# Patient Record
Sex: MALE | Race: WHITE | NOT HISPANIC OR LATINO | Employment: OTHER | URBAN - NONMETROPOLITAN AREA
[De-identification: names, ages, dates, MRNs, and addresses within clinical notes are randomized per-mention and may not be internally consistent; named-entity substitution may affect disease eponyms.]

---

## 2017-10-16 ENCOUNTER — OFFICE VISIT (OUTPATIENT)
Dept: NEUROSURGERY | Facility: CLINIC | Age: 82
End: 2017-10-16

## 2017-10-16 VITALS
WEIGHT: 216 LBS | SYSTOLIC BLOOD PRESSURE: 138 MMHG | BODY MASS INDEX: 30.92 KG/M2 | DIASTOLIC BLOOD PRESSURE: 80 MMHG | HEIGHT: 70 IN

## 2017-10-16 DIAGNOSIS — Z87.891 FORMER SMOKER: ICD-10-CM

## 2017-10-16 DIAGNOSIS — M48.062 SPINAL STENOSIS, LUMBAR REGION, WITH NEUROGENIC CLAUDICATION: Primary | ICD-10-CM

## 2017-10-16 DIAGNOSIS — E66.3 OVERWEIGHT: ICD-10-CM

## 2017-10-16 PROCEDURE — 99203 OFFICE O/P NEW LOW 30 MIN: CPT | Performed by: NEUROLOGICAL SURGERY

## 2017-10-16 RX ORDER — AMLODIPINE BESYLATE 5 MG/1
TABLET ORAL
Status: ON HOLD | COMMUNITY
Start: 2017-09-21 | End: 2017-10-30

## 2017-10-16 RX ORDER — ALPRAZOLAM 0.5 MG/1
TABLET ORAL
Status: ON HOLD | COMMUNITY
Start: 2017-09-13 | End: 2017-10-30 | Stop reason: SDUPTHER

## 2017-10-16 RX ORDER — HYDROCODONE BITARTRATE AND ACETAMINOPHEN 7.5; 325 MG/1; MG/1
TABLET ORAL
COMMUNITY
Start: 2017-10-09

## 2017-10-16 RX ORDER — AMLODIPINE BESYLATE 10 MG/1
TABLET ORAL
Status: ON HOLD | COMMUNITY
Start: 2017-07-22 | End: 2017-10-30

## 2017-10-17 NOTE — PROGRESS NOTES
"    Chief complaint   Chief Complaint   Patient presents with   • Back Pain        Subjective     HPI:     This patient is a 82-year-old male who presents with a 8 month history of spontaneous onset of back pain and trouble walking.  The quality is constant in the severity is 5 out of 10.  The majority of his pain is located in his low back region and his hip region, the timing is all the time.  It is associated with difficulty walking long distances and pain that radiates down his legs.  Walking worsens his symptoms, pain pills improve his symptoms.  He currently is seeing Dr. Mejia, a pain management physician in [place], and is being treated with Lortabs.  He is being referred for findings on his MRI.  His wife is at bedside.    Review of Systems     A 12 point review of systems is obtained and is negative except for as described in HPI    Past Medical History:   Diagnosis Date   • Arthritis    • Diabetes    • Gout    • Hypertension      Past Surgical History:   Procedure Laterality Date   • KNEE SURGERY       No family history on file.  Social History   Substance Use Topics   • Smoking status: Former Smoker   • Smokeless tobacco: None   • Alcohol use No       (Not in a hospital admission)  Allergies:  Review of patient's allergies indicates no known allergies.    Objective      Vital Signs  /80  Ht 70\" (177.8 cm)  Wt 216 lb (98 kg)  BMI 30.99 kg/m2    Physical Exam    No acute distress  Awake alert oriented ×3  HEENT atraumatic normocephalic  Neck supple  Abdomen soft, nontender  Extremities no clubbing, edema, cyanosis  Neurologic exam  Cranial nerves II through XII grossly intact  No pronator drift  Patient moves all extremities 5 out 5 strength  Sensation is intact light touch in upper and lower extremities  2+ biceps reflex, 1+ patellar reflex bilaterally  Ataxic, shuffling gait    Results Review:     MRI of lumbar spine reveals severe lumbar stenosis at L2, 3 and L3, 4 and moderate lumbar " stenosis at L4, 5          Assessment/Plan:     82-year-old male with a three-month history of low back pain and neurogenic claudication with severe lumbar stenosis.  I directly reviewed imaging findings with the patient and I discuss risks, benefits, alternatives of a lumbar laminectomy for treatment of his neurogenic claudication.  Patient acknowledges and declines surgical intervention as it would not address his complaint of low back pain.  Patient states that his wife benefited significantly from pain pump placement.  Patient states that he would like to proceed with pain pump placement and forgo a lumbar laminectomy.  I have discussed patient's wishes with Dr. Mejia's office.  I will follow-up with the patient in the future if he wishes to proceed with a lumbar laminectomy.  Thank you for this consultation.    I discussed the patients findings and my recommendations with patient    Wade uGdino MD  10/17/17  12:03 PM

## 2017-10-30 ENCOUNTER — HOSPITAL ENCOUNTER (INPATIENT)
Facility: HOSPITAL | Age: 82
LOS: 15 days | Discharge: HOME | End: 2017-11-14
Attending: FAMILY MEDICINE | Admitting: FAMILY MEDICINE

## 2017-10-30 DIAGNOSIS — I10 ESSENTIAL HYPERTENSION: ICD-10-CM

## 2017-10-30 DIAGNOSIS — M54.9 CHRONIC BACK PAIN GREATER THAN 3 MONTHS DURATION: ICD-10-CM

## 2017-10-30 DIAGNOSIS — G89.29 CHRONIC BACK PAIN GREATER THAN 3 MONTHS DURATION: ICD-10-CM

## 2017-10-30 DIAGNOSIS — R53.1 LEFT-SIDED WEAKNESS: ICD-10-CM

## 2017-10-30 DIAGNOSIS — Z74.09 IMPAIRED MOBILITY AND ADLS: ICD-10-CM

## 2017-10-30 DIAGNOSIS — R48.9 SYMBOLIC DYSFUNCTION: ICD-10-CM

## 2017-10-30 DIAGNOSIS — M62.81 MUSCLE WEAKNESS (GENERALIZED): ICD-10-CM

## 2017-10-30 DIAGNOSIS — F41.9 CHRONIC ANXIETY: Primary | ICD-10-CM

## 2017-10-30 DIAGNOSIS — Z78.9 IMPAIRED MOBILITY AND ADLS: ICD-10-CM

## 2017-10-30 DIAGNOSIS — R26.9 ABNORMALITY OF GAIT AND MOBILITY: ICD-10-CM

## 2017-10-30 DIAGNOSIS — I63.81 RIGHT-SIDED LACUNAR STROKE (HCC): ICD-10-CM

## 2017-10-30 DIAGNOSIS — I63.81 RIGHT-SIDED LACUNAR STROKE (HCC): Primary | ICD-10-CM

## 2017-10-30 DIAGNOSIS — E11.9 TYPE 2 DIABETES MELLITUS WITHOUT COMPLICATION, WITHOUT LONG-TERM CURRENT USE OF INSULIN (HCC): ICD-10-CM

## 2017-10-30 PROBLEM — G47.33 OBSTRUCTIVE SLEEP APNEA SYNDROME: Status: ACTIVE | Noted: 2017-10-30

## 2017-10-30 PROBLEM — I48.20 ATRIAL FIBRILLATION, CHRONIC (HCC): Status: ACTIVE | Noted: 2017-10-30

## 2017-10-30 PROBLEM — Z51.89 ENCOUNTER FOR REHABILITATION: Status: ACTIVE | Noted: 2017-10-30

## 2017-10-30 LAB
GLUCOSE BLDC GLUCOMTR-MCNC: 100 MG/DL (ref 70–130)
GLUCOSE BLDC GLUCOMTR-MCNC: 130 MG/DL (ref 70–130)

## 2017-10-30 PROCEDURE — 93010 ELECTROCARDIOGRAM REPORT: CPT | Performed by: INTERNAL MEDICINE

## 2017-10-30 PROCEDURE — 82962 GLUCOSE BLOOD TEST: CPT

## 2017-10-30 PROCEDURE — 93005 ELECTROCARDIOGRAM TRACING: CPT | Performed by: FAMILY MEDICINE

## 2017-10-30 PROCEDURE — 99221 1ST HOSP IP/OBS SF/LOW 40: CPT | Performed by: FAMILY MEDICINE

## 2017-10-30 RX ORDER — NAPROXEN 500 MG/1
TABLET ORAL
COMMUNITY
Start: 2017-08-16 | End: 2017-11-14 | Stop reason: HOSPADM

## 2017-10-30 RX ORDER — METOPROLOL SUCCINATE 50 MG/1
50 TABLET, EXTENDED RELEASE ORAL DAILY
Qty: 1 TABLET | Refills: 1
Start: 2017-10-30 | End: 2017-11-14

## 2017-10-30 RX ORDER — GLIMEPIRIDE 2 MG/1
2 TABLET ORAL
Qty: 1 TABLET | Refills: 1
Start: 2017-10-30

## 2017-10-30 RX ORDER — LISINOPRIL 10 MG/1
10 TABLET ORAL
Status: DISCONTINUED | OUTPATIENT
Start: 2017-10-31 | End: 2017-11-14 | Stop reason: HOSPADM

## 2017-10-30 RX ORDER — ASPIRIN 81 MG/1
81 TABLET, CHEWABLE ORAL DAILY
Status: DISCONTINUED | OUTPATIENT
Start: 2017-10-31 | End: 2017-11-14 | Stop reason: HOSPADM

## 2017-10-30 RX ORDER — ASPIRIN 81 MG/1
81 TABLET, CHEWABLE ORAL DAILY
Qty: 1 TABLET | Refills: 1
Start: 2017-10-30

## 2017-10-30 RX ORDER — TERAZOSIN 5 MG/1
5 CAPSULE ORAL NIGHTLY
Status: DISCONTINUED | OUTPATIENT
Start: 2017-10-30 | End: 2017-11-14 | Stop reason: HOSPADM

## 2017-10-30 RX ORDER — ALPRAZOLAM 0.5 MG/1
0.5 TABLET ORAL NIGHTLY PRN
Qty: 1 TABLET | Refills: 0
Start: 2017-10-30 | End: 2017-11-14 | Stop reason: HOSPADM

## 2017-10-30 RX ORDER — CALCIUM CARBONATE 200(500)MG
2 TABLET,CHEWABLE ORAL 2 TIMES DAILY PRN
Status: DISCONTINUED | OUTPATIENT
Start: 2017-10-30 | End: 2017-11-14 | Stop reason: HOSPADM

## 2017-10-30 RX ORDER — PANTOPRAZOLE SODIUM 40 MG/1
40 TABLET, DELAYED RELEASE ORAL DAILY
Qty: 1 TABLET | Refills: 1 | OUTPATIENT
Start: 2017-10-30 | End: 2017-11-14 | Stop reason: HOSPADM

## 2017-10-30 RX ORDER — METOPROLOL SUCCINATE 50 MG/1
50 TABLET, EXTENDED RELEASE ORAL NIGHTLY
Status: DISCONTINUED | OUTPATIENT
Start: 2017-10-30 | End: 2017-11-14 | Stop reason: HOSPADM

## 2017-10-30 RX ORDER — HYDROCODONE BITARTRATE AND ACETAMINOPHEN 7.5; 325 MG/1; MG/1
1 TABLET ORAL EVERY 8 HOURS PRN
Status: DISCONTINUED | OUTPATIENT
Start: 2017-10-30 | End: 2017-10-31

## 2017-10-30 RX ORDER — LISINOPRIL 10 MG/1
10 TABLET ORAL DAILY
Qty: 1 TABLET | Refills: 1
Start: 2017-10-30 | End: 2017-11-14

## 2017-10-30 RX ORDER — DOXAZOSIN 8 MG/1
8 TABLET ORAL NIGHTLY
Qty: 1 TABLET | Refills: 1
Start: 2017-10-30

## 2017-10-30 RX ORDER — CLOPIDOGREL BISULFATE 75 MG/1
75 TABLET ORAL DAILY
Qty: 1 TABLET | Refills: 1
Start: 2017-10-30 | End: 2017-11-14

## 2017-10-30 RX ORDER — PANTOPRAZOLE SODIUM 40 MG/1
40 TABLET, DELAYED RELEASE ORAL
Status: DISCONTINUED | OUTPATIENT
Start: 2017-10-31 | End: 2017-11-14 | Stop reason: HOSPADM

## 2017-10-30 RX ORDER — OMEPRAZOLE 20 MG/1
CAPSULE, DELAYED RELEASE ORAL
COMMUNITY
Start: 2017-09-13

## 2017-10-30 RX ORDER — ACETAMINOPHEN 325 MG/1
650 TABLET ORAL EVERY 4 HOURS PRN
Status: DISCONTINUED | OUTPATIENT
Start: 2017-10-30 | End: 2017-11-14 | Stop reason: HOSPADM

## 2017-10-30 RX ORDER — AMLODIPINE BESYLATE 10 MG/1
10 TABLET ORAL NIGHTLY
Status: DISCONTINUED | OUTPATIENT
Start: 2017-10-30 | End: 2017-11-14 | Stop reason: HOSPADM

## 2017-10-30 RX ORDER — NICOTINE POLACRILEX 4 MG
15 LOZENGE BUCCAL
Status: DISCONTINUED | OUTPATIENT
Start: 2017-10-30 | End: 2017-11-14 | Stop reason: HOSPADM

## 2017-10-30 RX ORDER — LANOLIN ALCOHOL/MO/W.PET/CERES
1 CREAM (GRAM) TOPICAL DAILY
Qty: 1 TABLET | Refills: 1
Start: 2017-10-30 | End: 2017-11-14

## 2017-10-30 RX ORDER — ATORVASTATIN CALCIUM 10 MG/1
10 TABLET, FILM COATED ORAL NIGHTLY
Qty: 1 TABLET | Refills: 1
Start: 2017-10-30 | End: 2017-11-14

## 2017-10-30 RX ORDER — GLIPIZIDE 10 MG/1
10 TABLET ORAL
Status: DISCONTINUED | OUTPATIENT
Start: 2017-10-31 | End: 2017-11-14 | Stop reason: HOSPADM

## 2017-10-30 RX ORDER — AMLODIPINE BESYLATE 5 MG/1
10 TABLET ORAL DAILY
Qty: 1 TABLET | Refills: 1
Start: 2017-10-30 | End: 2017-11-14 | Stop reason: HOSPADM

## 2017-10-30 RX ORDER — ALPRAZOLAM 0.25 MG/1
0.5 TABLET ORAL NIGHTLY PRN
Status: DISCONTINUED | OUTPATIENT
Start: 2017-10-30 | End: 2017-10-31

## 2017-10-30 RX ORDER — ATORVASTATIN CALCIUM 10 MG/1
10 TABLET, FILM COATED ORAL NIGHTLY
Status: DISCONTINUED | OUTPATIENT
Start: 2017-10-30 | End: 2017-11-14 | Stop reason: HOSPADM

## 2017-10-30 RX ORDER — CLOPIDOGREL BISULFATE 75 MG/1
75 TABLET ORAL DAILY
Status: DISCONTINUED | OUTPATIENT
Start: 2017-10-31 | End: 2017-11-14 | Stop reason: HOSPADM

## 2017-10-30 RX ADMIN — AMLODIPINE BESYLATE 10 MG: 10 TABLET ORAL at 20:34

## 2017-10-30 RX ADMIN — METFORMIN HYDROCHLORIDE 1000 MG: 500 TABLET ORAL at 17:02

## 2017-10-30 RX ADMIN — ALPRAZOLAM 0.5 MG: 0.25 TABLET ORAL at 23:53

## 2017-10-30 RX ADMIN — METOPROLOL SUCCINATE 50 MG: 50 TABLET, EXTENDED RELEASE ORAL at 20:33

## 2017-10-30 RX ADMIN — TERAZOSIN HYDROCHLORIDE ANHYDROUS 5 MG: 5 CAPSULE ORAL at 20:33

## 2017-10-30 RX ADMIN — HYDROCORTISONE: 1 CREAM TOPICAL at 17:02

## 2017-10-30 RX ADMIN — ATORVASTATIN CALCIUM 10 MG: 10 TABLET, FILM COATED ORAL at 20:33

## 2017-10-31 LAB
ALBUMIN SERPL-MCNC: 3.5 G/DL (ref 3.4–4.8)
ALBUMIN/GLOB SERPL: 1.3 G/DL (ref 1.1–1.8)
ALP SERPL-CCNC: 45 U/L (ref 38–126)
ALT SERPL W P-5'-P-CCNC: 32 U/L (ref 21–72)
ANION GAP SERPL CALCULATED.3IONS-SCNC: 13 MMOL/L (ref 5–15)
AST SERPL-CCNC: 26 U/L (ref 17–59)
BASOPHILS # BLD AUTO: 0.03 10*3/MM3 (ref 0–0.2)
BASOPHILS NFR BLD AUTO: 0.4 % (ref 0–2)
BILIRUB SERPL-MCNC: 1.5 MG/DL (ref 0.2–1.3)
BUN BLD-MCNC: 16 MG/DL (ref 7–21)
BUN/CREAT SERPL: 20.3 (ref 7–25)
CALCIUM SPEC-SCNC: 9.3 MG/DL (ref 8.4–10.2)
CHLORIDE SERPL-SCNC: 104 MMOL/L (ref 95–110)
CO2 SERPL-SCNC: 25 MMOL/L (ref 22–31)
CREAT BLD-MCNC: 0.79 MG/DL (ref 0.7–1.3)
DEPRECATED RDW RBC AUTO: 43 FL (ref 35.1–43.9)
EOSINOPHIL # BLD AUTO: 0.4 10*3/MM3 (ref 0–0.7)
EOSINOPHIL NFR BLD AUTO: 5 % (ref 0–7)
ERYTHROCYTE [DISTWIDTH] IN BLOOD BY AUTOMATED COUNT: 13.4 % (ref 11.5–14.5)
GFR SERPL CREATININE-BSD FRML MDRD: 94 ML/MIN/1.73 (ref 42–98)
GLOBULIN UR ELPH-MCNC: 2.8 GM/DL (ref 2.3–3.5)
GLUCOSE BLD-MCNC: 116 MG/DL (ref 60–100)
GLUCOSE BLDC GLUCOMTR-MCNC: 108 MG/DL (ref 70–130)
GLUCOSE BLDC GLUCOMTR-MCNC: 113 MG/DL (ref 70–130)
GLUCOSE BLDC GLUCOMTR-MCNC: 127 MG/DL (ref 70–130)
GLUCOSE BLDC GLUCOMTR-MCNC: 158 MG/DL (ref 70–130)
HBA1C MFR BLD: 5.6 % (ref 4–5.6)
HCT VFR BLD AUTO: 39.6 % (ref 39–49)
HGB BLD-MCNC: 13.4 G/DL (ref 13.7–17.3)
IMM GRANULOCYTES # BLD: 0.01 10*3/MM3 (ref 0–0.02)
IMM GRANULOCYTES NFR BLD: 0.1 % (ref 0–0.5)
IRON 24H UR-MRATE: 105 MCG/DL (ref 49–181)
IRON SATN MFR SERPL: 34 % (ref 20–55)
LYMPHOCYTES # BLD AUTO: 1.74 10*3/MM3 (ref 0.6–4.2)
LYMPHOCYTES NFR BLD AUTO: 21.8 % (ref 10–50)
MAGNESIUM SERPL-MCNC: 1.4 MG/DL (ref 1.6–2.3)
MCH RBC QN AUTO: 29.8 PG (ref 26.5–34)
MCHC RBC AUTO-ENTMCNC: 33.8 G/DL (ref 31.5–36.3)
MCV RBC AUTO: 88 FL (ref 80–98)
MONOCYTES # BLD AUTO: 1.01 10*3/MM3 (ref 0–0.9)
MONOCYTES NFR BLD AUTO: 12.6 % (ref 0–12)
NEUTROPHILS # BLD AUTO: 4.8 10*3/MM3 (ref 2–8.6)
NEUTROPHILS NFR BLD AUTO: 60.1 % (ref 37–80)
PLATELET # BLD AUTO: 235 10*3/MM3 (ref 150–450)
PMV BLD AUTO: 9.4 FL (ref 8–12)
POTASSIUM BLD-SCNC: 3.6 MMOL/L (ref 3.5–5.1)
PROT SERPL-MCNC: 6.3 G/DL (ref 6.3–8.6)
RBC # BLD AUTO: 4.5 10*6/MM3 (ref 4.37–5.74)
SODIUM BLD-SCNC: 142 MMOL/L (ref 137–145)
TIBC SERPL-MCNC: 305 MCG/DL (ref 261–462)
TSH SERPL DL<=0.05 MIU/L-ACNC: 3.85 MIU/ML (ref 0.46–4.68)
WBC NRBC COR # BLD: 7.99 10*3/MM3 (ref 3.2–9.8)

## 2017-10-31 PROCEDURE — G8988 SELF CARE GOAL STATUS: HCPCS

## 2017-10-31 PROCEDURE — 99232 SBSQ HOSP IP/OBS MODERATE 35: CPT | Performed by: FAMILY MEDICINE

## 2017-10-31 PROCEDURE — 97530 THERAPEUTIC ACTIVITIES: CPT

## 2017-10-31 PROCEDURE — 83550 IRON BINDING TEST: CPT | Performed by: FAMILY MEDICINE

## 2017-10-31 PROCEDURE — 83036 HEMOGLOBIN GLYCOSYLATED A1C: CPT | Performed by: FAMILY MEDICINE

## 2017-10-31 PROCEDURE — 92523 SPEECH SOUND LANG COMPREHEN: CPT | Performed by: SPEECH-LANGUAGE PATHOLOGIST

## 2017-10-31 PROCEDURE — G8978 MOBILITY CURRENT STATUS: HCPCS | Performed by: PHYSICAL THERAPIST

## 2017-10-31 PROCEDURE — 83735 ASSAY OF MAGNESIUM: CPT | Performed by: FAMILY MEDICINE

## 2017-10-31 PROCEDURE — 82962 GLUCOSE BLOOD TEST: CPT

## 2017-10-31 PROCEDURE — 80053 COMPREHEN METABOLIC PANEL: CPT | Performed by: FAMILY MEDICINE

## 2017-10-31 PROCEDURE — 84443 ASSAY THYROID STIM HORMONE: CPT | Performed by: FAMILY MEDICINE

## 2017-10-31 PROCEDURE — 97116 GAIT TRAINING THERAPY: CPT | Performed by: PHYSICAL THERAPIST

## 2017-10-31 PROCEDURE — G8987 SELF CARE CURRENT STATUS: HCPCS

## 2017-10-31 PROCEDURE — 25010000002 ENOXAPARIN PER 10 MG: Performed by: FAMILY MEDICINE

## 2017-10-31 PROCEDURE — 63710000001 INSULIN ASPART PER 5 UNITS: Performed by: FAMILY MEDICINE

## 2017-10-31 PROCEDURE — 97542 WHEELCHAIR MNGMENT TRAINING: CPT | Performed by: PHYSICAL THERAPIST

## 2017-10-31 PROCEDURE — 97530 THERAPEUTIC ACTIVITIES: CPT | Performed by: PHYSICAL THERAPIST

## 2017-10-31 PROCEDURE — 83540 ASSAY OF IRON: CPT | Performed by: FAMILY MEDICINE

## 2017-10-31 PROCEDURE — 97166 OT EVAL MOD COMPLEX 45 MIN: CPT

## 2017-10-31 PROCEDURE — 97116 GAIT TRAINING THERAPY: CPT

## 2017-10-31 PROCEDURE — G8979 MOBILITY GOAL STATUS: HCPCS | Performed by: PHYSICAL THERAPIST

## 2017-10-31 PROCEDURE — 97535 SELF CARE MNGMENT TRAINING: CPT

## 2017-10-31 PROCEDURE — 85025 COMPLETE CBC W/AUTO DIFF WBC: CPT | Performed by: FAMILY MEDICINE

## 2017-10-31 PROCEDURE — 97162 PT EVAL MOD COMPLEX 30 MIN: CPT | Performed by: PHYSICAL THERAPIST

## 2017-10-31 RX ORDER — ALPRAZOLAM 0.25 MG/1
0.25 TABLET ORAL NIGHTLY PRN
Status: DISCONTINUED | OUTPATIENT
Start: 2017-10-31 | End: 2017-10-31

## 2017-10-31 RX ORDER — ALPRAZOLAM 0.25 MG/1
0.25 TABLET ORAL NIGHTLY PRN
Status: DISCONTINUED | OUTPATIENT
Start: 2017-10-31 | End: 2017-11-14 | Stop reason: HOSPADM

## 2017-10-31 RX ORDER — HYDROCODONE BITARTRATE AND ACETAMINOPHEN 5; 325 MG/1; MG/1
1 TABLET ORAL EVERY 6 HOURS PRN
Status: DISCONTINUED | OUTPATIENT
Start: 2017-10-31 | End: 2017-10-31

## 2017-10-31 RX ADMIN — HYDROCORTISONE: 1 CREAM TOPICAL at 08:47

## 2017-10-31 RX ADMIN — INSULIN ASPART 2 UNITS: 100 INJECTION, SOLUTION INTRAVENOUS; SUBCUTANEOUS at 11:40

## 2017-10-31 RX ADMIN — LISINOPRIL 10 MG: 10 TABLET ORAL at 08:42

## 2017-10-31 RX ADMIN — ASPIRIN 81 MG 81 MG: 81 TABLET ORAL at 08:42

## 2017-10-31 RX ADMIN — METFORMIN HYDROCHLORIDE 1000 MG: 500 TABLET ORAL at 08:42

## 2017-10-31 RX ADMIN — TERAZOSIN HYDROCHLORIDE ANHYDROUS 5 MG: 5 CAPSULE ORAL at 20:07

## 2017-10-31 RX ADMIN — METOPROLOL SUCCINATE 50 MG: 50 TABLET, EXTENDED RELEASE ORAL at 20:07

## 2017-10-31 RX ADMIN — ATORVASTATIN CALCIUM 10 MG: 10 TABLET, FILM COATED ORAL at 20:06

## 2017-10-31 RX ADMIN — THERA TABS 1 TABLET: TAB at 08:42

## 2017-10-31 RX ADMIN — CLOPIDOGREL BISULFATE 75 MG: 75 TABLET ORAL at 08:42

## 2017-10-31 RX ADMIN — ENOXAPARIN SODIUM 40 MG: 40 INJECTION SUBCUTANEOUS at 08:42

## 2017-10-31 RX ADMIN — GLIPIZIDE 10 MG: 10 TABLET ORAL at 08:45

## 2017-10-31 RX ADMIN — HYDROCORTISONE: 1 CREAM TOPICAL at 17:01

## 2017-10-31 RX ADMIN — ALPRAZOLAM 0.25 MG: 0.25 TABLET ORAL at 20:10

## 2017-10-31 RX ADMIN — PANTOPRAZOLE SODIUM 40 MG: 40 TABLET, DELAYED RELEASE ORAL at 05:48

## 2017-10-31 RX ADMIN — MAGNESIUM OXIDE TAB 400 MG (241.3 MG ELEMENTAL MG) 400 MG: 400 (241.3 MG) TAB at 08:42

## 2017-10-31 RX ADMIN — METFORMIN HYDROCHLORIDE 1000 MG: 500 TABLET ORAL at 17:00

## 2017-10-31 RX ADMIN — AMLODIPINE BESYLATE 10 MG: 10 TABLET ORAL at 20:07

## 2017-11-01 LAB
GLUCOSE BLDC GLUCOMTR-MCNC: 114 MG/DL (ref 70–130)
GLUCOSE BLDC GLUCOMTR-MCNC: 118 MG/DL (ref 70–130)
GLUCOSE BLDC GLUCOMTR-MCNC: 140 MG/DL (ref 70–130)
GLUCOSE BLDC GLUCOMTR-MCNC: 99 MG/DL (ref 70–130)

## 2017-11-01 PROCEDURE — 82962 GLUCOSE BLOOD TEST: CPT

## 2017-11-01 PROCEDURE — 92507 TX SP LANG VOICE COMM INDIV: CPT | Performed by: SPEECH-LANGUAGE PATHOLOGIST

## 2017-11-01 PROCEDURE — 97530 THERAPEUTIC ACTIVITIES: CPT

## 2017-11-01 PROCEDURE — 97112 NEUROMUSCULAR REEDUCATION: CPT

## 2017-11-01 PROCEDURE — 97116 GAIT TRAINING THERAPY: CPT

## 2017-11-01 PROCEDURE — 25010000002 ENOXAPARIN PER 10 MG: Performed by: FAMILY MEDICINE

## 2017-11-01 PROCEDURE — 99232 SBSQ HOSP IP/OBS MODERATE 35: CPT | Performed by: FAMILY MEDICINE

## 2017-11-01 PROCEDURE — 97535 SELF CARE MNGMENT TRAINING: CPT

## 2017-11-01 RX ORDER — FINASTERIDE 5 MG/1
5 TABLET, FILM COATED ORAL DAILY
Status: DISCONTINUED | OUTPATIENT
Start: 2017-11-01 | End: 2017-11-14 | Stop reason: HOSPADM

## 2017-11-01 RX ORDER — FLUTICASONE PROPIONATE 50 MCG
2 SPRAY, SUSPENSION (ML) NASAL DAILY
Status: DISCONTINUED | OUTPATIENT
Start: 2017-11-01 | End: 2017-11-14 | Stop reason: HOSPADM

## 2017-11-01 RX ADMIN — FLUTICASONE PROPIONATE 2 SPRAY: 50 SPRAY, METERED NASAL at 14:57

## 2017-11-01 RX ADMIN — HYDROCORTISONE: 1 CREAM TOPICAL at 17:04

## 2017-11-01 RX ADMIN — METFORMIN HYDROCHLORIDE 1000 MG: 500 TABLET ORAL at 17:05

## 2017-11-01 RX ADMIN — FINASTERIDE 5 MG: 5 TABLET, FILM COATED ORAL at 14:58

## 2017-11-01 RX ADMIN — THERA TABS 1 TABLET: TAB at 08:16

## 2017-11-01 RX ADMIN — GLIPIZIDE 10 MG: 10 TABLET ORAL at 08:16

## 2017-11-01 RX ADMIN — ACETAMINOPHEN 650 MG: 325 TABLET ORAL at 14:59

## 2017-11-01 RX ADMIN — LISINOPRIL 10 MG: 10 TABLET ORAL at 08:17

## 2017-11-01 RX ADMIN — ENOXAPARIN SODIUM 40 MG: 40 INJECTION SUBCUTANEOUS at 08:17

## 2017-11-01 RX ADMIN — AMLODIPINE BESYLATE 10 MG: 10 TABLET ORAL at 21:19

## 2017-11-01 RX ADMIN — METOPROLOL SUCCINATE 50 MG: 50 TABLET, EXTENDED RELEASE ORAL at 21:20

## 2017-11-01 RX ADMIN — PANTOPRAZOLE SODIUM 40 MG: 40 TABLET, DELAYED RELEASE ORAL at 05:31

## 2017-11-01 RX ADMIN — CLOPIDOGREL BISULFATE 75 MG: 75 TABLET ORAL at 08:16

## 2017-11-01 RX ADMIN — MAGNESIUM OXIDE TAB 400 MG (241.3 MG ELEMENTAL MG) 400 MG: 400 (241.3 MG) TAB at 08:16

## 2017-11-01 RX ADMIN — ATORVASTATIN CALCIUM 10 MG: 10 TABLET, FILM COATED ORAL at 21:20

## 2017-11-01 RX ADMIN — METFORMIN HYDROCHLORIDE 1000 MG: 500 TABLET ORAL at 08:16

## 2017-11-01 RX ADMIN — TERAZOSIN HYDROCHLORIDE ANHYDROUS 5 MG: 5 CAPSULE ORAL at 21:21

## 2017-11-01 RX ADMIN — ASPIRIN 81 MG 81 MG: 81 TABLET ORAL at 08:17

## 2017-11-01 RX ADMIN — HYDROCORTISONE: 1 CREAM TOPICAL at 08:17

## 2017-11-01 NOTE — PROGRESS NOTES
LOS: 2 days   Patient Care Team:  Evan Marrero MD as PCP - General (Family Medicine)    Chief Complaint:   Right-sided lacunar stroke          Interval History:     SUBJECTIVE:  No shortness of breath no chest pain.  He says his weakness is improving.  His only real complaint is that he's having nocturia and urgency.  This is been a problem for long time he is  taking Hytrin at night but is not helping since his      story taken from: patient chart family RN And therapy staff.  I called his daughter yesterday at length    Objective     Vital Signs  Temp:  [97.8 °F (36.6 °C)] 97.8 °F (36.6 °C)  Heart Rate:  [77-80] 77  Resp:  [20] 20  BP: (129-140)/(63-73) 129/63  Last 3 weights    10/30/17  1500 10/30/17  2300 11/01/17  0100   Weight: 208 lb 4 oz (94.5 kg) 208 lb (94.3 kg) 211 lb (95.7 kg)         Physical Exam:     General Appearance:    Alert, cooperative, in no acute distress   Head:    Normocephalic, without obvious abnormality, atraumatic   Eyes:            Lids and lashes normal, conjunctivae and sclerae normal, no   icterus, no pallor, corneas clear, PERRLA   Throat:   No oral lesions, no thrush, oral mucosa moist   Neck:   No adenopathy, supple, trachea midline, no thyromegaly, no   carotid bruit, no JVD       Lungs:     Clear to auscultation,respirations regular, even and                  UnlGood bilateral air movement    Heart:    Regular rhythm and normal rate, normal S1 and S2, no            murmur, no gallop, no rub, no Ectopy.  rhythm is very regular although noted to be A. fib on EKG   Chest Wall:    No abnormalities observed   Abdomen:     Normal bowel sounds, no masses, no organomegaly, soft        non-tender, non-distended, no guarding, no rebound                Tenderness      Extremities:   Moves all extremities well, no edema, no cyanosis, no             Redness  Still has left upper and lower extremity weakness and spasticity and ataxia         Skin:   No bleeding, bruising or rash    Lymph nodes:   No palpable adenopathy   Neurologic:   Cranial nerves 2 - 12 grossly intact, sensation intact, DTR       present and equal bilCranial nerves December the intact       RESULTS REVIEW:     Lab Results (last 24 hours)     Procedure Component Value Units Date/Time    Hemoglobin A1c [[id number]]  (Normal) Collected:  10/31/17 0530    Specimen:  Blood Updated:  10/31/17 1005     Hemoglobin A1C 5.6 %     POC Glucose Fingerstick [[id number]]  (Abnormal) Collected:  10/31/17 1137    Specimen:  Blood Updated:  10/31/17 1235     Glucose 158 (H) mg/dL       Sliding Scale AdminMeter: [id number]: [id number] CROOK CE       POC Glucose Fingerstick [[id number]]  (Normal) Collected:  10/31/17 1612    Specimen:  Blood Updated:  10/31/17 1637     Glucose 127 mg/dL       RN NotifiedMeter: [id number]: [id number] RIGOBERTO KLARISSA       POC Glucose Fingerstick [[id number]]  (Normal) Collected:  10/31/17 1958    Specimen:  Blood Updated:  10/31/17 2030     Glucose 108 mg/dL       Meter: [id number]: [id number] KEYLA DE SANTIAGOCY ROBERT       POC Glucose Fingerstick [[id number]]  (Normal) Collected:  11/01/17 0519    Specimen:  Blood Updated:  11/01/17 0552     Glucose 118 mg/dL       RN NotifiedMeter: [id number]: [id number] CHALO TUTTLE           Imaging Results (last 72 hours)     ** No results found for the last 72 hours. **          Assessment/Plan     Principal Problem:    Right-sided lacunar stroke  Active Problems:    Left-sided weakness    Atrial fibrillation, chronic    Essential hypertension    Diabetes mellitus    Chronic anxiety    Chronic back pain greater than 3 months duration    Degenerative joint disease involving multiple joints    Encounter for rehabilitation    Obstructive sleep apnea syndrome    Former smoker        Participating well with therapy  diabetes is well-controlled hypertension well controlled.  We'll add Proscar to see if we can help his urinary symptoms  He had a  better night last night although he says he was a little anxious pearly on he slept well he's not complaining of any pain    Daughter was concerned about taking pain medicine here she said that he is not been complaining of pain and I did stop hydrocodone at her request.  Patient has not had complaints of pain      Vishnu Mckinnon MD  11/01/17  9:49 AM       12:45 PM  patient tells me now that he feels like he has water or wax in his right ear and doesn't hear as well.  Daughter says that he has been sneezing since he was hospitalized and that he has not had his hearing aids in 2 weeks.  I examined both external auditory canals and tympanic membranes.  There is no wax there is no foreign body or swelling in the external auditory canal.  His tympanic membranes are dull and white consistent with an effusion in the middle ear.  I reassured him there was no foreign body or wax occluding and recommended Flonase 2 puffs each nostril daily and he is agreeable to this daughter also agreeable

## 2017-11-01 NOTE — PLAN OF CARE
Problem: Patient Care Overview (Adult)  Goal: Plan of Care Review  Outcome: Ongoing (interventions implemented as appropriate)    11/01/17 1159   Coping/Psychosocial Response Interventions   Plan Of Care Reviewed With patient;daughter   Patient Care Overview   Progress progress toward functional goals as expected   Outcome Evaluation   Outcome Summary/Follow up Plan pt continues to have decreased orientation, memory and confusion that is not usual to him.          Problem: Inpatient SLP  Goal: Cognitive-linguistic-Patient will improve Cognitive-linguistic skills to improve safety and safety awareness in environment  Outcome: Ongoing (interventions implemented as appropriate)    10/31/17 1244 11/01/17 1159   Cognitive Linguistic- Improve Safety and Awareness   Cognitive Linguistic Improve Safety and Awareness- SLP, Date Established 10/31/17 --    Cognitive Linguistic Improve Safety and Awareness- SLP, Time to Achieve by discharge --    Cognitive Linguistic Improve Safety and Awareness- SLP, Activity Level Patient will improve orientation for increased safety in environment;Patient will improve memory skills for increased safety in environment;Patient will improve functional problem solving skills for increased safety in environment --    Cognitive Linguistic Improve Safety and Awareness- SLP, Date Goal Reviewed --  11/01/17   Cognitive Linguistic Improve Safety and Awareness- SLP, Outcome --  goal not met

## 2017-11-01 NOTE — PLAN OF CARE
Problem: Patient Care Overview (Adult)  Goal: Plan of Care Review  Outcome: Ongoing (interventions implemented as appropriate)    11/01/17 0129   Coping/Psychosocial Response Interventions   Plan Of Care Reviewed With patient   Patient Care Overview   Progress no change   Outcome Evaluation   Outcome Summary/Follow up Plan pt restless at start of shift no complaints of pain vss 1 episode of urinary incontinence oriented x3         Problem: Fall Risk (Adult)  Goal: Absence of Falls  Outcome: Ongoing (interventions implemented as appropriate)    Problem: Stroke (Ischemic) (Adult)  Goal: Signs and Symptoms of Listed Potential Problems Will be Absent or Manageable (Stroke)  Outcome: Ongoing (interventions implemented as appropriate)

## 2017-11-01 NOTE — CONSULTS
"Adult Nutrition  Assessment    Patient Name:  Kenneth Harper Jr.  YOB: 1934  MRN: [id number]  Admit Date:  10/30/2017    Assessment Date:  11/1/2017    Comments:  Pt said his appetite is not good. Denied GI issues. Pt sat with his daughter and she was encouraging him to eat and drink glucerna. Pt agreed to glucerna twice per day with meals(breakfast and lunch).will continue to monitor and note acceptance of meals. Made aware of menu and the always available list of food.          Reason for Assessment       11/01/17 1553    Reason for Assessment    Reason For Assessment/Visit admission assessment    Identified At Risk By Screening Criteria reduced oral intake over the last month                Nutrition/Diet History       11/01/17 1553    Nutrition/Diet History    Patient Reported Diet/Restrictions/Preferences carbohydrate counting    Typical Food/Fluid Intake pt didn't like the food at lunch and only ate bites. his daughter is there and encouraged him to drink glucerna with breakfast and lunch. pt tried it and did have good acceptance.    Food Preferences likes honey bun with coffee for breakfast-will allow.              Labs/Tests/Procedures/Meds       11/01/17 1557    Labs/Tests/Procedures/Meds    Labs/Tests Review Reviewed;Glucose    Medication Review Reviewed, pertinent    Significant Vitals reviewed              Estimated/Assessed Needs       11/01/17 1558    Calculation Measurements    Weight Used For Calculations 81 kg (178 lb 9.2 oz)    Height Used for Calculations 1.778 m (5' 10\")    Estimated/Assessed Energy Needs    Energy Need Method [facility]    Age 82    RMR ([facility] Equation) 1516.25    Total estimated needs ([facility] 1970    Estimated/Assessed Protein Needs    Weight Used for Protein Calculation 81 kg (178 lb 9.2 oz)    Protein (gm/kg) 1.0    1.0 Gm Protein (gm) 81    Estimated/Assessed Fluid Needs    Fluid Need Method RDA method    RDA Method (mL)  " 2000            Nutrition Prescription Ordered       11/01/17 1600    Nutrition Prescription PO    Current PO Diet Regular    Supplement Glucerna Shake    Supplement Frequency 2 times a day    Common Modifiers Cardiac;Consistent Carbohydrate            Evaluation of Received Nutrient/Fluid Intake       11/01/17 1600    PO Evaluation    Number of Meals 3    % PO Intake 75            Electronically signed by:  Landy Bourgeois RD  11/01/17 4:01 PM

## 2017-11-01 NOTE — PLAN OF CARE
Problem: Patient Care Overview (Adult)  Goal: Plan of Care Review  Outcome: Ongoing (interventions implemented as appropriate)    11/01/17 9592   Coping/Psychosocial Response Interventions   Plan Of Care Reviewed With patient;daughter   Patient Care Overview   Progress no change   Outcome Evaluation   Outcome Summary/Follow up Plan agreed to try glucerna with meals bid to supplement diet.

## 2017-11-01 NOTE — THERAPY TREATMENT NOTE
Inpatient Rehabilitation - Physical Therapy Treatment Note  [facility]     Patient Name: Kenneth Harper Jr.  : 1934  MRN: [id number]  Today's Date: 2017  Onset of Illness/Injury or Date of Surgery Date: 10/30/17  Date of Referral to PT: 10/30/17  Referring Physician: TERRENCE Mckinnon MD    Admit Date: 10/30/2017    Visit Dx:    ICD-10-CM ICD-9-CM   1. Symbolic dysfunction R48.9 784.60   2. Impaired mobility and ADLs Z74.09 799.89   3. Abnormality of gait and mobility R26.9 781.2   4. Muscle weakness (generalized) M62.81 728.87     Patient Active Problem List   Diagnosis   • Spinal stenosis, lumbar region, with neurogenic claudication   • Former smoker   • Overweight   • Left-sided weakness   • Right-sided lacunar stroke   • Essential hypertension   • Diabetes mellitus   • Chronic anxiety   • Chronic back pain greater than 3 months duration   • Prostatic hypertrophy   • Degenerative joint disease involving multiple joints   • Atrial fibrillation, chronic   • Encounter for rehabilitation   • Obstructive sleep apnea syndrome               Adult Rehabilitation Note       17 1524 17 1346 17 1122    Rehab Assessment/Intervention    Discipline (P)  occupational therapist  -RW physical therapy assistant  -RWA occupational therapist  -RW    Document Type  therapy note (daily note)  -RWA therapy note (daily note)  -RW    Subjective Information  agree to therapy  -RWA agree to therapy;no complaints  -RW    Patient Effort, Rehab Treatment  good  -RWA good  -RW    Symptoms Noted During/After Treatment   none  -RW    Precautions/Limitations  fall precautions  -RWA fall precautions  -RW    Recorded by [RW] Jacinta Pitts, OTR/L [RWA] Pipe Gruber PTA [RW] Jacinta Pitts, OTR/L    Vital Signs    Pretreatment Heart Rate (beats/min)  81  -RWA 86  -RW    Posttreatment Heart Rate (beats/min)   87  -RW    Pre SpO2 (%)  98  -RWA 93  -RW    O2 Delivery Pre Treatment  room air  -RWA room  air  -RW    Post SpO2 (%)   95  -RW    O2 Delivery Post Treatment   room air  -RW    Pre Patient Position   Sitting  -RW    Post Patient Position   Sitting  -RW    Recorded by  [RWA] Pipe Gruber PTA [RW] Jacinta Pitts, OTR/L    Pain Assessment    Pain Assessment (P)  0-10  -RW --   gives no rating but c/o left back/flank pain  -RWA No/denies pain  -RW    Recorded by [RW] Jacinta Pitts, OTR/L [RWA] Pipe Gruber PTA [RW] Jacinta Pitts, OTR/L    Vision Assessment/Intervention    Visual Impairment  WFL with corrective lenses  -RWA     Recorded by  [RWA] Pipe Gruber PTA     Cognitive Assessment/Intervention    Current Cognitive/Communication Assessment  impaired  -RWA impaired  -RW    Orientation Status  oriented to;person;place  -RWA oriented to;person;place;situation  -RW    Follows Commands/Answers Questions   able to follow single-step instructions;100% of the time  -RW    Personal Safety  mild impairment  -RWA mild impairment  -RW    Personal Safety Interventions  gait belt;nonskid shoes/slippers when out of bed  -RWA gait belt;fall prevention program maintained;nonskid shoes/slippers when out of bed;supervised activity;muscle strengthening facilitated  -RW    Recorded by  [RWA] Pipe Gruber PTA [RW] Jacinta Pitts, OTR/L    Cognitive Assessment Intervention    Behavior/Mood Observations  behavior appropriate to situation, WNL/WFL  -RWA     Recorded by  [RWA] Pipe Gruber PTA     Bed Mobility, Assessment/Treatment    Bed Mob, Supine to Sit, [place]  minimum assist (75% patient effort)  -RWA     Bed Mob, Sit to Supine, [place]  contact guard assist  -RWA     Recorded by  [RWA] Pipe Gruber PTA     Transfer Assessment/Treatment    Transfers, Bed-Chair [place]  minimum assist (75% patient effort)  -RWA     Transfers, Chair-Bed [place]  minimum assist (75% patient effort)  -RWA     Transfers, Bed-Chair-Bed, Assist Device  rolling walker  -RWA     Transfers,  Sit-Stand [place]  minimum assist (75% patient effort);contact guard assist  -RWA minimum assist (75% patient effort)  -RW    Transfers, Stand-Sit [place]  minimum assist (75% patient effort);verbal cues required;contact guard assist  -RWA minimum assist (75% patient effort)  -RW    Transfers, Sit-Stand-Sit, Assist Device  rolling walker  -RWA rolling walker  -RW    Toilet Transfer, [place]   minimum assist (75% patient effort)  -RW    Toilet Transfer, Assistive Device   rolling walker   grab bar  -RW    Transfer, Safety Issues   step length decreased  -RW    Transfer, Comment   sit-stand from toilet x3 for clothing management, lis care, & application of cream  -RW    Recorded by  [RWA] Pipe Gruber PTA [RW] Jacinta Pitts, OTR/L    Gait Assessment/Treatment    Gait, [place] Level  minimum assist (75% patient effort)  -RWA     Gait, Assistive Device  rolling walker  -RWA     Gait, Distance (Feet)  40   x 3  -RWA     Gait, Gait Deviations  left:;step length decreased   improved  -RWA     Recorded by  [RWA] Pipe Gruber PTA     Functional Mobility    Functional Mobility- Ind. Level   contact guard assist  -RW    Functional Mobility- Device   rolling walker  -RW    Functional Mobility-Distance (Feet)   12   x2  -RW    Functional Mobility- Safety Issues   step length decreased;weight-shifting ability decreased   too far away from AD  -RW    Recorded by   [RW] Jacinta Pitts, OTR/L    Wheelchair Training/Management    Wheelchair, Distance Propelled   25 ft with SBA  -RW    Recorded by   [RW] Jacinta Pitts, OTR/L    Lower Body Bathing Assessment/Training    LB Bathing Assess/Train, Position   standing  -RW    LB Bathing Assess/Train, [place] Level   minimum assist (75% patient effort)   for balance  -RW    LB Bathing Assess/Train, Impairments   impaired balance;strength decreased  -RW    LB Bathing Assess/Train, Comment   Pt washed lis area only & applied magic butt.  -RW     Recorded by   [RW] Jacinta Pitts OTR/L    Grooming Assessment/Training    Grooming Assess/Train, Position   sitting;sink side   w/c  -RW    Grooming Assess/Train, Indepen Level   supervision required;verbal cues required  -RW    Grooming Assess/Train, Impairments   strength decreased;coordination impaired  -RW    Grooming Assess/Train, Comment   Pt brushing teeth upon arrival, washed face & combed hair.  -RW    Recorded by   [RW] Jacinta Pitts OTR/L    Balance Skills Training    Static Standing Balance Support   assistive device  -RW    Standing-Balance Activities   Weight Shift R-L   ADL task  -RW    Standing Balance # of Minutes   5  -RW    Recorded by   [RW] Jacinta Pitts OTR/L    Therapy Exercises    Bilateral Lower Extremities  AROM:;10 reps;supine;ankle pumps/circles;heel slides;hip abduction/adduction;SAQ;standing;heel raises   2 sets of hs . hooklying hip aa x 10 radha  -RWA     Trunk Exercises  10 reps;trunk rotation;supine   hooklying  -RWA     Recorded by  [RWA] Pipe Gruber PTA     Positioning and Restraints    Pre-Treatment Position  other (comment)   eom  -RWA sitting in chair/recliner   w/c  -RW    Post Treatment Position  wheelchair  -RWA wheelchair   at table for lunch  -RW    In Wheelchair  with family/caregiver  -RWA sitting;with family/caregiver  -RW    Recorded by  [RWA] Pipe Gruber PTA [RW] Jacinta Pitts OTR/L      11/01/17 1020 11/01/17 0930 10/31/17 1310    Rehab Assessment/Intervention    Discipline speech language pathologist  -EC physical therapy assistant  -RWA physical therapy assistant  -RWA    Document Type therapy note (daily note)  -EC therapy note (daily note)  -RWA therapy note (daily note)  -RWA    Subjective Information agree to therapy  -EC agree to therapy  -RWA agree to therapy  -RWA    Patient Effort, Rehab Treatment good  -EC good  -RWA good  -RWA    Precautions/Limitations  fall precautions  -RWA fall precautions  -RWA    Recorded by [EC]  Clementina Murrell CCC-SLP [RWA] Pipe Gruber PTA [RWA] Pipe Gruber PTA    Vital Signs    Pre Systolic BP Rehab   117  -RWA    Pre Treatment Diastolic BP   62  -RWA    Pretreatment Heart Rate (beats/min)  70  -RWA 81  -RWA    Pre SpO2 (%)  94  -RWA     O2 Delivery Pre Treatment  room air  -RWA     Recorded by  [RWA] Pipe Gruber PTA [RWA] Pipe Gruber PTA    Pain Assessment    Pain Assessment No/denies pain  -EC No/denies pain  -RWA No/denies pain  -RWA    Recorded by [EC] Clementina Murrell CCC-SLP [RWA] Pipe Gruber PTA [RWA] Pipe Gruber PTA    Vision Assessment/Intervention    Visual Impairment  WFL with corrective lenses  -RWA WFL with corrective lenses  -RWA    Recorded by  [RWA] Pipe Gruber PTA [RWA] Pipe Gruber PTA    Cognitive Assessment/Intervention    Current Cognitive/Communication Assessment  impaired  -RWA impaired  -RWA    Orientation Status  oriented to;person;place  -RWA oriented to;person;place  -RWA    Personal Safety Interventions  gait belt;nonskid shoes/slippers when out of bed  -RWA gait belt;nonskid shoes/slippers when out of bed  -RWA    Recorded by  [RWA] Pipe Gruber PTA [RWA] Pipe Gruber PTA    Cognitive Assessment Intervention    Behavior/Mood Observations  behavior appropriate to situation, WNL/WFL  -RWA     Recorded by  [RWA] Pipe Gruber PTA     Communication Treatment Objective and Progress    Cognitive Linguistic Treatment Objectives Improve orientation;Improve memory skills;Improve functional problem solving  -EC      Recorded by [EC] Clementina Murrell CCC-SLP      Improve orientation    Improve orientation through: demonstrating orientation to day;demonstrating orientation to month;demonstrating orientation to year;demonstrating orientation to place;demonstrating orientation to disease/impairment;90%;with inconsistent cues  -EC      Status: Improve orientation through Progress slower than expected  -EC      Orientation Progress 0%  -EC       Comments: Improve orientation through pt was not oriented to a single question this date.  only recalled that yesterday was halloween, but not date/month  -EC      Recorded by [EC] MERI Schultz      Improve memory skills    Improve memory skills through: recalling related word lists with an imposed delay;90%;with inconsistent cues  -EC      Status: Improve memory skills Progress slower than expected  -EC      Memory Skills Progress 60%  -EC      Comments: Improve memory skills recall of 3 related words  -EC      Recorded by [EC] MERI Schultz      Improve functional problem solving    Improve functional problem solving through: complete organization/home management task;tell similarity between items  -EC      Status: Improve functional problem solving Progressing as expected  -EC      Functional Problem Solving Progress 70%  -EC      Comments: Improve functional problem solving pt identified early/late with 90% asccuracy and identified information on a bill and performed check writing with 60%   -EC      Recorded by [EC] MERI Schultz      Bed Mobility, Assessment/Treatment    Bed Mobility, Assistive Device   --  -RWA    Bed Mobility, Scoot/Bridge, Kay   --  -RWA    Bed Mob, Supine to Sit, [place]  minimum assist (75% patient effort)  -RWA --  -RWA    Bed Mob, Sit to Supine, [place]  contact guard assist  -RWA --  -RWA    Recorded by  [RWA] Pipe Gruber PTA [RWA] Pipe Gruber PTA    Transfer Assessment/Treatment    Transfers, Bed-Chair [place]  minimum assist (75% patient effort)  -RWA     Transfers, Chair-Bed [place]  minimum assist (75% patient effort)  -RWA     Transfers, Bed-Chair-Bed, Assist Device  rolling walker  -RWA     Transfers, Sit-Stand [place]  minimum assist (75% patient effort);contact guard assist  -RWA minimum assist (75% patient effort)  -RWA    Transfers, Stand-Sit [place]  minimum assist (75% patient  effort);verbal cues required;contact guard assist  -RWA minimum assist (75% patient effort);verbal cues required  -RWA    Transfers, Sit-Stand-Sit, Assist Device  rolling walker  -RWA rolling walker  -RWA    Transfer, Comment  sit to stand x 5, one lob  -RWA sit to stand x 5  -RWA    Recorded by  [RWA] Pipe Gruber PTA [RWA] Pipe Gruber PTA    Gait Assessment/Treatment    Gait, [place] Level  minimum assist (75% patient effort)  -RWA minimum assist (75% patient effort)  -RWA    Gait, Assistive Device  rolling walker  -RWA rolling walker  -RWA    Gait, Distance (Feet)  70  -RWA 30  -RWA    Gait, Gait Deviations  leans to the left;step length decreased;toe-to-floor clearance decreased;left:  -RWA leans to the left;narrow base  -RWA    Recorded by  [RWA] Pipe Gruber PTA [RWA] Pipe Gruber PTA    Wheelchair Training/Management    Wheelchair, Distance Propelled  150 sba and lots of vcues  -RWA  x 2  -RWA    Wheelchair Training Comment   decreased hand strength on left and limited ability to use le at this time  -RWA    Recorded by  [RWA] Pipe Gruber PTA [RWA] Pipe Gruber PTA    Therapy Exercises    Bilateral Lower Extremities  AROM:;10 reps;supine;ankle pumps/circles;heel slides;hip abduction/adduction;SAQ;standing;heel raises  -RWA AROM:;10 reps;standing;heel raises  -RWA    Recorded by  [RWA] Pipe Gruber PTA [RWA] Pipe Gruber PTA    Positioning and Restraints    Pre-Treatment Position  sitting in chair/recliner  -RWA sitting in chair/recliner  -RWA    Post Treatment Position  wheelchair  -RWA wheelchair  -RWA    In Wheelchair  with SLP  -RWA with family/caregiver  -RWA    Recorded by  [[email or web address]] Pipe Gruber PTA [RWA] Pipe Gruber PTA      10/31/17 1101          Rehab Assessment/Intervention    Discipline speech language pathologist  -HR      Patient Effort, Rehab Treatment good  -HR      Precautions/Limitations fall precautions  -HR      Recorded by [HR] Shante Otero MS CCC-SLP         User Key  (r) = Recorded By, (t) = Taken By, (c) = Cosigned By    Initials Name Effective Dates    EC Clementina Murrell, CCC-SLP 12/30/16 -     RW Jacinta Pitts, OTR/L 10/17/16 -     HR Shante Otero, MS CCC-SLP 12/15/16 -     RWA Pipe Gruber, PTA 10/17/16 -                 IP PT Goals       11/01/17 1543 10/31/17 0825       Bed Mobility PT LTG    Bed Mobility PT LTG, Date Established  10/31/17  -LM     Bed Mobility PT LTG, Time to Achieve  by discharge  -LM     Bed Mobility PT LTG, Activity Type  supine to sit/sit to supine  -LM     Bed Mobility PT LTG, [place] Level  supervision required  -LM     Bed Mobility PT LTG, Additional Goal  HOB flat; No BR's  -LM     Bed Mobility PT LTG, Date Goal Reviewed 11/01/17  -RW      Bed Mobility PT LTG, Outcome goal ongoing  -RW goal ongoing  -LM     Transfer Training PT LTG    Transfer Training PT LTG, Date Established  10/31/17  -LM     Transfer Training PT LTG, Time to Achieve  by discharge  -LM     Transfer Training PT LTG, Activity Type  bed to chair /chair to bed  -LM     Transfer Training PT LTG, [place] Level  supervision required  -LM     Transfer Training PT LTG, Assist Device  --   AAD  -LM     Transfer Training PT  LTG, Date Goal Reviewed 11/01/17  -RW      Transfer Training PT LTG, Outcome goal ongoing  -RW goal ongoing  -LM     Gait Training PT LTG    Gait Training Goal PT LTG, Date Established  10/31/17  -LM     Gait Training Goal PT LTG, Time to Achieve  by discharge  -LM     Gait Training Goal PT LTG, [place] Level  supervision required  -LM     Gait Training Goal PT LTG, Assist Device  --   AAD  -LM     Gait Training Goal PT LTG, Distance to Achieve  150 feet  -LM     Gait Training Goal PT LTG, Date Goal Reviewed 11/01/17  -RW      Gait Training Goal PT LTG, Outcome goal ongoing  -RW goal ongoing  -LM     Stair Training PT STG    Stair Training Goal PT STG, Date Established  10/31/17  -LM     Stair Training Goal PT STG, Time to  Achieve  4 days  -LM     Stair Training Goal PT STG, Number of Steps  4 steps  -LM     Stair Training Goal PT STG, [place] Level  contact guard assist  -LM     Stair Training Goal PT STG, Assist Device  1 handrail  -LM     Stair Training Goal PT STG, Date Goal Reviewed 11/01/17  -RW      Stair Training Goal PT STG, Outcome goal ongoing  -RW goal ongoing  -LM     Stair Training PT LTG    Stair Training Goal PT LTG, Date Established  10/31/17  -LM     Stair Training Goal PT LTG, Time to Achieve  by discharge  -LM     Stair Training Goal PT LTG, Number of Steps  1 flight  -LM     Stair Training Goal PT LTG, [place] Level  supervision required  -LM     Stair Training Goal PT LTG, Assist Device  2 handrails  -LM     Stair Training Goal PT LTG, Date Goal Reviewed 11/01/17  -RW      Stair Training Goal PT LTG, Outcome goal ongoing  -RW goal ongoing  -LM       User Key  (r) = Recorded By, (t) = Taken By, (c) = Cosigned By    Initials Name Provider Type    LM Landy Mckeon, LEOLA Physical Therapist    KATHIE Gruber, PTA Physical Therapy Assistant          Physical Therapy Education     Title: PT OT SLP Therapies (Active)     Topic: Physical Therapy (Active)     Point: Mobility training (Done)    Learning Progress Summary    Learner Readiness Method Response Comment Documented by Status   Patient Acceptance E VU reviewed safe transfers and risks of falls  11/01/17 1052 Done    Acceptance E NR Reviewed safety with transfers and ambulation.  10/31/17 1506 Active               Point: Precautions (Done)    Learning Progress Summary    Learner Readiness Method Response Comment Documented by Status   Patient Acceptance E VU reviewed safe transfers and risks of falls  11/01/17 1052 Done    Acceptance E NR Reviewed safety with transfers and ambulation.  10/31/17 1506 Active                      User Key     Initials Effective Dates Name Provider Type Discipline     06/15/16 -  Landy Mckeon, LEOLA Physical Therapist  PT    RW 10/17/16 -  Pipe Gruber PTA Physical Therapy Assistant PT                    PT Recommendation and Plan  Anticipated Equipment Needs At Discharge: front wheeled walker  Anticipated Discharge Disposition: home with 24/7 care, home with home health  Planned Therapy Interventions: balance training, bed mobility training, gait training, home exercise program, motor coordination training, neuromuscular re-education, patient/family education, postural re-education, ROM (Range of Motion), stair training, strengthening, stretching, transfer training, wheelchair management/propulsion training  PT Frequency: other (see comments) (5-14 times/wk)  Plan of Care Review  Plan Of Care Reviewed With: patient  Outcome Summary/Follow up Plan: some improvement is gt quality noted and with cues has improved sit to stand function. cont wiht PT          Outcome Measures       11/01/17 1122 11/01/17 1000 10/31/17 0925    How much help from another person do you currently need...    Turning from your back to your side while in flat bed without using bedrails?  3  -RW     Moving from lying on back to sitting on the side of a flat bed without bedrails?  3  -RW     Moving to and from a bed to a chair (including a wheelchair)?  3  -RW     Standing up from a chair using your arms (e.g., wheelchair, bedside chair)?  3  -RW     Climbing 3-5 steps with a railing?  2  -RW     To walk in hospital room?  3  -RW     AM-PAC 6 Clicks Score  17  -RW     How much help from another is currently needed...    Putting on and taking off regular lower body clothing? 2  -RWA  2  -BH (r) SP (t) BH (c)    Bathing (including washing, rinsing, and drying) 2  -RWA  2  -BH (r) SP (t) BH (c)    Toileting (which includes using toilet bed pan or urinal) 2  -RWA  2  -BH (r) SP (t) BH (c)    Putting on and taking off regular upper body clothing 3  -RWA  3  -BH (r) SP (t) BH (c)    Taking care of personal grooming (such as brushing teeth) 3  -RWA  3  -BH (r) SP  (t) BH (c)    Eating meals 3  -RWA  3  -BH (r) SP (t) BH (c)    Score 15  -RWA  15  -BH (r) SP (t)    Functional Assessment    Outcome Measure Options AM-PAC 6 Clicks Daily Activity (OT)  -RWA  AM-PAC 6 Clicks Daily Activity (OT)  -BH (r) SP (t) BH (c)      10/31/17 0825          How much help from another person do you currently need...    Turning from your back to your side while in flat bed without using bedrails? 3  -LM      Moving from lying on back to sitting on the side of a flat bed without bedrails? 3  -LM      Moving to and from a bed to a chair (including a wheelchair)? 2  -LM      Standing up from a chair using your arms (e.g., wheelchair, bedside chair)? 2  -LM      Climbing 3-5 steps with a railing? 2  -LM      To walk in hospital room? 3  -LM      AM-PAC 6 Clicks Score 15  -LM      Functional Assessment    Outcome Measure Options AM-PAC 6 Clicks Basic Mobility (PT)  -LM        User Key  (r) = Recorded By, (t) = Taken By, (c) = Cosigned By    Initials Name Provider Type     Landy Mckeon, PT Physical Therapist     Karoline Huang, OTR/L Occupational Therapist    THERESA Pitts, OTR/L Occupational Therapist    KATHIE Gruber PTA Physical Therapy Assistant    DIONTE Bruce, OT Student OT Student           Time Calculation:         PT Charges       11/01/17 1545 11/01/17 1053       Time Calculation    Start Time 1346  -RW 0930  -RW     Stop Time 1432  -RW 1015  -RW     Time Calculation (min) 46 min  -RW 45 min  -RW     Time Calculation- PT    Total Timed Code Minutes- PT 46 minute(s)  -RW 45 minute(s)  -RW       User Key  (r) = Recorded By, (t) = Taken By, (c) = Cosigned By    Initials Name Provider Type    KATHIE Gruber PTA Physical Therapy Assistant          Therapy Charges for Today     Code Description Service Date Service Provider Modifiers Qty    50868049847 HC GAIT TRAINING EA 15 MIN 10/31/2017 Pipe Gruber PTA GP 1    98589209904 HC PT THERAPEUTIC ACT EA 15 MIN  10/31/2017 Pipe HOWARD Yasmani, PTA GP 1    50351094889 HC PT THERAPEUTIC ACT EA 15 MIN 10/31/2017 Pipe HOWARD Yasmani, PTA GP 1    62460177479 HC GAIT TRAINING EA 15 MIN 11/1/2017 Pipe HOWARD Yasmani, PTA GP 1    95661569594 HC PT NEUROMUSC RE EDUCATION EA 15 MIN 11/1/2017 Pipe HOWARD Yasmani, PTA GP 1    33119605741 HC PT THERAPEUTIC ACT EA 15 MIN 11/1/2017 Pipe HOWARD Yasmani, PTA GP 1    93403071509 HC GAIT TRAINING EA 15 MIN 11/1/2017 Pipe Gruber, PTA GP 1    23713869660 HC PT NEUROMUSC RE EDUCATION EA 15 MIN 11/1/2017 Pipe HOWARD Yasmani, PTA GP 1    41955408192 HC PT THERAPEUTIC ACT EA 15 MIN 11/1/2017 Pipe HOWARD Yasmani, PTA GP 1          PT G-Codes  PT Professional Judgement Used?: Yes  Outcome Measure Options: AM-PAC 6 Clicks Daily Activity (OT)  Score: 15  Functional Limitation: Mobility: Walking and moving around  Mobility: Walking and Moving Around Current Status (): At least 40 percent but less than 60 percent impaired, limited or restricted  Mobility: Walking and Moving Around Goal Status (): At least 1 percent but less than 20 percent impaired, limited or restricted    Pipe LEE Gruber PTA  11/1/2017

## 2017-11-01 NOTE — PROGRESS NOTES
"Adult Nutrition  Assessment    Patient Name:  Kenneth Harper Jr.  YOB: 1934  MRN: [id number]  Admit Date:  10/30/2017    Assessment Date:  11/1/2017    Comments:  Noted a stage 1 pressure ulcer on coccyx. glucerna with meals bid will add protein and calories to present intake and can help promote healing.          Reason for Assessment       11/01/17 1553    Reason for Assessment    Reason For Assessment/Visit admission assessment    Identified At Risk By Screening Criteria reduced oral intake over the last month                Nutrition/Diet History       11/01/17 1553    Nutrition/Diet History    Patient Reported Diet/Restrictions/Preferences carbohydrate counting    Typical Food/Fluid Intake pt didn't like the food at lunch and only ate bites. his daughter is there and encouraged him to drink glucerna with breakfast and lunch. pt tried it and did have good acceptance.    Food Preferences likes honey bun with coffee for breakfast-will allow.              Labs/Tests/Procedures/Meds       11/01/17 1557    Labs/Tests/Procedures/Meds    Labs/Tests Review Reviewed;Glucose    Medication Review Reviewed, pertinent    Significant Vitals reviewed              Estimated/Assessed Needs       11/01/17 1558    Calculation Measurements    Weight Used For Calculations 81 kg (178 lb 9.2 oz)    Height Used for Calculations 1.778 m (5' 10\")    Estimated/Assessed Energy Needs    Energy Need Method Jackson-St or    Age 82    RMR (Jackson-St. Jeor Equation) 1516.25    Total estimated needs (Jackson St. Jeor) 1970    Estimated/Assessed Protein Needs    Weight Used for Protein Calculation 81 kg (178 lb 9.2 oz)    Protein (gm/kg) 1.0    1.0 Gm Protein (gm) 81    Estimated/Assessed Fluid Needs    Fluid Need Method RDA method    RDA Method (mL)  2000            Nutrition Prescription Ordered       11/01/17 1600    Nutrition Prescription PO    Current PO Diet Regular    Supplement Glucerna Shake    Supplement " Frequency 2 times a day    Common Modifiers Cardiac;Consistent Carbohydrate            Evaluation of Received Nutrient/Fluid Intake       11/01/17 1600    PO Evaluation    Number of Meals 3    % PO Intake 75            Electronically signed by:  Landy Bourgeois RD  11/01/17 4:19 PM

## 2017-11-01 NOTE — THERAPY TREATMENT NOTE
Inpatient Rehabilitation - Occupational Therapy Treatment Note  [facility]     Patient Name: Kenneth Harper Jr.  : 1934  MRN: [id number]  Today's Date: 2017  Onset of Illness/Injury or Date of Surgery Date: 10/30/17  Date of Referral to OT: 10/30/17  Referring Physician: TERRENCE Mckinnon MD      Admit Date: 10/30/2017    Visit Dx:     ICD-10-CM ICD-9-CM   1. Symbolic dysfunction R48.9 784.60   2. Impaired mobility and ADLs Z74.09 799.89   3. Abnormality of gait and mobility R26.9 781.2   4. Muscle weakness (generalized) M62.81 728.87     Patient Active Problem List   Diagnosis   • Spinal stenosis, lumbar region, with neurogenic claudication   • Former smoker   • Overweight   • Left-sided weakness   • Right-sided lacunar stroke   • Essential hypertension   • Diabetes mellitus   • Chronic anxiety   • Chronic back pain greater than 3 months duration   • Prostatic hypertrophy   • Degenerative joint disease involving multiple joints   • Atrial fibrillation, chronic   • Encounter for rehabilitation   • Obstructive sleep apnea syndrome             Adult Rehabilitation Note       17 1122 17 1020 17 0930    Rehab Assessment/Intervention    Discipline occupational therapist  -RW speech language pathologist  -EC physical therapy assistant  -RWA    Document Type therapy note (daily note)  -RW therapy note (daily note)  -EC therapy note (daily note)  -RWA    Subjective Information agree to therapy;no complaints  -RW agree to therapy  -EC agree to therapy  -RWA    Patient Effort, Rehab Treatment good  -RW good  -EC good  -RWA    Symptoms Noted During/After Treatment none  -RW      Precautions/Limitations fall precautions  -RW  fall precautions  -RWA    Recorded by [RW] Jacinta Pitts OTR/L [EC] Clementina Murrell CCC-SLP [RWA] Pipe Gruber PTA    Vital Signs    Pretreatment Heart Rate (beats/min) 86  -RW  70  -RWA    Posttreatment Heart Rate (beats/min) 87  -RW      Pre SpO2 (%) 93   -RW  94  -RWA    O2 Delivery Pre Treatment room air  -RW  room air  -RWA    Post SpO2 (%) 95  -RW      O2 Delivery Post Treatment room air  -RW      Pre Patient Position Sitting  -RW      Post Patient Position Sitting  -RW      Recorded by [RW] Jacinta Pitts, OTR/L  [RWA] Pipe Gruber PTA    Pain Assessment    Pain Assessment No/denies pain  -RW No/denies pain  -EC No/denies pain  -RWA    Recorded by [RW] Jacinta Pitts, OTR/L [EC] Clementina Murrell CCC-SLP [RWA] Ppie Gruber PTA    Vision Assessment/Intervention    Visual Impairment   WFL with corrective lenses  -RWA    Recorded by   [RWA] Pipe Gruber PTA    Cognitive Assessment/Intervention    Current Cognitive/Communication Assessment impaired  -RW  impaired  -RWA    Orientation Status oriented to;person;place;situation  -RW  oriented to;person;place  -RWA    Follows Commands/Answers Questions able to follow single-step instructions;100% of the time  -RW      Personal Safety mild impairment  -RW      Personal Safety Interventions gait belt;fall prevention program maintained;nonskid shoes/slippers when out of bed;supervised activity;muscle strengthening facilitated  -RW  gait belt;nonskid shoes/slippers when out of bed  -RWA    Recorded by [RW] Jacinta Pitts, OTR/L  [RWA] Pipe Gruber PTA    Cognitive Assessment Intervention    Behavior/Mood Observations   behavior appropriate to situation, WNL/WFL  -RWA    Recorded by   [RWA] Pipe Gruber PTA    Communication Treatment Objective and Progress    Cognitive Linguistic Treatment Objectives  Improve orientation;Improve memory skills;Improve functional problem solving  -EC     Recorded by  [EC] Clementina Murrell CCC-SLP     Improve orientation    Improve orientation through:  demonstrating orientation to day;demonstrating orientation to month;demonstrating orientation to year;demonstrating orientation to place;demonstrating orientation to disease/impairment;90%;with inconsistent cues   -EC     Status: Improve orientation through  Progress slower than expected  -EC     Orientation Progress  0%  -EC     Comments: Improve orientation through  pt was not oriented to a single question this date.  only recalled that yesterday was halloween, but not date/month  -EC     Recorded by  [EC] Clementina Murrell CCC-SLP     Improve memory skills    Improve memory skills through:  recalling related word lists with an imposed delay;90%;with inconsistent cues  -EC     Status: Improve memory skills  Progress slower than expected  -EC     Memory Skills Progress  60%  -EC     Comments: Improve memory skills  recall of 3 related words  -EC     Recorded by  [EC] MERI Schultz     Improve functional problem solving    Improve functional problem solving through:  complete organization/home management task;tell similarity between items  -EC     Status: Improve functional problem solving  Progressing as expected  -EC     Functional Problem Solving Progress  70%  -EC     Comments: Improve functional problem solving  pt identified early/late with 90% asccuracy and identified information on a bill and performed check writing with 60%   -EC     Recorded by  [EC] Clementina Murrell CCC-SLP     Bed Mobility, Assessment/Treatment    Bed Mob, Supine to Sit, [place]   minimum assist (75% patient effort)  -RWA    Bed Mob, Sit to Supine, [place]   contact guard assist  -RWA    Recorded by   [RWA] Pipe Gruber, PTA    Transfer Assessment/Treatment    Transfers, Bed-Chair [place]   minimum assist (75% patient effort)  -RWA    Transfers, Chair-Bed [place]   minimum assist (75% patient effort)  -RWA    Transfers, Bed-Chair-Bed, Assist Device   rolling walker  -RWA    Transfers, Sit-Stand [place] minimum assist (75% patient effort)  -RW  minimum assist (75% patient effort);contact guard assist  -RWA    Transfers, Stand-Sit [place] minimum assist (75% patient effort)  -RW  minimum assist (75%  patient effort);verbal cues required;contact guard assist  -RWA    Transfers, Sit-Stand-Sit, Assist Device rolling walker  -RW  rolling walker  -RWA    Toilet Transfer, [place] minimum assist (75% patient effort)  -RW      Toilet Transfer, Assistive Device rolling walker   grab bar  -RW      Transfer, Safety Issues step length decreased  -RW      Transfer, Comment sit-stand from toilet x3 for clothing management, lis care, & application of cream  -RW  sit to stand x 5, one lob  -RWA    Recorded by [RW] Jacinta Pitts, OTR/L  [RWA] Pipe Gruber, PTA    Gait Assessment/Treatment    Gait, [place] Level   minimum assist (75% patient effort)  -RWA    Gait, Assistive Device   rolling walker  -RWA    Gait, Distance (Feet)   70  -RWA    Gait, Gait Deviations   leans to the left;step length decreased;toe-to-floor clearance decreased;left:  -RWA    Recorded by   [RWA] Pipe Gruber, PTA    Functional Mobility    Functional Mobility- Ind. Level contact guard assist  -RW      Functional Mobility- Device rolling walker  -RW      Functional Mobility-Distance (Feet) 12   x2  -RW      Functional Mobility- Safety Issues step length decreased;weight-shifting ability decreased   too far away from AD  -RW      Recorded by [RW] Jacinta Pitts, OTR/L      Wheelchair Training/Management    Wheelchair, Distance Propelled 25 ft with SBA  -RW  150 sba and lots of vcues  -RWA    Recorded by [RW] Jacinta Pitts, OTR/L  [RWA] Pipe Gruber, PTA    Lower Body Bathing Assessment/Training    LB Bathing Assess/Train, Position standing  -RW      LB Bathing Assess/Train, [place] Level minimum assist (75% patient effort)   for balance  -RW      LB Bathing Assess/Train, Impairments impaired balance;strength decreased  -RW      LB Bathing Assess/Train, Comment Pt washed lis area only & applied magic butt.  -RW      Recorded by [RW] Jacinta Pitts OTR/L      Grooming Assessment/Training    Grooming Assess/Train,  Position sitting;sink side   w/c  -RW      Grooming Assess/Train, [place]ep[place] Level supervision required;verbal cues required  -RW      Grooming Assess/Train, Impairments strength decreased;coordination impaired  -RW      Grooming Assess/Train, Comment Pt brushing teeth upon arrival, washed face & combed hair.  -RW      Recorded by [RW] Jacinta Pitts OTR/L      Balance Skills Training    Static Standing Balance Support assistive device  -RW      Standing-Balance Activities Weight Shift R-L   ADL task  -RW      Standing Balance # of Minutes 5  -RW      Recorded by [RW] Jacinta Pitts, OTR/L      Therapy Exercises    Bilateral Lower Extremities   AROM:;10 reps;supine;ankle pumps/circles;heel slides;hip abduction/adduction;SAQ;standing;heel raises  -RWA    Recorded by   [RWA] Pipe Gruber PTA    Positioning and Restraints    Pre-Treatment Position sitting in chair/recliner   w/c  -RW  sitting in chair/recliner  -RWA    Post Treatment Position wheelchair   at table for lunch  -RW  wheelchair  -RWA    In Wheelchair sitting;with family/caregiver  -RW  with SLP  -RWA    Recorded by [RW] Jacinta Pitts OTR/L  [RWA] Pipe Gruber PTA      10/31/17 1310 10/31/17 1101       Rehab Assessment/Intervention    Discipline physical therapy assistant  -RWA speech language pathologist  -HR     Document Type therapy note (daily note)  -RWA      Subjective Information agree to therapy  -RWA      Patient Effort, Rehab Treatment good  -RWA good  -HR     Precautions/Limitations fall precautions  -RWA fall precautions  -HR     Recorded by [RWA] Pipe Gruber PTA [HR] Shante Otero MS CCC-SLP     Vital Signs    Pre Systolic BP Rehab 117  -RWA      Pre Treatment Diastolic BP 62  -RWA      Pretreatment Heart Rate (beats/min) 81  -RWA      Recorded by [RWA] Pipe Gruber PTA      Pain Assessment    Pain Assessment No/denies pain  -RWA      Recorded by [RWA] Pipe Gruber PTA      Vision Assessment/Intervention    Visual  Impairment WFL with corrective lenses  -RWA      Recorded by [RWJADIEL] Pipe Gruber PTA      Cognitive Assessment/Intervention    Current Cognitive/Communication Assessment impaired  -RWA      Orientation Status oriented to;person;place  -RWA      Personal Safety Interventions gait belt;nonskid shoes/slippers when out of bed  -RWA      Recorded by [RWA] Pipe Gruber PTA      Bed Mobility, Assessment/Treatment    Bed Mobility, Assistive Device --  -RWA      Bed Mobility, Scoot/Bridge, [place] --  -RWA      Bed Mob, Supine to Sit, [place] --  -RWA      Bed Mob, Sit to Supine, [place] --  -RWA      Recorded by [RWJADIEL] Pipe Gruber PTA      Transfer Assessment/Treatment    Transfers, Sit-Stand [place] minimum assist (75% patient effort)  -RWA      Transfers, Stand-Sit [place] minimum assist (75% patient effort);verbal cues required  -RWA      Transfers, Sit-Stand-Sit, Assist Device rolling walker  -RWA      Transfer, Comment sit to stand x 5  -RWA      Recorded by [THERESA] Pipe Gruber PTA      Gait Assessment/Treatment    Gait, [place] Level minimum assist (75% patient effort)  -RWA      Gait, Assistive Device rolling walker  -RWA      Gait, Distance (Feet) 30  -RWA      Gait, Gait Deviations leans to the left;narrow base  -RWA      Recorded by [THERESA] Pipe rGuber PTA      Wheelchair Training/Management    Wheelchair, Distance Propelled  x 2  -RWA      Wheelchair Training Comment decreased hand strength on left and limited ability to use le at this time  -RWA      Recorded by [THERESA] Pipe Gruber PTA      Therapy Exercises    Bilateral Lower Extremities AROM:;10 reps;standing;heel raises  -RWA      Recorded by [THERESA] Pipe Gruber PTA      Positioning and Restraints    Pre-Treatment Position sitting in chair/recliner  -RWA      Post Treatment Position wheelchair  -RWA      In Wheelchair with family/caregiver  -RWA      Recorded by [THERESA] Pipe Gruber PTA        User Key  (r) =  Recorded By, (t) = Taken By, (c) = Cosigned By    Initials Name Effective Dates    EC Clementina Murrell, CCC-SLP 12/30/16 -     RW Jacinta Pitts, OTR/L 10/17/16 -     HR Shante Otero, MS CCC-SLP 12/15/16 -     THERESA Gruber, PTA 10/17/16 -                 OT Goals       10/31/17 0925          Transfer Training OT LTG    Transfer Training OT LTG, Date Established 10/31/17  -BH (r) SP (t) BH (c)      Transfer Training OT LTG, Time to Achieve by discharge  -BH (r) SP (t) BH (c)      Transfer Training OT LTG, Activity Type all transfers  -BH (r) SP (t) BH (c)      Transfer Training OT LTG, [place] Level contact guard assist  -BH (r) SP (t) BH (c)      Patient Education OT LTG    Patient Education OT LTG, Date Established 10/31/17  -BH (r) SP (t) BH (c)      Patient Education OT LTG, Time to Achieve by discharge  -BH (r) SP (t) BH (c)      Patient Education OT LTG, Education Type HEP;posture/body mechanics;1 hand/anni technique;home safety;adaptive equipment mgmt;energy conservation  -BH (r) SP (t) BH (c)      Patient Education OT LTG, Education Understanding independent;demonstrates adequately;verbalizes understanding   with caregiver assist as necessary  -BH (r) SP (t) BH (c)      Safety Awareness OT LTG    Safety Awareness OT LTG, Date Established 10/31/17  -BH (r) SP (t) BH (c)      Safety Awareness OT LTG, Time to Achieve by discharge  -BH (r) SP (t) BH (c)      Safety Awareness OT LTG, Activity Type good safety awareness;with kitchen mobility;with ADL's  -BH (r) SP (t) BH (c)      Safety Awareness OT LTG, [place] Level min verbal cues  -BH (r) SP (t) BH (c)      ADL OT LTG    ADL OT LTG, Date Established 10/31/17  -BH (r) SP (t) BH (c)      ADL OT LTG, Time to Achieve by discharge  -BH (r) SP (t) BH (c)      ADL OT LTG, Activity Type ADL skills  -BH (r) SP (t) BH (c)      ADL OT LTG, Additional Goal Set-up A with self-feeding and grooming; VC for UB bathing and UB dressing; CGA with LB  bathing and LB dressing  -BH (r) SP (t) BH (c)      Endurance OT LTG    Endurance Goal OT LTG, Date Established 10/31/17  -BH (r) SP (t) BH (c)      Endurance Goal OT LTG, Time to Achieve by discharge  -BH (r) SP (t) BH (c)      Endurance Goal OT LTG, Activity Level endurance 2 good-  -BH (r) SP (t) BH (c)      Endurance Goal OT LTG, Additional Goal During 30 minutes of functional tasks, ADL, and therapeutic exercise  -BH (r) SP (t) BH (c)        User Key  (r) = Recorded By, (t) = Taken By, (c) = Cosigned By    Initials Name Provider Type    DONAL Huang, OTR/L Occupational Therapist    DIONTE Bruce, OT Student OT Student          Occupational Therapy Education     Title: PT OT SLP Therapies (Active)     Topic: Occupational Therapy (Active)     Point: ADL training (Active)    Description: Instruct learner(s) on proper safety adaptation and remediation techniques during self care or transfers.   Instruct in proper use of assistive devices.    Learning Progress Summary    Learner Readiness Method Response Comment Documented by Status   Patient Acceptance E,D NR   11/01/17 1518 Active    Acceptance E VU Educated about OT and POC. Educated about anni dressing technique. Educated about safety with ADL and t/f. Educated to call for assist and not to stand independently.  10/31/17 1354 Done   Family Acceptance E,D NR   11/01/17 1518 Active    Acceptance E VU Educated about OT and POC. Educated about anni dressing technique. Educated about safety with ADL and t/f. Educated to call for assist and not to stand independently.  10/31/17 1354 Done               Point: Precautions (Active)    Description: Instruct learner(s) on prescribed precautions during self-care and functional transfers.    Learning Progress Summary    Learner Readiness Method Response Comment Documented by Status   Patient Acceptance E,D NR   11/01/17 1518 Active    Acceptance E VU Educated about OT and POC. Educated about anni dressing  technique. Educated about safety with ADL and t/f. Educated to call for assist and not to stand independently. SP 10/31/17 1354 Done   Family Acceptance E,D NR   11/01/17 1518 Active    Acceptance E VU Educated about OT and POC. Educated about anni dressing technique. Educated about safety with ADL and t/f. Educated to call for assist and not to stand independently. SP 10/31/17 1354 Done               Point: Body mechanics (Done)    Description: Instruct learner(s) on proper positioning and spine alignment during self-care, functional mobility activities and/or exercises.    Learning Progress Summary    Learner Readiness Method Response Comment Documented by Status   Patient Acceptance E VU Educated about OT and POC. Educated about anni dressing technique. Educated about safety with ADL and t/f. Educated to call for assist and not to stand independently. SP 10/31/17 1354 Done   Family Acceptance E VU Educated about OT and POC. Educated about anni dressing technique. Educated about safety with ADL and t/f. Educated to call for assist and not to stand independently.  10/31/17 1354 Done                      User Key     Initials Effective Dates Name Provider Type Discipline     10/17/16 -  Jacinta Pitts, OTR/L Occupational Therapist OT     01/31/17 -  Alem Bruce OT Student OT Student OT                  OT Recommendation and Plan  Anticipated Equipment Needs At Discharge: bathing equipment, dressing equipment, front wheeled walker  Anticipated Discharge Disposition: home with 24/7 care, home with home health  Planned Therapy Interventions: activity intolerance, adaptive equipment training, ADL retraining, IADL retraining, balance training, bed mobility training, energy conservation, fine motor coordination training, home exercise program, motor coordination training, neuromuscular re-education, ROM (Range of Motion), strengthening, transfer training  Therapy Frequency: other (see comments)  (3-14x/wk)           Outcome Measures       11/01/17 1122 11/01/17 1000 10/31/17 0925    How much help from another person do you currently need...    Turning from your back to your side while in flat bed without using bedrails?  3  -RW     Moving from lying on back to sitting on the side of a flat bed without bedrails?  3  -RW     Moving to and from a bed to a chair (including a wheelchair)?  3  -RW     Standing up from a chair using your arms (e.g., wheelchair, bedside chair)?  3  -RW     Climbing 3-5 steps with a railing?  2  -RW     To walk in hospital room?  3  -RW     AM-PAC 6 Clicks Score  17  -RW     How much help from another is currently needed...    Putting on and taking off regular lower body clothing? 2  -RWA  2  -BH (r) SP (t) BH (c)    Bathing (including washing, rinsing, and drying) 2  -RWA  2  -BH (r) SP (t) BH (c)    Toileting (which includes using toilet bed pan or urinal) 2  -RWA  2  -BH (r) SP (t) BH (c)    Putting on and taking off regular upper body clothing 3  -RWA  3  -BH (r) SP (t) BH (c)    Taking care of personal grooming (such as brushing teeth) 3  -RWA  3  -BH (r) SP (t) BH (c)    Eating meals 3  -RWA  3  -BH (r) SP (t) BH (c)    Score 15  -RWA  15  -BH (r) SP (t)    Functional Assessment    Outcome Measure Options AM-PAC 6 Clicks Daily Activity (OT)  -RWA  AM-PAC 6 Clicks Daily Activity (OT)  -BH (r) SP (t) BH (c)      10/31/17 0825          How much help from another person do you currently need...    Turning from your back to your side while in flat bed without using bedrails? 3  -LM      Moving from lying on back to sitting on the side of a flat bed without bedrails? 3  -LM      Moving to and from a bed to a chair (including a wheelchair)? 2  -LM      Standing up from a chair using your arms (e.g., wheelchair, bedside chair)? 2  -LM      Climbing 3-5 steps with a railing? 2  -LM      To walk in hospital room? 3  -LM      AM-PAC 6 Clicks Score 15  -LM      Functional Assessment     Outcome Measure Options AM-PAC 6 Clicks Basic Mobility (PT)  -LM        User Key  (r) = Recorded By, (t) = Taken By, (c) = Cosigned By    Initials Name Provider Type     Landy Mckeon, PT Physical Therapist     Karoline Huang, OTR/L Occupational Therapist    RWA Jacinta Pitts, OTR/L Occupational Therapist    RW Pipe Gruber, PTA Physical Therapy Assistant    SP Alem Bruce, OT Student OT Student           Time Calculation:         Time Calculation- OT       11/01/17 1518 11/01/17 1305       Time Calculation- OT    OT Start Time 1122  -RW 1122  -KD     OT Stop Time 1203  -RW 1203  -KD     OT Time Calculation (min) 41 min  -RW 41 min  -KD     Total Timed Code Minutes- OT 41 minute(s)  -RW      OT Received On 11/01/17  -RW        User Key  (r) = Recorded By, (t) = Taken By, (c) = Cosigned By    Initials Name Provider Type    RW Jacinta Pitts, OTR/L Occupational Therapist    JOSS King, LARA/L Occupational Therapy Assistant           Therapy Charges for Today     Code Description Service Date Service Provider Modifiers Qty    14048047599 HC OT SELF CARE/MGMT/TRAIN EA 15 MIN 11/1/2017 Jacinta Pitts, OTR/L GO 3          OT G-codes  OT Professional Judgement Used?: Yes  OT Functional Scales Options: AM-PAC 6 Clicks Daily Activity (OT)  Score: 15  Functional Limitation: Self care  Self Care Current Status (): At least 40 percent but less than 60 percent impaired, limited or restricted  Self Care Goal Status (): At least 1 percent but less than 20 percent impaired, limited or restricted    Jacinta Pitts, OTR/L  11/1/2017

## 2017-11-01 NOTE — PLAN OF CARE
Problem: Patient Care Overview (Adult)  Goal: Plan of Care Review  Outcome: Ongoing (interventions implemented as appropriate)    11/01/17 1635   Coping/Psychosocial Response Interventions   Plan Of Care Reviewed With patient;daughter   Patient Care Overview   Progress progress toward functional goals as expected   Outcome Evaluation   Outcome Summary/Follow up Plan Pt participated well in OT tx this date. This AM, pt initiating grooming upon arrival. He completed grooming tasks seated in w/c at sink with set up & vc's. He required min(A) for transfers & min-mod(A) for toileting tasks. Pt propelled w/c to dining area for lunch with SBA & vc's. This afternoon pt c/o back pain & fatigue due to being up all day but agreeable to fine motor & strengthening ax in bed. Theraputty given & education completed with pt & daughter. Pt very motivated & would continue to benefit from skilled OT services to increase independence & safety with ADLs.         Problem: Inpatient Occupational Therapy  Goal: Transfer Training Goal 1 LTG- OT  Outcome: Ongoing (interventions implemented as appropriate)    10/31/17 0925 11/01/17 1635   Transfer Training OT LTG   Transfer Training OT LTG, Date Established 10/31/17 --    Transfer Training OT LTG, Time to Achieve by discharge --    Transfer Training OT LTG, Activity Type all transfers --    Transfer Training OT LTG, [place] Level contact guard assist --    Transfer Training OT LTG, Date Goal Reviewed --  11/01/17   Transfer Training OT LTG, Outcome --  goal ongoing       Goal: Patient Education Goal LTG- OT  Outcome: Ongoing (interventions implemented as appropriate)    10/31/17 0925 11/01/17 1635   Patient Education OT LTG   Patient Education OT LTG, Date Established 10/31/17 --    Patient Education OT LTG, Time to Achieve by discharge --    Patient Education OT LTG, Education Type HEP;posture/body mechanics;1 hand/anni technique;home safety;adaptive equipment mgmt;energy conservation  --    Patient Education OT LTG, Education Understanding independent;demonstrates adequately;verbalizes understanding  (with caregiver assist as necessary) --    Patient Education OT LTG, Date Goal Reviewed --  11/01/17   Patient Education OT LTG Outcome --  goal ongoing       Goal: Safety Awareness Goal LTG- OT  Outcome: Ongoing (interventions implemented as appropriate)    10/31/17 0925 11/01/17 1635   Safety Awareness OT LTG   Safety Awareness OT LTG, Date Established 10/31/17 --    Safety Awareness OT LTG, Time to Achieve by discharge --    Safety Awareness OT LTG, Activity Type good safety awareness;with kitchen mobility;with ADL's --    Safety Awareness OT LTG, [place] Level min verbal cues --    Safety Awareness OT LTG, Date Goal Reviewed --  11/01/17   Safety Awareness OT LTG, Outcome --  goal ongoing       Goal: ADL Goal LTG- OT  Outcome: Ongoing (interventions implemented as appropriate)    10/31/17 0925 11/01/17 1635   ADL OT LTG   ADL OT LTG, Date Established 10/31/17 --    ADL OT LTG, Time to Achieve by discharge --    ADL OT LTG, Activity Type ADL skills --    ADL OT LTG, Additional Goal Set-up A with self-feeding and grooming; VC for UB bathing and UB dressing; CGA with LB bathing and LB dressing --    ADL OT LTG, Date Goal Reviewed --  11/01/17   ADL OT LTG, Outcome --  goal ongoing       Goal: Endurance Goal LTG- OT  Outcome: Ongoing (interventions implemented as appropriate)    10/31/17 0925 11/01/17 1635   Endurance OT LTG   Endurance Goal OT LTG, Date Established 10/31/17 --    Endurance Goal OT LTG, Time to Achieve by discharge --    Endurance Goal OT LTG, Activity Level endurance 2 good- --    Endurance Goal OT LTG, Additional Goal During 30 minutes of functional tasks, ADL, and therapeutic exercise --    Endurance Goal OT LTG, Date Goal Reviewed --  11/01/17   Endurance Goal OT LTG, Outcome --  goal ongoing

## 2017-11-01 NOTE — THERAPY TREATMENT NOTE
Inpatient Rehabilitation - Occupational Therapy Treatment Note  [facility]     Patient Name: Kenneth Harper Jr.  : 1934  MRN: [id number]  Today's Date: 2017  Onset of Illness/Injury or Date of Surgery Date: 10/30/17  Date of Referral to OT: 10/30/17  Referring Physician: TERRENCE Mckinnon MD      Admit Date: 10/30/2017    Visit Dx:     ICD-10-CM ICD-9-CM   1. Symbolic dysfunction R48.9 784.60   2. Impaired mobility and ADLs Z74.09 799.89   3. Abnormality of gait and mobility R26.9 781.2   4. Muscle weakness (generalized) M62.81 728.87     Patient Active Problem List   Diagnosis   • Spinal stenosis, lumbar region, with neurogenic claudication   • Former smoker   • Overweight   • Left-sided weakness   • Right-sided lacunar stroke   • Essential hypertension   • Diabetes mellitus   • Chronic anxiety   • Chronic back pain greater than 3 months duration   • Prostatic hypertrophy   • Degenerative joint disease involving multiple joints   • Atrial fibrillation, chronic   • Encounter for rehabilitation   • Obstructive sleep apnea syndrome             Adult Rehabilitation Note       17 1524 17 1346 17 1122    Rehab Assessment/Intervention    Discipline occupational therapist  -RW physical therapy assistant  -RWA occupational therapist  -RW    Document Type therapy note (daily note)  -RW therapy note (daily note)  -RWA therapy note (daily note)  -RW    Subjective Information agree to therapy;complains of;pain  -RW agree to therapy  -RWA agree to therapy;no complaints  -RW    Patient Effort, Rehab Treatment good  -RW good  -RWA good  -RW    Symptoms Noted During/After Treatment none  -RW  none  -RW    Precautions/Limitations fall precautions  -RW fall precautions  -RWA fall precautions  -RW    Recorded by [RW] Jacinta Pitts, OTR/L [RWA] Pipe Gruber PTA [RW] Jacinta Pitts, OTR/L    Vital Signs    Pretreatment Heart Rate (beats/min) 81  -RW 81  -RWA 86  -RW    Posttreatment  Heart Rate (beats/min) 72  -RW  87  -RW    Pre SpO2 (%) 96  -RW 98  -RWA 93  -RW    O2 Delivery Pre Treatment room air  -RW room air  -RWA room air  -RW    Post SpO2 (%) 95  -RW  95  -RW    O2 Delivery Post Treatment room air  -RW  room air  -RW    Pre Patient Position Supine  -RW  Sitting  -RW    Post Patient Position Supine  -RW  Sitting  -RW    Recorded by [RW] Jacinta Pitts, OTR/L [RWA] Pipe Gruber PTA [RW] Jacinta Pitts, OTR/L    Pain Assessment    Pain Assessment 0-10  -RW --   gives no rating but c/o left back/flank pain  -RWA No/denies pain  -RW    Pain Score 4  -RW      Post Pain Score 3  -RW      Pain Location Back  -RW      Pain Orientation Lower;Upper  -RW      Recorded by [RW] Jacinta Pitts, OTR/L [RWA] Pipe Gruber PTA [RW] Jacinta Pitts, OTR/L    Vision Assessment/Intervention    Visual Impairment  WFL with corrective lenses  -RWA     Recorded by  [RWA] Pipe Gruber PTA     Cognitive Assessment/Intervention    Current Cognitive/Communication Assessment impaired  -RW impaired  -RWA impaired  -RW    Orientation Status oriented to;person;place;situation  -RW oriented to;person;place  -RWA oriented to;person;place;situation  -RW    Follows Commands/Answers Questions able to follow single-step instructions;100% of the time  -RW  able to follow single-step instructions;100% of the time  -RW    Personal Safety mild impairment  -RW mild impairment  -RWA mild impairment  -RW    Personal Safety Interventions  gait belt;nonskid shoes/slippers when out of bed  -RWA gait belt;fall prevention program maintained;nonskid shoes/slippers when out of bed;supervised activity;muscle strengthening facilitated  -RW    Recorded by [RW] Jacinta Pitts, OTR/L [RWA] Pipe Gruber PTA [RW] Jacinta Pitts, OTR/L    Cognitive Assessment Intervention    Behavior/Mood Observations  behavior appropriate to situation, WNL/WFL  -RWA     Recorded by  [RWA] Pipe Gruber PTA     Bed Mobility,  Assessment/Treatment    Bed Mob, Supine to Sit, [place]  minimum assist (75% patient effort)  -RWA     Bed Mob, Sit to Supine, [place]  contact guard assist  -RWA     Recorded by  [RWA] Pipe Gruber PTA     Transfer Assessment/Treatment    Transfers, Bed-Chair [place]  minimum assist (75% patient effort)  -RWA     Transfers, Chair-Bed [place]  minimum assist (75% patient effort)  -RWA     Transfers, Bed-Chair-Bed, Assist Device  rolling walker  -RWA     Transfers, Sit-Stand [place]  minimum assist (75% patient effort);contact guard assist  -RWA minimum assist (75% patient effort)  -RW    Transfers, Stand-Sit [place]  minimum assist (75% patient effort);verbal cues required;contact guard assist  -RWA minimum assist (75% patient effort)  -RW    Transfers, Sit-Stand-Sit, Assist Device  rolling walker  -RWA rolling walker  -RW    Toilet Transfer, [place]   minimum assist (75% patient effort)  -RW    Toilet Transfer, Assistive Device   rolling walker   grab bar  -RW    Transfer, Safety Issues   step length decreased  -RW    Transfer, Comment   sit-stand from toilet x3 for clothing management, lis care, & application of cream  -RW    Recorded by  [RWA] Pipe Gruber PTA [RW] Jacinta Pitts, OTR/L    Gait Assessment/Treatment    Gait, [place] Level  minimum assist (75% patient effort)  -RWA     Gait, Assistive Device  rolling walker  -RWA     Gait, Distance (Feet)  40   x 3  -RWA     Gait, Gait Deviations  left:;step length decreased   improved  -RWA     Recorded by  [RWA] Pipe Gruber PTA     Functional Mobility    Functional Mobility- Ind. Level   contact guard assist  -RW    Functional Mobility- Device   rolling walker  -RW    Functional Mobility-Distance (Feet)   12   x2  -RW    Functional Mobility- Safety Issues   step length decreased;weight-shifting ability decreased   too far away from AD  -RW    Recorded by   [RW] Jacinta Pitts, OTR/L    Wheelchair  Training/Management    Wheelchair, Distance Propelled   25 ft with SBA  -RW    Recorded by   [RW] Jacinta Pitts OTR/L    Lower Body Bathing Assessment/Training    LB Bathing Assess/Train, Position   standing  -RW    LB Bathing Assess/Train, [place] Level   minimum assist (75% patient effort)   for balance  -RW    LB Bathing Assess/Train, Impairments   impaired balance;strength decreased  -RW    LB Bathing Assess/Train, Comment   Pt washed lis area only & applied magic butt.  -RW    Recorded by   [RW] Jacinta Pitts OTR/L    Grooming Assessment/Training    Grooming Assess/Train, Position   sitting;sink side   w/c  -RW    Grooming Assess/Train, [place] Level   supervision required;verbal cues required  -RW    Grooming Assess/Train, Impairments   strength decreased;coordination impaired  -RW    Grooming Assess/Train, Comment   Pt brushing teeth upon arrival, washed face & combed hair.  -RW    Recorded by   [RW] Jacinta Pitts OTR/L    Balance Skills Training    Static Standing Balance Support   assistive device  -RW    Standing-Balance Activities   Weight Shift R-L   ADL task  -RW    Standing Balance # of Minutes   5  -RW    Recorded by   [RW] Jacinta Pitts, OTR/L    Therapy Exercises    Bilateral Lower Extremities  AROM:;10 reps;supine;ankle pumps/circles;heel slides;hip abduction/adduction;SAQ;standing;heel raises   2 sets of hs . hooklying hip aa x 10 radha  -RWA     Left Upper Extremity 5 reps  -RW      LUE Resistance theraputty   red  -RW      BUE Resistance theraputty   removed beads from theraputty x3, requiring approx 15 min  -RW      Trunk Exercises  10 reps;trunk rotation;supine   hooklying  -RWA     Recorded by [RW] Jacinta Pitts OTR/L [RWA] Pipe Gruber, PTA     Fine Motor Coordination Training    Detail (Fine Motor Coordination Training) pegboard ax & Connect 4 game to increase LUE fine motor coordination & strength  -RW      Recorded by [RW] Jacinta Pitts OTR/L       Positioning and Restraints    Pre-Treatment Position in bed  -RW other (comment)   eom  -RWA sitting in chair/recliner   w/c  -RW    Post Treatment Position bed  -RW wheelchair  -RWA wheelchair   at table for lunch  -RW    In Bed supine;call light within reach;encouraged to call for assist;with family/caregiver  -RW      In Wheelchair  with family/caregiver  -RWA sitting;with family/caregiver  -RW    Recorded by [RW] Jacinta Pitts, OTR/L [RWA] Pipe Gruber PTA [RW] Jacinta Pitts, OTR/L      11/01/17 1020 11/01/17 0930 10/31/17 1310    Rehab Assessment/Intervention    Discipline speech language pathologist  -EC physical therapy assistant  -RWA physical therapy assistant  -RWA    Document Type therapy note (daily note)  -EC therapy note (daily note)  -RWA therapy note (daily note)  -RWA    Subjective Information agree to therapy  -EC agree to therapy  -RWA agree to therapy  -RWA    Patient Effort, Rehab Treatment good  -EC good  -RWA good  -RWA    Precautions/Limitations  fall precautions  -RWA fall precautions  -RWA    Recorded by [EC] Clementina Murrell CCC-SLP [RWA] Pipe Gruber PTA [RWA] Pipe Gruber PTA    Vital Signs    Pre Systolic BP Rehab   117  -RWA    Pre Treatment Diastolic BP   62  -RWA    Pretreatment Heart Rate (beats/min)  70  -RWA 81  -RWA    Pre SpO2 (%)  94  -RWA     O2 Delivery Pre Treatment  room air  -RWA     Recorded by  [RWA] Pipe Gruber PTA [RWA] Pipe Gruber PTA    Pain Assessment    Pain Assessment No/denies pain  -EC No/denies pain  -RWA No/denies pain  -RWA    Recorded by [EC] Clementina Murrell CCC-SLP [RWA] Pipe Gruber PTA [RWA] Pipe Gruber PTA    Vision Assessment/Intervention    Visual Impairment  WFL with corrective lenses  -RWA WFL with corrective lenses  -RWA    Recorded by  [RWA] Pipe Gruber PTA [RWA] Pipe Gruber PTA    Cognitive Assessment/Intervention    Current Cognitive/Communication Assessment  impaired  -RWA impaired  -RWA     Orientation Status  oriented to;person;place  -RWA oriented to;person;place  -RWA    Personal Safety Interventions  gait belt;nonskid shoes/slippers when out of bed  -RWA gait belt;nonskid shoes/slippers when out of bed  -RWA    Recorded by  [RWA] Pipe Gruber PTA [RWA] Pipe Gruber PTA    Cognitive Assessment Intervention    Behavior/Mood Observations  behavior appropriate to situation, WNL/WFL  -RWA     Recorded by  [RWA] Pipe Gruber PTA     Communication Treatment Objective and Progress    Cognitive Linguistic Treatment Objectives Improve orientation;Improve memory skills;Improve functional problem solving  -EC      Recorded by [EC] Clementina Murrell CCC-SLP      Improve orientation    Improve orientation through: demonstrating orientation to day;demonstrating orientation to month;demonstrating orientation to year;demonstrating orientation to place;demonstrating orientation to disease/impairment;90%;with inconsistent cues  -EC      Status: Improve orientation through Progress slower than expected  -EC      Orientation Progress 0%  -EC      Comments: Improve orientation through pt was not oriented to a single question this date.  only recalled that yesterday was halloween, but not date/month  -EC      Recorded by [EC] Clementina Murrell CCC-SLP      Improve memory skills    Improve memory skills through: recalling related word lists with an imposed delay;90%;with inconsistent cues  -EC      Status: Improve memory skills Progress slower than expected  -EC      Memory Skills Progress 60%  -EC      Comments: Improve memory skills recall of 3 related words  -EC      Recorded by [EC] Clementina Murrell CCC-SLP      Improve functional problem solving    Improve functional problem solving through: complete organization/home management task;tell similarity between items  -EC      Status: Improve functional problem solving Progressing as expected  -EC      Functional Problem Solving Progress 70%  -EC       Comments: Improve functional problem solving pt identified early/late with 90% asccuracy and identified information on a bill and performed check writing with 60%   -EC      Recorded by [EC] Clementina Murrell CCC-SLP      Bed Mobility, Assessment/Treatment    Bed Mobility, Assistive Device   --  -RWA    Bed Mobility, Scoot/Bridge, [place]   --  -RWA    Bed Mob, Supine to Sit, [place]  minimum assist (75% patient effort)  -RWA --  -RWA    Bed Mob, Sit to Supine, [place]  contact guard assist  -RWA --  -RWA    Recorded by  [RWA] Pipe Gruber PTA [RWA] Pipe Gruber PTA    Transfer Assessment/Treatment    Transfers, Bed-Chair [place]  minimum assist (75% patient effort)  -RWA     Transfers, Chair-Bed [place]  minimum assist (75% patient effort)  -RWA     Transfers, Bed-Chair-Bed, Assist Device  rolling walker  -RWA     Transfers, Sit-Stand [place]  minimum assist (75% patient effort);contact guard assist  -RWA minimum assist (75% patient effort)  -RWA    Transfers, Stand-Sit [place]  minimum assist (75% patient effort);verbal cues required;contact guard assist  -RWA minimum assist (75% patient effort);verbal cues required  -RWA    Transfers, Sit-Stand-Sit, Assist Device  rolling walker  -RWA rolling walker  -RWA    Transfer, Comment  sit to stand x 5, one lob  -RWA sit to stand x 5  -RWA    Recorded by  [RWA] Pipe Gruber PTA [RWA] Pipe Gruber PTA    Gait Assessment/Treatment    Gait, [place] Level  minimum assist (75% patient effort)  -RWA minimum assist (75% patient effort)  -RWA    Gait, Assistive Device  rolling walker  -RWA rolling walker  -RWA    Gait, Distance (Feet)  70  -RWA 30  -RWA    Gait, Gait Deviations  leans to the left;step length decreased;toe-to-floor clearance decreased;left:  -RWA leans to the left;narrow base  -RWA    Recorded by  [RWA] Pipe Gruber PTA [RWA] Pipe Gruber PTA    Wheelchair Training/Management    Wheelchair, Distance  Propelled  150 sba and lots of vcues  -RWA  x 2  -RWA    Wheelchair Training Comment   decreased hand strength on left and limited ability to use le at this time  -RWA    Recorded by  [RWA] Pipe Gruber PTA [RWA] Pipe Gruber, MARTIN    Therapy Exercises    Bilateral Lower Extremities  AROM:;10 reps;supine;ankle pumps/circles;heel slides;hip abduction/adduction;SAQ;standing;heel raises  -RWA AROM:;10 reps;standing;heel raises  -RWA    Recorded by  [RWA] Pipe Gruber PTA [RWA] Pipe Gruber PTA    Positioning and Restraints    Pre-Treatment Position  sitting in chair/recliner  -RWA sitting in chair/recliner  -RWA    Post Treatment Position  wheelchair  -RWA wheelchair  -RWA    In Wheelchair  with SLP  -RWA with family/caregiver  -RWA    Recorded by  [RWA] Pipe Gruber PTA [RWA] Pipe Gruber, PTA      10/31/17 1101          Rehab Assessment/Intervention    Discipline speech language pathologist  -HR      Patient Effort, Rehab Treatment good  -HR      Precautions/Limitations fall precautions  -HR      Recorded by [HR] Shante Otero, MS CCC-SLP        User Key  (r) = Recorded By, (t) = Taken By, (c) = Cosigned By    Initials Name Effective Dates    EC Clementina Murrell, CCC-SLP 12/30/16 -     RW Jacinta Pitts, OTR/L 10/17/16 -     HR Shante Otero, MS CCC-SLP 12/15/16 -     RWA Pipe Gruber, PTA 10/17/16 -                 OT Goals       11/01/17 1635 10/31/17 0925       Transfer Training OT LTG    Transfer Training OT LTG, Date Established  10/31/17  -BH (r) SP (t) BH (c)     Transfer Training OT LTG, Time to Achieve  by discharge  -BH (r) SP (t) BH (c)     Transfer Training OT LTG, Activity Type  all transfers  -BH (r) SP (t) BH (c)     Transfer Training OT LTG, [place] Level  contact guard assist  -BH (r) SP (t) BH (c)     Transfer Training OT LTG, Date Goal Reviewed 11/01/17  -RW      Transfer Training OT LTG, Outcome goal ongoing  -RW      Patient Education OT LTG    Patient Education OT  LTG, Date Established  10/31/17  -BH (r) SP (t) BH (c)     Patient Education OT LTG, Time to Achieve  by discharge  -BH (r) SP (t) BH (c)     Patient Education OT LTG, Education Type  HEP;posture/body mechanics;1 hand/anni technique;home safety;adaptive equipment mgmt;energy conservation  -BH (r) SP (t) BH (c)     Patient Education OT LTG, Education Understanding  independent;demonstrates adequately;verbalizes understanding   with caregiver assist as necessary  -BH (r) SP (t) BH (c)     Patient Education OT LTG, Date Goal Reviewed 11/01/17  -RW      Patient Education OT LTG Outcome goal ongoing  -RW      Safety Awareness OT LTG    Safety Awareness OT LTG, Date Established  10/31/17  -BH (r) SP (t) BH (c)     Safety Awareness OT LTG, Time to Achieve  by discharge  -BH (r) SP (t) BH (c)     Safety Awareness OT LTG, Activity Type  good safety awareness;with kitchen mobility;with ADL's  -BH (r) SP (t) BH (c)     Safety Awareness OT LTG, [place] Level  min verbal cues  -BH (r) SP (t) BH (c)     Safety Awareness OT LTG, Date Goal Reviewed 11/01/17  -RW      Safety Awareness OT LTG, Outcome goal ongoing  -RW      ADL OT LTG    ADL OT LTG, Date Established  10/31/17  -BH (r) SP (t) BH (c)     ADL OT LTG, Time to Achieve  by discharge  -BH (r) SP (t) BH (c)     ADL OT LTG, Activity Type  ADL skills  -BH (r) SP (t) BH (c)     ADL OT LTG, Additional Goal  Set-up A with self-feeding and grooming; VC for UB bathing and UB dressing; CGA with LB bathing and LB dressing  -BH (r) SP (t) BH (c)     ADL OT LTG, Date Goal Reviewed 11/01/17  -RW      ADL OT LTG, Outcome goal ongoing  -RW      Endurance OT LTG    Endurance Goal OT LTG, Date Established  10/31/17  -BH (r) SP (t) BH (c)     Endurance Goal OT LTG, Time to Achieve  by discharge  -BH (r) SP (t) BH (c)     Endurance Goal OT LTG, Activity Level  endurance 2 good-  -BH (r) SP (t) BH (c)     Endurance Goal OT LTG, Additional Goal  During 30 minutes of functional tasks, ADL,  and therapeutic exercise  -BH (r) SP (t) BH (c)     Endurance Goal OT LTG, Date Goal Reviewed 11/01/17  -      Endurance Goal OT LTG, Outcome goal ongoing  -        User Key  (r) = Recorded By, (t) = Taken By, (c) = Cosigned By    Initials Name Provider Type     Karoline Huang, OTR/L Occupational Therapist    RW Jacinta Pitts, OTR/L Occupational Therapist    DIONTE Bruce, OT Student OT Student          Occupational Therapy Education     Title: PT OT SLP Therapies (Active)     Topic: Occupational Therapy (Active)     Point: ADL training (Active)    Description: Instruct learner(s) on proper safety adaptation and remediation techniques during self care or transfers.   Instruct in proper use of assistive devices.    Learning Progress Summary    Learner Readiness Method Response Comment Documented by Status   Patient Acceptance E,D NR   11/01/17 1518 Active    Acceptance E VU Educated about OT and POC. Educated about anni dressing technique. Educated about safety with ADL and t/f. Educated to call for assist and not to stand independently. SP 10/31/17 1354 Done   Family Acceptance E,D NR   11/01/17 1518 Active    Acceptance E VU Educated about OT and POC. Educated about anni dressing technique. Educated about safety with ADL and t/f. Educated to call for assist and not to stand independently.  10/31/17 1354 Done               Point: Home exercise program (Active)    Description: Instruct learner(s) on appropriate technique for monitoring, assisting and/or progressing therapeutic exercises/activities.    Learning Progress Summary    Learner Readiness Method Response Comment Documented by Status   Patient Acceptance E,D NR theraputty  11/01/17 1634 Active   Family Acceptance E,D NR theraputty  11/01/17 1634 Active               Point: Precautions (Active)    Description: Instruct learner(s) on prescribed precautions during self-care and functional transfers.    Learning Progress Summary     Learner Readiness Method Response Comment Documented by Status   Patient Acceptance E,D NR  RW 11/01/17 1518 Active    Acceptance E VU Educated about OT and POC. Educated about anni dressing technique. Educated about safety with ADL and t/f. Educated to call for assist and not to stand independently. SP 10/31/17 1354 Done   Family Acceptance E,D NR  RW 11/01/17 1518 Active    Acceptance E VU Educated about OT and POC. Educated about anni dressing technique. Educated about safety with ADL and t/f. Educated to call for assist and not to stand independently. SP 10/31/17 1354 Done               Point: Body mechanics (Done)    Description: Instruct learner(s) on proper positioning and spine alignment during self-care, functional mobility activities and/or exercises.    Learning Progress Summary    Learner Readiness Method Response Comment Documented by Status   Patient Acceptance E VU Educated about OT and POC. Educated about anni dressing technique. Educated about safety with ADL and t/f. Educated to call for assist and not to stand independently. SP 10/31/17 1354 Done   Family Acceptance E VU Educated about OT and POC. Educated about anni dressing technique. Educated about safety with ADL and t/f. Educated to call for assist and not to stand independently. SP 10/31/17 1354 Done                      User Key     Initials Effective Dates Name Provider Type Discipline    RW 10/17/16 -  Jacinta Pitts OTR/L Occupational Therapist OT    SP 01/31/17 -  Alem Bruce OT Student OT Student OT                  OT Recommendation and Plan  Anticipated Equipment Needs At Discharge: bathing equipment, dressing equipment, front wheeled walker  Anticipated Discharge Disposition: home with 24/7 care, home with home health  Planned Therapy Interventions: activity intolerance, adaptive equipment training, ADL retraining, IADL retraining, balance training, bed mobility training, energy conservation, fine motor coordination  training, home exercise program, motor coordination training, neuromuscular re-education, ROM (Range of Motion), strengthening, transfer training  Therapy Frequency: other (see comments) (3-14x/wk)  Plan of Care Review  Plan Of Care Reviewed With: patient, daughter  Progress: progress toward functional goals as expected  Outcome Summary/Follow up Plan: Pt participated well in OT tx this date. This AM, pt initiating grooming upon arrival. He completed grooming tasks seated in w/c at sink with set up & vc's. He required min(A) for transfers & min-mod(A) for toileting tasks. Pt propelled w/c to dining area for lunch with SBA & vc's. This afternoon pt c/o back pain & fatigue due to being up all day but agreeable to fine motor & strengthening ax in bed. Theraputty given & education completed with pt & daughter. Pt very motivated & would continue to benefit from skilled OT services to increase independence & safety with ADLs.        Outcome Measures       11/01/17 1122 11/01/17 1000 10/31/17 0925    How much help from another person do you currently need...    Turning from your back to your side while in flat bed without using bedrails?  3  -RW     Moving from lying on back to sitting on the side of a flat bed without bedrails?  3  -RW     Moving to and from a bed to a chair (including a wheelchair)?  3  -RW     Standing up from a chair using your arms (e.g., wheelchair, bedside chair)?  3  -RW     Climbing 3-5 steps with a railing?  2  -RW     To walk in hospital room?  3  -RW     AM-PAC 6 Clicks Score  17  -RW     How much help from another is currently needed...    Putting on and taking off regular lower body clothing? 2  -RWA  2  -BH (r) SP (t) BH (c)    Bathing (including washing, rinsing, and drying) 2  -RWA  2  -BH (r) SP (t) BH (c)    Toileting (which includes using toilet bed pan or urinal) 2  -RWA  2  -BH (r) SP (t) BH (c)    Putting on and taking off regular upper body clothing 3  -RWA  3  -BH (r) SP (t) BH (c)     Taking care of personal grooming (such as brushing teeth) 3  -RWA  3  -BH (r) SP (t) BH (c)    Eating meals 3  -RWA  3  -BH (r) SP (t) BH (c)    Score 15  -RWA  15  -BH (r) SP (t)    Functional Assessment    Outcome Measure Options AM-PAC 6 Clicks Daily Activity (OT)  -RWA  AM-PAC 6 Clicks Daily Activity (OT)  -BH (r) SP (t) BH (c)      10/31/17 0825          How much help from another person do you currently need...    Turning from your back to your side while in flat bed without using bedrails? 3  -LM      Moving from lying on back to sitting on the side of a flat bed without bedrails? 3  -LM      Moving to and from a bed to a chair (including a wheelchair)? 2  -LM      Standing up from a chair using your arms (e.g., wheelchair, bedside chair)? 2  -LM      Climbing 3-5 steps with a railing? 2  -LM      To walk in hospital room? 3  -LM      AM-PAC 6 Clicks Score 15  -LM      Functional Assessment    Outcome Measure Options AM-PAC 6 Clicks Basic Mobility (PT)  -LM        User Key  (r) = Recorded By, (t) = Taken By, (c) = Cosigned By    Initials Name Provider Type     Landy Mckeon, PT Physical Therapist     Karoline Huang, OTR/L Occupational Therapist    THERESA Pitts, OTR/L Occupational Therapist    KATHIE Gruber, PTA Physical Therapy Assistant    DIONTE Bruce, OT Student OT Student           Time Calculation:         Time Calculation- OT       11/01/17 1634 11/01/17 1518 11/01/17 1305    Time Calculation- OT    OT Start Time 1524  -RW 1122  -RW 1122  -KD    OT Stop Time 1607  -RW 1203  -RW 1203  -KD    OT Time Calculation (min) 43 min  -RW 41 min  -RW 41 min  -KD    Total Timed Code Minutes- OT 43 minute(s)  -RW 41 minute(s)  -RW     OT Received On 11/01/17  -RW 11/01/17  -RW       User Key  (r) = Recorded By, (t) = Taken By, (c) = Cosigned By    Initials Name Provider Type    KATHIE Pitts, OTR/L Occupational Therapist    MARCO Sánchez/L Occupational Therapy Assistant            Therapy Charges for Today     Code Description Service Date Service Provider Modifiers Qty    15164624026  OT SELF CARE/MGMT/TRAIN EA 15 MIN 11/1/2017 Jacinta Pitts, OTR/L GO 3    [id number]  OT THERAPEUTIC ACT EA 15 MIN 11/1/2017 Jacinta Pitts OTR/L GO 3          OT G-codes  OT Professional Judgement Used?: Yes  OT Functional Scales Options: AM-PAC 6 Clicks Daily Activity (OT)  Score: 15  Functional Limitation: Self care  Self Care Current Status (): At least 40 percent but less than 60 percent impaired, limited or restricted  Self Care Goal Status (): At least 1 percent but less than 20 percent impaired, limited or restricted    Jacinta Pitts OTR/L  11/1/2017

## 2017-11-01 NOTE — THERAPY TREATMENT NOTE
Inpatient Rehabilitation - Speech Language Pathology Treatment Note  [facility]     Patient Name: Kenneth Harper Jr.  : 1934  MRN: [id number]  Today's Date: 2017  Referring Physician: Pratibha      Admit Date: 10/30/2017  Goals  1. Patient will demonstrate orientation to day, month, year, place, situation with 90% acc with min cues: 0% accuracy.  Pt is oriented to place and situation, but could not recall day of week, month, date, year, stated that yesterday was halloween and that meant it had been the  .  Attempted to use newspaper as visual aid, but pt not successful.  Will make calendars to use tomorrow.    2. Patient will complete delayed word recall with 80% acc with min cues: delayed recall of 3 related words with 60% accuracy at 10 , 20, and 30 min intervals.  Pt was unsuccessful with memory strategies.  Will attempt related words again tomorrow.  (fall, leaves, rake)  3. Patient will complete organization/home management task with 90% acc with min cues: pt identified parts of a bill with 75% accuracy and filled out information on a check with 70% accuracy.  Pt identified early/late for appointment time with no cues and 90% accuracy.  Recommend use of pill organizer task tomorrow.    4. Patient will complete compare/contrast activity with 90% acc with min cues: pt was very sucessful with compare/contrast of visual objects.  He was articulate and well organized.  GOAL MET  Clementina Murrell CCC-SLP 2017 12:05 PM    Visit Dx:      ICD-10-CM ICD-9-CM   1. Symbolic dysfunction R48.9 784.60   2. Impaired mobility and ADLs Z74.09 799.89   3. Abnormality of gait and mobility R26.9 781.2   4. Muscle weakness (generalized) M62.81 728.87     Patient Active Problem List   Diagnosis   • Spinal stenosis, lumbar region, with neurogenic claudication   • Former smoker   • Overweight   • Left-sided weakness   • Right-sided lacunar stroke   • Essential hypertension   • Diabetes mellitus   •  Chronic anxiety   • Chronic back pain greater than 3 months duration   • Prostatic hypertrophy   • Degenerative joint disease involving multiple joints   • Atrial fibrillation, chronic   • Encounter for rehabilitation   • Obstructive sleep apnea syndrome              Adult Rehabilitation Note       11/01/17 1020 11/01/17 0930 10/31/17 1310    Rehab Assessment/Intervention    Discipline speech language pathologist  -EC physical therapy assistant  -RW physical therapy assistant  -RW    Document Type therapy note (daily note)  -EC therapy note (daily note)  -RW therapy note (daily note)  -RW    Subjective Information agree to therapy  -EC agree to therapy  -RW agree to therapy  -RW    Patient Effort, Rehab Treatment good  -EC good  -RW good  -RW    Precautions/Limitations  fall precautions  -RW fall precautions  -RW    Recorded by [EC] Clementina Murrell CCC-SLP [RW] Pipe Gruber PTA [RW] Pipe Gruber PTA    Vital Signs    Pre Systolic BP Rehab   117  -RW    Pre Treatment Diastolic BP   62  -RW    Pretreatment Heart Rate (beats/min)  70  -RW 81  -RW    Pre SpO2 (%)  94  -RW     O2 Delivery Pre Treatment  room air  -RW     Recorded by  [RW] Pipe Gruber PTA [RW] Pipe Gruber PTA    Pain Assessment    Pain Assessment No/denies pain  -EC No/denies pain  -RW No/denies pain  -RW    Recorded by [EC] Clementina Murrell CCC-SLP [RW] Pipe Gruber PTA [RW] Pipe Gruber PTA    Vision Assessment/Intervention    Visual Impairment  WFL with corrective lenses  -RW WFL with corrective lenses  -RW    Recorded by  [RW] Pipe Gruber PTA [RW] Pipe Gruber PTA    Cognitive Assessment/Intervention    Current Cognitive/Communication Assessment  impaired  -RW impaired  -RW    Orientation Status  oriented to;person;place  -RW oriented to;person;place  -RW    Personal Safety Interventions  gait belt;nonskid shoes/slippers when out of bed  -RW gait belt;nonskid shoes/slippers when out of bed  -RW    Recorded by  [RW]  Pipe Gruber PTA [RW] Pipe Gruber PTA    Cognitive Assessment Intervention    Behavior/Mood Observations  behavior appropriate to situation, WNL/WFL  -RW     Recorded by  [RW] Pipe Gruber PTA     Communication Treatment Objective and Progress    Cognitive Linguistic Treatment Objectives Improve orientation;Improve memory skills;Improve functional problem solving  -EC      Recorded by [EC] MERI Schultz      Improve orientation    Improve orientation through: demonstrating orientation to day;demonstrating orientation to month;demonstrating orientation to year;demonstrating orientation to place;demonstrating orientation to disease/impairment;90%;with inconsistent cues  -EC      Status: Improve orientation through Progress slower than expected  -EC      Orientation Progress 0%  -EC      Comments: Improve orientation through pt was not oriented to a single question this date.  only recalled that yesterday was halloween, but not date/month  -EC      Recorded by [EC] MERI Schultz      Improve memory skills    Improve memory skills through: recalling related word lists with an imposed delay;90%;with inconsistent cues  -EC      Status: Improve memory skills Progress slower than expected  -EC      Memory Skills Progress 60%  -EC      Comments: Improve memory skills recall of 3 related words  -EC      Recorded by [EC] MERI Schultz      Improve functional problem solving    Improve functional problem solving through: complete organization/home management task;tell similarity between items  -EC      Status: Improve functional problem solving Progressing as expected  -EC      Functional Problem Solving Progress 70%  -EC      Comments: Improve functional problem solving pt identified early/late with 90% asccuracy and identified information on a bill and performed check writing with 60%   -EC      Recorded by [EC] MERI Schultz      Bed Mobility,  Assessment/Treatment    Bed Mobility, Assistive Device   --  -RW    Bed Mobility, Scoot/Bridge, [place]   --  -RW    Bed Mob, Supine to Sit, [place]  minimum assist (75% patient effort)  -RW --  -RW    Bed Mob, Sit to Supine, [place]  contact guard assist  -RW --  -RW    Recorded by  [RW] Pipe Gruber PTA [RW] Pipe Gruber PTA    Transfer Assessment/Treatment    Transfers, Bed-Chair [place]  minimum assist (75% patient effort)  -RW     Transfers, Chair-Bed [place]  minimum assist (75% patient effort)  -RW     Transfers, Bed-Chair-Bed, Assist Device  rolling walker  -RW     Transfers, Sit-Stand [place]  minimum assist (75% patient effort);contact guard assist  -RW minimum assist (75% patient effort)  -RW    Transfers, Stand-Sit [place]  minimum assist (75% patient effort);verbal cues required;contact guard assist  -RW minimum assist (75% patient effort);verbal cues required  -RW    Transfers, Sit-Stand-Sit, Assist Device  rolling walker  -RW rolling walker  -RW    Transfer, Comment  sit to stand x 5, one lob  -RW sit to stand x 5  -RW    Recorded by  [RW] Pipe Gruber PTA [RW] Pipe Gruber PTA    Gait Assessment/Treatment    Gait, [place] Level  minimum assist (75% patient effort)  -RW minimum assist (75% patient effort)  -RW    Gait, Assistive Device  rolling walker  -RW rolling walker  -RW    Gait, Distance (Feet)  70  -RW 30  -RW    Gait, Gait Deviations  leans to the left;step length decreased;toe-to-floor clearance decreased;left:  -RW leans to the left;narrow base  -RW    Recorded by  [RW] Pipe Gruber PTA [RW] Pipe Gruber PTA    Wheelchair Training/Management    Wheelchair, Distance Propelled  150 sba and lots of vcues  -RW  x 2  -RW    Wheelchair Training Comment   decreased hand strength on left and limited ability to use le at this time  -RW    Recorded by  [RW] Pipe Gruber PTA [RW] Pipe Gruber PTA    Therapy Exercises    Bilateral Lower  Extremities  AROM:;10 reps;supine;ankle pumps/circles;heel slides;hip abduction/adduction;SAQ;standing;heel raises  -RW AROM:;10 reps;standing;heel raises  -RW    Recorded by  [RW] Pipe Gruber PTA [RW] Pipe Gruber PTA    Positioning and Restraints    Pre-Treatment Position  sitting in chair/recliner  -RW sitting in chair/recliner  -RW    Post Treatment Position  wheelchair  -RW wheelchair  -RW    In Wheelchair  with SLP  -RW with family/caregiver  -RW    Recorded by  [RW] Pipe Gruber PTA [RW] Pipe Gruber PTA      10/31/17 1101          Rehab Assessment/Intervention    Discipline speech language pathologist  -HR      Patient Effort, Rehab Treatment good  -HR      Precautions/Limitations fall precautions  -HR      Recorded by [HR] Shante Otero MS CCC-SLP        User Key  (r) = Recorded By, (t) = Taken By, (c) = Cosigned By    Initials Name Effective Dates    EC Clementina Murrell CCC-SLP 12/30/16 -     HR Shante Otero MS CCC-SLP 12/15/16 -     RW Pipe Gruber PTA 10/17/16 -               IP SLP Goals       11/01/17 1159 10/31/17 1244       Cognitive Linguistic- Improve Safety and Awareness    Cognitive Linguistic Improve Safety and Awareness- SLP, Date Established  10/31/17  -HR     Cognitive Linguistic Improve Safety and Awareness- SLP, Time to Achieve  by discharge  -HR     Cognitive Linguistic Improve Safety and Awareness- SLP, Activity Level  Patient will improve orientation for increased safety in environment;Patient will improve memory skills for increased safety in environment;Patient will improve functional problem solving skills for increased safety in environment  -HR     Cognitive Linguistic Improve Safety and Awareness- SLP, Date Goal Reviewed 11/01/17  -EC 10/31/17  -HR     Cognitive Linguistic Improve Safety and Awareness- SLP, Outcome goal not met  -EC goal ongoing  -HR       User Key  (r) = Recorded By, (t) = Taken By, (c) = Cosigned By    Initials Name Provider Type    EC  Clementina Murrell CCC-SLP Speech and Language Pathologist    HR Shante Otero, MS CCC-SLP Speech and Language Pathologist          EDUCATION  The patient has been educated in the following areas:   Cognitive Impairment.    SLP Recommendation and Plan                               Plan of Care Review  Plan Of Care Reviewed With: patient, daughter  Progress: progress toward functional goals as expected  Outcome Summary/Follow up Plan: pt continues to have decreased orientation, memory and confusion that is not usual to him.            Time Calculation:         Time Calculation- SLP       11/01/17 1200          Time Calculation- SLP    SLP Start Time 1020  -EC      SLP Stop Time 1115  -EC      SLP Time Calculation (min) 55 min  -EC      Total Timed Code Minutes- SLP 55 minute(s)  -EC      SLP Received On 11/01/17  -EC        User Key  (r) = Recorded By, (t) = Taken By, (c) = Cosigned By    Initials Name Provider Type    EC Clementina Murrell CCC-SLP Speech and Language Pathologist          Therapy Charges for Today     Code Description Service Date Service Provider Modifiers Qty    [id number]  ST TREATMENT SPEECH 4 11/1/2017 MERI Schultz GN 1               MERI Casey  11/1/2017

## 2017-11-01 NOTE — PLAN OF CARE
Problem: Patient Care Overview (Adult)  Goal: Plan of Care Review  Outcome: Ongoing (interventions implemented as appropriate)    11/01/17 1509   Coping/Psychosocial Response Interventions   Plan Of Care Reviewed With patient   Patient Care Overview   Progress improving   Outcome Evaluation   Outcome Summary/Follow up Plan pt has some fluid in the ears,new orders received,pt hasnt been confused today,wife stayed with him for most of the day as did his daughter.pt has good day         Problem: Fall Risk (Adult)  Goal: Absence of Falls  Outcome: Ongoing (interventions implemented as appropriate)    Problem: Stroke (Ischemic) (Adult)  Goal: Signs and Symptoms of Listed Potential Problems Will be Absent or Manageable (Stroke)  Outcome: Ongoing (interventions implemented as appropriate)

## 2017-11-01 NOTE — PLAN OF CARE
Problem: Diabetes, Type 2 (Adult)  Intervention: Support/Optimize Psychosocial Response to Condition    11/01/17 1620   Coping Strategies   Family/Support System Care self-care encouraged   Supportive Measures counseling provided         11/01/17 1620   Coping Strategies   Family/Support System Care self-care encouraged   Supportive Measures counseling provided

## 2017-11-01 NOTE — PLAN OF CARE
Problem: Patient Care Overview (Adult)  Goal: Plan of Care Review  Outcome: Ongoing (interventions implemented as appropriate)    11/01/17 1543   Coping/Psychosocial Response Interventions   Plan Of Care Reviewed With patient   Outcome Evaluation   Outcome Summary/Follow up Plan some improvement is gt quality noted and with cues has improved sit to stand function. cont wiht PT         Problem: Inpatient Physical Therapy  Goal: Bed Mobility Goal LTG- PT  Outcome: Ongoing (interventions implemented as appropriate)    10/31/17 0825 11/01/17 1543   Bed Mobility PT LTG   Bed Mobility PT LTG, Date Established 10/31/17 --    Bed Mobility PT LTG, Time to Achieve by discharge --    Bed Mobility PT LTG, Activity Type supine to sit/sit to supine --    Bed Mobility PT LTG, [place] Level supervision required --    Bed Mobility PT LTG, Additional Goal HOB flat; No BR's --    Bed Mobility PT LTG, Date Goal Reviewed --  11/01/17   Bed Mobility PT LTG, Outcome --  goal ongoing       Goal: Transfer Training Goal 1 LTG- PT  Outcome: Ongoing (interventions implemented as appropriate)    10/31/17 0825 11/01/17 1543   Transfer Training PT LTG   Transfer Training PT LTG, Date Established 10/31/17 --    Transfer Training PT LTG, Time to Achieve by discharge --    Transfer Training PT LTG, Activity Type bed to chair /chair to bed --    Transfer Training PT LTG, [place] Level supervision required --    Transfer Training PT LTG, Assist Device (AAD) --    Transfer Training PT LTG, Date Goal Reviewed --  11/01/17   Transfer Training PT LTG, Outcome --  goal ongoing       Goal: Gait Training Goal LTG- PT  Outcome: Ongoing (interventions implemented as appropriate)    10/31/17 0825 11/01/17 1543   Gait Training PT LTG   Gait Training Goal PT LTG, Date Established 10/31/17 --    Gait Training Goal PT LTG, Time to Achieve by discharge --    Gait Training Goal PT LTG, [place] Level supervision required --    Gait Training Goal PT LTG,  Assist Device (AAD) --    Gait Training Goal PT LTG, Distance to Achieve 150 feet --    Gait Training Goal PT LTG, Date Goal Reviewed --  11/01/17   Gait Training Goal PT LTG, Outcome --  goal ongoing       Goal: Stair Training Goal STG- PT  Outcome: Ongoing (interventions implemented as appropriate)    10/31/17 0825 11/01/17 1543   Stair Training PT STG   Stair Training Goal PT STG, Date Established 10/31/17 --    Stair Training Goal PT STG, Time to Achieve 4 days --    Stair Training Goal PT STG, Number of Steps 4 steps --    Stair Training Goal PT STG, [place] Level contact guard assist --    Stair Training Goal PT STG, Assist Device 1 handrail --    Stair Training Goal PT STG, Date Goal Reviewed --  11/01/17   Stair Training Goal PT STG, Outcome --  goal ongoing       Goal: Stair Training Goal LTG- PT  Outcome: Ongoing (interventions implemented as appropriate)    10/31/17 0825 11/01/17 1543   Stair Training PT LTG   Stair Training Goal PT LTG, Date Established 10/31/17 --    Stair Training Goal PT LTG, Time to Achieve by discharge --    Stair Training Goal PT LTG, Number of Steps 1 flight --    Stair Training Goal PT LTG, [place] Level supervision required --    Stair Training Goal PT LTG, Assist Device 2 handrails --    Stair Training Goal PT LTG, Date Goal Reviewed --  11/01/17   Stair Training Goal PT LTG, Outcome --  goal ongoing

## 2017-11-02 LAB
GLUCOSE BLDC GLUCOMTR-MCNC: 137 MG/DL (ref 70–130)
GLUCOSE BLDC GLUCOMTR-MCNC: 141 MG/DL (ref 70–130)
GLUCOSE BLDC GLUCOMTR-MCNC: 84 MG/DL (ref 70–130)
GLUCOSE BLDC GLUCOMTR-MCNC: 98 MG/DL (ref 70–130)

## 2017-11-02 PROCEDURE — 97110 THERAPEUTIC EXERCISES: CPT

## 2017-11-02 PROCEDURE — 97116 GAIT TRAINING THERAPY: CPT

## 2017-11-02 PROCEDURE — 25010000002 ENOXAPARIN PER 10 MG: Performed by: FAMILY MEDICINE

## 2017-11-02 PROCEDURE — 97530 THERAPEUTIC ACTIVITIES: CPT

## 2017-11-02 PROCEDURE — 97535 SELF CARE MNGMENT TRAINING: CPT

## 2017-11-02 PROCEDURE — 92507 TX SP LANG VOICE COMM INDIV: CPT | Performed by: SPEECH-LANGUAGE PATHOLOGIST

## 2017-11-02 PROCEDURE — 97542 WHEELCHAIR MNGMENT TRAINING: CPT

## 2017-11-02 PROCEDURE — 99232 SBSQ HOSP IP/OBS MODERATE 35: CPT | Performed by: FAMILY MEDICINE

## 2017-11-02 PROCEDURE — 82962 GLUCOSE BLOOD TEST: CPT

## 2017-11-02 RX ADMIN — FLUTICASONE PROPIONATE 2 SPRAY: 50 SPRAY, METERED NASAL at 08:38

## 2017-11-02 RX ADMIN — HYDROCORTISONE: 1 CREAM TOPICAL at 09:00

## 2017-11-02 RX ADMIN — ASPIRIN 81 MG 81 MG: 81 TABLET ORAL at 08:37

## 2017-11-02 RX ADMIN — GLIPIZIDE 10 MG: 10 TABLET ORAL at 07:30

## 2017-11-02 RX ADMIN — LISINOPRIL 10 MG: 10 TABLET ORAL at 08:36

## 2017-11-02 RX ADMIN — FINASTERIDE 5 MG: 5 TABLET, FILM COATED ORAL at 08:38

## 2017-11-02 RX ADMIN — ACETAMINOPHEN 650 MG: 325 TABLET ORAL at 13:48

## 2017-11-02 RX ADMIN — METFORMIN HYDROCHLORIDE 1000 MG: 500 TABLET ORAL at 17:00

## 2017-11-02 RX ADMIN — ENOXAPARIN SODIUM 40 MG: 40 INJECTION SUBCUTANEOUS at 08:36

## 2017-11-02 RX ADMIN — THERA TABS 1 TABLET: TAB at 08:36

## 2017-11-02 RX ADMIN — PANTOPRAZOLE SODIUM 40 MG: 40 TABLET, DELAYED RELEASE ORAL at 06:11

## 2017-11-02 RX ADMIN — METFORMIN HYDROCHLORIDE 1000 MG: 500 TABLET ORAL at 08:36

## 2017-11-02 RX ADMIN — CLOPIDOGREL BISULFATE 75 MG: 75 TABLET ORAL at 08:36

## 2017-11-02 RX ADMIN — ATORVASTATIN CALCIUM 10 MG: 10 TABLET, FILM COATED ORAL at 21:00

## 2017-11-02 RX ADMIN — METOPROLOL SUCCINATE 50 MG: 50 TABLET, EXTENDED RELEASE ORAL at 21:00

## 2017-11-02 RX ADMIN — TERAZOSIN HYDROCHLORIDE ANHYDROUS 5 MG: 5 CAPSULE ORAL at 21:00

## 2017-11-02 RX ADMIN — AMLODIPINE BESYLATE 10 MG: 10 TABLET ORAL at 20:59

## 2017-11-02 RX ADMIN — HYDROCORTISONE: 1 CREAM TOPICAL at 17:00

## 2017-11-02 RX ADMIN — MAGNESIUM OXIDE TAB 400 MG (241.3 MG ELEMENTAL MG) 400 MG: 400 (241.3 MG) TAB at 08:36

## 2017-11-02 NOTE — PLAN OF CARE
Problem: Patient Care Overview (Adult)  Goal: Plan of Care Review  Outcome: Ongoing (interventions implemented as appropriate)    11/02/17 1614   Coping/Psychosocial Response Interventions   Plan Of Care Reviewed With patient;daughter   Patient Care Overview   Progress progress toward functional goals as expected   Outcome Evaluation   Outcome Summary/Follow up Plan St therapy is progressing slowly. pt has diffiiulty with orientation and organization.         Problem: Inpatient SLP  Goal: Cognitive-linguistic-Patient will improve Cognitive-linguistic skills to improve safety and safety awareness in environment  Outcome: Ongoing (interventions implemented as appropriate)    10/31/17 1244 11/02/17 1614   Cognitive Linguistic- Improve Safety and Awareness   Cognitive Linguistic Improve Safety and Awareness- SLP, Date Established 10/31/17 --    Cognitive Linguistic Improve Safety and Awareness- SLP, Time to Achieve by discharge --    Cognitive Linguistic Improve Safety and Awareness- SLP, Activity Level Patient will improve orientation for increased safety in environment;Patient will improve memory skills for increased safety in environment;Patient will improve functional problem solving skills for increased safety in environment --    Cognitive Linguistic Improve Safety and Awareness- SLP, Date Goal Reviewed --  11/02/17   Cognitive Linguistic Improve Safety and Awareness- SLP, Outcome --  goal not met

## 2017-11-02 NOTE — THERAPY TREATMENT NOTE
Inpatient Rehabilitation - Occupational Therapy Treatment Note  [facility]     Patient Name: Kenneth Harper Jr.  : 1934  MRN: [id number]  Today's Date: 2017  Onset of Illness/Injury or Date of Surgery Date: 10/30/17  Date of Referral to OT: 10/30/17  Referring Physician: TERRENCE Mckinnon MD      Admit Date: 10/30/2017    Visit Dx:     ICD-10-CM ICD-9-CM   1. Symbolic dysfunction R48.9 784.60   2. Impaired mobility and ADLs Z74.09 799.89   3. Abnormality of gait and mobility R26.9 781.2   4. Muscle weakness (generalized) M62.81 728.87     Patient Active Problem List   Diagnosis   • Spinal stenosis, lumbar region, with neurogenic claudication   • Former smoker   • Overweight   • Left-sided weakness   • Right-sided lacunar stroke   • Essential hypertension   • Diabetes mellitus   • Chronic anxiety   • Chronic back pain greater than 3 months duration   • Prostatic hypertrophy   • Degenerative joint disease involving multiple joints   • Atrial fibrillation, chronic   • Encounter for rehabilitation   • Obstructive sleep apnea syndrome             Adult Rehabilitation Note       17 0902 17 0715 17 1524    Rehab Assessment/Intervention    Discipline physical therapy assistant  -RW occupational therapy assistant  -KD occupational therapist  -RWA    Document Type therapy note (daily note)  -RW therapy note (daily note)  -KD therapy note (daily note)  -RWA    Subjective Information agree to therapy  -RW agree to therapy  -KD agree to therapy;complains of;pain  -RWA    Patient Effort, Rehab Treatment good  -RW good  -KD good  -RWA    Symptoms Noted During/After Treatment   none  -RWA    Precautions/Limitations fall precautions  -RW fall precautions  -KD fall precautions  -RWA    Recorded by [RW] Pipe Gruber, MARTIN [KD] Therese King, LARA/DAWN [RWA] Jacinta Pitts, OTR/L    Vital Signs    Pre Systolic BP Rehab  105  -KD     Pre Treatment Diastolic BP  57  -KD     Pretreatment Heart Rate  (beats/min) 80  -RW 83  -KD 81  -RWA    Posttreatment Heart Rate (beats/min)  74  -KD 72  -RWA    Pre SpO2 (%) 93  -RW 94  -KD 96  -RWA    O2 Delivery Pre Treatment room air  -RW room air  -KD room air  -RWA    Post SpO2 (%)  96  -KD 95  -RWA    O2 Delivery Post Treatment  room air  -KD room air  -RWA    Pre Patient Position  Sitting  -KD Supine  -RWA    Intra Patient Position  Standing  -KD     Post Patient Position  Sitting  -KD Supine  -RWA    Recorded by [RW] Pipe Gruber PTA [KD] Therese King, LARA/L [RWA] Jacinta Pitts, OTR/L    Pain Assessment    Pain Assessment No/denies pain  -RW No/denies pain  -KD 0-10  -RWA    Pain Score   4  -RWA    Post Pain Score   3  -RWA    Pain Location   Back  -RWA    Pain Orientation   Lower;Upper  -RWA    Recorded by [RW] Pipe Gruber PTA [KD] Therese King, LARA/L [RWA] Jacinta Pitts, OTR/L    Vision Assessment/Intervention    Visual Impairment WFL with corrective lenses  -RW      Recorded by [RW] Pipe Gruber PTA      Cognitive Assessment/Intervention    Current Cognitive/Communication Assessment impaired  -RW impaired  -KD impaired  -RWA    Orientation Status oriented to;person;place  -RW oriented to;person;place  -KD oriented to;person;place;situation  -RWA    Follows Commands/Answers Questions  100% of the time;able to follow single-step instructions  -KD able to follow single-step instructions;100% of the time  -RWA    Personal Safety   mild impairment  -RWA    Personal Safety Interventions gait belt;nonskid shoes/slippers when out of bed  -RW gait belt;nonskid shoes/slippers when out of bed  -KD     Recorded by [RW] Pipe Gruber PTA [KD] Therese King, LARA/L [RWA] Jacinta Pitts, OTR/L    Cognitive Assessment Intervention    Behavior/Mood Observations behavior appropriate to situation, WNL/WFL  -RW      Recorded by [RW] Pipe Gruber PTA      Bed Mobility, Assessment/Treatment    Bed Mob, Supine to Sit, [place] supervision required   -RW      Bed Mob, Sit to Supine, [place] supervision required  -RW      Recorded by [RW] Pipe Gruber PTA      Transfer Assessment/Treatment    Transfers, Bed-Chair [place] minimum assist (75% patient effort)  -RW contact guard assist  -KD     Transfers, Chair-Bed [place] minimum assist (75% patient effort)  -RW      Transfers, Bed-Chair-Bed, Assist Device rolling walker  -RW rolling walker  -KD     Transfers, Sit-Stand [place] contact guard assist;verbal cues required  -RW contact guard assist  -KD     Transfers, Stand-Sit [place] verbal cues required;contact guard assist  -RW contact guard assist  -KD     Transfers, Sit-Stand-Sit, Assist Device rolling walker  -RW rolling walker  -KD     Walk-In Shower Transfer, [place]  contact guard assist  -KD     Walk-In Shower Transfer, Assist Device  rolling walker  -KD     Transfer, Comment  Pt completed 10 sit-stands w/ 1 RB  -KD     Recorded by [RW] Pipe Gruber PTA [KD] LEIGH VidalesA/L     Gait Assessment/Treatment    Gait, [place] Level minimum assist (75% patient effort)  -RW      Gait, Assistive Device rolling walker  -RW      Gait, Distance (Feet) 60    x 4  -RW      Gait, Gait Deviations left:   occ foot drag  -RW      Recorded by [RW] Pipe Grbuer PTA      Stairs Assessment/Treatment    Number of Stairs 4  -RW      Stairs, Handrail Location both sides  -RW      Stairs, [place] Level contact guard assist;verbal cues required  -RW      Recorded by [RW] Pipe Gruber PTA      Functional Mobility    Functional Mobility- Ind. Level  contact guard assist  -KD     Functional Mobility- Device  rolling walker  -KD     Functional Mobility-Distance (Feet)  --   10 x 2  -KD     Recorded by  [KD] LEIGH VidalesA/L     Wheelchair Training/Management    Wheelchair, Distance Propelled 150 cga  - cga w/ vc's  -KD     Recorded by [RW] Pipe Gruber PTA [KD] LEIGH VidalesA/L     Upper Body Bathing  Assessment/Training    UB Bathing Assess/Train Assistive Device  bath mitt;grab bars;hand-held shower head;long-handled sponge;shower chair without back  -KD     UB Bathing Assess/Train, Position  edge of bed;sitting  -KD     UB Bathing Assess/Train, [place] Level  set up required  -KD     Recorded by  [KD] MARCO Vidales/L     Lower Body Bathing Assessment/Training    LB Bathing Assess/Train Assistive Device  bath mitt;grab bars;hand-held shower head;long-handled sponge;shower chair without back  -KD     LB Bathing Assess/Train, Position  sitting  -KD     LB Bathing Assess/Train, Campus Level  set up required  -KD     Recorded by  [KD] DAYDAY Vidales     Upper Body Dressing Assessment/Training    UB Dressing Assess/Train, Clothing Type  doffing:;donning:;pull over  -KD     UB Dressing Assess/Train, Position  edge of bed;sitting  -KD     UB Dressing Assess/Train, Campus  contact guard assist  -KD     Recorded by  [KD] MARCO Vidales/L     Lower Body Dressing Assessment/Training    LB Dressing Assess/Train, Clothing Type  doffing:;donning:;pants;shorts;socks  -KD     LB Dressing Assess/Train, Position  edge of bed;sitting  -KD     LB Dressing Assess/Train, Campus  minimum assist (75% patient effort)  -KD     LB Dressing Assess/Train, Impairments  impaired balance  -KD     LB Dressing Assess/Train, Comment  Pt demonstrated impaired balance with LB dressing , pt leans to L and need vc's to self correct    -KD     Recorded by  [KD] DAYDAY Vidales     Grooming Assessment/Training    Grooming Assess/Train, Assistive Device  --   comb hair/ brush teeth  -KD     Grooming Assess/Train, Position  sitting  -KD     Grooming Assess/Train, Indepen Level  set up required  -KD     Recorded by  [KD] DAYDAY Vidales     Self-Feeding Assessment/Training    Self-Feeding Assess/Train, Position  sitting  -KD     Self-Feeding Assess/Train, Campus  conditional independence   -KD     Self-Feeding Assess/Train, Spillage Amount  minimal  -KD     Recorded by  [KD] LEIGH VidalesA/L     Balance Skills Training    Standing-Level of Assistance  Contact guard  -KD     Static Standing Balance Support Right upper extremity supported;Left upper extremity supported  -RW assistive device  -KD     Standing-Balance Activities Weight Shift A-P  -RW Weight Shift A-P   Baloon volleyball  -KD     Standing Balance # of Minutes  --   3  -KD     Recorded by [RW] Pipe Gruber PTA [KD] LEIGH VidalesA/L     Therapy Exercises    Bilateral Lower Extremities AROM:;10 reps;supine;hip abduction/adduction;sitting;knee flexion;LAQ;ankle pumps/circles  -RW      Left Upper Extremity   5 reps  -RWA    LUE Resistance   theraputty   red  -RWA    Bilateral Upper Extremity  AROM:;20 reps;sitting;elbow flexion/extension;hand pumps;pronation/supination;shoulder circles;shoulder ER/IR;shoulder extension/flexion  -KD     BUE Resistance  manual resistance- minimal  -KD theraputty   removed beads from theraputty x3, requiring approx 15 min  -RWA    Trunk Exercises 10 reps;trunk rotation;supine   hooklying  -RW      Recorded by [RW] Pipe Gruber PTA [KD] LEIGH VidalesA/L [RWA] Jacinta Pitts OTR/L    Fine Motor Coordination Training    Detail (Fine Motor Coordination Training)   pegboard ax & Connect 4 game to increase LUE fine motor coordination & strength  -RWA    Recorded by   [RWA] Jacinta Pitts OTR/L    Positioning and Restraints    Pre-Treatment Position sitting in chair/recliner  -RW sitting in chair/recliner  -KD in bed  -RWA    Post Treatment Position wheelchair  -RW wheelchair  -KD bed  -RWA    In Bed with family/caregiver  -RW  supine;call light within reach;encouraged to call for assist;with family/caregiver  -RWA    In Wheelchair  sitting;call light within reach;encouraged to call for assist;exit alarm on  -KD     Recorded by [RW] Pipe Gruber PTA [KD] MARCO Vidales/DAWN [RWA]  Jacinta Pitts, OTR/L      11/01/17 1346 11/01/17 1122 11/01/17 1020    Rehab Assessment/Intervention    Discipline physical therapy assistant  -RW occupational therapist  -RWA speech language pathologist  -EC    Document Type therapy note (daily note)  -RW therapy note (daily note)  -RWA therapy note (daily note)  -EC    Subjective Information agree to therapy  -RW agree to therapy;no complaints  -RWA agree to therapy  -EC    Patient Effort, Rehab Treatment good  -RW good  -RWA good  -EC    Symptoms Noted During/After Treatment  none  -RWA     Precautions/Limitations fall precautions  -RW fall precautions  -RWA     Recorded by [RW] Pipe Gruber PTA [RWA] Jacinta Pitts, OTR/L [EC] Clementina Murrell CCC-SLP    Vital Signs    Pretreatment Heart Rate (beats/min) 81  -RW 86  -RWA     Posttreatment Heart Rate (beats/min)  87  -RWA     Pre SpO2 (%) 98  -RW 93  -RWA     O2 Delivery Pre Treatment room air  -RW room air  -RWA     Post SpO2 (%)  95  -RWA     O2 Delivery Post Treatment  room air  -RWA     Pre Patient Position  Sitting  -RWA     Post Patient Position  Sitting  -RWA     Recorded by [RW] Pipe Gruber PTA [RWA] Jacinta Pitts, OTR/L     Pain Assessment    Pain Assessment --   gives no rating but c/o left back/flank pain  -RW No/denies pain  -RWA No/denies pain  -EC    Recorded by [RW] Pipe Gruber PTA [RWA] Jacinta Pitts, OTR/L [EC] Clementina Murrell CCC-SLP    Vision Assessment/Intervention    Visual Impairment WFL with corrective lenses  -RW      Recorded by [RW] Pipe Gruber PTA      Cognitive Assessment/Intervention    Current Cognitive/Communication Assessment impaired  -RW impaired  -RWA     Orientation Status oriented to;person;place  -RW oriented to;person;place;situation  -RWA     Follows Commands/Answers Questions  able to follow single-step instructions;100% of the time  -RWA     Personal Safety mild impairment  -RW mild impairment  -RWA     Personal Safety  Interventions gait belt;nonskid shoes/slippers when out of bed  -RW gait belt;fall prevention program maintained;nonskid shoes/slippers when out of bed;supervised activity;muscle strengthening facilitated  -RWA     Recorded by [RW] Pipe Gruber PTA [RWA] Jacinta Pitts, OTR/L     Cognitive Assessment Intervention    Behavior/Mood Observations behavior appropriate to situation, WNL/WFL  -RW      Recorded by [RW] Pipe Gruber PTA      Communication Treatment Objective and Progress    Cognitive Linguistic Treatment Objectives   Improve orientation;Improve memory skills;Improve functional problem solving  -EC    Recorded by   [EC] Clementina Murrell CCC-SLP    Improve orientation    Improve orientation through:   demonstrating orientation to day;demonstrating orientation to month;demonstrating orientation to year;demonstrating orientation to place;demonstrating orientation to disease/impairment;90%;with inconsistent cues  -EC    Status: Improve orientation through   Progress slower than expected  -EC    Orientation Progress   0%  -EC    Comments: Improve orientation through   pt was not oriented to a single question this date.  only recalled that yesterday was halloween, but not date/month  -EC    Recorded by   [EC] Clementina Murrell CCC-SLP    Improve memory skills    Improve memory skills through:   recalling related word lists with an imposed delay;90%;with inconsistent cues  -EC    Status: Improve memory skills   Progress slower than expected  -EC    Memory Skills Progress   60%  -EC    Comments: Improve memory skills   recall of 3 related words  -EC    Recorded by   [EC] Clementina Murrell CCC-SLP    Improve functional problem solving    Improve functional problem solving through:   complete organization/home management task;tell similarity between items  -EC    Status: Improve functional problem solving   Progressing as expected  -EC    Functional Problem Solving Progress   70%  -EC    Comments:  Improve functional problem solving   pt identified early/late with 90% asccuracy and identified information on a bill and performed check writing with 60%   -EC    Recorded by   [EC] Clementina Murrell, CCC-SLP    Bed Mobility, Assessment/Treatment    Bed Mob, Supine to Sit, [place] minimum assist (75% patient effort)  -RW      Bed Mob, Sit to Supine, [place] contact guard assist  -RW      Recorded by [RW] Pipe Gruber PTA      Transfer Assessment/Treatment    Transfers, Bed-Chair [place] minimum assist (75% patient effort)  -RW      Transfers, Chair-Bed [place] minimum assist (75% patient effort)  -RW      Transfers, Bed-Chair-Bed, Assist Device rolling walker  -RW      Transfers, Sit-Stand [place] minimum assist (75% patient effort);contact guard assist  -RW minimum assist (75% patient effort)  -RWA     Transfers, Stand-Sit [place] minimum assist (75% patient effort);verbal cues required;contact guard assist  -RW minimum assist (75% patient effort)  -RWA     Transfers, Sit-Stand-Sit, Assist Device rolling walker  -RW rolling walker  -RWA     Toilet Transfer, [place]  minimum assist (75% patient effort)  -RWA     Toilet Transfer, Assistive Device  rolling walker   grab bar  -RWA     Transfer, Safety Issues  step length decreased  -RWA     Transfer, Comment  sit-stand from toilet x3 for clothing management, lis care, & application of cream  -RWA     Recorded by [RW] Pipe Gruber PTA [RWA] Jacinta Pitts, OTR/L     Gait Assessment/Treatment    Gait, [place] Level minimum assist (75% patient effort)  -RW      Gait, Assistive Device rolling walker  -RW      Gait, Distance (Feet) 40   x 3  -RW      Gait, Gait Deviations left:;step length decreased   improved  -RW      Recorded by [RW] Pipe Gruber PTA      Functional Mobility    Functional Mobility- Ind. Level  contact guard assist  -RWA     Functional Mobility- Device  rolling walker  -RWA     Functional  Mobility-Distance (Feet)  12   x2  -RWA     Functional Mobility- Safety Issues  step length decreased;weight-shifting ability decreased   too far away from AD  -RWA     Recorded by  [RWA] Jacinta Pitts OTR/L     Wheelchair Training/Management    Wheelchair, Distance Propelled  25 ft with SBA  -RWA     Recorded by  [RWA] Jacinta Pitts OTR/L     Lower Body Bathing Assessment/Training    LB Bathing Assess/Train, Position  standing  -RWA     LB Bathing Assess/Train, [place] Level  minimum assist (75% patient effort)   for balance  -RWA     LB Bathing Assess/Train, Impairments  impaired balance;strength decreased  -RWA     LB Bathing Assess/Train, Comment  Pt washed lis area only & applied magic butt.  -RWA     Recorded by  [RWA] ESTEBAN Silver/L     Grooming Assessment/Training    Grooming Assess/Train, Position  sitting;sink side   w/c  -RWA     Grooming Assess/Train, Indepen Level  supervision required;verbal cues required  -RWA     Grooming Assess/Train, Impairments  strength decreased;coordination impaired  -RWA     Grooming Assess/Train, Comment  Pt brushing teeth upon arrival, washed face & combed hair.  -RWA     Recorded by  [RWA] Jacinta Pitts OTR/L     Balance Skills Training    Static Standing Balance Support  assistive device  -RWA     Standing-Balance Activities  Weight Shift R-L   ADL task  -RWA     Standing Balance # of Minutes  5  -RWA     Recorded by  [RWA] Jacinta Pitts OTR/L     Therapy Exercises    Bilateral Lower Extremities AROM:;10 reps;supine;ankle pumps/circles;heel slides;hip abduction/adduction;SAQ;standing;heel raises   2 sets of hs . hooklying hip aa x 10 radha  -RW      Trunk Exercises 10 reps;trunk rotation;supine   hooklying  -RW      Recorded by [RW] Pipe Gruber, PTA      Positioning and Restraints    Pre-Treatment Position other (comment)   eom  -RW sitting in chair/recliner   w/c  -RWA     Post Treatment Position wheelchair  -RW wheelchair   at  table for lunch  -RWA     In Wheelchair with family/caregiver  -RW sitting;with family/caregiver  -RWA     Recorded by [RW] Pipe Gruber PTA [RWA] Jacinta Pitts, OTR/L       11/01/17 0930 10/31/17 1310 10/31/17 1101    Rehab Assessment/Intervention    Discipline physical therapy assistant  -RW physical therapy assistant  -RW speech language pathologist  -HR    Document Type therapy note (daily note)  -RW therapy note (daily note)  -RW     Subjective Information agree to therapy  -RW agree to therapy  -RW     Patient Effort, Rehab Treatment good  -RW good  -RW good  -HR    Precautions/Limitations fall precautions  -RW fall precautions  -RW fall precautions  -HR    Recorded by [RW] Pipe Gruber PTA [RW] Pipe Gruber PTA [HR] Shante Otero MS CCC-SLP    Vital Signs    Pre Systolic BP Rehab  117  -RW     Pre Treatment Diastolic BP  62  -RW     Pretreatment Heart Rate (beats/min) 70  -RW 81  -RW     Pre SpO2 (%) 94  -RW      O2 Delivery Pre Treatment room air  -RW      Recorded by [RW] Pipe Gruber PTA [RW] Pipe Gruber PTA     Pain Assessment    Pain Assessment No/denies pain  -RW No/denies pain  -RW     Recorded by [RW] Pipe Gruber PTA [RW] Pipe Gruber PTA     Vision Assessment/Intervention    Visual Impairment WFL with corrective lenses  -RW WFL with corrective lenses  -RW     Recorded by [RW] Pipe Gruber PTA [RW] Pipe Gruber PTA     Cognitive Assessment/Intervention    Current Cognitive/Communication Assessment impaired  -RW impaired  -RW     Orientation Status oriented to;person;place  -RW oriented to;person;place  -RW     Personal Safety Interventions gait belt;nonskid shoes/slippers when out of bed  -RW gait belt;nonskid shoes/slippers when out of bed  -RW     Recorded by [RW] Pipe Gruber PTA [RW] Pipe Gruber PTA     Cognitive Assessment Intervention    Behavior/Mood Observations behavior appropriate to situation, WNL/WFL  -RW      Recorded by [RW] Pipe Gruber PTA       Bed Mobility, Assessment/Treatment    Bed Mobility, Assistive Device  --  -RW     Bed Mobility, Scoot/Bridge, [place]  --  -RW     Bed Mob, Supine to Sit, [place] minimum assist (75% patient effort)  -RW --  -RW     Bed Mob, Sit to Supine, [place] contact guard assist  -RW --  -RW     Recorded by [RW] Pipe Gruber PTA [RW] Pipe Gruber PTA     Transfer Assessment/Treatment    Transfers, Bed-Chair [place] minimum assist (75% patient effort)  -RW      Transfers, Chair-Bed [place] minimum assist (75% patient effort)  -RW      Transfers, Bed-Chair-Bed, Assist Device rolling walker  -RW      Transfers, Sit-Stand [place] minimum assist (75% patient effort);contact guard assist  -RW minimum assist (75% patient effort)  -RW     Transfers, Stand-Sit [place] minimum assist (75% patient effort);verbal cues required;contact guard assist  -RW minimum assist (75% patient effort);verbal cues required  -RW     Transfers, Sit-Stand-Sit, Assist Device rolling walker  -RW rolling walker  -RW     Transfer, Comment sit to stand x 5, one lob  -RW sit to stand x 5  -RW     Recorded by [RW] Pipe Gruber PTA [RW] Pipe Gruber PTA     Gait Assessment/Treatment    Gait, [place] Level minimum assist (75% patient effort)  -RW minimum assist (75% patient effort)  -RW     Gait, Assistive Device rolling walker  -RW rolling walker  -RW     Gait, Distance (Feet) 70  -RW 30  -RW     Gait, Gait Deviations leans to the left;step length decreased;toe-to-floor clearance decreased;left:  -RW leans to the left;narrow base  -RW     Recorded by [RW] Pipe Gruebr PTA [RW] Pipe Gruber PTA     Wheelchair Training/Management    Wheelchair, Distance Propelled 150 sba and lots of vcues  -RW  x 2  -RW     Wheelchair Training Comment  decreased hand strength on left and limited ability to use le at this time  -RW     Recorded by [RW] Pipe Gruber PTA [RW] Pipe Gruber PTA     Therapy Exercises     Bilateral Lower Extremities AROM:;10 reps;supine;ankle pumps/circles;heel slides;hip abduction/adduction;SAQ;standing;heel raises  -RW AROM:;10 reps;standing;heel raises  -RW     Recorded by [RW] Pipe Gruber PTA [RW] Pipe Gruber PTA     Positioning and Restraints    Pre-Treatment Position sitting in chair/recliner  -RW sitting in chair/recliner  -RW     Post Treatment Position wheelchair  -RW wheelchair  -RW     In Wheelchair with SLP  -RW with family/caregiver  -RW     Recorded by [RW] Pipe Gruber PTA [RW] Pipe Gruber PTA       User Key  (r) = Recorded By, (t) = Taken By, (c) = Cosigned By    Initials Name Effective Dates    EC Clementina Murrell, CCC-SLP 12/30/16 -     RWA Jacinta Pitts, OTR/L 10/17/16 -     HR Shante Otero, MS [facility]-SLP 12/15/16 -     RW Pipe Gruber PTA 10/17/16 -     KD Therese King, LARA/L 10/17/16 -                 OT Goals       11/02/17 0715 11/01/17 1635 10/31/17 0925    Transfer Training OT LTG    Transfer Training OT LTG, Date Established   10/31/17  -BH (r) SP (t) BH (c)    Transfer Training OT LTG, Time to Achieve   by discharge  -BH (r) SP (t) BH (c)    Transfer Training OT LTG, Activity Type   all transfers  -BH (r) SP (t) BH (c)    Transfer Training OT LTG, [place] Level   contact guard assist  -BH (r) SP (t) BH (c)    Transfer Training OT LTG, Date Goal Reviewed 11/02/17  -KD 11/01/17  -RW     Transfer Training OT LTG, Outcome goal not met  -KD goal ongoing  -RW     Patient Education OT LTG    Patient Education OT LTG, Date Established   10/31/17  -BH (r) SP (t) BH (c)    Patient Education OT LTG, Time to Achieve   by discharge  -BH (r) SP (t) BH (c)    Patient Education OT LTG, Education Type   HEP;posture/body mechanics;1 hand/anni technique;home safety;adaptive equipment mgmt;energy conservation  -BH (r) SP (t) BH (c)    Patient Education OT LTG, Education Understanding   independent;demonstrates adequately;verbalizes understanding   with caregiver  assist as necessary  -BH (r) SP (t) BH (c)    Patient Education OT LTG, Date Goal Reviewed 11/02/17  -KD 11/01/17  -RW     Patient Education OT LTG Outcome goal not met  -KD goal ongoing  -RW     Safety Awareness OT LTG    Safety Awareness OT LTG, Date Established   10/31/17  -BH (r) SP (t) BH (c)    Safety Awareness OT LTG, Time to Achieve   by discharge  -BH (r) SP (t) BH (c)    Safety Awareness OT LTG, Activity Type   good safety awareness;with kitchen mobility;with ADL's  -BH (r) SP (t) BH (c)    Safety Awareness OT LTG, [place] Level   min verbal cues  -BH (r) SP (t) BH (c)    Safety Awareness OT LTG, Date Goal Reviewed 11/02/17  -KD 11/01/17  -RW     Safety Awareness OT LTG, Outcome goal not met  -KD goal ongoing  -RW     ADL OT LTG    ADL OT LTG, Date Established   10/31/17  -BH (r) SP (t) BH (c)    ADL OT LTG, Time to Achieve   by discharge  -BH (r) SP (t) BH (c)    ADL OT LTG, Activity Type   ADL skills  -BH (r) SP (t) BH (c)    ADL OT LTG, Additional Goal   Set-up A with self-feeding and grooming; VC for UB bathing and UB dressing; CGA with LB bathing and LB dressing  -BH (r) SP (t) BH (c)    ADL OT LTG, Date Goal Reviewed  11/01/17  -RW     ADL OT LTG, Outcome goal not met  -KD goal ongoing  -RW     Endurance OT LTG    Endurance Goal OT LTG, Date Established   10/31/17  -BH (r) SP (t) BH (c)    Endurance Goal OT LTG, Time to Achieve   by discharge  -BH (r) SP (t) BH (c)    Endurance Goal OT LTG, Activity Level   endurance 2 good-  -BH (r) SP (t) BH (c)    Endurance Goal OT LTG, Additional Goal   During 30 minutes of functional tasks, ADL, and therapeutic exercise  -BH (r) SP (t) BH (c)    Endurance Goal OT LTG, Date Goal Reviewed 11/02/17  -KD 11/01/17  -RW     Endurance Goal OT LTG, Outcome goal not met  -KD goal ongoing  -RW       User Key  (r) = Recorded By, (t) = Taken By, (c) = Cosigned By    Initials Name Provider Type    DONAL Haung, OTR/L Occupational Therapist    KATHIE Pitts,  OTR/L Occupational Therapist    JOSS King LARA/L Occupational Therapy Assistant    DIONTE Bruce, OT Student OT Student          Occupational Therapy Education     Title: PT OT SLP Therapies (Active)     Topic: Occupational Therapy (Active)     Point: ADL training (Active)    Description: Instruct learner(s) on proper safety adaptation and remediation techniques during self care or transfers.   Instruct in proper use of assistive devices.    Learning Progress Summary    Learner Readiness Method Response Comment Documented by Status   Patient Acceptance TB NR  KD 11/02/17 1108 Active    Acceptance E,D NR  RW 11/01/17 1518 Active    Acceptance E VU Educated about OT and POC. Educated about anni dressing technique. Educated about safety with ADL and t/f. Educated to call for assist and not to stand independently. SP 10/31/17 1354 Done   Family Acceptance E,D NR  RW 11/01/17 1518 Active    Acceptance E VU Educated about OT and POC. Educated about anni dressing technique. Educated about safety with ADL and t/f. Educated to call for assist and not to stand independently. SP 10/31/17 1354 Done               Point: Home exercise program (Active)    Description: Instruct learner(s) on appropriate technique for monitoring, assisting and/or progressing therapeutic exercises/activities.    Learning Progress Summary    Learner Readiness Method Response Comment Documented by Status   Patient Acceptance E,D NR theraputty RW 11/01/17 1634 Active   Family Acceptance E,D NR theraputty RW 11/01/17 1634 Active               Point: Precautions (Active)    Description: Instruct learner(s) on prescribed precautions during self-care and functional transfers.    Learning Progress Summary    Learner Readiness Method Response Comment Documented by Status   Patient Acceptance E,D NR  RW 11/01/17 1518 Active    Acceptance E VU Educated about OT and POC. Educated about anni dressing technique. Educated about safety with ADL and  t/f. Educated to call for assist and not to stand independently. SP 10/31/17 1354 Done   Family Acceptance E,D NR  RW 11/01/17 1518 Active    Acceptance E VU Educated about OT and POC. Educated about anni dressing technique. Educated about safety with ADL and t/f. Educated to call for assist and not to stand independently. SP 10/31/17 1354 Done               Point: Body mechanics (Done)    Description: Instruct learner(s) on proper positioning and spine alignment during self-care, functional mobility activities and/or exercises.    Learning Progress Summary    Learner Readiness Method Response Comment Documented by Status   Patient Acceptance E VU Educated about OT and POC. Educated about anni dressing technique. Educated about safety with ADL and t/f. Educated to call for assist and not to stand independently. SP 10/31/17 1354 Done   Family Acceptance E VU Educated about OT and POC. Educated about anni dressing technique. Educated about safety with ADL and t/f. Educated to call for assist and not to stand independently. SP 10/31/17 1354 Done                      User Key     Initials Effective Dates Name Provider Type Discipline    RW 10/17/16 -  ESTEBAN Silver/L Occupational Therapist OT    KD 10/17/16 -  MARCO Vidales/L Occupational Therapy Assistant OT    SP 01/31/17 -  Alem Bruce OT Student OT Student OT                  OT Recommendation and Plan  Anticipated Equipment Needs At Discharge: bathing equipment, dressing equipment, front wheeled walker  Anticipated Discharge Disposition: home with 24/7 care, home with home health  Planned Therapy Interventions: activity intolerance, adaptive equipment training, ADL retraining, IADL retraining, balance training, bed mobility training, energy conservation, fine motor coordination training, home exercise program, motor coordination training, neuromuscular re-education, ROM (Range of Motion), strengthening, transfer training  Therapy Frequency:  other (see comments) (3-14x/wk)  Plan of Care Review  Outcome Summary/Follow up Plan: sit-stand cga, pt completed 10 sit-stands with good lizandro 1 RB using RW. Pt performed dynamic standing ~ 3 mins with good tolerance using RW. Pt performed 1 set x 30 reps of ther ex w/ 3lb HW w/ no complaints. Pt completed shower Independently using AE this AM Pt CGA with shower t/f. Pt lizandro tx well this AM.        Outcome Measures       11/02/17 1000 11/02/17 0715 11/01/17 1122    How much help from another person do you currently need...    Turning from your back to your side while in flat bed without using bedrails? 3  -RW      Moving from lying on back to sitting on the side of a flat bed without bedrails? 3  -RW      Moving to and from a bed to a chair (including a wheelchair)? 3  -RW      Standing up from a chair using your arms (e.g., wheelchair, bedside chair)? 3  -RW      Climbing 3-5 steps with a railing? 3  -RW      To walk in hospital room? 3  -RW      AM-PAC 6 Clicks Score 18  -RW      How much help from another is currently needed...    Putting on and taking off regular lower body clothing?  2  -KD 2  -RWA    Bathing (including washing, rinsing, and drying)  3  -KD 2  -RWA    Toileting (which includes using toilet bed pan or urinal)  2  -KD 2  -RWA    Putting on and taking off regular upper body clothing  3  -KD 3  -RWA    Taking care of personal grooming (such as brushing teeth)  3  -KD 3  -RWA    Eating meals  3  -KD 3  -RWA    Score  16  -KD 15  -RWA    Functional Assessment    Outcome Measure Options   AM-PAC 6 Clicks Daily Activity (OT)  -RWA      11/01/17 1000 10/31/17 0925 10/31/17 0825    How much help from another person do you currently need...    Turning from your back to your side while in flat bed without using bedrails? 3  -RW  3  -LM    Moving from lying on back to sitting on the side of a flat bed without bedrails? 3  -RW  3  -LM    Moving to and from a bed to a chair (including a wheelchair)? 3  -RW  2   -LM    Standing up from a chair using your arms (e.g., wheelchair, bedside chair)? 3  -RW  2  -LM    Climbing 3-5 steps with a railing? 2  -RW  2  -LM    To walk in hospital room? 3  -RW  3  -LM    AM-PAC 6 Clicks Score 17  -RW  15  -LM    How much help from another is currently needed...    Putting on and taking off regular lower body clothing?  2  -BH (r) SP (t) BH (c)     Bathing (including washing, rinsing, and drying)  2  -BH (r) SP (t) BH (c)     Toileting (which includes using toilet bed pan or urinal)  2  -BH (r) SP (t) BH (c)     Putting on and taking off regular upper body clothing  3  -BH (r) SP (t) BH (c)     Taking care of personal grooming (such as brushing teeth)  3  -BH (r) SP (t) BH (c)     Eating meals  3  -BH (r) SP (t) BH (c)     Score  15  -BH (r) SP (t)     Functional Assessment    Outcome Measure Options  AM-PAC 6 Clicks Daily Activity (OT)  -BH (r) SP (t) BH (c) AM-PAC 6 Clicks Basic Mobility (PT)  -LM      User Key  (r) = Recorded By, (t) = Taken By, (c) = Cosigned By    Initials Name Provider Type    LM Landy Mckeon, PT Physical Therapist     Karoline Huang, OTR/L Occupational Therapist    THERESA Pitts, OTR/L Occupational Therapist    RW Pipe Gruber, PTA Physical Therapy Assistant    MARCO Sánchez/L Occupational Therapy Assistant    DIONTE Bruce, OT Student OT Student           Time Calculation:         Time Calculation- OT       11/02/17 1118          Time Calculation- OT    OT Start Time 0715  -KD      OT Stop Time 0845  -KD      OT Time Calculation (min) 90 min  -KD      Total Timed Code Minutes- OT 90 minute(s)  -KD      OT Received On 11/02/17  -KD        User Key  (r) = Recorded By, (t) = Taken By, (c) = Cosigned By    Initials Name Provider Type    LEIGH SánchezA/L Occupational Therapy Assistant           Therapy Charges for Today     Code Description Service Date Service Provider Modifiers Qty    14589638709  OT THER PROC EA 15 MIN 11/2/2017  MARCO Vidales/L GO 2    50920257669 HC OT WHEELCHAIR MGMT EA 15 MIN 11/2/2017 MARCO Vidales/L GO 1    58994699284 HC OT THERAPEUTIC ACT EA 15 MIN 11/2/2017 MARCO Vidales/L GO 1    38154062311 HC OT SELF CARE/MGMT/TRAIN EA 15 MIN 11/2/2017 MARCO Vidales/L GO 2          OT G-codes  OT Professional Judgement Used?: Yes  OT Functional Scales Options: AM-PAC 6 Clicks Daily Activity (OT)  Score: 15  Functional Limitation: Self care  Self Care Current Status (): At least 40 percent but less than 60 percent impaired, limited or restricted  Self Care Goal Status (): At least 1 percent but less than 20 percent impaired, limited or restricted    MARCO Vidales/DAWN  11/2/2017

## 2017-11-02 NOTE — THERAPY TREATMENT NOTE
Inpatient Rehabilitation - Speech Language Pathology Treatment Note  [facility]     Patient Name: Kenneth Harper Jr.  : 1934  MRN: [id number]  Today's Date: 2017  Referring Physician: Pratibha      Admit Date: 10/30/2017  1. Patient will demonstrate orientation to day, month, year, place, situation with 90% acc with min cues: 10% accuracy.  Pt is oriented to place and situation but states he knows he is waking up disoriented in the middle of the night and he continues to have trouble stating month,day, year, date.  Calendars provided with information filled in and will be used daily for orientation activities.      2. Patient will complete delayed word recall with 80% acc with min cues: delayed recall of 4 related words (3 of them the same as yesterday.  Recall is with 75% accuracy at 10 , 20, and 30 min intervals with occasional cues.  Will attempt related words again tomorrow.  (fall, leaves, rake, red)  3. Patient will complete organization/home management task with 90% acc with min cues: pt used pill organizer this date with max cues and only 50% accuracy.  .    4. Patient will complete compare/contrast activity with 90% acc with min cues: GOAL MET previously.  Daughter present for half of session.  Concern for orientation and memory continues.  Discussed need for no driving till cleared.    Clementina Murrell, CCC-SLP 2017 4:25 PM    Visit Dx:      ICD-10-CM ICD-9-CM   1. Symbolic dysfunction R48.9 784.60   2. Impaired mobility and ADLs Z74.09 799.89   3. Abnormality of gait and mobility R26.9 781.2   4. Muscle weakness (generalized) M62.81 728.87     Patient Active Problem List   Diagnosis   • Spinal stenosis, lumbar region, with neurogenic claudication   • Former smoker   • Overweight   • Left-sided weakness   • Right-sided lacunar stroke   • Essential hypertension   • Diabetes mellitus   • Chronic anxiety   • Chronic back pain greater than 3 months duration   • Prostatic  hypertrophy   • Degenerative joint disease involving multiple joints   • Atrial fibrillation, chronic   • Encounter for rehabilitation   • Obstructive sleep apnea syndrome              Adult Rehabilitation Note       11/02/17 1455 11/02/17 0902 11/02/17 0715    Rehab Assessment/Intervention    Discipline speech language pathologist  -EC physical therapy assistant  -RW occupational therapy assistant  -KD    Document Type therapy note (daily note)  -EC therapy note (daily note)  -RW therapy note (daily note)  -KD    Subjective Information agree to therapy  -EC agree to therapy  -RW agree to therapy  -KD    Patient Effort, Rehab Treatment good  -EC good  -RW good  -KD    Precautions/Limitations  fall precautions  -RW fall precautions  -KD    Recorded by [EC] Clementina Murrell CCC-SLP [RW] Pipe Gruber PTA [KD] Therese King, LARA/L    Vital Signs    Pre Systolic BP Rehab   105  -KD    Pre Treatment Diastolic BP   57  -KD    Pretreatment Heart Rate (beats/min)  80  -RW 83  -KD    Posttreatment Heart Rate (beats/min)   74  -KD    Pre SpO2 (%)  93  -RW 94  -KD    O2 Delivery Pre Treatment  room air  -RW room air  -KD    Post SpO2 (%)   96  -KD    O2 Delivery Post Treatment   room air  -KD    Pre Patient Position   Sitting  -KD    Intra Patient Position   Standing  -KD    Post Patient Position   Sitting  -KD    Recorded by  [RW] Pipe Gruber PTA [KD] Therese King, LARA/L    Pain Assessment    Pain Assessment No/denies pain  -EC No/denies pain  -RW No/denies pain  -KD    Recorded by [EC] Clementina Murrell CCC-SLP [RW] Pipe Gruber PTA [KD] Therese King, LARA/L    Vision Assessment/Intervention    Visual Impairment  WFL with corrective lenses  -RW     Recorded by  [RW] Pipe Gruber PTA     Cognitive Assessment/Intervention    Current Cognitive/Communication Assessment  impaired  -RW impaired  -KD    Orientation Status  oriented to;person;place  -RW oriented to;person;place  -KD    Follows  Commands/Answers Questions   100% of the time;able to follow single-step instructions  -KD    Personal Safety Interventions  gait belt;nonskid shoes/slippers when out of bed  -RW gait belt;nonskid shoes/slippers when out of bed  -KD    Recorded by  [RW] Pipe Gruber PTA [KD] MARCO Vdiales/L    Cognitive Assessment Intervention    Behavior/Mood Observations  behavior appropriate to situation, WNL/WFL  -RW     Recorded by  [RW] Pipe Gruber PTA     Communication Treatment Objective and Progress    Cognitive Linguistic Treatment Objectives Improve orientation;Improve memory skills;Improve functional problem solving  -EC      Recorded by [EC] MERI Schultz      Improve orientation    Improve orientation through: demonstrating orientation to day;demonstrating orientation to month;demonstrating orientation to year;demonstrating orientation to place;demonstrating orientation to disease/impairment;90%;with inconsistent cues  -EC      Status: Improve orientation through Progress slower than expected  -EC      Orientation Progress 10%  -EC      Recorded by [EC] MERI Schultz      Improve memory skills    Improve memory skills through: recalling related word lists with an imposed delay;90%;with inconsistent cues  -EC      Status: Improve memory skills Progress slower than expected  -EC      Memory Skills Progress 70%  -EC      Recorded by [EC] MERI Schultz      Improve functional problem solving    Improve functional problem solving through: complete organization/home management task;tell similarity between items  -EC      Status: Improve functional problem solving Progress slower than expected  -EC      Functional Problem Solving Progress 30%  -EC      Recorded by [EC] MERI Schultz      Bed Mobility, Assessment/Treatment    Bed Mob, Supine to Sit, [place]  supervision required  -RW     Bed Mob, Sit to Supine, [place]  supervision required  -RW      Recorded by  [RW] Pipe Gruber PTA     Transfer Assessment/Treatment    Transfers, Bed-Chair [place]  minimum assist (75% patient effort)  -RW contact guard assist  -KD    Transfers, Chair-Bed [place]  minimum assist (75% patient effort)  -RW     Transfers, Bed-Chair-Bed, Assist Device  rolling walker  -RW rolling walker  -KD    Transfers, Sit-Stand [place]  contact guard assist;verbal cues required  -RW contact guard assist  -KD    Transfers, Stand-Sit [place]  verbal cues required;contact guard assist  -RW contact guard assist  -KD    Transfers, Sit-Stand-Sit, Assist Device  rolling walker  -RW rolling walker  -KD    Walk-In Shower Transfer, [place]   contact guard assist  -KD    Walk-In Shower Transfer, Assist Device   rolling walker  -KD    Transfer, Comment   Pt completed 10 sit-stands w/ 1 RB  -KD    Recorded by  [RW] Pipe Gruber PTA [KD] MARCO Vidales/L    Gait Assessment/Treatment    Gait, [place] Level  minimum assist (75% patient effort)  -RW     Gait, Assistive Device  rolling walker  -RW     Gait, Distance (Feet)  60    x 4  -RW     Gait, Gait Deviations  left:   occ foot drag  -RW     Recorded by  [RW] Pipe Gruber PTA     Stairs Assessment/Treatment    Number of Stairs  4  -RW     Stairs, Handrail Location  both sides  -RW     Stairs, [place] Level  contact guard assist;verbal cues required  -RW     Recorded by  [RW] Pipe Gruber PTA     Functional Mobility    Functional Mobility- Ind. Level   contact guard assist  -KD    Functional Mobility- Device   rolling walker  -KD    Functional Mobility-Distance (Feet)   --   10 x 2  -KD    Recorded by   [KD] MARCO Vidales/L    Wheelchair Training/Management    Wheelchair, Distance Propelled  150 cga  - cga w/ vc's  -KD    Recorded by  [RW] Pipe Gruber PTA [KD] LEIGH VidalesA/L    Upper Body Bathing Assessment/Training    UB Bathing Assess/Train Assistive Device   bath mitt;venkatesh  bars;hand-held shower head;long-handled sponge;shower chair without back  -KD    UB Bathing Assess/Train, Position   edge of bed;sitting  -KD    UB Bathing Assess/Train, [place] Level   set up required  -KD    Recorded by   [KD] LEIGH VidalesA/L    Lower Body Bathing Assessment/Training    LB Bathing Assess/Train Assistive Device   bath mitt;grab bars;hand-held shower head;long-handled sponge;shower chair without back  -KD    LB Bathing Assess/Train, Position   sitting  -KD    LB Bathing Assess/Train, [place] Level   set up required  -KD    Recorded by   [KD] MARCO Vidales/L    Upper Body Dressing Assessment/Training    UB Dressing Assess/Train, Clothing Type   doffing:;donning:;pull over  -KD    UB Dressing Assess/Train, Position   edge of bed;sitting  -KD    UB Dressing Assess/Train, [place]   contact guard assist  -KD    Recorded by   [KD] MARCO Vidales/L    Lower Body Dressing Assessment/Training    LB Dressing Assess/Train, Clothing Type   doffing:;donning:;pants;shorts;socks  -KD    LB Dressing Assess/Train, Position   edge of bed;sitting  -KD    LB Dressing Assess/Train, [place]   minimum assist (75% patient effort)  -KD    LB Dressing Assess/Train, Impairments   impaired balance  -KD    LB Dressing Assess/Train, Comment   Pt demonstrated impaired balance with LB dressing , pt leans to L and need vc's to self correct    -KD    Recorded by   [KD] MARCO Vidales/L    Grooming Assessment/Training    Grooming Assess/Train, Assistive Device   --   comb hair/ brush teeth  -KD    Grooming Assess/Train, Position   sitting  -KD    Grooming Assess/Train, [place]ep[place] Level   set up required  -KD    Recorded by   [KD] LEIGH VidalesA/L    Self-Feeding Assessment/Training    Self-Feeding Assess/Train, Position   sitting  -KD    Self-Feeding Assess/Train, [place]   conditional independence  -KD    Self-Feeding Assess/Train, Spillage Amount   minimal  -KD    Recorded by    [KD] MARCO Vidales/L    Balance Skills Training    Standing-Level of Assistance   Contact guard  -KD    Static Standing Balance Support  Right upper extremity supported;Left upper extremity supported  -RW assistive device  -KD    Standing-Balance Activities  Weight Shift A-P  -RW Weight Shift A-P   Baloon volleyball  -KD    Standing Balance # of Minutes   --   3  -KD    Recorded by  [RW] Pipe Gruber PTA [KD] LEIGH VidalesA/L    Therapy Exercises    Bilateral Lower Extremities  AROM:;10 reps;supine;hip abduction/adduction;sitting;knee flexion;LAQ;ankle pumps/circles  -RW     Bilateral Upper Extremity   AROM:;20 reps;sitting;elbow flexion/extension;hand pumps;pronation/supination;shoulder circles;shoulder ER/IR;shoulder extension/flexion  -KD    BUE Resistance   manual resistance- minimal  -KD    Trunk Exercises  10 reps;trunk rotation;supine   hooklying  -RW     Recorded by  [RW] Pipe Gruber PTA [KD] MARCO Vidales/L    Positioning and Restraints    Pre-Treatment Position  sitting in chair/recliner  -RW sitting in chair/recliner  -KD    Post Treatment Position  wheelchair  -RW wheelchair  -KD    In Bed  with family/caregiver  -RW     In Wheelchair   sitting;call light within reach;encouraged to call for assist;exit alarm on  -KD    Recorded by  [RW] Pipe Gruber PTA [KD] MARCO Vidales/DAWN      11/01/17 1524 11/01/17 1346 11/01/17 1122    Rehab Assessment/Intervention    Discipline occupational therapist  -RWA physical therapy assistant  -RW occupational therapist  -RWA    Document Type therapy note (daily note)  -RWA therapy note (daily note)  -RW therapy note (daily note)  -RWA    Subjective Information agree to therapy;complains of;pain  -RWA agree to therapy  -RW agree to therapy;no complaints  -RWA    Patient Effort, Rehab Treatment good  -RWA good  -RW good  -RWA    Symptoms Noted During/After Treatment none  -RWA  none  -RWA    Precautions/Limitations fall precautions  -RWA  fall precautions  -RW fall precautions  -RWA    Recorded by [RWA] Jacinta Pitts, OTR/L [RW] Pipe Gruber PTA [RWA] Jacinta Pitts, OTR/L    Vital Signs    Pretreatment Heart Rate (beats/min) 81  -RWA 81  -RW 86  -RWA    Posttreatment Heart Rate (beats/min) 72  -RWA  87  -RWA    Pre SpO2 (%) 96  -RWA 98  -RW 93  -RWA    O2 Delivery Pre Treatment room air  -RWA room air  -RW room air  -RWA    Post SpO2 (%) 95  -RWA  95  -RWA    O2 Delivery Post Treatment room air  -RWA  room air  -RWA    Pre Patient Position Supine  -RWA  Sitting  -RWA    Post Patient Position Supine  -RWA  Sitting  -RWA    Recorded by [RWA] Jacinta Pitts, OTR/L [RW] Pipe Gruber PTA [RWA] Jacinta Pitts, OTR/L    Pain Assessment    Pain Assessment 0-10  -RWA --   gives no rating but c/o left back/flank pain  -RW No/denies pain  -RWA    Pain Score 4  -RWA      Post Pain Score 3  -RWA      Pain Location Back  -RWA      Pain Orientation Lower;Upper  -RWA      Recorded by [RWA] Jacinta Pitts, OTR/L [RW] Pipe Gruber PTA [RWA] Jacinta Pitts, OTR/L    Vision Assessment/Intervention    Visual Impairment  WFL with corrective lenses  -RW     Recorded by  [RW] Pipe Gruber PTA     Cognitive Assessment/Intervention    Current Cognitive/Communication Assessment impaired  -RWA impaired  -RW impaired  -RWA    Orientation Status oriented to;person;place;situation  -RWA oriented to;person;place  -RW oriented to;person;place;situation  -RWA    Follows Commands/Answers Questions able to follow single-step instructions;100% of the time  -RWA  able to follow single-step instructions;100% of the time  -RWA    Personal Safety mild impairment  -RWA mild impairment  -RW mild impairment  -RWA    Personal Safety Interventions  gait belt;nonskid shoes/slippers when out of bed  -RW gait belt;fall prevention program maintained;nonskid shoes/slippers when out of bed;supervised activity;muscle strengthening facilitated  -RWA    Recorded by  [RWA] Jacinta Pitts, OTR/L [RW] Pipe Gruber PTA [RWA] Jacinta Pitts, OTR/L    Cognitive Assessment Intervention    Behavior/Mood Observations  behavior appropriate to situation, WNL/WFL  -RW     Recorded by  [RW] Pipe Gruber PTA     Bed Mobility, Assessment/Treatment    Bed Mob, Supine to Sit, [place]  minimum assist (75% patient effort)  -RW     Bed Mob, Sit to Supine, [place]  contact guard assist  -RW     Recorded by  [RW] Pipe Gruber PTA     Transfer Assessment/Treatment    Transfers, Bed-Chair [place]  minimum assist (75% patient effort)  -RW     Transfers, Chair-Bed [place]  minimum assist (75% patient effort)  -RW     Transfers, Bed-Chair-Bed, Assist Device  rolling walker  -RW     Transfers, Sit-Stand [place]  minimum assist (75% patient effort);contact guard assist  -RW minimum assist (75% patient effort)  -RWA    Transfers, Stand-Sit [place]  minimum assist (75% patient effort);verbal cues required;contact guard assist  -RW minimum assist (75% patient effort)  -RWA    Transfers, Sit-Stand-Sit, Assist Device  rolling walker  -RW rolling walker  -RWA    Toilet Transfer, [place]   minimum assist (75% patient effort)  -RWA    Toilet Transfer, Assistive Device   rolling walker   grab bar  -RWA    Transfer, Safety Issues   step length decreased  -RWA    Transfer, Comment   sit-stand from toilet x3 for clothing management, lis care, & application of cream  -RWA    Recorded by  [RW] Pipe Gruber PTA [RWA] Jacinta Pitts, OTR/L    Gait Assessment/Treatment    Gait, [place] Level  minimum assist (75% patient effort)  -RW     Gait, Assistive Device  rolling walker  -RW     Gait, Distance (Feet)  40   x 3  -RW     Gait, Gait Deviations  left:;step length decreased   improved  -RW     Recorded by  [RW] Pipe Gruber PTA     Functional Mobility    Functional Mobility- Ind. Level   contact guard assist  -RWA    Functional Mobility- Device   rolling  walker  -RWA    Functional Mobility-Distance (Feet)   12   x2  -RWA    Functional Mobility- Safety Issues   step length decreased;weight-shifting ability decreased   too far away from AD  -RWA    Recorded by   [RWA] Jacinta Pitts OTR/L    Wheelchair Training/Management    Wheelchair, Distance Propelled   25 ft with SBA  -RWA    Recorded by   [RWA] Jacinta Pitts OTR/L    Lower Body Bathing Assessment/Training    LB Bathing Assess/Train, Position   standing  -RWA    LB Bathing Assess/Train, [place] Level   minimum assist (75% patient effort)   for balance  -RWA    LB Bathing Assess/Train, Impairments   impaired balance;strength decreased  -RWA    LB Bathing Assess/Train, Comment   Pt washed lis area only & applied magic butt.  -RWA    Recorded by   [RWA] Jacinta Pitts OTR/L    Grooming Assessment/Training    Grooming Assess/Train, Position   sitting;sink side   w/c  -RWA    Grooming Assess/Train, [place] Level   supervision required;verbal cues required  -RWA    Grooming Assess/Train, Impairments   strength decreased;coordination impaired  -RWA    Grooming Assess/Train, Comment   Pt brushing teeth upon arrival, washed face & combed hair.  -RWA    Recorded by   [RWA] Jacinta Pitts OTR/L    Balance Skills Training    Static Standing Balance Support   assistive device  -RWA    Standing-Balance Activities   Weight Shift R-L   ADL task  -RWA    Standing Balance # of Minutes   5  -RWA    Recorded by   [RWA] Jacinta Pitts OTR/L    Therapy Exercises    Bilateral Lower Extremities  AROM:;10 reps;supine;ankle pumps/circles;heel slides;hip abduction/adduction;SAQ;standing;heel raises   2 sets of hs . hooklying hip aa x 10 radha  -RW     Left Upper Extremity 5 reps  -RWA      LUE Resistance theraputty   red  -RWA      BUE Resistance theraputty   removed beads from theraputty x3, requiring approx 15 min  -RWA      Trunk Exercises  10 reps;trunk rotation;supine   hooklying  -RW     Recorded by  [RWA] Jacinta Pitts, OTR/L [RW] Pipe Gruber PTA     Fine Motor Coordination Training    Detail (Fine Motor Coordination Training) pegboard ax & Connect 4 game to increase LUE fine motor coordination & strength  -RWA      Recorded by [RWA] Jacinta Pitts, OTR/L      Positioning and Restraints    Pre-Treatment Position in bed  -RWA other (comment)   eom  -RW sitting in chair/recliner   w/c  -RWA    Post Treatment Position bed  -RWA wheelchair  -RW wheelchair   at table for lunch  -RWA    In Bed supine;call light within reach;encouraged to call for assist;with family/caregiver  -RWA      In Wheelchair  with family/caregiver  -RW sitting;with family/caregiver  -RWA    Recorded by [RWA] Jacinta Pitts, OTR/L [RW] Pipe Gruber PTA [RWA] Jacinta Pitts, OTR/L      11/01/17 1020 11/01/17 0930 10/31/17 1310    Rehab Assessment/Intervention    Discipline speech language pathologist  -EC physical therapy assistant  -RW physical therapy assistant  -RW    Document Type therapy note (daily note)  -EC therapy note (daily note)  -RW therapy note (daily note)  -RW    Subjective Information agree to therapy  -EC agree to therapy  -RW agree to therapy  -RW    Patient Effort, Rehab Treatment good  -EC good  -RW good  -RW    Precautions/Limitations  fall precautions  -RW fall precautions  -RW    Recorded by [EC] Clementina Murrell CCC-SLP [RW] Pipe Gruber PTA [RW] Pipe Gruber PTA    Vital Signs    Pre Systolic BP Rehab   117  -RW    Pre Treatment Diastolic BP   62  -RW    Pretreatment Heart Rate (beats/min)  70  -RW 81  -RW    Pre SpO2 (%)  94  -RW     O2 Delivery Pre Treatment  room air  -RW     Recorded by  [RW] Pipe Gruber PTA [RW] Pipe Gruber PTA    Pain Assessment    Pain Assessment No/denies pain  -EC No/denies pain  -RW No/denies pain  -RW    Recorded by [EC] Clementina Murrell CCC-SLP [RW] Pipe Gruber PTA [RW] Pipe Gruber PTA    Vision Assessment/Intervention    Visual Impairment   WFL with corrective lenses  -RW WFL with corrective lenses  -RW    Recorded by  [RW] Pipe Gruber PTA [RW] Pipe Gruber PTA    Cognitive Assessment/Intervention    Current Cognitive/Communication Assessment  impaired  -RW impaired  -RW    Orientation Status  oriented to;person;place  -RW oriented to;person;place  -RW    Personal Safety Interventions  gait belt;nonskid shoes/slippers when out of bed  -RW gait belt;nonskid shoes/slippers when out of bed  -RW    Recorded by  [RW] Pipe Gruber PTA [RW] Pipe Gruber PTA    Cognitive Assessment Intervention    Behavior/Mood Observations  behavior appropriate to situation, WNL/WFL  -RW     Recorded by  [RW] Pipe Gruber PTA     Communication Treatment Objective and Progress    Cognitive Linguistic Treatment Objectives Improve orientation;Improve memory skills;Improve functional problem solving  -EC      Recorded by [EC] Clementina Murrell CCC-SLP      Improve orientation    Improve orientation through: demonstrating orientation to day;demonstrating orientation to month;demonstrating orientation to year;demonstrating orientation to place;demonstrating orientation to disease/impairment;90%;with inconsistent cues  -EC      Status: Improve orientation through Progress slower than expected  -EC      Orientation Progress 0%  -EC      Comments: Improve orientation through pt was not oriented to a single question this date.  only recalled that yesterday was halloween, but not date/month  -EC      Recorded by [EC] Clementina Murrell CCC-SLP      Improve memory skills    Improve memory skills through: recalling related word lists with an imposed delay;90%;with inconsistent cues  -EC      Status: Improve memory skills Progress slower than expected  -EC      Memory Skills Progress 60%  -EC      Comments: Improve memory skills recall of 3 related words  -EC      Recorded by [EC] Clementina Murrell CCC-SLP      Improve functional problem solving    Improve functional  problem solving through: complete organization/home management task;tell similarity between items  -EC      Status: Improve functional problem solving Progressing as expected  -EC      Functional Problem Solving Progress 70%  -EC      Comments: Improve functional problem solving pt identified early/late with 90% asccuracy and identified information on a bill and performed check writing with 60%   -EC      Recorded by [EC] Clementina Murrell, [facility]SLP      Bed Mobility, Assessment/Treatment    Bed Mobility, Assistive Device   --  -RW    Bed Mobility, Scoot/Bridge, [place]   --  -RW    Bed Mob, Supine to Sit, [place]  minimum assist (75% patient effort)  -RW --  -RW    Bed Mob, Sit to Supine, [place]  contact guard assist  -RW --  -RW    Recorded by  [RW] Pipe Gruber PTA [RW] Pipe Gruber PTA    Transfer Assessment/Treatment    Transfers, Bed-Chair [place]  minimum assist (75% patient effort)  -RW     Transfers, Chair-Bed [place]  minimum assist (75% patient effort)  -RW     Transfers, Bed-Chair-Bed, Assist Device  rolling walker  -RW     Transfers, Sit-Stand [place]  minimum assist (75% patient effort);contact guard assist  -RW minimum assist (75% patient effort)  -RW    Transfers, Stand-Sit [place]  minimum assist (75% patient effort);verbal cues required;contact guard assist  -RW minimum assist (75% patient effort);verbal cues required  -RW    Transfers, Sit-Stand-Sit, Assist Device  rolling walker  -RW rolling walker  -RW    Transfer, Comment  sit to stand x 5, one lob  -RW sit to stand x 5  -RW    Recorded by  [RW] Pipe Gruber PTA [RW] Pipe Gruber PTA    Gait Assessment/Treatment    Gait, [place] Level  minimum assist (75% patient effort)  -RW minimum assist (75% patient effort)  -RW    Gait, Assistive Device  rolling walker  -RW rolling walker  -RW    Gait, Distance (Feet)  70  -RW 30  -RW    Gait, Gait Deviations  leans to the left;step length  decreased;toe-to-floor clearance decreased;left:  -RW leans to the left;narrow base  -RW    Recorded by  [RW] Pipe Gruber PTA [RW] Pipe Gruber PTA    Wheelchair Training/Management    Wheelchair, Distance Propelled  150 sba and lots of vcues  -RW  x 2  -RW    Wheelchair Training Comment   decreased hand strength on left and limited ability to use le at this time  -RW    Recorded by  [RW] Pipe Gruber PTA [RW] Pipe Gruber PTA    Therapy Exercises    Bilateral Lower Extremities  AROM:;10 reps;supine;ankle pumps/circles;heel slides;hip abduction/adduction;SAQ;standing;heel raises  -RW AROM:;10 reps;standing;heel raises  -RW    Recorded by  [RW] Pipe Gruber PTA [RW] Pipe Gruber PTA    Positioning and Restraints    Pre-Treatment Position  sitting in chair/recliner  -RW sitting in chair/recliner  -RW    Post Treatment Position  wheelchair  -RW wheelchair  -RW    In Wheelchair  with SLP  -RW with family/caregiver  -RW    Recorded by  [RW] Pipe Gruber PTA [RW] Pipe Gruber PTA      10/31/17 1101          Rehab Assessment/Intervention    Discipline speech language pathologist  -HR      Patient Effort, Rehab Treatment good  -HR      Precautions/Limitations fall precautions  -HR      Recorded by [HR] Shante Otero, MS CCC-SLP        User Key  (r) = Recorded By, (t) = Taken By, (c) = Cosigned By    Initials Name Effective Dates    EC Clementina Murrell CCC-SLP 12/30/16 -     RWA Jacinta Ptits, OTR/L 10/17/16 -     HR Shante Otero, MS [facility]-SLP 12/15/16 -     RW Pipe Gruber PTA 10/17/16 -     KD Therese King, LARA/L 10/17/16 -               IP SLP Goals       11/02/17 1614 11/01/17 1159 10/31/17 1244    Cognitive Linguistic- Improve Safety and Awareness    Cognitive Linguistic Improve Safety and Awareness- SLP, Date Established   10/31/17  -HR    Cognitive Linguistic Improve Safety and Awareness- SLP, Time to Achieve   by discharge  -HR    Cognitive Linguistic Improve Safety and  Awareness- SLP, Activity Level   Patient will improve orientation for increased safety in environment;Patient will improve memory skills for increased safety in environment;Patient will improve functional problem solving skills for increased safety in environment  -HR    Cognitive Linguistic Improve Safety and Awareness- SLP, Date Goal Reviewed 11/02/17  -EC 11/01/17  -EC 10/31/17  -HR    Cognitive Linguistic Improve Safety and Awareness- SLP, Outcome goal not met  -EC goal not met  -EC goal ongoing  -HR      User Key  (r) = Recorded By, (t) = Taken By, (c) = Cosigned By    Initials Name Provider Type    DANELLE Murrell CCC-SLP Speech and Language Pathologist    HR Shante Otero, MS CCC-SLP Speech and Language Pathologist          EDUCATION  The patient has been educated in the following areas:   Cognitive Impairment.    SLP Recommendation and Plan                               Plan of Care Review  Plan Of Care Reviewed With: patient, daughter  Progress: progress toward functional goals as expected  Outcome Summary/Follow up Plan: St therapy is progressing slowly.  pt has diffiiulty with orientation and organization.          Time Calculation:         Time Calculation- SLP       11/02/17 1619          Time Calculation- SLP    SLP Start Time 1455  -EC      SLP Stop Time 1550  -EC      SLP Time Calculation (min) 55 min  -EC      Total Timed Code Minutes- SLP 55 minute(s)  -EC      SLP Received On 11/02/17  -EC        User Key  (r) = Recorded By, (t) = Taken By, (c) = Cosigned By    Initials Name Provider Type    EC MERI Schultz Speech and Language Pathologist          Therapy Charges for Today     Code Description Service Date Service Provider Modifiers Qty    97301349411  ST TREATMENT SPEECH 4 11/1/2017 MERI Schultz GN 1    [id number]  ST TREATMENT SPEECH 4 11/2/2017 MERI Schultz GN 1               MERI Casey  11/2/2017

## 2017-11-02 NOTE — PROGRESS NOTES
LOS: 3 days   Patient Care Team:  Evan Marrero MD as PCP - General (Family Medicine)    Chief Complaint:   Right-sided lacunar stroke          Interval History:     SUBJECTIVE:  He feels good today he was a little confused this morning on first getting out of bed as he did yesterday.  He reorients quickly.  He says is getting stronger.  Still complains of weakness and loss of control and left side  History taken from: patient chart family RN    Objective     Vital Signs  Temp:  [95.3 °F (35.2 °C)-97.9 °F (36.6 °C)] 97.9 °F (36.6 °C)  Heart Rate:  [68-73] 73  Resp:  [18] 18  BP: (113-135)/(62-70) 135/70  Last 3 weights    10/30/17  1500 10/30/17  2300 11/01/17  0100   Weight: 208 lb 4 oz (94.5 kg) 208 lb (94.3 kg) 211 lb (95.7 kg)         Physical Exam:     General Appearance:    Alert, cooperative, in no acute distress   Head:    Normocephalic, without obvious abnormality, atraumatic   Eyes:            Lids and lashes normal, conjunctivae and sclerae normal, no   icterus, no pallor, corneas clear, PERRLA   Throat:   No oral lesions, no thrush, oral mucosa moist   Neck:   No adenopathy, supple, trachea midline, no thyromegaly, no   carotid bruit, no JVD       Lungs:     Clear to auscultation,respirations regular, even and                  Unlabored     Heart:    Regular rhythm and normal rate, normal S1 and S2, no            murmur, no gallop, no rub, no click    Chest Wall:    No abnormalities observed   Abdomen:     Normal bowel sounds, no masses, no organomegaly, soft        non-tender, non-distended, no guarding, no rebound                Tenderness    Extremities:   Moves all extremities well, no edema, no cyanosis, no             Redness        Skin:   No bleeding, bruising or rash   Lymph nodes:   No palpable adenopathy   Neurologic:   Cranial nerves 2 - 12 grossly intact, sensation intact, DTR       present and equal bilaterally He has some left-sided weakness still and ataxia in the upper and  lower extremity.  This is demonstrated more on his ambulation and wheelchair mobility       RESULTS REVIEW:     Lab Results (last 24 hours)     Procedure Component Value Units Date/Time    POC Glucose Fingerstick [[id number]]  (Abnormal) Collected:  11/01/17 1042    Specimen:  Blood Updated:  11/01/17 1057     Glucose 140 (H) mg/dL       RN NotifiedMeter: [id number]: [id number] HUSK NAOMI       POC Glucose Fingerstick [[id number]]  (Normal) Collected:  11/01/17 1605    Specimen:  Blood Updated:  11/01/17 1639     Glucose 114 mg/dL       RN NotifiedMeter: [id number]: [id number] LAVELLE GALDAMEZ       POC Glucose Fingerstick [[id number]]  (Normal) Collected:  11/01/17 1945    Specimen:  Blood Updated:  11/01/17 2008     Glucose 99 mg/dL       RN NotifiedMeter: [id number]: [id number] LAVELLE GALDAMEZ       POC Glucose Fingerstick [[id number]]  (Normal) Collected:  11/02/17 0608    Specimen:  Blood Updated:  11/02/17 0659     Glucose 98 mg/dL       Meter: [id number]: [id number] KEYLA JONES       POC Glucose Fingerstick [[id number]]  (Abnormal) Collected:  11/02/17 1031    Specimen:  Blood Updated:  11/02/17 1044     Glucose 141 (H) mg/dL       RN NotifiedMeter: [id number]: [id number] HUSK NAOMI           Imaging Results (last 72 hours)     ** No results found for the last 72 hours. **          Assessment/Plan     Principal Problem:    Right-sided lacunar stroke  Active Problems:    Left-sided weakness    Atrial fibrillation, chronic    Essential hypertension    Diabetes mellitus    Chronic anxiety    Chronic back pain greater than 3 months duration    Degenerative joint disease involving multiple joints    Encounter for rehabilitation    Obstructive sleep apnea syndrome    Former smoker        He is participating in therapy very well and he is progressing.  He will need continued acute rehabilitation.  He is participating in intensive therapy.  His daughter did ask  questions about some discharge planning.  They plan to take him home.  He has an old war rolling walker that belonged to his wife and he will need a new one.  Possibly will need a wheelchair but that's uncertain at this time.  He should continue to progress  Diabetes is very well controlled and does not seem to be a problem for him. Blood pressures controlled as well      Vishnu Mckinnon MD  11/02/17  10:44 AM

## 2017-11-02 NOTE — THERAPY TREATMENT NOTE
Inpatient Rehabilitation - Physical Therapy Treatment Note  [facility]     Patient Name: Kenneth Harper Jr.  : 1934  MRN: [id number]  Today's Date: 2017  Onset of Illness/Injury or Date of Surgery Date: 10/30/17  Date of Referral to PT: 10/30/17  Referring Physician: TERRENCE Mckinnon MD    Admit Date: 10/30/2017    Visit Dx:    ICD-10-CM ICD-9-CM   1. Symbolic dysfunction R48.9 784.60   2. Impaired mobility and ADLs Z74.09 799.89   3. Abnormality of gait and mobility R26.9 781.2   4. Muscle weakness (generalized) M62.81 728.87     Patient Active Problem List   Diagnosis   • Spinal stenosis, lumbar region, with neurogenic claudication   • Former smoker   • Overweight   • Left-sided weakness   • Right-sided lacunar stroke   • Essential hypertension   • Diabetes mellitus   • Chronic anxiety   • Chronic back pain greater than 3 months duration   • Prostatic hypertrophy   • Degenerative joint disease involving multiple joints   • Atrial fibrillation, chronic   • Encounter for rehabilitation   • Obstructive sleep apnea syndrome               Adult Rehabilitation Note       17 1455 17 1405 17 [id number]    Rehab Assessment/Intervention    Discipline speech language pathologist  -EC physical therapy assistant  -RW physical therapy assistant  -RW    Document Type therapy note (daily note)  -EC therapy note (daily note)  -RW therapy note (daily note)  -RW    Subjective Information agree to therapy  -EC agree to therapy  -RW agree to therapy  -RW    Patient Effort, Rehab Treatment good  -EC good  -RW good  -RW    Precautions/Limitations  fall precautions  -RW fall precautions  -RW    Recorded by [EC] Clementina Murrell CCC-SLP [RW] Pipe Gruber PTA [RW] Pipe Gruber PTA    Vital Signs    Pretreatment Heart Rate (beats/min)   80  -RW    Pre SpO2 (%)   93  -RW    O2 Delivery Pre Treatment   room air  -RW    Recorded by   [RW] Pipe Grbuer PTA    Pain Assessment    Pain Assessment  No/denies pain  -EC No/denies pain  -RW No/denies pain  -RW    Recorded by [EC] Clementina Murrell CCC-SLP [RW] Pipe Gruber PTA [RW] Pipe Gruber PTA    Vision Assessment/Intervention    Visual Impairment  WFL with corrective lenses  -RW WFL with corrective lenses  -RW    Recorded by  [RW] Pipe Gruber PTA [RW] Pipe Gruber PTA    Cognitive Assessment/Intervention    Current Cognitive/Communication Assessment  impaired  -RW impaired  -RW    Orientation Status  oriented to;person;place  -RW oriented to;person;place  -RW    Personal Safety Interventions  gait belt;nonskid shoes/slippers when out of bed  -RW gait belt;nonskid shoes/slippers when out of bed  -RW    Recorded by  [RW] Pipe Gruber PTA [RW] Pipe Gruber PTA    Cognitive Assessment Intervention    Behavior/Mood Observations  behavior appropriate to situation, WNL/WFL  -RW behavior appropriate to situation, WNL/WFL  -RW    Recorded by  [RW] Pipe Gruber PTA [RW] Pipe Gruber PTA    Communication Treatment Objective and Progress    Cognitive Linguistic Treatment Objectives Improve orientation;Improve memory skills;Improve functional problem solving  -EC      Recorded by [EC] Clementina Murrell CCC-SLP      Improve orientation    Improve orientation through: demonstrating orientation to day;demonstrating orientation to month;demonstrating orientation to year;demonstrating orientation to place;demonstrating orientation to disease/impairment;90%;with inconsistent cues  -EC      Status: Improve orientation through Progress slower than expected  -EC      Orientation Progress 10%  -EC      Recorded by [EC] Clementina Murrell CCC-SLP      Improve memory skills    Improve memory skills through: recalling related word lists with an imposed delay;90%;with inconsistent cues  -EC      Status: Improve memory skills Progress slower than expected  -EC      Memory Skills Progress 70%  -EC      Recorded by [EC] Clementina Murrell CCC-SLP       Improve functional problem solving    Improve functional problem solving through: complete organization/home management task;tell similarity between items  -EC      Status: Improve functional problem solving Progress slower than expected  -EC      Functional Problem Solving Progress 30%  -EC      Recorded by [EC] Clementina Murrell CCC-SLP      Bed Mobility, Assessment/Treatment    Bed Mob, Supine to Sit, [place]   supervision required  -RW    Bed Mob, Sit to Supine, [place]   supervision required  -RW    Recorded by   [RW] Pipe Gruber PTA    Transfer Assessment/Treatment    Transfers, Bed-Chair [place]  minimum assist (75% patient effort)  -RW minimum assist (75% patient effort)  -RW    Transfers, Chair-Bed [place]  minimum assist (75% patient effort)  -RW minimum assist (75% patient effort)  -RW    Transfers, Bed-Chair-Bed, Assist Device  rolling walker  -RW rolling walker  -RW    Transfers, Sit-Stand [place]  contact guard assist;verbal cues required  -RW contact guard assist;verbal cues required  -RW    Transfers, Stand-Sit [place]  verbal cues required;contact guard assist  -RW verbal cues required;contact guard assist  -RW    Transfers, Sit-Stand-Sit, Assist Device  rolling walker  -RW rolling walker  -RW    Transfer, Comment  sit to stand x 5  -RW     Recorded by  [RW] Pipe Gruber PTA [RW] Pipe Gruber PTA    Gait Assessment/Treatment    Gait, [place] Level  minimum assist (75% patient effort)  -RW minimum assist (75% patient effort)  -RW    Gait, Assistive Device  rolling walker  -RW rolling walker  -RW    Gait, Distance (Feet)  150   2 standing / stop to regroup, 60 x 3  -RW 60    x 4  -RW    Gait, Gait Deviations  --   left foot drag at times  -RW left:   occ foot drag  -RW    Recorded by  [RW] Pipe Gruber PTA [RW] Pipe Gruber PTA    Stairs Assessment/Treatment    Number of Stairs   4  -RW    Stairs, Handrail Location   both sides  -RW    Stairs,  [place] Level   contact guard assist;verbal cues required  -RW    Recorded by   [RW] Pipe Gruber PTA    Wheelchair Training/Management    Wheelchair, Distance Propelled   150 cga  -RW    Recorded by   [RW] Pipe Gruber PTA    Balance Skills Training    Static Standing Balance Support   Right upper extremity supported;Left upper extremity supported  -RW    Standing-Balance Activities   Weight Shift A-P  -RW    Recorded by   [RW] Pipe Gruber PTA    Therapy Exercises    Bilateral Lower Extremities  AROM:;sitting;knee flexion;LAQ;ankle pumps/circles;20 reps;hip flexion;standing;heel slides  -RW AROM:;10 reps;supine;hip abduction/adduction;sitting;knee flexion;LAQ;ankle pumps/circles  -RW    Trunk Exercises  --   hooklying  -RW 10 reps;trunk rotation;supine   hooklying  -RW    Recorded by  [RW] Pipe Gruber PTA [RW] Pipe Gruber PTA    Positioning and Restraints    Pre-Treatment Position  sitting in chair/recliner  -RW sitting in chair/recliner  -RW    Post Treatment Position  wheelchair  -RW wheelchair  -RW    In Bed   with family/caregiver  -RW    Recorded by  [RW] Pipe Gruber PTA [RW] Pipe Gruber PTA      11/02/17 0715 11/01/17 1524 11/01/17 1346    Rehab Assessment/Intervention    Discipline occupational therapy assistant  -KD occupational therapist  -RWA physical therapy assistant  -RW    Document Type therapy note (daily note)  -KD therapy note (daily note)  -RWA therapy note (daily note)  -RW    Subjective Information agree to therapy  -KD agree to therapy;complains of;pain  -RWA agree to therapy  -RW    Patient Effort, Rehab Treatment good  -KD good  -RWA good  -RW    Symptoms Noted During/After Treatment  none  -RWA     Precautions/Limitations fall precautions  -KD fall precautions  -RWA fall precautions  -RW    Recorded by [KD] Therese King, LARA/L [RWA] Jacinta Pitts, OTR/L [RW] Pipe Gruber PTA    Vital Signs    Pre Systolic BP Rehab 105  -KD      Pre Treatment  Diastolic BP 57  -KD      Pretreatment Heart Rate (beats/min) 83  -KD 81  -RWA 81  -RW    Posttreatment Heart Rate (beats/min) 74  -KD 72  -RWA     Pre SpO2 (%) 94  -KD 96  -RWA 98  -RW    O2 Delivery Pre Treatment room air  -KD room air  -RWA room air  -RW    Post SpO2 (%) 96  -KD 95  -RWA     O2 Delivery Post Treatment room air  -KD room air  -RWA     Pre Patient Position Sitting  -KD Supine  -RWA     Intra Patient Position Standing  -KD      Post Patient Position Sitting  -KD Supine  -RWA     Recorded by [KD] Therese King, LARA/L [RWA] Jacinta Pitts, OTR/L [RW] Pipe Gruber PTA    Pain Assessment    Pain Assessment No/denies pain  -KD 0-10  -RWA --   gives no rating but c/o left back/flank pain  -RW    Pain Score  4  -RWA     Post Pain Score  3  -RWA     Pain Location  Back  -RWA     Pain Orientation  Lower;Upper  -RWA     Recorded by [KD] Therese King LARA/L [RWA] Jacinta Pitts, OTR/L [RW] Pipe Gruber PTA    Vision Assessment/Intervention    Visual Impairment   WFL with corrective lenses  -RW    Recorded by   [RW] Pipe Gruber PTA    Cognitive Assessment/Intervention    Current Cognitive/Communication Assessment impaired  -KD impaired  -RWA impaired  -RW    Orientation Status oriented to;person;place  -KD oriented to;person;place;situation  -RWA oriented to;person;place  -RW    Follows Commands/Answers Questions 100% of the time;able to follow single-step instructions  -KD able to follow single-step instructions;100% of the time  -RWA     Personal Safety  mild impairment  -RWA mild impairment  -RW    Personal Safety Interventions gait belt;nonskid shoes/slippers when out of bed  -KD  gait belt;nonskid shoes/slippers when out of bed  -RW    Recorded by [KD] Therese King LARA/L [RWA] Jacinta Pitts, OTR/L [RW] Pipe Gruber PTA    Cognitive Assessment Intervention    Behavior/Mood Observations   behavior appropriate to situation, WNL/WFL  -RW    Recorded by   [RW] Pipe Gruber,  PTA    Bed Mobility, Assessment/Treatment    Bed Mob, Supine to Sit, [place]   minimum assist (75% patient effort)  -RW    Bed Mob, Sit to Supine, [place]   contact guard assist  -RW    Recorded by   [RW] Pipe Gruber PTA    Transfer Assessment/Treatment    Transfers, Bed-Chair [place] contact guard assist  -KD  minimum assist (75% patient effort)  -RW    Transfers, Chair-Bed [place]   minimum assist (75% patient effort)  -RW    Transfers, Bed-Chair-Bed, Assist Device rolling walker  -KD  rolling walker  -RW    Transfers, Sit-Stand [place] contact guard assist  -KD  minimum assist (75% patient effort);contact guard assist  -RW    Transfers, Stand-Sit [place] contact guard assist  -KD  minimum assist (75% patient effort);verbal cues required;contact guard assist  -RW    Transfers, Sit-Stand-Sit, Assist Device rolling walker  -KD  rolling walker  -RW    Walk-In Shower Transfer, [place] contact guard assist  -KD      Walk-In Shower Transfer, Assist Device rolling walker  -KD      Transfer, Comment Pt completed 10 sit-stands w/ 1 RB  -KD      Recorded by [KD] MARCO Vidales/DAWN  [RW] Pipe Gruber PTA    Gait Assessment/Treatment    Gait, [place] Level   minimum assist (75% patient effort)  -RW    Gait, Assistive Device   rolling walker  -RW    Gait, Distance (Feet)   40   x 3  -RW    Gait, Gait Deviations   left:;step length decreased   improved  -RW    Recorded by   [RW] Pipe Gruber PTA    Functional Mobility    Functional Mobility- Ind. Level contact guard assist  -KD      Functional Mobility- Device rolling walker  -KD      Functional Mobility-Distance (Feet) --   10 x 2  -KD      Recorded by [KD] MARCO Vidales/L      Wheelchair Training/Management    Wheelchair, Distance Propelled 150 cga w/ vc's  -KD      Recorded by [KD] MARCO Vidales/L      Upper Body Bathing Assessment/Training    UB Bathing Assess/Train Assistive Device bath mitt;venkatesh  bars;hand-held shower head;long-handled sponge;shower chair without back  -KD      UB Bathing Assess/Train, Position edge of bed;sitting  -KD      UB Bathing Assess/Train, [place] Level set up required  -KD      Recorded by [KD] LEIGH VidalesA/L      Lower Body Bathing Assessment/Training    LB Bathing Assess/Train Assistive Device bath mitt;grab bars;hand-held shower head;long-handled sponge;shower chair without back  -KD      LB Bathing Assess/Train, Position sitting  -KD      LB Bathing Assess/Train, [place] Level set up required  -KD      Recorded by [KD] MARCO Vidales/L      Upper Body Dressing Assessment/Training    UB Dressing Assess/Train, Clothing Type doffing:;donning:;pull over  -KD      UB Dressing Assess/Train, Position edge of bed;sitting  -KD      UB Dressing Assess/Train, [place] contact guard assist  -KD      Recorded by [KD] MARCO Vidales/L      Lower Body Dressing Assessment/Training    LB Dressing Assess/Train, Clothing Type doffing:;donning:;pants;shorts;socks  -KD      LB Dressing Assess/Train, Position edge of bed;sitting  -KD      LB Dressing Assess/Train, [place] minimum assist (75% patient effort)  -KD      LB Dressing Assess/Train, Impairments impaired balance  -KD      LB Dressing Assess/Train, Comment Pt demonstrated impaired balance with LB dressing , pt leans to L and need vc's to self correct    -KD      Recorded by [KD] MARCO Vidales/L      Grooming Assessment/Training    Grooming Assess/Train, Assistive Device --   comb hair/ brush teeth  -KD      Grooming Assess/Train, Position sitting  -KD      Grooming Assess/Train, [place] Level set up required  -KD      Recorded by [KD] LEIGH VidalesA/L      Self-Feeding Assessment/Training    Self-Feeding Assess/Train, Position sitting  -KD      Self-Feeding Assess/Train, [place] conditional independence  -KD      Self-Feeding Assess/Train, Spillage Amount minimal  -KD      Recorded by  [KD] MARCO Vidales/L      Balance Skills Training    Standing-Level of Assistance Contact guard  -KD      Static Standing Balance Support assistive device  -KD      Standing-Balance Activities Weight Shift A-P   Baloon volleyball  -KD      Standing Balance # of Minutes --   3  -KD      Recorded by [KD] MARCO Vidales/L      Therapy Exercises    Bilateral Lower Extremities   AROM:;10 reps;supine;ankle pumps/circles;heel slides;hip abduction/adduction;SAQ;standing;heel raises   2 sets of hs . hooklying hip aa x 10 radha  -RW    Left Upper Extremity  5 reps  -RWA     LUE Resistance  theraputty   red  -RWA     Bilateral Upper Extremity AROM:;20 reps;sitting;elbow flexion/extension;hand pumps;pronation/supination;shoulder circles;shoulder ER/IR;shoulder extension/flexion  -KD      BUE Resistance manual resistance- minimal  -KD theraputty   removed beads from theraputty x3, requiring approx 15 min  -RWA     Trunk Exercises   10 reps;trunk rotation;supine   hooklying  -RW    Recorded by [KD] MARCO Vidales/DAWN [RWA] Jacinta Pitts OTR/L [RW] Pipe Gruber, PTA    Fine Motor Coordination Training    Detail (Fine Motor Coordination Training)  pegboard ax & Connect 4 game to increase LUE fine motor coordination & strength  -RWA     Recorded by  [RWA] ESTEBAN Silver/L     Positioning and Restraints    Pre-Treatment Position sitting in chair/recliner  -KD in bed  -RWA other (comment)   eom  -RW    Post Treatment Position wheelchair  -KD bed  -RWA wheelchair  -RW    In Bed  supine;call light within reach;encouraged to call for assist;with family/caregiver  -RWA     In Wheelchair sitting;call light within reach;encouraged to call for assist;exit alarm on  -KD  with family/caregiver  -RW    Recorded by [KD] MARCO Vidales/DAWN [RWA] ESTEBAN Silver/L [RW] Pipe Gruber PTA      11/01/17 1122 11/01/17 1020 11/01/17 0930    Rehab Assessment/Intervention    Discipline occupational therapist   -RWA speech language pathologist  -EC physical therapy assistant  -RW    Document Type therapy note (daily note)  -RWA therapy note (daily note)  -EC therapy note (daily note)  -RW    Subjective Information agree to therapy;no complaints  -RWA agree to therapy  -EC agree to therapy  -RW    Patient Effort, Rehab Treatment good  -RWA good  -EC good  -RW    Symptoms Noted During/After Treatment none  -RWA      Precautions/Limitations fall precautions  -RWA  fall precautions  -RW    Recorded by [RWA] Jacinta Pitts, OTR/L [EC] Clementina Murrell CCC-SLP [RW] Pipe Gruber, MARTIN    Vital Signs    Pretreatment Heart Rate (beats/min) 86  -RWA  70  -RW    Posttreatment Heart Rate (beats/min) 87  -RWA      Pre SpO2 (%) 93  -RWA  94  -RW    O2 Delivery Pre Treatment room air  -RWA  room air  -RW    Post SpO2 (%) 95  -RWA      O2 Delivery Post Treatment room air  -RWA      Pre Patient Position Sitting  -RWA      Post Patient Position Sitting  -RWA      Recorded by [RWA] Jacinta Pitts, OTR/L  [RW] Pipe Gruber PTA    Pain Assessment    Pain Assessment No/denies pain  -RWA No/denies pain  -EC No/denies pain  -RW    Recorded by [RWA] Jacinta Pitts, OTR/L [EC] Clementina Murrell CCC-SLP [RW] Pipe Gruber PTA    Vision Assessment/Intervention    Visual Impairment   WFL with corrective lenses  -RW    Recorded by   [RW] Pipe Gruber PTA    Cognitive Assessment/Intervention    Current Cognitive/Communication Assessment impaired  -RWA  impaired  -RW    Orientation Status oriented to;person;place;situation  -RWA  oriented to;person;place  -RW    Follows Commands/Answers Questions able to follow single-step instructions;100% of the time  -RWA      Personal Safety mild impairment  -RWA      Personal Safety Interventions gait belt;fall prevention program maintained;nonskid shoes/slippers when out of bed;supervised activity;muscle strengthening facilitated  -RWA  gait belt;nonskid shoes/slippers when out of bed  -RW     Recorded by [RWA] Jacinta Pitts, OTR/L  [RW] Pipe Gruber PTA    Cognitive Assessment Intervention    Behavior/Mood Observations   behavior appropriate to situation, WNL/WFL  -RW    Recorded by   [RW] Pipe Gruber PTA    Communication Treatment Objective and Progress    Cognitive Linguistic Treatment Objectives  Improve orientation;Improve memory skills;Improve functional problem solving  -EC     Recorded by  [EC] SHELTON SchultzSLP     Improve orientation    Improve orientation through:  demonstrating orientation to day;demonstrating orientation to month;demonstrating orientation to year;demonstrating orientation to place;demonstrating orientation to disease/impairment;90%;with inconsistent cues  -EC     Status: Improve orientation through  Progress slower than expected  -EC     Orientation Progress  0%  -EC     Comments: Improve orientation through  pt was not oriented to a single question this date.  only recalled that yesterday was halloween, but not date/month  -EC     Recorded by  [EC] MERI Schultz     Improve memory skills    Improve memory skills through:  recalling related word lists with an imposed delay;90%;with inconsistent cues  -EC     Status: Improve memory skills  Progress slower than expected  -EC     Memory Skills Progress  60%  -EC     Comments: Improve memory skills  recall of 3 related words  -EC     Recorded by  [EC] MERI Schultz     Improve functional problem solving    Improve functional problem solving through:  complete organization/home management task;tell similarity between items  -EC     Status: Improve functional problem solving  Progressing as expected  -EC     Functional Problem Solving Progress  70%  -EC     Comments: Improve functional problem solving  pt identified early/late with 90% asccuracy and identified information on a bill and performed check writing with 60%   -EC     Recorded by  [EC] SHELTON SchultzSLP      Bed Mobility, Assessment/Treatment    Bed Mob, Supine to Sit, [place]   minimum assist (75% patient effort)  -RW    Bed Mob, Sit to Supine, [place]   contact guard assist  -RW    Recorded by   [RW] Pipe Gruber PTA    Transfer Assessment/Treatment    Transfers, Bed-Chair [place]   minimum assist (75% patient effort)  -RW    Transfers, Chair-Bed [place]   minimum assist (75% patient effort)  -RW    Transfers, Bed-Chair-Bed, Assist Device   rolling walker  -RW    Transfers, Sit-Stand [place] minimum assist (75% patient effort)  -RWA  minimum assist (75% patient effort);contact guard assist  -RW    Transfers, Stand-Sit [place] minimum assist (75% patient effort)  -RWA  minimum assist (75% patient effort);verbal cues required;contact guard assist  -RW    Transfers, Sit-Stand-Sit, Assist Device rolling walker  -RWA  rolling walker  -RW    Toilet Transfer, [place] minimum assist (75% patient effort)  -RWA      Toilet Transfer, Assistive Device rolling walker   grab bar  -RWA      Transfer, Safety Issues step length decreased  -RWA      Transfer, Comment sit-stand from toilet x3 for clothing management, lis care, & application of cream  -RWA  sit to stand x 5, one lob  -RW    Recorded by [RWA] Jacinta Pitts, OTR/L  [RW] Pipe Gruber PTA    Gait Assessment/Treatment    Gait, [place] Level   minimum assist (75% patient effort)  -RW    Gait, Assistive Device   rolling walker  -RW    Gait, Distance (Feet)   70  -RW    Gait, Gait Deviations   leans to the left;step length decreased;toe-to-floor clearance decreased;left:  -RW    Recorded by   [RW] Pipe Gruber PTA    Functional Mobility    Functional Mobility- Ind. Level contact guard assist  -RWA      Functional Mobility- Device rolling walker  -RWA      Functional Mobility-Distance (Feet) 12   x2  -RWA      Functional Mobility- Safety Issues step length decreased;weight-shifting ability decreased   too far away from AD   -RWA      Recorded by [RWA] Jacinta Pitts OTR/L      Wheelchair Training/Management    Wheelchair, Distance Propelled 25 ft with SBA  -RWA  150 sba and lots of vcues  -RW    Recorded by [RWA] Jacinta Pitts OTR/L  [RW] Pipe Gruber PTA    Lower Body Bathing Assessment/Training    LB Bathing Assess/Train, Position standing  -RWA      LB Bathing Assess/Train, [place] Level minimum assist (75% patient effort)   for balance  -RWA      LB Bathing Assess/Train, Impairments impaired balance;strength decreased  -RWA      LB Bathing Assess/Train, Comment Pt washed lis area only & applied magic butt.  -RWA      Recorded by [RWA] Jacinta Pitts OTR/L      Grooming Assessment/Training    Grooming Assess/Train, Position sitting;sink side   w/c  -RWA      Grooming Assess/Train, [place] Level supervision required;verbal cues required  -RWA      Grooming Assess/Train, Impairments strength decreased;coordination impaired  -RWA      Grooming Assess/Train, Comment Pt brushing teeth upon arrival, washed face & combed hair.  -RWA      Recorded by [RWA] Jacinta Pitts OTR/L      Balance Skills Training    Static Standing Balance Support assistive device  -RWA      Standing-Balance Activities Weight Shift R-L   ADL task  -RWA      Standing Balance # of Minutes 5  -RWA      Recorded by [RWA] Jacinta Pitts OTR/L      Therapy Exercises    Bilateral Lower Extremities   AROM:;10 reps;supine;ankle pumps/circles;heel slides;hip abduction/adduction;SAQ;standing;heel raises  -RW    Recorded by   [RW] Pipe Gruber PTA    Positioning and Restraints    Pre-Treatment Position sitting in chair/recliner   w/c  -RWA  sitting in chair/recliner  -RW    Post Treatment Position wheelchair   at table for lunch  -RWA  wheelchair  -RW    In Wheelchair sitting;with family/caregiver  -RWA  with SLP  -RW    Recorded by [RWA] ESTEBAN Silver/L  [RW] Pipe Gruber PTA      10/31/17 1310 10/31/17 1101       Rehab  Assessment/Intervention    Discipline physical therapy assistant  -RW speech language pathologist  -HR     Document Type therapy note (daily note)  -RW      Subjective Information agree to therapy  -RW      Patient Effort, Rehab Treatment good  -RW good  -HR     Precautions/Limitations fall precautions  -RW fall precautions  -HR     Recorded by [RW] Pipe Gruber PTA [HR] Shante Otero MS CCC-SLP     Vital Signs    Pre Systolic BP Rehab 117  -RW      Pre Treatment Diastolic BP 62  -RW      Pretreatment Heart Rate (beats/min) 81  -RW      Recorded by [RW] Pipe Gruber PTA      Pain Assessment    Pain Assessment No/denies pain  -RW      Recorded by [RW] Pipe Gruber PTA      Vision Assessment/Intervention    Visual Impairment WFL with corrective lenses  -RW      Recorded by [RW] Pipe Gruber PTA      Cognitive Assessment/Intervention    Current Cognitive/Communication Assessment impaired  -RW      Orientation Status oriented to;person;place  -RW      Personal Safety Interventions gait belt;nonskid shoes/slippers when out of bed  -RW      Recorded by [RW] Pipe Gruber PTA      Bed Mobility, Assessment/Treatment    Bed Mobility, Assistive Device --  -RW      Bed Mobility, Scoot/Bridge, [place] --  -RW      Bed Mob, Supine to Sit, [place] --  -RW      Bed Mob, Sit to Supine, [place] --  -RW      Recorded by [RW] Pipe Gruber PTA      Transfer Assessment/Treatment    Transfers, Sit-Stand [place] minimum assist (75% patient effort)  -RW      Transfers, Stand-Sit [place] minimum assist (75% patient effort);verbal cues required  -RW      Transfers, Sit-Stand-Sit, Assist Device rolling walker  -RW      Transfer, Comment sit to stand x 5  -RW      Recorded by [RW] Pipe Grubre PTA      Gait Assessment/Treatment    Gait, [place] Level minimum assist (75% patient effort)  -RW      Gait, Assistive Device rolling walker  -RW      Gait, Distance (Feet) 30  -RW      Gait, Gait  Deviations leans to the left;narrow base  -RW      Recorded by [RW] Pipe Gruber PTA      Wheelchair Training/Management    Wheelchair, Distance Propelled  x 2  -RW      Wheelchair Training Comment decreased hand strength on left and limited ability to use le at this time  -RW      Recorded by [RW] Pipe Gruber PTA      Therapy Exercises    Bilateral Lower Extremities AROM:;10 reps;standing;heel raises  -RW      Recorded by [RW] Pipe Gruber PTA      Positioning and Restraints    Pre-Treatment Position sitting in chair/recliner  -RW      Post Treatment Position wheelchair  -RW      In Wheelchair with family/caregiver  -RW      Recorded by [RW] Pipe Gruber PTA        User Key  (r) = Recorded By, (t) = Taken By, (c) = Cosigned By    Initials Name Effective Dates    EC Clementina Murrell, CCC-SLP 12/30/16 -     RWA Jacinta Pitts, OTR/L 10/17/16 -     HR Shante Otero, MS CCC-SLP 12/15/16 -     RW Pipe Gruber, PTA 10/17/16 -     KD Therese King, LARA/L 10/17/16 -                 IP PT Goals       11/02/17 1631 11/01/17 1543 10/31/17 0825    Bed Mobility PT LTG    Bed Mobility PT LTG, Date Established   10/31/17  -LM    Bed Mobility PT LTG, Time to Achieve   by discharge  -LM    Bed Mobility PT LTG, Activity Type   supine to sit/sit to supine  -LM    Bed Mobility PT LTG, [place] Level   supervision required  -LM    Bed Mobility PT LTG, Additional Goal   HOB flat; No BR's  -LM    Bed Mobility PT LTG, Date Goal Reviewed 11/02/17  -RW 11/01/17  -RW     Bed Mobility PT LTG, Outcome goal met  -RW goal ongoing  -RW goal ongoing  -LM    Transfer Training PT LTG    Transfer Training PT LTG, Date Established   10/31/17  -LM    Transfer Training PT LTG, Time to Achieve   by discharge  -LM    Transfer Training PT LTG, Activity Type   bed to chair /chair to bed  -LM    Transfer Training PT LTG, [place] Level   supervision required  -LM    Transfer Training PT LTG, Assist Device   --   AAD  -LM     Transfer Training PT  LTG, Date Goal Reviewed 11/02/17  -RW 11/01/17  -RW     Transfer Training PT LTG, Outcome goal ongoing  -RW goal ongoing  -RW goal ongoing  -LM    Gait Training PT LTG    Gait Training Goal PT LTG, Date Established   10/31/17  -LM    Gait Training Goal PT LTG, Time to Achieve   by discharge  -LM    Gait Training Goal PT LTG, [place] Level   supervision required  -LM    Gait Training Goal PT LTG, Assist Device   --   AAD  -LM    Gait Training Goal PT LTG, Distance to Achieve   150 feet  -LM    Gait Training Goal PT LTG, Date Goal Reviewed 11/02/17  -RW 11/01/17  -RW     Gait Training Goal PT LTG, Outcome goal ongoing  -RW goal ongoing  -RW goal ongoing  -LM    Stair Training PT STG    Stair Training Goal PT STG, Date Established   10/31/17  -LM    Stair Training Goal PT STG, Time to Achieve   4 days  -LM    Stair Training Goal PT STG, Number of Steps   4 steps  -LM    Stair Training Goal PT STG, [place] Level   contact guard assist  -LM    Stair Training Goal PT STG, Assist Device   1 handrail  -LM    Stair Training Goal PT STG, Date Goal Reviewed 11/02/17  -RW 11/01/17  -RW     Stair Training Goal PT STG, Outcome goal ongoing  -RW goal ongoing  -RW goal ongoing  -LM    Stair Training PT LTG    Stair Training Goal PT LTG, Date Established   10/31/17  -LM    Stair Training Goal PT LTG, Time to Achieve   by discharge  -LM    Stair Training Goal PT LTG, Number of Steps   1 flight  -LM    Stair Training Goal PT LTG, [place] Level   supervision required  -LM    Stair Training Goal PT LTG, Assist Device   2 handrails  -LM    Stair Training Goal PT LTG, Date Goal Reviewed 11/02/17  -RW 11/01/17  -RW     Stair Training Goal PT LTG, Outcome goal ongoing  -RW goal ongoing  -RW goal ongoing  -LM      User Key  (r) = Recorded By, (t) = Taken By, (c) = Cosigned By    Initials Name Provider Type    MOSHE Mckeon, PT Physical Therapist    KATHIE Gruber, PTA Physical Therapy Assistant           Physical Therapy Education     Title: PT OT SLP Therapies (Active)     Topic: Physical Therapy (Active)     Point: Mobility training (Done)    Learning Progress Summary    Learner Readiness Method Response Comment Documented by Status   Patient Acceptance E VU reviewed safe transfers and risks of falls  11/01/17 1052 Done    Acceptance E NR Reviewed safety with transfers and ambulation.  10/31/17 1506 Active               Point: Precautions (Done)    Learning Progress Summary    Learner Readiness Method Response Comment Documented by Status   Patient Acceptance E VU reviewed safe transfers and risks of falls RW 11/01/17 1052 Done    Acceptance E NR Reviewed safety with transfers and ambulation.  10/31/17 1506 Active                      User Key     Initials Effective Dates Name Provider Type Discipline     06/15/16 -  Landy Mckeon, PT Physical Therapist PT     10/17/16 -  Pipe Gruber, PTA Physical Therapy Assistant PT                    PT Recommendation and Plan  Anticipated Equipment Needs At Discharge: front wheeled walker  Anticipated Discharge Disposition: home with 24/7 care, home with home health  Planned Therapy Interventions: balance training, bed mobility training, gait training, home exercise program, motor coordination training, neuromuscular re-education, patient/family education, postural re-education, ROM (Range of Motion), stair training, strengthening, stretching, transfer training, wheelchair management/propulsion training  PT Frequency: other (see comments) (5-14 times/wk)  Plan of Care Review  Plan Of Care Reviewed With: patient  Outcome Summary/Follow up Plan: pt with improved bed mobility and somewhat improved gt . needs cont PT          Outcome Measures       11/02/17 1000 11/02/17 0715 11/01/17 1122    How much help from another person do you currently need...    Turning from your back to your side while in flat bed without using bedrails? 3  -RW      Moving from lying on  back to sitting on the side of a flat bed without bedrails? 3  -RW      Moving to and from a bed to a chair (including a wheelchair)? 3  -RW      Standing up from a chair using your arms (e.g., wheelchair, bedside chair)? 3  -RW      Climbing 3-5 steps with a railing? 3  -RW      To walk in hospital room? 3  -RW      AM-PAC 6 Clicks Score 18  -RW      How much help from another is currently needed...    Putting on and taking off regular lower body clothing?  2  -KD 2  -RWA    Bathing (including washing, rinsing, and drying)  3  -KD 2  -RWA    Toileting (which includes using toilet bed pan or urinal)  2  -KD 2  -RWA    Putting on and taking off regular upper body clothing  3  -KD 3  -RWA    Taking care of personal grooming (such as brushing teeth)  3  -KD 3  -RWA    Eating meals  3  -KD 3  -RWA    Score  16  -KD 15  -RWA    Functional Assessment    Outcome Measure Options   AM-PAC 6 Clicks Daily Activity (OT)  -RWA      11/01/17 1000 10/31/17 0925 10/31/17 0825    How much help from another person do you currently need...    Turning from your back to your side while in flat bed without using bedrails? 3  -RW  3  -LM    Moving from lying on back to sitting on the side of a flat bed without bedrails? 3  -RW  3  -LM    Moving to and from a bed to a chair (including a wheelchair)? 3  -RW  2  -LM    Standing up from a chair using your arms (e.g., wheelchair, bedside chair)? 3  -RW  2  -LM    Climbing 3-5 steps with a railing? 2  -RW  2  -LM    To walk in hospital room? 3  -RW  3  -LM    AM-PAC 6 Clicks Score 17  -RW  15  -LM    How much help from another is currently needed...    Putting on and taking off regular lower body clothing?  2  -BH (r) SP (t) BH (c)     Bathing (including washing, rinsing, and drying)  2  -BH (r) SP (t) BH (c)     Toileting (which includes using toilet bed pan or urinal)  2  -BH (r) SP (t) BH (c)     Putting on and taking off regular upper body clothing  3  -BH (r) SP (t) BH (c)     Taking care  of personal grooming (such as brushing teeth)  3  -BH (r) SP (t) BH (c)     Eating meals  3  -BH (r) SP (t) BH (c)     Score  15  -BH (r) SP (t)     Functional Assessment    Outcome Measure Options  AM-PAC 6 Clicks Daily Activity (OT)  -BH (r) SP (t) BH (c) AM-PAC 6 Clicks Basic Mobility (PT)  -LM      User Key  (r) = Recorded By, (t) = Taken By, (c) = Cosigned By    Initials Name Provider Type     Landy Mckeon, PT Physical Therapist     Karoline Huang, OTR/L Occupational Therapist    RWA Jacinta Pitts, OTR/L Occupational Therapist    KATHIE Gruber, MARTIN Physical Therapy Assistant    JOSS King, LARA/L Occupational Therapy Assistant    DIONTE Bruce, OT Student OT Student           Time Calculation:         PT Charges       11/02/17 1638 11/02/17 1011       Time Calculation    Start Time 1405  -RW 0902  -RW     Stop Time 1445  -RW 0958  -RW     Time Calculation (min) 40 min  -RW 56 min  -RW     Time Calculation- PT    Total Timed Code Minutes- PT 40 minute(s)  -RW 56 minute(s)  -RW       User Key  (r) = Recorded By, (t) = Taken By, (c) = Cosigned By    Initials Name Provider Type    KATHIE Gruber PTA Physical Therapy Assistant          Therapy Charges for Today     Code Description Service Date Service Provider Modifiers Qty    42837462167 HC GAIT TRAINING EA 15 MIN 11/1/2017 Pipe Gruber, PTA GP 1    09203738417 HC PT NEUROMUSC RE EDUCATION EA 15 MIN 11/1/2017 Ppie Gruber PTA GP 1    61142280150 HC PT THERAPEUTIC ACT EA 15 MIN 11/1/2017 Pipe Gruber PTA GP 1    92126328937 HC GAIT TRAINING EA 15 MIN 11/1/2017 Pipe Gruber PTA GP 1    66210882528 HC PT NEUROMUSC RE EDUCATION EA 15 MIN 11/1/2017 Pipe Gruber PTA GP 1    39891794139 HC PT THERAPEUTIC ACT EA 15 MIN 11/1/2017 Pipe Gruber PTA GP 1    77032496326 HC GAIT TRAINING EA 15 MIN 11/2/2017 Pipe Gruber PTA GP 1    74645313677 HC PT THER PROC EA 15 MIN 11/2/2017 Pipe Gruber, [facility] GP 1    10144951738  PT  THERAPEUTIC ACT EA 15 MIN 11/2/2017 Pipe Gruber, PTA GP 2    28957177441 HC GAIT TRAINING EA 15 MIN 11/2/2017 Pipe Gruber, PTA GP 1    26151162752 HC PT THER PROC EA 15 MIN 11/2/2017 Pipe Gruber, PTA GP 1    76156310881 HC PT THERAPEUTIC ACT EA 15 MIN 11/2/2017 Pipe Gruber, PTA GP 1          PT G-Codes  PT Professional Judgement Used?: Yes  Outcome Measure Options: AM-PAC 6 Clicks Daily Activity (OT)  Score: 15  Functional Limitation: Mobility: Walking and moving around  Mobility: Walking and Moving Around Current Status (): At least 40 percent but less than 60 percent impaired, limited or restricted  Mobility: Walking and Moving Around Goal Status (): At least 1 percent but less than 20 percent impaired, limited or restricted    Pipe Gruber PTA  11/2/2017

## 2017-11-02 NOTE — PROGRESS NOTES
Discharge Planning Assessment  [facility]     Patient Name: Kenneth Harper Jr.  MRN: [id number]  Today's Date: 11/2/2017    Admit Date: 10/30/2017      Demographic Summary       11/02/17 1643    Referral Information    Admission Type --   Acute Rehab Unit    Arrived From admitted as an inpatient    Reason For Consult --   Psychosocial assessment    Record Reviewed medical record            Functional Status       11/02/17 1644    Functional Status Current    Ambulation 3-->assistive equipment and person    Transferring 3-->assistive equipment and person    Toileting 1-->assistive equipment    Bathing 3-->assistive equipment and person    Dressing 2-->assistive person    Eating 0-->independent    Communication 0-->understands/communicates without difficulty    Change in Functional Status Since Onset of Current Illness/Injury yes    Cognitive/Perceptual/Developmental    Current Mental Status/Cognitive Functioning no deficits noted    Recent Changes in Mental Status/Cognitive Functioning other (see comments)   daughter at bedside indicates impaired mental status secondary to stroke with patient resuming his cognitive abilities during admission.  Reportedly improving daily.    Developmental Stage (Eriksson's Stages of Development) Stage 8 (65 years-death/Late Adulthood) Integrity vs. Despair    Employment/Financial    Source Of Income pension/prison;social security    Financial Concerns none    Who Manages Finances If Patient Unable pt manages all finances and indicates no deficit in maintaining that role     Employment/Finance Comments Pt. 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            Psychosocial       11/02/17 4376    Values/Beliefs    (F) [place]: Importance of Culture, Spirituality, Buddhist in Life not discussed this visit    Emotional/Psychological    Affect no deficits noted;other (see comments)   smiling, calm and pleasant      Mood congruent to situation    Verbal Skills no deficits noted    Current Interpersonal Conduct/Behavior appropriate to situation    Mental Health Conditions/Symptoms anxiety disorder;other (see comments)   history of anxiety, prescribed xanax at bedtime, by pcp    Thought Process Alterations no deficits noted    Previous Mental Health Treatment medication    Coping/Stress    Major Change/Loss/Stressor caregiver responsibilities;family/significant other illness;medical condition/diagnosis;other (see comments)   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  Wife has 2 adult children,  no children together    Reaction To Health Status adjusting;anxious;shock    Understanding Of Condition And Treatment adequate understanding of medical condition;adequate understanding of treatment    Coping/Stress Comments Pt. with history of adaptive coping.  Daughter at bedside presents attentive and supportive and participating in patient care.  Pt. describes self as positive and goal directed with the goal to get back to his baseline functioning    Suicide Risk    Suicidal Ideation no    Homicide Risk    Homicidal Ideation no            Abuse/Neglect       11/02/17 1657    Home Safety    Feels Safe Living In Home yes           Discharge Plan       11/02/17 1657    Case Management/Social Work Plan    Plan Pt. states his goal is to return home, receptive to home health and adaptive equipment if needed.  Support from adult children voiced to aid in care of pt. wife.       KAL met with patient in his room on Acute Rehab Unit.  Pt. Is accompanied during the assessment by his daughter who presents attentive and supportive and is assisting patient with his care.  KAL  introduced self and explained role and scope of assessment.  Pt. Was admitted to ARU with impaired mobility subsequent to right sided lacunar stroke. Pt states he is motivated for therapy and describes willingness to participate as his goal is to return to his home with his wife. Pt. 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  Pt was  from his first wife and has been  to his current spouse for 30 years. The two have children from previous marriages. Pt has 3 adult children.  Pt. Endorses history of general anxiety disorder and often experiences negative and fearful thoughts around bedtime.  Pt. Is prescribed xanax by his pcp and takes one prior to sleep. Pt. Indicates his symptoms are well managed at present. No reported history of mood disorder or psychiatric treatment.  Pt. Engaged freely and responded receptive to SW encounter and feedback. SW offered education as to ARU services and supports. LCSW offered emotional support, validation of emotions, education related to symptoms of depression/ anxiety with current emotions normalized and consistent with her current situation.  Pt. Describes himself as positive and goal driven and his goal is to return home with his wife. He is receptive to support from family, adaptive equipment  and home health services as needed to maintain at home. Pt stated no immediate SW needs and voiced understanding of support and willingness to contact should need arise.       Bonita Tracy LCSW

## 2017-11-02 NOTE — PLAN OF CARE
Problem: Patient Care Overview (Adult)  Goal: Plan of Care Review  Outcome: Ongoing (interventions implemented as appropriate)    11/02/17 1449   Coping/Psychosocial Response Interventions   Plan Of Care Reviewed With patient   Patient Care Overview   Progress improving   Outcome Evaluation   Outcome Summary/Follow up Plan pt has periods of confusion but he is very pleasant and willing to cooperate         Problem: Fall Risk (Adult)  Goal: Absence of Falls  Outcome: Ongoing (interventions implemented as appropriate)    Problem: Stroke (Ischemic) (Adult)  Goal: Signs and Symptoms of Listed Potential Problems Will be Absent or Manageable (Stroke)  Outcome: Ongoing (interventions implemented as appropriate)    Problem: Diabetes, Type 2 (Adult)  Goal: Signs and Symptoms of Listed Potential Problems Will be Absent or Manageable (Diabetes, Type 2)  Outcome: Ongoing (interventions implemented as appropriate)

## 2017-11-02 NOTE — PLAN OF CARE
Problem: Patient Care Overview (Adult)  Goal: Plan of Care Review  Outcome: Ongoing (interventions implemented as appropriate)    11/02/17 0447 11/02/17 0715   Coping/Psychosocial Response Interventions   Plan Of Care Reviewed With patient --    Patient Care Overview   Progress progress toward functional goals as expected --    Outcome Evaluation   Outcome Summary/Follow up Plan --  sit-stand cga, pt completed 10 sit-stands with good lizandro 1 RB using RW. Pt performed dynamic standing ~ 3 mins withgood tolerance using RW. Pt completed shower Independently using AE this AM Pt CGA with shower t/f. Pt lizandro tx well this AM.         11/02/17 0447 11/02/17 0715   Coping/Psychosocial Response Interventions   Plan Of Care Reviewed With patient --    Patient Care Overview   Progress progress toward functional goals as expected --    Outcome Evaluation   Outcome Summary/Follow up Plan --  sit-stand cga, pt completed 10 sit-stands with good lizandro 1 RB using RW. Pt performed dynamic standing ~ 3 mins with good tolerance using RW. Pt performed 1 set x 30 reps of ther ex w/ 3lb HW w/ no complaints. Pt completed shower Independently using AE this AM Pt CGA with shower t/f. Pt lizandro tx well this AM.         11/02/17 0447 11/02/17 0715   Coping/Psychosocial Response Interventions   Plan Of Care Reviewed With patient --    Patient Care Overview   Progress progress toward functional goals as expected --    Outcome Evaluation   Outcome Summary/Follow up Plan --  sit-stand cga, pt completed 10 sit-stands with good lizandro 1 RB using RW. Pt performed dynamic standing ~ 3 mins with good tolerance using RW. Pt performed 1 set x 30 reps of ther ex w/ 3lb HW w/ no complaints. Pt completed shower Independently using AE this AM Pt CGA with shower t/f. Pt lizandro tx well this AM.       Goal: Discharge Needs Assessment  Outcome: Ongoing (interventions implemented as appropriate)    10/31/17 0825 10/31/17 0925 11/02/17 0447   Discharge Needs Assessment    Concerns To Be Addressed --  --  no discharge needs identified   Equipment Needed After Discharge walker, rolling --  --    Discharge Facility/Level Of Care Needs home with home health  (Home with 24/7) --  --    Living Environment   Transportation Available --  car;family or friend will provide --    Self-Care   Equipment Currently Used at Home --  cane, straight;shower chair;raised toilet --          Problem: Inpatient Occupational Therapy  Goal: Transfer Training Goal 1 LTG- OT  Outcome: Ongoing (interventions implemented as appropriate)    10/31/17 0925 11/02/17 0715   Transfer Training OT LTG   Transfer Training OT LTG, Date Established 10/31/17 --    Transfer Training OT LTG, Time to Achieve by discharge --    Transfer Training OT LTG, Activity Type all transfers --    Transfer Training OT LTG, [place] Level contact guard assist --    Transfer Training OT LTG, Date Goal Reviewed --  11/02/17   Transfer Training OT LTG, Outcome --  goal not met       Goal: Patient Education Goal LTG- OT  Outcome: Ongoing (interventions implemented as appropriate)    10/31/17 0925 11/02/17 0715   Patient Education OT LTG   Patient Education OT LTG, Date Established 10/31/17 --    Patient Education OT LTG, Time to Achieve by discharge --    Patient Education OT LTG, Education Type HEP;posture/body mechanics;1 hand/anni technique;home safety;adaptive equipment mgmt;energy conservation --    Patient Education OT LTG, Education Understanding independent;demonstrates adequately;verbalizes understanding  (with caregiver assist as necessary) --    Patient Education OT LTG, Date Goal Reviewed --  11/02/17   Patient Education OT LTG Outcome --  goal not met       Goal: Safety Awareness Goal LTG- OT  Outcome: Ongoing (interventions implemented as appropriate)    10/31/17 0925 11/02/17 0715   Safety Awareness OT LTG   Safety Awareness OT LTG, Date Established 10/31/17 --    Safety Awareness OT LTG, Time to Achieve by discharge --     Safety Awareness OT LTG, Activity Type good safety awareness;with kitchen mobility;with ADL's --    Safety Awareness OT LTG, [place] Level min verbal cues --    Safety Awareness OT LTG, Date Goal Reviewed --  11/02/17   Safety Awareness OT LTG, Outcome --  goal not met       Goal: ADL Goal LTG- OT  Outcome: Ongoing (interventions implemented as appropriate)    10/31/17 0925 11/01/17 1635 11/02/17 0715   ADL OT LTG   ADL OT LTG, Date Established 10/31/17 --  --    ADL OT LTG, Time to Achieve by discharge --  --    ADL OT LTG, Activity Type ADL skills --  --    ADL OT LTG, Additional Goal Set-up A with self-feeding and grooming; VC for UB bathing and UB dressing; CGA with LB bathing and LB dressing --  --    ADL OT LTG, Date Goal Reviewed --  11/01/17 --    ADL OT LTG, Outcome --  --  goal not met       Goal: Endurance Goal LTG- OT  Outcome: Ongoing (interventions implemented as appropriate)    10/31/17 0925 11/02/17 0715   Endurance OT LTG   Endurance Goal OT LTG, Date Established 10/31/17 --    Endurance Goal OT LTG, Time to Achieve by discharge --    Endurance Goal OT LTG, Activity Level endurance 2 good- --    Endurance Goal OT LTG, Additional Goal During 30 minutes of functional tasks, ADL, and therapeutic exercise --    Endurance Goal OT LTG, Date Goal Reviewed --  11/02/17   Endurance Goal OT LTG, Outcome --  goal not met

## 2017-11-02 NOTE — PLAN OF CARE
Problem: Patient Care Overview (Adult)  Goal: Plan of Care Review  Outcome: Ongoing (interventions implemented as appropriate)  Goal: Adult Individualization and Mutuality  Outcome: Ongoing (interventions implemented as appropriate)  Goal: Discharge Needs Assessment  Outcome: Ongoing (interventions implemented as appropriate)    Problem: Fall Risk (Adult)  Goal: Absence of Falls  Outcome: Ongoing (interventions implemented as appropriate)    Problem: Stroke (Ischemic) (Adult)  Goal: Signs and Symptoms of Listed Potential Problems Will be Absent or Manageable (Stroke)  Outcome: Ongoing (interventions implemented as appropriate)    Problem: Diabetes, Type 2 (Adult)  Goal: Signs and Symptoms of Listed Potential Problems Will be Absent or Manageable (Diabetes, Type 2)  Outcome: Ongoing (interventions implemented as appropriate)

## 2017-11-02 NOTE — PLAN OF CARE
Problem: Patient Care Overview (Adult)  Goal: Plan of Care Review  Outcome: Ongoing (interventions implemented as appropriate)    11/02/17 1631   Coping/Psychosocial Response Interventions   Plan Of Care Reviewed With patient   Outcome Evaluation   Outcome Summary/Follow up Plan pt with improved bed mobility and somewhat improved gt . needs cont PT         Problem: Inpatient Physical Therapy  Goal: Bed Mobility Goal LTG- PT  Outcome: Outcome(s) achieved Date Met:  11/02/17    10/31/17 0825 11/02/17 1631   Bed Mobility PT LTG   Bed Mobility PT LTG, Date Established 10/31/17 --    Bed Mobility PT LTG, Time to Achieve by discharge --    Bed Mobility PT LTG, Activity Type supine to sit/sit to supine --    Bed Mobility PT LTG, [place] Level supervision required --    Bed Mobility PT LTG, Additional Goal HOB flat; No BR's --    Bed Mobility PT LTG, Date Goal Reviewed --  11/02/17   Bed Mobility PT LTG, Outcome --  goal met       Goal: Transfer Training Goal 1 LTG- PT  Outcome: Ongoing (interventions implemented as appropriate)    10/31/17 0825 11/02/17 1631   Transfer Training PT LTG   Transfer Training PT LTG, Date Established 10/31/17 --    Transfer Training PT LTG, Time to Achieve by discharge --    Transfer Training PT LTG, Activity Type bed to chair /chair to bed --    Transfer Training PT LTG, [place] Level supervision required --    Transfer Training PT LTG, Assist Device (AAD) --    Transfer Training PT LTG, Date Goal Reviewed --  11/02/17   Transfer Training PT LTG, Outcome --  goal ongoing       Goal: Gait Training Goal LTG- PT  Outcome: Ongoing (interventions implemented as appropriate)    10/31/17 0825 11/02/17 1631   Gait Training PT LTG   Gait Training Goal PT LTG, Date Established 10/31/17 --    Gait Training Goal PT LTG, Time to Achieve by discharge --    Gait Training Goal PT LTG, [place] Level supervision required --    Gait Training Goal PT LTG, Assist Device (AAD) --    Gait Training  Goal PT LTG, Distance to Achieve 150 feet --    Gait Training Goal PT LTG, Date Goal Reviewed --  11/02/17   Gait Training Goal PT LTG, Outcome --  goal ongoing       Goal: Stair Training Goal STG- PT  Outcome: Ongoing (interventions implemented as appropriate)    10/31/17 0825 11/02/17 1631   Stair Training PT STG   Stair Training Goal PT STG, Date Established 10/31/17 --    Stair Training Goal PT STG, Time to Achieve 4 days --    Stair Training Goal PT STG, Number of Steps 4 steps --    Stair Training Goal PT STG, [place] Level contact guard assist --    Stair Training Goal PT STG, Assist Device 1 handrail --    Stair Training Goal PT STG, Date Goal Reviewed --  11/02/17   Stair Training Goal PT STG, Outcome --  goal ongoing       Goal: Stair Training Goal LTG- PT  Outcome: Ongoing (interventions implemented as appropriate)    10/31/17 0825 11/02/17 1631   Stair Training PT LTG   Stair Training Goal PT LTG, Date Established 10/31/17 --    Stair Training Goal PT LTG, Time to Achieve by discharge --    Stair Training Goal PT LTG, Number of Steps 1 flight --    Stair Training Goal PT LTG, [place] Level supervision required --    Stair Training Goal PT LTG, Assist Device 2 handrails --    Stair Training Goal PT LTG, Date Goal Reviewed --  11/02/17   Stair Training Goal PT LTG, Outcome --  goal ongoing

## 2017-11-03 LAB
ALBUMIN SERPL-MCNC: 3.6 G/DL (ref 3.4–4.8)
ALBUMIN/GLOB SERPL: 1.2 G/DL (ref 1.1–1.8)
ALP SERPL-CCNC: 44 U/L (ref 38–126)
ALT SERPL W P-5'-P-CCNC: 28 U/L (ref 21–72)
ANION GAP SERPL CALCULATED.3IONS-SCNC: 13 MMOL/L (ref 5–15)
AST SERPL-CCNC: 26 U/L (ref 17–59)
BILIRUB SERPL-MCNC: 1.4 MG/DL (ref 0.2–1.3)
BUN BLD-MCNC: 23 MG/DL (ref 7–21)
BUN/CREAT SERPL: 24.2 (ref 7–25)
CALCIUM SPEC-SCNC: 9.1 MG/DL (ref 8.4–10.2)
CHLORIDE SERPL-SCNC: 104 MMOL/L (ref 95–110)
CO2 SERPL-SCNC: 25 MMOL/L (ref 22–31)
CREAT BLD-MCNC: 0.95 MG/DL (ref 0.7–1.3)
DEPRECATED RDW RBC AUTO: 42.3 FL (ref 35.1–43.9)
ERYTHROCYTE [DISTWIDTH] IN BLOOD BY AUTOMATED COUNT: 13.1 % (ref 11.5–14.5)
GFR SERPL CREATININE-BSD FRML MDRD: 76 ML/MIN/1.73 (ref 42–98)
GLOBULIN UR ELPH-MCNC: 2.9 GM/DL (ref 2.3–3.5)
GLUCOSE BLD-MCNC: 101 MG/DL (ref 60–100)
GLUCOSE BLDC GLUCOMTR-MCNC: 104 MG/DL (ref 70–130)
GLUCOSE BLDC GLUCOMTR-MCNC: 110 MG/DL (ref 70–130)
GLUCOSE BLDC GLUCOMTR-MCNC: 142 MG/DL (ref 70–130)
GLUCOSE BLDC GLUCOMTR-MCNC: 82 MG/DL (ref 70–130)
HCT VFR BLD AUTO: 38.6 % (ref 39–49)
HGB BLD-MCNC: 13.1 G/DL (ref 13.7–17.3)
MCH RBC QN AUTO: 29.9 PG (ref 26.5–34)
MCHC RBC AUTO-ENTMCNC: 33.9 G/DL (ref 31.5–36.3)
MCV RBC AUTO: 88.1 FL (ref 80–98)
PLATELET # BLD AUTO: 262 10*3/MM3 (ref 150–450)
PMV BLD AUTO: 9.6 FL (ref 8–12)
POTASSIUM BLD-SCNC: 3.4 MMOL/L (ref 3.5–5.1)
PROT SERPL-MCNC: 6.5 G/DL (ref 6.3–8.6)
RBC # BLD AUTO: 4.38 10*6/MM3 (ref 4.37–5.74)
SODIUM BLD-SCNC: 142 MMOL/L (ref 137–145)
WBC NRBC COR # BLD: 6.91 10*3/MM3 (ref 3.2–9.8)

## 2017-11-03 PROCEDURE — 97530 THERAPEUTIC ACTIVITIES: CPT

## 2017-11-03 PROCEDURE — 97110 THERAPEUTIC EXERCISES: CPT

## 2017-11-03 PROCEDURE — 92507 TX SP LANG VOICE COMM INDIV: CPT | Performed by: SPEECH-LANGUAGE PATHOLOGIST

## 2017-11-03 PROCEDURE — 80053 COMPREHEN METABOLIC PANEL: CPT | Performed by: FAMILY MEDICINE

## 2017-11-03 PROCEDURE — 97116 GAIT TRAINING THERAPY: CPT

## 2017-11-03 PROCEDURE — 82962 GLUCOSE BLOOD TEST: CPT

## 2017-11-03 PROCEDURE — 99232 SBSQ HOSP IP/OBS MODERATE 35: CPT | Performed by: FAMILY MEDICINE

## 2017-11-03 PROCEDURE — 25010000002 ENOXAPARIN PER 10 MG: Performed by: FAMILY MEDICINE

## 2017-11-03 PROCEDURE — 85027 COMPLETE CBC AUTOMATED: CPT | Performed by: FAMILY MEDICINE

## 2017-11-03 PROCEDURE — 97535 SELF CARE MNGMENT TRAINING: CPT

## 2017-11-03 RX ORDER — LIDOCAINE 50 MG/G
1 PATCH TOPICAL
Status: DISCONTINUED | OUTPATIENT
Start: 2017-11-03 | End: 2017-11-11

## 2017-11-03 RX ORDER — POTASSIUM CHLORIDE 750 MG/1
10 CAPSULE, EXTENDED RELEASE ORAL DAILY
Status: DISCONTINUED | OUTPATIENT
Start: 2017-11-03 | End: 2017-11-14 | Stop reason: HOSPADM

## 2017-11-03 RX ADMIN — METFORMIN HYDROCHLORIDE 1000 MG: 500 TABLET ORAL at 08:30

## 2017-11-03 RX ADMIN — METOPROLOL SUCCINATE 50 MG: 50 TABLET, EXTENDED RELEASE ORAL at 20:05

## 2017-11-03 RX ADMIN — HYDROCORTISONE: 1 CREAM TOPICAL at 17:10

## 2017-11-03 RX ADMIN — MAGNESIUM OXIDE TAB 400 MG (241.3 MG ELEMENTAL MG) 400 MG: 400 (241.3 MG) TAB at 08:30

## 2017-11-03 RX ADMIN — ATORVASTATIN CALCIUM 10 MG: 10 TABLET, FILM COATED ORAL at 20:05

## 2017-11-03 RX ADMIN — FINASTERIDE 5 MG: 5 TABLET, FILM COATED ORAL at 08:30

## 2017-11-03 RX ADMIN — FLUTICASONE PROPIONATE 2 SPRAY: 50 SPRAY, METERED NASAL at 08:31

## 2017-11-03 RX ADMIN — ACETAMINOPHEN 650 MG: 325 TABLET ORAL at 15:12

## 2017-11-03 RX ADMIN — HYDROCORTISONE: 1 CREAM TOPICAL at 08:31

## 2017-11-03 RX ADMIN — CLOPIDOGREL BISULFATE 75 MG: 75 TABLET ORAL at 08:30

## 2017-11-03 RX ADMIN — ENOXAPARIN SODIUM 40 MG: 40 INJECTION SUBCUTANEOUS at 08:30

## 2017-11-03 RX ADMIN — GLIPIZIDE 10 MG: 10 TABLET ORAL at 08:31

## 2017-11-03 RX ADMIN — LISINOPRIL 10 MG: 10 TABLET ORAL at 08:30

## 2017-11-03 RX ADMIN — ASPIRIN 81 MG 81 MG: 81 TABLET ORAL at 08:30

## 2017-11-03 RX ADMIN — THERA TABS 1 TABLET: TAB at 08:31

## 2017-11-03 RX ADMIN — LIDOCAINE 1 PATCH: 50 PATCH TOPICAL at 17:09

## 2017-11-03 RX ADMIN — POTASSIUM CHLORIDE 10 MEQ: 750 CAPSULE, EXTENDED RELEASE ORAL at 12:15

## 2017-11-03 RX ADMIN — AMLODIPINE BESYLATE 10 MG: 10 TABLET ORAL at 20:05

## 2017-11-03 RX ADMIN — METFORMIN HYDROCHLORIDE 1000 MG: 500 TABLET ORAL at 17:09

## 2017-11-03 RX ADMIN — TERAZOSIN HYDROCHLORIDE ANHYDROUS 5 MG: 5 CAPSULE ORAL at 20:05

## 2017-11-03 RX ADMIN — PANTOPRAZOLE SODIUM 40 MG: 40 TABLET, DELAYED RELEASE ORAL at 06:00

## 2017-11-03 NOTE — PLAN OF CARE
Problem: Patient Care Overview (Adult)  Goal: Plan of Care Review  Outcome: Ongoing (interventions implemented as appropriate)    11/03/17 1540   Coping/Psychosocial Response Interventions   Plan Of Care Reviewed With patient   Outcome Evaluation   Outcome Summary/Follow up Plan pt is cga for transfers and cga/min for gt. making progress overall. cont wiht gt and therex         Problem: Inpatient Physical Therapy  Goal: Transfer Training Goal 1 LTG- PT  Outcome: Ongoing (interventions implemented as appropriate)    10/31/17 0825 11/03/17 1540   Transfer Training PT LTG   Transfer Training PT LTG, Date Established 10/31/17 --    Transfer Training PT LTG, Time to Achieve by discharge --    Transfer Training PT LTG, Activity Type bed to chair /chair to bed --    Transfer Training PT LTG, [place] Level supervision required --    Transfer Training PT LTG, Assist Device (AAD) --    Transfer Training PT LTG, Date Goal Reviewed --  11/03/17   Transfer Training PT LTG, Outcome --  goal ongoing       Goal: Gait Training Goal LTG- PT  Outcome: Ongoing (interventions implemented as appropriate)    10/31/17 0825 11/03/17 1540   Gait Training PT LTG   Gait Training Goal PT LTG, Date Established 10/31/17 --    Gait Training Goal PT LTG, Time to Achieve by discharge --    Gait Training Goal PT LTG, [place] Level supervision required --    Gait Training Goal PT LTG, Assist Device (AAD) --    Gait Training Goal PT LTG, Distance to Achieve 150 feet --    Gait Training Goal PT LTG, Date Goal Reviewed --  11/03/17   Gait Training Goal PT LTG, Outcome --  goal ongoing       Goal: Stair Training Goal STG- PT  Outcome: Ongoing (interventions implemented as appropriate)    10/31/17 0825 11/03/17 1540   Stair Training PT STG   Stair Training Goal PT STG, Date Established 10/31/17 --    Stair Training Goal PT STG, Time to Achieve 4 days --    Stair Training Goal PT STG, Number of Steps 4 steps --    Stair Training Goal PT STG,  [place] Level contact guard assist --    Stair Training Goal PT STG, Assist Device 1 handrail --    Stair Training Goal PT STG, Date Goal Reviewed --  11/03/17   Stair Training Goal PT STG, Outcome --  goal ongoing       Goal: Stair Training Goal LTG- PT  Outcome: Ongoing (interventions implemented as appropriate)    10/31/17 0825 11/03/17 1540   Stair Training PT LTG   Stair Training Goal PT LTG, Date Established 10/31/17 --    Stair Training Goal PT LTG, Time to Achieve by discharge --    Stair Training Goal PT LTG, Number of Steps 1 flight --    Stair Training Goal PT LTG, [place] Level supervision required --    Stair Training Goal PT LTG, Assist Device 2 handrails --    Stair Training Goal PT LTG, Date Goal Reviewed --  11/03/17   Stair Training Goal PT LTG, Outcome --  goal ongoing

## 2017-11-03 NOTE — PLAN OF CARE
Problem: Patient Care Overview (Adult)  Goal: Plan of Care Review  Outcome: Ongoing (interventions implemented as appropriate)    11/03/17 1346   Coping/Psychosocial Response Interventions   Plan Of Care Reviewed With patient   Patient Care Overview   Progress progress toward functional goals as expected   Outcome Evaluation   Outcome Summary/Follow up Plan ST therapy: pt orientatiion improved with use of calendar, but requires cues to use.          Problem: Inpatient SLP  Goal: Cognitive-linguistic-Patient will improve Cognitive-linguistic skills to improve safety and safety awareness in environment  Outcome: Ongoing (interventions implemented as appropriate)    10/31/17 1244 11/03/17 1346   Cognitive Linguistic- Improve Safety and Awareness   Cognitive Linguistic Improve Safety and Awareness- SLP, Date Established 10/31/17 --    Cognitive Linguistic Improve Safety and Awareness- SLP, Time to Achieve by discharge --    Cognitive Linguistic Improve Safety and Awareness- SLP, Activity Level Patient will improve orientation for increased safety in environment;Patient will improve memory skills for increased safety in environment;Patient will improve functional problem solving skills for increased safety in environment --    Cognitive Linguistic Improve Safety and Awareness- SLP, Date Goal Reviewed --  11/03/17   Cognitive Linguistic Improve Safety and Awareness- SLP, Outcome --  goal not met

## 2017-11-03 NOTE — PROGRESS NOTES
LOS: 4 days   Patient Care Team:  Evan Marrero MD as PCP - General (Family Medicine)    Chief Complaint:   Right-sided lacunar stroke          Interval History:     SUBJECTIVE:  Says he feels sleepy this morning but he has been up in therapy all morning working well with therapy making progress.  No shortness of breath no chest pain.  He says the more he works in therapy to more he realized that his processes are not as sharp and clear as they have been before his stroke.  He has to pay more attention to ambulate and transfers.  History taken from: patient chart family    Objective     Vital Signs  Temp:  [96.9 °F (36.1 °C)-97.2 °F (36.2 °C)] 96.9 °F (36.1 °C)  Heart Rate:  [65-73] 73  Resp:  [18] 18  BP: (108-134)/(56-68) 131/68  Last 3 weights    10/30/17  2300 11/01/17  0100 11/03/17  0500   Weight: 208 lb (94.3 kg) 211 lb (95.7 kg) 207 lb 14.4 oz (94.3 kg)         Physical Exam:     General Appearance:    Alert, cooperative, in no acute distress   Head:    Normocephalic, without obvious abnormality, atraumatic   Eyes:            Lids and lashes normal, conjunctivae and sclerae normal, no   icterus, no pallor, corneas clear, PERRLA   Throat:   No oral lesions, no thrush, oral mucosa moist   Neck:   No adenopathy, supple, trachea midline, no thyromegaly, no   carotid bruit, no JVD       Lungs:     Clear to auscultation,respirations regular, even and                  Unlabored Good bilateral air movement     Heart:    Regular rhythm and normal rate, normal S1 and S2, no            murmur, no gallop, no rub, no click Regular rhythm and no ectopy    Chest Wall:    No abnormalities observed   Abdomen:     Normal bowel sounds, no masses, no organomegaly, soft        non-tender, non-distended, no guarding, no rebound                Tenderness    Extremities:   Moves all extremities well, no edema, no cyanosis, no             Redness        Skin:   No bleeding, bruising or rash   Lymph nodes:   No palpable  adenopathy   Neurologic:   Cranial nerves 2 - 12 grossly intact, sensation intact, DTR       present and equal bilaterally he has left-sided weakness and ataxia but does seem to be improving       RESULTS REVIEW:     Lab Results (last 24 hours)     Procedure Component Value Units Date/Time    POC Glucose Fingerstick [[id number]]  (Abnormal) Collected:  11/02/17 1031    Specimen:  Blood Updated:  11/02/17 1044     Glucose 141 (H) mg/dL       RN NotifiedMeter: [id number]: [id number] HUSK NAOMI       POC Glucose Fingerstick [[id number]]  (Abnormal) Collected:  11/02/17 1605    Specimen:  Blood Updated:  11/02/17 1628     Glucose 137 (H) mg/dL       RN NotifiedMeter: [id number]: [id number] LAVELLE GALDAMEZ       POC Glucose Fingerstick [[id number]]  (Normal) Collected:  11/02/17 1932    Specimen:  Blood Updated:  11/02/17 2019     Glucose 84 mg/dL       Meter: [id number]: [id number] LAVELLE GALDAMEZ       CBC (No Diff) [[id number]]  (Abnormal) Collected:  11/03/17 0512    Specimen:  Blood Updated:  11/03/17 0610     WBC 6.91 10*3/mm3      RBC 4.38 10*6/mm3      Hemoglobin 13.1 (L) g/dL      Hematocrit 38.6 (L) %      MCV 88.1 fL      MCH 29.9 pg      MCHC 33.9 g/dL      RDW 13.1 %      RDW-SD 42.3 fl      MPV 9.6 fL      Platelets 262 10*3/mm3     Comprehensive Metabolic Panel [[id number]]  (Abnormal) Collected:  11/03/17 0512    Specimen:  Blood Updated:  11/03/17 0636     Glucose 101 (H) mg/dL      BUN 23 (H) mg/dL      Creatinine 0.95 mg/dL      Sodium 142 mmol/L      Potassium 3.4 (L) mmol/L      Chloride 104 mmol/L      CO2 25.0 mmol/L      Calcium 9.1 mg/dL      Total Protein 6.5 g/dL      Albumin 3.60 g/dL      ALT (SGPT) 28 U/L      AST (SGOT) 26 U/L      Alkaline Phosphatase 44 U/L      Total Bilirubin 1.4 (H) mg/dL      eGFR Non African Amer 76 mL/min/1.73      Globulin 2.9 gm/dL      A/G Ratio 1.2 g/dL      BUN/Creatinine Ratio 24.2     Anion Gap 13.0 mmol/L     Narrative:       The  MDRD GFR formula is only valid for adults with stable renal function between ages 18 and 70.        Imaging Results (last 72 hours)     ** No results found for the last 72 hours. **          Assessment/Plan     Principal Problem:    Right-sided lacunar stroke  Active Problems:    Left-sided weakness    Atrial fibrillation, chronic    Essential hypertension    Diabetes mellitus    Chronic anxiety    Chronic back pain greater than 3 months duration    Degenerative joint disease involving multiple joints    Encounter for rehabilitation    Obstructive sleep apnea syndrome    Former smoker        He is participating well with therapy making good progress  Diabetes is well-controlled  Noted that his potassium is decreasing and I'm going to start him on low-dose potassium replacement and recheck lab work in 48 hours.  He is not on a diuretic at this time  Otherwise seems to be doing well and make progress    He is noted to be a little bit confused in the morning or after he awakens from sleep during the day      Vishnu Mckinnno MD  11/03/17  10:36 AM

## 2017-11-03 NOTE — THERAPY TREATMENT NOTE
Inpatient Rehabilitation - Physical Therapy Treatment Note  [facility]     Patient Name: Kenneth Harper Jr.  : 1934  MRN: [id number]  Today's Date: 11/3/2017  Onset of Illness/Injury or Date of Surgery Date: 10/30/17  Date of Referral to PT: 10/30/17  Referring Physician: TERRENCE Mckinnon MD    Admit Date: 10/30/2017    Visit Dx:    ICD-10-CM ICD-9-CM   1. Symbolic dysfunction R48.9 784.60   2. Impaired mobility and ADLs Z74.09 799.89   3. Abnormality of gait and mobility R26.9 781.2   4. Muscle weakness (generalized) M62.81 728.87     Patient Active Problem List   Diagnosis   • Spinal stenosis, lumbar region, with neurogenic claudication   • Former smoker   • Overweight   • Left-sided weakness   • Right-sided lacunar stroke   • Essential hypertension   • Diabetes mellitus   • Chronic anxiety   • Chronic back pain greater than 3 months duration   • Prostatic hypertrophy   • Degenerative joint disease involving multiple joints   • Atrial fibrillation, chronic   • Encounter for rehabilitation   • Obstructive sleep apnea syndrome               Adult Rehabilitation Note       17 1421 17 1300 17 1025    Rehab Assessment/Intervention    Discipline physical therapy assistant  -RW speech language pathologist  -EC occupational therapy assistant  -LM    Document Type therapy note (daily note)  -RW therapy note (daily note)  -EC therapy note (daily note)  -LM    Subjective Information agree to therapy  -RW agree to therapy  -EC agree to therapy  -LM    Patient Effort, Rehab Treatment good  -RW  good  -LM    Symptoms Noted During/After Treatment   none  -LM    Precautions/Limitations fall precautions  -RW      Recorded by [RW] Pipe Gruber PTA [EC] Clementina Murrell CCC-SLP [LM] MARCO Piña/L    Pain Assessment    Pain Assessment  No/denies pain  -EC No/denies pain  -LM    Pain Score   4  -LM    Post Pain Score   3  -LM    Pain Type   Acute pain  -LM    Pain Location   Back   -LM    Recorded by  [EC] SHELTON SchultzSLP [LM] Carmen Boucher, LARA/L    Vision Assessment/Intervention    Visual Impairment WFL with corrective lenses  -RW  WFL  -LM    Recorded by [RW] Pipe Gruber PTA  [LM] Carmen Boucher, LARA/L    Cognitive Assessment/Intervention    Current Cognitive/Communication Assessment impaired  -RW  impaired  -LM    Orientation Status oriented to;person;place  -RW  oriented x 4  -LM    Follows Commands/Answers Questions   100% of the time  -LM    Personal Safety   mild impairment  -LM    Recorded by [RW] Pipe Gruber PTA  [LM] Carmen Boucher, LARA/L    Cognitive Assessment Intervention    Behavior/Mood Observations behavior appropriate to situation, WNL/WFL  -RW      Recorded by [RW] Pipe Gruber PTA      Communication Treatment Objective and Progress    Cognitive Linguistic Treatment Objectives  Improve orientation;Improve memory skills;Improve functional problem solving  -EC     Recorded by  [EC] Clementina Murrell CCC-SLP     Improve orientation    Improve orientation through:  demonstrating orientation to day;demonstrating orientation to month;demonstrating orientation to year;demonstrating orientation to place;demonstrating orientation to disease/impairment;90%;with inconsistent cues  -EC     Status: Improve orientation through  Progress slower than expected  -EC     Orientation Progress  50%  -EC     Comments: Improve orientation through  with mod cues and calendar to look at  -EC     Recorded by  [EC] SHELTON SchultzSLP     Improve memory skills    Improve memory skills through:  recalling related word lists with an imposed delay;90%;with inconsistent cues  -EC     Status: Improve memory skills  Progress slower than expected  -EC     Memory Skills Progress  60%  -EC     Comments: Improve memory skills  using the same 4 words from previous 2 days.  patient continues to only get 3/4 even with memory st rategies.   -EC     Recorded by  [EC]  Clementina Murrell, CCC-SLP     Improve functional problem solving    Improve functional problem solving through:  complete organization/home management task;tell similarity between items  -EC     Status: Improve functional problem solving  Progress slower than expected  -EC     Functional Problem Solving Progress  60%  -EC     Comments: Improve functional problem solving  moderate cues to read infomation on a bill and write a check  -EC     Recorded by  [EC] Clementina Murrell CCC-SLP     Bed Mobility, Assessment/Treatment    Bed Mobility, Assistive Device head of bed elevated;bed rails  -RW      Bed Mobility, Scoot/Bridge, [place] supervision required  -RW      Bed Mob, Sit to Supine, [place] supervision required  -RW      Recorded by [RW] Pipe Gruber PTA      Transfer Assessment/Treatment    Transfers, Bed-Chair [place] --  -RW      Transfers, Chair-Bed [place] contact guard assist  -RW      Transfers, Bed-Chair-Bed, Assist Device rolling walker  -RW      Transfers, Sit-Stand [place] verbal cues required;stand by assist  -RW      Transfers, Stand-Sit [place] verbal cues required;stand by assist  -RW      Transfers, Sit-Stand-Sit, Assist Device rolling walker  -RW      Recorded by [RW] Pipe Gruber PTA      Gait Assessment/Treatment    Gait, [place] Level minimum assist (75% patient effort);contact guard assist;verbal cues required  -RW      Gait, Assistive Device rolling walker  -RW      Gait, Distance (Feet) 10  -RW      Recorded by [RW] Pipe Gruber PTA      Stairs Assessment/Treatment    Stairs, Handrail Location --  -RW      Stairs, [place] Level --  -RW      Recorded by [RW] Pipe Gruber PTA      Wheelchair Training/Management    Wheelchair, Distance Propelled   --   150 ft   -LM    Recorded by   [LM] MARCO Piña/L    Lower Body Dressing Assessment/Training    LB Dressing Assess/Train, Clothing Type shoes;doffing:  -RW      Recorded by [RW]  Pipe Gruber PTA      Grooming Assessment/Training    Grooming Assess/Train, [place]ep[place] Level   independent  -LM    Recorded by   [LM] DAYDAY Piña    Motor Skills/Interventions    Motor Response Observations   --   ther act with multi act to incresae rom pulley over head  -LM    Additional Documentation   --   coordination act B UE to incresae L UE  -LM    Recorded by   [LM] DAYDAY Piña    Balance Skills Training    Standing-Level of Assistance --  -RW      Static Standing Balance Support --  -RW      Standing-Balance Activities --  -RW      Gait Balance Support --  -RW      Gait Balance Activities --  -RW      Recorded by [RW] Pipe Gruber PTA      Therapy Exercises    Bilateral Lower Extremities AROM:;20 reps;supine;ankle pumps/circles;glut sets;heel slides;hip abduction/adduction;SAQ  -RW      Bilateral Upper Extremity   AROM:;elbow flexion/extension;shoulder abduction/adduction;shoulder extension/flexion;shoulder horizontal abd/add;shoulder protraction/retraction;shoulder rolls/shrugs   also perf tband all planes x 20 x 2   -LM    BUE Resistance   manual resistance- minimal   also UEE bike x 15 min   -LM    Trunk Exercises trunk rotation;supine;15 reps   hooklying  -RW      Recorded by [RW] Pipe Gruber PTA  [LM] DAYDAY Piña    Positioning and Restraints    Pre-Treatment Position sitting in chair/recliner  -RW  sitting in chair/recliner  -LM    Post Treatment Position bed  -RW  wheelchair  -LM    In Bed call light within reach  -RW      Recorded by [RW] Pipe Gruber PTA  [LM] DAYDAY Piña      11/03/17 0807 11/02/17 1455 11/02/17 1405    Rehab Assessment/Intervention    Discipline physical therapy assistant  -RW speech language pathologist  -EC physical therapy assistant  -RW    Document Type therapy note (daily note)  -RW therapy note (daily note)  -EC therapy note (daily note)  -RW    Subjective Information agree to therapy  -RW agree to therapy   -EC agree to therapy  -RW    Patient Effort, Rehab Treatment good  -RW good  -EC good  -RW    Precautions/Limitations fall precautions  -RW  fall precautions  -RW    Recorded by [RW] Pipe Gruber PTA [EC] Clementina Murrell CCC-SLP [RW] Pipe Gruber PTA    Pain Assessment    Pain Assessment No/denies pain  -RW No/denies pain  -EC No/denies pain  -RW    Recorded by [RW] Pipe Gruber PTA [EC] Clementina Murrell CCC-SLP [RW] Pipe Gruber PTA    Vision Assessment/Intervention    Visual Impairment WFL with corrective lenses  -RW  WFL with corrective lenses  -RW    Recorded by [RW] Pipe Gruber PTA  [RW] Pipe Gruber PTA    Cognitive Assessment/Intervention    Current Cognitive/Communication Assessment impaired  -RW  impaired  -RW    Orientation Status oriented to;person;place  -RW  oriented to;person;place  -RW    Personal Safety Interventions   gait belt;nonskid shoes/slippers when out of bed  -RW    Recorded by [RW] Pipe Gruber, MARTIN  [RW] Pipe Gruber PTA    Cognitive Assessment Intervention    Behavior/Mood Observations behavior appropriate to situation, WNL/WFL  -RW  behavior appropriate to situation, WNL/WFL  -RW    Recorded by [RW] Pipe Gruber PTA  [RW] Pipe Gruber PTA    Communication Treatment Objective and Progress    Cognitive Linguistic Treatment Objectives  Improve orientation;Improve memory skills;Improve functional problem solving  -EC     Recorded by  [EC] Clementina Murrell CCC-SLP     Improve orientation    Improve orientation through:  demonstrating orientation to day;demonstrating orientation to month;demonstrating orientation to year;demonstrating orientation to place;demonstrating orientation to disease/impairment;90%;with inconsistent cues  -EC     Status: Improve orientation through  Progress slower than expected  -EC     Orientation Progress  10%  -EC     Recorded by  [EC] Clementina Murrell CCC-SLP     Improve memory skills    Improve memory skills through:   recalling related word lists with an imposed delay;90%;with inconsistent cues  -EC     Status: Improve memory skills  Progress slower than expected  -EC     Memory Skills Progress  70%  -EC     Recorded by  [EC] Clementina Murrell CCC-SLP     Improve functional problem solving    Improve functional problem solving through:  complete organization/home management task;tell similarity between items  -EC     Status: Improve functional problem solving  Progress slower than expected  -EC     Functional Problem Solving Progress  30%  -EC     Recorded by  [EC] Clementina Murrell CCC-SLP     Transfer Assessment/Treatment    Transfers, Bed-Chair [place] minimum assist (75% patient effort)  -RW  minimum assist (75% patient effort)  -RW    Transfers, Chair-Bed [place] minimum assist (75% patient effort)  -RW  minimum assist (75% patient effort)  -RW    Transfers, Bed-Chair-Bed, Assist Device rolling walker  -RW  rolling walker  -RW    Transfers, Sit-Stand [place] verbal cues required;stand by assist  -RW  contact guard assist;verbal cues required  -RW    Transfers, Stand-Sit [place] verbal cues required;stand by assist  -RW  verbal cues required;contact guard assist  -RW    Transfers, Sit-Stand-Sit, Assist Device rolling walker  -RW  rolling walker  -RW    Transfer, Comment   sit to stand x 5  -RW    Recorded by [RW] Pipe Gruber PTA  [RW] Pipe Gruber PTA    Gait Assessment/Treatment    Gait, [place] Level minimum assist (75% patient effort);contact guard assist;verbal cues required  -RW  minimum assist (75% patient effort)  -RW    Gait, Assistive Device rolling walker  -RW  rolling walker  -RW    Gait, Distance (Feet) 40    x 5  -RW  150   2 standing / stop to regroup, 60 x 3  -RW    Gait, Gait Deviations   --   left foot drag at times  -RW    Recorded by [RW] Pipe Gruber PTA  [RW] Pipe Gruber PTA    Stairs Assessment/Treatment    Number of Stairs 4  -RW      Stairs, Handrail Location  left side (ascending)  -RW      Stairs, [place] Level minimum assist (75% patient effort);verbal cues required  -RW      Stairs, Comment pt not getting weak leg onto step  fully which poses a risk for accident  -RW      Recorded by [RW] Pipe Gruber PTA      Lower Body Dressing Assessment/Training    LB Dressing Assess/Train, Clothing Type donning:;shoes  -RW      LB Dressing Assess/Train, [place] set up required  -RW      Recorded by [RW] Pipe Gruber PTA      Balance Skills Training    Standing-Level of Assistance Contact guard  -RW      Static Standing Balance Support assistive device  -RW      Standing-Balance Activities --   tucking in shirt-tail  -RW      Gait Balance Support parallel bars  -RW      Gait Balance Activities side-stepping  -RW      Recorded by [RW] Pipe Gruber PTA      Therapy Exercises    Bilateral Lower Extremities AROM:;20 reps;heel raises  -RW  AROM:;sitting;knee flexion;LAQ;ankle pumps/circles;20 reps;hip flexion;standing;heel slides  -RW    Trunk Exercises --   hooklying  -RW  --   hooklying  -RW    Recorded by [RW] Pipe Gruber PTA  [RW] Pipe Gruber PTA    Positioning and Restraints    Pre-Treatment Position in bed   eob  -RW  sitting in chair/recliner  -RW    Post Treatment Position wheelchair  -RW  wheelchair  -RW    Recorded by [RW] Pipe Gruber PTA  [RW] Pipe Gruber PTA      11/02/17 0902 11/02/17 0715 11/01/17 1524    Rehab Assessment/Intervention    Discipline physical therapy assistant  -RW occupational therapy assistant  -KD occupational therapist  -RWA    Document Type therapy note (daily note)  -RW therapy note (daily note)  -KD therapy note (daily note)  -RWA    Subjective Information agree to therapy  -RW agree to therapy  -KD agree to therapy;complains of;pain  -RWA    Patient Effort, Rehab Treatment good  -RW good  -KD good  -RWA    Symptoms Noted During/After Treatment   none  -RWA    Precautions/Limitations fall precautions  -RW fall  precautions  -KD fall precautions  -RWA    Recorded by [RW] Pipe Gruber PTA [KD] Therese King, LARA/L [RWA] Jacinta Pitts, OTR/L    Vital Signs    Pre Systolic BP Rehab  105  -KD     Pre Treatment Diastolic BP  57  -KD     Pretreatment Heart Rate (beats/min) 80  -RW 83  -KD 81  -RWA    Posttreatment Heart Rate (beats/min)  74  -KD 72  -RWA    Pre SpO2 (%) 93  -RW 94  -KD 96  -RWA    O2 Delivery Pre Treatment room air  -RW room air  -KD room air  -RWA    Post SpO2 (%)  96  -KD 95  -RWA    O2 Delivery Post Treatment  room air  -KD room air  -RWA    Pre Patient Position  Sitting  -KD Supine  -RWA    Intra Patient Position  Standing  -KD     Post Patient Position  Sitting  -KD Supine  -RWA    Recorded by [RW] Pipe Gruber PTA [KD] Therese King, LARA/L [RWA] Jacinta Pitts, OTR/L    Pain Assessment    Pain Assessment No/denies pain  -RW No/denies pain  -KD 0-10  -RWA    Pain Score   4  -RWA    Post Pain Score   3  -RWA    Pain Location   Back  -RWA    Pain Orientation   Lower;Upper  -RWA    Recorded by [RW] Pipe Gruber PTA [KD] Therese King, LARA/L [RWA] Jacinta Pitts, OTR/L    Vision Assessment/Intervention    Visual Impairment WFL with corrective lenses  -RW      Recorded by [RW] Pipe Gruber PTA      Cognitive Assessment/Intervention    Current Cognitive/Communication Assessment impaired  -RW impaired  -KD impaired  -RWA    Orientation Status oriented to;person;place  -RW oriented to;person;place  -KD oriented to;person;place;situation  -RWA    Follows Commands/Answers Questions  100% of the time;able to follow single-step instructions  -KD able to follow single-step instructions;100% of the time  -RWA    Personal Safety   mild impairment  -RWA    Personal Safety Interventions gait belt;nonskid shoes/slippers when out of bed  -RW gait belt;nonskid shoes/slippers when out of bed  -KD     Recorded by [RW] Pipe Gruber PTA [KD] Therese King, LARA/L [RWA] Jacinta Pitts, OTR/L     Cognitive Assessment Intervention    Behavior/Mood Observations behavior appropriate to situation, WNL/WFL  -RW      Recorded by [RW] Pipe Gruber PTA      Bed Mobility, Assessment/Treatment    Bed Mob, Supine to Sit, [place] supervision required  -RW      Bed Mob, Sit to Supine, [place] supervision required  -RW      Recorded by [RW] Pipe Gruber PTA      Transfer Assessment/Treatment    Transfers, Bed-Chair [place] minimum assist (75% patient effort)  -RW contact guard assist  -KD     Transfers, Chair-Bed [place] minimum assist (75% patient effort)  -RW      Transfers, Bed-Chair-Bed, Assist Device rolling walker  -RW rolling walker  -KD     Transfers, Sit-Stand [place] contact guard assist;verbal cues required  -RW contact guard assist  -KD     Transfers, Stand-Sit [place] verbal cues required;contact guard assist  -RW contact guard assist  -KD     Transfers, Sit-Stand-Sit, Assist Device rolling walker  -RW rolling walker  -KD     Walk-In Shower Transfer, [place]  contact guard assist  -KD     Walk-In Shower Transfer, Assist Device  rolling walker  -KD     Transfer, Comment  Pt completed 10 sit-stands w/ 1 RB  -KD     Recorded by [RW] Pipe Gruber PTA [KD] Therese King, LARA/L     Gait Assessment/Treatment    Gait, [place] Level minimum assist (75% patient effort)  -RW      Gait, Assistive Device rolling walker  -RW      Gait, Distance (Feet) 60    x 4  -RW      Gait, Gait Deviations left:   occ foot drag  -RW      Recorded by [RW] Pipe Gruber PTA      Stairs Assessment/Treatment    Number of Stairs 4  -RW      Stairs, Handrail Location both sides  -RW      Stairs, [place] Level contact guard assist;verbal cues required  -RW      Recorded by [RW] Pipe Gruber PTA      Functional Mobility    Functional Mobility- Ind. Level  contact guard assist  -KD     Functional Mobility- Device  rolling walker  -KD     Functional Mobility-Distance (Feet)  --   10 x 2   -KD     Recorded by  [KD] DAYDAY Vidales     Wheelchair Training/Management    Wheelchair, Distance Propelled 150 cga  - cga w/ vc's  -KD     Recorded by [RW] Pipe Gruber PTA [KD] MARCO Vidales/L     Upper Body Bathing Assessment/Training    UB Bathing Assess/Train Assistive Device  bath mitt;grab bars;hand-held shower head;long-handled sponge;shower chair without back  -KD     UB Bathing Assess/Train, Position  edge of bed;sitting  -KD     UB Bathing Assess/Train, [place] Level  set up required  -KD     Recorded by  [KD] MARCO Vidales/L     Lower Body Bathing Assessment/Training    LB Bathing Assess/Train Assistive Device  bath mitt;grab bars;hand-held shower head;long-handled sponge;shower chair without back  -KD     LB Bathing Assess/Train, Position  sitting  -KD     LB Bathing Assess/Train, [place] Level  set up required  -KD     Recorded by  [KD] MARCO Vidales/L     Upper Body Dressing Assessment/Training    UB Dressing Assess/Train, Clothing Type  doffing:;donning:;pull over  -KD     UB Dressing Assess/Train, Position  edge of bed;sitting  -KD     UB Dressing Assess/Train, [place]  contact guard assist  -KD     Recorded by  [KD] MARCO Vidales/L     Lower Body Dressing Assessment/Training    LB Dressing Assess/Train, Clothing Type  doffing:;donning:;pants;shorts;socks  -KD     LB Dressing Assess/Train, Position  edge of bed;sitting  -KD     LB Dressing Assess/Train, [place]  minimum assist (75% patient effort)  -KD     LB Dressing Assess/Train, Impairments  impaired balance  -KD     LB Dressing Assess/Train, Comment  Pt demonstrated impaired balance with LB dressing , pt leans to L and need vc's to self correct    -KD     Recorded by  [KD] DAYDAY Vidales     Grooming Assessment/Training    Grooming Assess/Train, Assistive Device  --   comb hair/ brush teeth  -KD     Grooming Assess/Train, Position  sitting  -KD     Grooming Assess/Train,  [place] Level  set up required  -KD     Recorded by  [KD] MARCO Vidales/L     Self-Feeding Assessment/Training    Self-Feeding Assess/Train, Position  sitting  -KD     Self-Feeding Assess/Train, [place]  conditional independence  -KD     Self-Feeding Assess/Train, Spillage Amount  minimal  -KD     Recorded by  [KD] MARCO Vidales/L     Balance Skills Training    Standing-Level of Assistance  Contact guard  -KD     Static Standing Balance Support Right upper extremity supported;Left upper extremity supported  -RW assistive device  -KD     Standing-Balance Activities Weight Shift A-P  -RW Weight Shift A-P   Baloon volleyball  -KD     Standing Balance # of Minutes  --   3  -KD     Recorded by [RW] Pipe Gruber PTA [KD] MARCO Vidales/L     Therapy Exercises    Bilateral Lower Extremities AROM:;10 reps;supine;hip abduction/adduction;sitting;knee flexion;LAQ;ankle pumps/circles  -RW      Left Upper Extremity   5 reps  -RWA    LUE Resistance   theraputty   red  -RWA    Bilateral Upper Extremity  AROM:;20 reps;sitting;elbow flexion/extension;hand pumps;pronation/supination;shoulder circles;shoulder ER/IR;shoulder extension/flexion  -KD     BUE Resistance  manual resistance- minimal  -KD theraputty   removed beads from theraputty x3, requiring approx 15 min  -RWA    Trunk Exercises 10 reps;trunk rotation;supine   hooklying  -RW      Recorded by [RW] Pipe Gruber PTA [KD] MARCO Vidales/DAWN [RWA] Jacinta Pitts OTR/L    Fine Motor Coordination Training    Detail (Fine Motor Coordination Training)   pegboard ax & Connect 4 game to increase LUE fine motor coordination & strength  -RWA    Recorded by   [RWA] Jacinta Pitts OTR/L    Positioning and Restraints    Pre-Treatment Position sitting in chair/recliner  -RW sitting in chair/recliner  -KD in bed  -RWA    Post Treatment Position wheelchair  -RW wheelchair  -KD bed  -RWA    In Bed with family/caregiver  -RW  supine;call light  within reach;encouraged to call for assist;with family/caregiver  -RWA    In Wheelchair  sitting;call light within reach;encouraged to call for assist;exit alarm on  -KD     Recorded by [RW] Pipe Gruber PTA [KD] Therese King LARA/L [RWA] Jacinta Pitts, OTR/L      11/01/17 1346 11/01/17 1122 11/01/17 1020    Rehab Assessment/Intervention    Discipline physical therapy assistant  -RW occupational therapist  -RWA speech language pathologist  -EC    Document Type therapy note (daily note)  -RW therapy note (daily note)  -RWA therapy note (daily note)  -EC    Subjective Information agree to therapy  -RW agree to therapy;no complaints  -RWA agree to therapy  -EC    Patient Effort, Rehab Treatment good  -RW good  -RWA good  -EC    Symptoms Noted During/After Treatment  none  -RWA     Precautions/Limitations fall precautions  -RW fall precautions  -RWA     Recorded by [RW] Pipe Gruber PTA [RWA] Jacinta Pitts, OTR/L [EC] Clementina Murrell, CCC-SLP    Vital Signs    Pretreatment Heart Rate (beats/min) 81  -RW 86  -RWA     Posttreatment Heart Rate (beats/min)  87  -RWA     Pre SpO2 (%) 98  -RW 93  -RWA     O2 Delivery Pre Treatment room air  -RW room air  -RWA     Post SpO2 (%)  95  -RWA     O2 Delivery Post Treatment  room air  -RWA     Pre Patient Position  Sitting  -RWA     Post Patient Position  Sitting  -RWA     Recorded by [RW] Pipe Gruber PTA [RWA] Jacinta Pitts, OTR/L     Pain Assessment    Pain Assessment --   gives no rating but c/o left back/flank pain  -RW No/denies pain  -RWA No/denies pain  -EC    Recorded by [RW] Pipe Gruber PTA [RWA] Jacinta Pitts, OTR/L [EC] Clementina Murrell, CCC-SLP    Vision Assessment/Intervention    Visual Impairment WFL with corrective lenses  -RW      Recorded by [RW] Pipe Gruber PTA      Cognitive Assessment/Intervention    Current Cognitive/Communication Assessment impaired  -RW impaired  -RWA     Orientation Status oriented to;person;place   -RW oriented to;person;place;situation  -RWA     Follows Commands/Answers Questions  able to follow single-step instructions;100% of the time  -RWA     Personal Safety mild impairment  -RW mild impairment  -RWA     Personal Safety Interventions gait belt;nonskid shoes/slippers when out of bed  -RW gait belt;fall prevention program maintained;nonskid shoes/slippers when out of bed;supervised activity;muscle strengthening facilitated  -RWA     Recorded by [RW] Pipe Gruber PTA [RWA] Jacinta Pitts, OTR/L     Cognitive Assessment Intervention    Behavior/Mood Observations behavior appropriate to situation, WNL/WFL  -RW      Recorded by [RW] Pipe Gruber PTA      Communication Treatment Objective and Progress    Cognitive Linguistic Treatment Objectives   Improve orientation;Improve memory skills;Improve functional problem solving  -EC    Recorded by   [EC] Clementina Murrell CCC-SLP    Improve orientation    Improve orientation through:   demonstrating orientation to day;demonstrating orientation to month;demonstrating orientation to year;demonstrating orientation to place;demonstrating orientation to disease/impairment;90%;with inconsistent cues  -EC    Status: Improve orientation through   Progress slower than expected  -EC    Orientation Progress   0%  -EC    Comments: Improve orientation through   pt was not oriented to a single question this date.  only recalled that yesterday was halloween, but not date/month  -EC    Recorded by   [EC] MERI Schultz    Improve memory skills    Improve memory skills through:   recalling related word lists with an imposed delay;90%;with inconsistent cues  -EC    Status: Improve memory skills   Progress slower than expected  -EC    Memory Skills Progress   60%  -EC    Comments: Improve memory skills   recall of 3 related words  -EC    Recorded by   [EC] MERI Schultz    Improve functional problem solving    Improve functional problem solving  through:   complete organization/home management task;tell similarity between items  -EC    Status: Improve functional problem solving   Progressing as expected  -EC    Functional Problem Solving Progress   70%  -EC    Comments: Improve functional problem solving   pt identified early/late with 90% asccuracy and identified information on a bill and performed check writing with 60%   -EC    Recorded by   [EC] Clementina Murrell, CCC-SLP    Bed Mobility, Assessment/Treatment    Bed Mob, Supine to Sit, [place] minimum assist (75% patient effort)  -RW      Bed Mob, Sit to Supine, [place] contact guard assist  -RW      Recorded by [RW] Pipe Gruber PTA      Transfer Assessment/Treatment    Transfers, Bed-Chair [place] minimum assist (75% patient effort)  -RW      Transfers, Chair-Bed [place] minimum assist (75% patient effort)  -RW      Transfers, Bed-Chair-Bed, Assist Device rolling walker  -RW      Transfers, Sit-Stand [place] minimum assist (75% patient effort);contact guard assist  -RW minimum assist (75% patient effort)  -RWA     Transfers, Stand-Sit [place] minimum assist (75% patient effort);verbal cues required;contact guard assist  -RW minimum assist (75% patient effort)  -RWA     Transfers, Sit-Stand-Sit, Assist Device rolling walker  -RW rolling walker  -RWA     Toilet Transfer, [place]  minimum assist (75% patient effort)  -RWA     Toilet Transfer, Assistive Device  rolling walker   grab bar  -RWA     Transfer, Safety Issues  step length decreased  -RWA     Transfer, Comment  sit-stand from toilet x3 for clothing management, lis care, & application of cream  -RWA     Recorded by [RW] Pipe Gruber PTA [RWA] Jacinta Pitts, OTR/L     Gait Assessment/Treatment    Gait, [place] Level minimum assist (75% patient effort)  -RW      Gait, Assistive Device rolling walker  -RW      Gait, Distance (Feet) 40   x 3  -RW      Gait, Gait Deviations left:;step length  decreased   improved  -RW      Recorded by [RW] Pipe Gruber, PTA      Functional Mobility    Functional Mobility- Ind. Level  contact guard assist  -RWA     Functional Mobility- Device  rolling walker  -RWA     Functional Mobility-Distance (Feet)  12   x2  -RWA     Functional Mobility- Safety Issues  step length decreased;weight-shifting ability decreased   too far away from AD  -RWA     Recorded by  [RWA] Jacinta Pitts OTR/L     Wheelchair Training/Management    Wheelchair, Distance Propelled  25 ft with SBA  -RWA     Recorded by  [RWA] Jacinat Pitts, OTR/L     Lower Body Bathing Assessment/Training    LB Bathing Assess/Train, Position  standing  -RWA     LB Bathing Assess/Train, [place] Level  minimum assist (75% patient effort)   for balance  -RWA     LB Bathing Assess/Train, Impairments  impaired balance;strength decreased  -RWA     LB Bathing Assess/Train, Comment  Pt washed lis area only & applied magic butt.  -RWA     Recorded by  [RWA] Jacinta Pitts OTR/L     Grooming Assessment/Training    Grooming Assess/Train, Position  sitting;sink side   w/c  -RWA     Grooming Assess/Train, [place] Level  supervision required;verbal cues required  -RWA     Grooming Assess/Train, Impairments  strength decreased;coordination impaired  -RWA     Grooming Assess/Train, Comment  Pt brushing teeth upon arrival, washed face & combed hair.  -RWA     Recorded by  [RWA] Jacinta Pitts OTR/L     Balance Skills Training    Static Standing Balance Support  assistive device  -RWA     Standing-Balance Activities  Weight Shift R-L   ADL task  -RWA     Standing Balance # of Minutes  5  -RWA     Recorded by  [RWA] Jacinta Pitts OTR/L     Therapy Exercises    Bilateral Lower Extremities AROM:;10 reps;supine;ankle pumps/circles;heel slides;hip abduction/adduction;SAQ;standing;heel raises   2 sets of hs . hooklying hip aa x 10 radha  -RW      Trunk Exercises 10 reps;trunk rotation;supine   hooklying  -RW       Recorded by [RW] Pipe Gruber PTA      Positioning and Restraints    Pre-Treatment Position other (comment)   eom  -RW sitting in chair/recliner   w/c  -RWA     Post Treatment Position wheelchair  -RW wheelchair   at table for lunch  -RWA     In Wheelchair with family/caregiver  -RW sitting;with family/caregiver  -RWA     Recorded by [RW] Pipe Gruber PTA [RWA] Jacinta Pitts, OTR/L       11/01/17 0930          Rehab Assessment/Intervention    Discipline physical therapy assistant  -RW      Document Type therapy note (daily note)  -RW      Subjective Information agree to therapy  -RW      Patient Effort, Rehab Treatment good  -RW      Precautions/Limitations fall precautions  -RW      Recorded by [RW] Pipe Gruber PTA      Vital Signs    Pretreatment Heart Rate (beats/min) 70  -RW      Pre SpO2 (%) 94  -RW      O2 Delivery Pre Treatment room air  -RW      Recorded by [RW] Pipe Gruber PTA      Pain Assessment    Pain Assessment No/denies pain  -RW      Recorded by [RW] Pipe Gruber PTA      Vision Assessment/Intervention    Visual Impairment WFL with corrective lenses  -RW      Recorded by [RW] Pipe Gruber PTA      Cognitive Assessment/Intervention    Current Cognitive/Communication Assessment impaired  -RW      Orientation Status oriented to;person;place  -RW      Personal Safety Interventions gait belt;nonskid shoes/slippers when out of bed  -RW      Recorded by [RW] Pipe Gruber PTA      Cognitive Assessment Intervention    Behavior/Mood Observations behavior appropriate to situation, WNL/WFL  -RW      Recorded by [RW] Pipe Gruber PTA      Bed Mobility, Assessment/Treatment    Bed Mob, Supine to Sit, [place] minimum assist (75% patient effort)  -RW      Bed Mob, Sit to Supine, [place] contact guard assist  -RW      Recorded by [RW] Pipe Gruber PTA      Transfer Assessment/Treatment    Transfers, Bed-Chair [place] minimum assist (75% patient effort)  -RW       Transfers, Chair-Bed [place] minimum assist (75% patient effort)  -RW      Transfers, Bed-Chair-Bed, Assist Device rolling walker  -RW      Transfers, Sit-Stand [place] minimum assist (75% patient effort);contact guard assist  -RW      Transfers, Stand-Sit [place] minimum assist (75% patient effort);verbal cues required;contact guard assist  -RW      Transfers, Sit-Stand-Sit, Assist Device rolling walker  -RW      Transfer, Comment sit to stand x 5, one lob  -RW      Recorded by [RW] Pipe Gruber PTA      Gait Assessment/Treatment    Gait, [place] Level minimum assist (75% patient effort)  -RW      Gait, Assistive Device rolling walker  -RW      Gait, Distance (Feet) 70  -RW      Gait, Gait Deviations leans to the left;step length decreased;toe-to-floor clearance decreased;left:  -RW      Recorded by [RW] Pipe Gruber PTA      Wheelchair Training/Management    Wheelchair, Distance Propelled 150 sba and lots of vcues  -RW      Recorded by [RW] Pipe Gruber PTA      Therapy Exercises    Bilateral Lower Extremities AROM:;10 reps;supine;ankle pumps/circles;heel slides;hip abduction/adduction;SAQ;standing;heel raises  -RW      Recorded by [RW] Pipe Gruber PTA      Positioning and Restraints    Pre-Treatment Position sitting in chair/recliner  -RW      Post Treatment Position wheelchair  -RW      In Wheelchair with SLP  -RW      Recorded by [RW] Pipe Gruber PTA        User Key  (r) = Recorded By, (t) = Taken By, (c) = Cosigned By    Initials Name Effective Dates    EC Clementina Murrell CCC-SLP 12/30/16 -     THERESA Pitts, OTR/L 10/17/16 -     KATHIE Gruber PTA 10/17/16 -     JOSS King, LARA/L 10/17/16 -     LM Carmen Boucher, LARA/L 10/17/16 -                 IP PT Goals       11/03/17 1540 11/02/17 1631 11/01/17 1543    Bed Mobility PT LTG    Bed Mobility PT LTG, Date Goal Reviewed  11/02/17  -RW 11/01/17  -RW    Bed Mobility PT LTG, Outcome  goal met  -RW  goal ongoing  -RW    Transfer Training PT LTG    Transfer Training PT  LTG, Date Goal Reviewed 11/03/17  -RW 11/02/17  -RW 11/01/17  -RW    Transfer Training PT LTG, Outcome goal ongoing  -RW goal ongoing  -RW goal ongoing  -RW    Gait Training PT LTG    Gait Training Goal PT LTG, Date Goal Reviewed 11/03/17  -RW 11/02/17  -RW 11/01/17  -RW    Gait Training Goal PT LTG, Outcome goal ongoing  -RW goal ongoing  -RW goal ongoing  -RW    Stair Training PT STG    Stair Training Goal PT STG, Date Goal Reviewed 11/03/17  -RW 11/02/17  -RW 11/01/17  -RW    Stair Training Goal PT STG, Outcome goal ongoing  -RW goal ongoing  -RW goal ongoing  -RW    Stair Training PT LTG    Stair Training Goal PT LTG, Date Goal Reviewed 11/03/17  -RW 11/02/17  -RW 11/01/17  -RW    Stair Training Goal PT LTG, Outcome goal ongoing  -RW goal ongoing  -RW goal ongoing  -RW      10/31/17 0825          Bed Mobility PT LTG    Bed Mobility PT LTG, Date Established 10/31/17  -LM      Bed Mobility PT LTG, Time to Achieve by discharge  -LM      Bed Mobility PT LTG, Activity Type supine to sit/sit to supine  -LM      Bed Mobility PT LTG, [place] Level supervision required  -LM      Bed Mobility PT LTG, Additional Goal HOB flat; No BR's  -LM      Bed Mobility PT LTG, Outcome goal ongoing  -LM      Transfer Training PT LTG    Transfer Training PT LTG, Date Established 10/31/17  -LM      Transfer Training PT LTG, Time to Achieve by discharge  -LM      Transfer Training PT LTG, Activity Type bed to chair /chair to bed  -LM      Transfer Training PT LTG, [place] Level supervision required  -LM      Transfer Training PT LTG, Assist Device --   AAD  -LM      Transfer Training PT LTG, Outcome goal ongoing  -LM      Gait Training PT LTG    Gait Training Goal PT LTG, Date Established 10/31/17  -LM      Gait Training Goal PT LTG, Time to Achieve by discharge  -LM      Gait Training Goal PT LTG, [place] Level supervision required  -LM      Gait  Training Goal PT LTG, Assist Device --   AAD  -LM      Gait Training Goal PT LTG, Distance to Achieve 150 feet  -LM      Gait Training Goal PT LTG, Outcome goal ongoing  -LM      Stair Training PT STG    Stair Training Goal PT STG, Date Established 10/31/17  -LM      Stair Training Goal PT STG, Time to Achieve 4 days  -LM      Stair Training Goal PT STG, Number of Steps 4 steps  -LM      Stair Training Goal PT STG, [place] Level contact guard assist  -LM      Stair Training Goal PT STG, Assist Device 1 handrail  -LM      Stair Training Goal PT STG, Outcome goal ongoing  -LM      Stair Training PT LTG    Stair Training Goal PT LTG, Date Established 10/31/17  -LM      Stair Training Goal PT LTG, Time to Achieve by discharge  -LM      Stair Training Goal PT LTG, Number of Steps 1 flight  -LM      Stair Training Goal PT LTG, [place] Level supervision required  -LM      Stair Training Goal PT LTG, Assist Device 2 handrails  -LM      Stair Training Goal PT LTG, Outcome goal ongoing  -LM        User Key  (r) = Recorded By, (t) = Taken By, (c) = Cosigned By    Initials Name Provider Type    LM Landy Mckeon, PT Physical Therapist    KATHIE Gruber, PTA Physical Therapy Assistant          Physical Therapy Education     Title: PT OT SLP Therapies (Active)     Topic: Physical Therapy (Active)     Point: Mobility training (Done)    Learning Progress Summary    Learner Readiness Method Response Comment Documented by Status   Patient Acceptance E VU again reviewed correct gt and transfer safey. RW 11/03/17 0910 Done    Acceptance E VU reviewed safe transfers and risks of falls RW 11/01/17 1052 Done    Acceptance E NR Reviewed safety with transfers and ambulation.  10/31/17 1506 Active               Point: Precautions (Done)    Learning Progress Summary    Learner Readiness Method Response Comment Documented by Status   Patient Acceptance E VU again reviewed correct gt and transfer safey. RW 11/03/17 0910 Done     Acceptance E VU reviewed safe transfers and risks of falls RW 11/01/17 1052 Done    Acceptance E NR Reviewed safety with transfers and ambulation.  10/31/17 1506 Active                      User Key     Initials Effective Dates Name Provider Type Discipline    LM 06/15/16 -  Landy Mckeon, PT Physical Therapist PT    RW 10/17/16 -  Pipe Gruber, PTA Physical Therapy Assistant PT                    PT Recommendation and Plan  Anticipated Equipment Needs At Discharge: front wheeled walker  Anticipated Discharge Disposition: home with /7 care, home with home health  Planned Therapy Interventions: balance training, bed mobility training, gait training, home exercise program, motor coordination training, neuromuscular re-education, patient/family education, postural re-education, ROM (Range of Motion), stair training, strengthening, stretching, transfer training, wheelchair management/propulsion training  PT Frequency: other (see comments) (5-14 times/wk)  Plan of Care Review  Plan Of Care Reviewed With: patient  Outcome Summary/Follow up Plan: pt is cga for transfers and cga/min  for gt. making progress overall. cont wiht gt and therex          Outcome Measures       11/03/17 0900 11/02/17 1000 11/02/17 0715    How much help from another person do you currently need...    Turning from your back to your side while in flat bed without using bedrails? 3  -RW 3  -RW     Moving from lying on back to sitting on the side of a flat bed without bedrails? 3  -RW 3  -RW     Moving to and from a bed to a chair (including a wheelchair)? 3  -RW 3  -RW     Standing up from a chair using your arms (e.g., wheelchair, bedside chair)? 3  -RW 3  -RW     Climbing 3-5 steps with a railing? 3  -RW 3  -RW     To walk in hospital room? 3  -RW 3  -RW     AM-PAC 6 Clicks Score 18  -RW 18  -RW     How much help from another is currently needed...    Putting on and taking off regular lower body clothing?   2  -KD    Bathing (including washing,  rinsing, and drying)   3  -KD    Toileting (which includes using toilet bed pan or urinal)   2  -KD    Putting on and taking off regular upper body clothing   3  -KD    Taking care of personal grooming (such as brushing teeth)   3  -KD    Eating meals   3  -KD    Score   16  -KD      11/01/17 1122 11/01/17 1000       How much help from another person do you currently need...    Turning from your back to your side while in flat bed without using bedrails?  3  -RW     Moving from lying on back to sitting on the side of a flat bed without bedrails?  3  -RW     Moving to and from a bed to a chair (including a wheelchair)?  3  -RW     Standing up from a chair using your arms (e.g., wheelchair, bedside chair)?  3  -RW     Climbing 3-5 steps with a railing?  2  -RW     To walk in hospital room?  3  -RW     AM-PAC 6 Clicks Score  17  -RW     How much help from another is currently needed...    Putting on and taking off regular lower body clothing? 2  -RWA      Bathing (including washing, rinsing, and drying) 2  -RWA      Toileting (which includes using toilet bed pan or urinal) 2  -RWA      Putting on and taking off regular upper body clothing 3  -RWA      Taking care of personal grooming (such as brushing teeth) 3  -RWA      Eating meals 3  -RWA      Score 15  -RWA      Functional Assessment    Outcome Measure Options AM-PAC 6 Clicks Daily Activity (OT)  -RWA        User Key  (r) = Recorded By, (t) = Taken By, (c) = Cosigned By    Initials Name Provider Type    RW Jacinta Pitts OTR/L Occupational Therapist    KATHIE Gruber PTA Physical Therapy Assistant    MARCO Sánchez/L Occupational Therapy Assistant           Time Calculation:         PT Charges       11/03/17 1543 11/03/17 0913       Time Calculation    Start Time 1421  -RW 0807  -RW     Stop Time 1500  -RW 0902  -RW     Time Calculation (min) 39 min  -RW 55 min  -RW     Time Calculation- PT    Total Timed Code Minutes- PT 39 minute(s)  -RW 55  minute(s)  -RW       User Key  (r) = Recorded By, (t) = Taken By, (c) = Cosigned By    Initials Name Provider Type    KATHIE Gruber PTA Physical Therapy Assistant          Therapy Charges for Today     Code Description Service Date Service Provider Modifiers Qty    19135959893 HC GAIT TRAINING EA 15 MIN 11/2/2017 Pipe HOWARD Yasmani, PTA GP 1    01664012125 HC PT THER PROC EA 15 MIN 11/2/2017 Pipe HOWARD Yasmani, PTA GP 1    90096105131 HC PT THERAPEUTIC ACT EA 15 MIN 11/2/2017 Pipe HOWARD Yasmani, PTA GP 2    78300293514 HC GAIT TRAINING EA 15 MIN 11/2/2017 Pipe HOWARD Yasmani, PTA GP 1    40470266826 HC PT THER PROC EA 15 MIN 11/2/2017 Pipe Gruber, PTA GP 1    19535830288 HC PT THERAPEUTIC ACT EA 15 MIN 11/2/2017 Pipe Gruber, PTA GP 1    45714684470 HC GAIT TRAINING EA 15 MIN 11/3/2017 Pipe HOWARD Yasmani, PTA GP 1    42676632423 HC PT THER PROC EA 15 MIN 11/3/2017 Pipe Gruber, PTA GP 1    99428928479 HC PT THERAPEUTIC ACT EA 15 MIN 11/3/2017 Pipe Gruber, PTA GP 2    13915285330 HC PT THER PROC EA 15 MIN 11/3/2017 Pipe Gruber, PTA GP 2    32218431963 HC PT THERAPEUTIC ACT EA 15 MIN 11/3/2017 Pipe Gruber, PTA GP 1          PT G-Codes  PT Professional Judgement Used?: Yes  Outcome Measure Options: AM-PAC 6 Clicks Daily Activity (OT)  Score: 15  Functional Limitation: Mobility: Walking and moving around  Mobility: Walking and Moving Around Current Status (): At least 40 percent but less than 60 percent impaired, limited or restricted  Mobility: Walking and Moving Around Goal Status (): At least 1 percent but less than 20 percent impaired, limited or restricted    Pipe Gruber PTA  11/3/2017

## 2017-11-03 NOTE — THERAPY TREATMENT NOTE
Inpatient Rehabilitation - Speech Language Pathology Treatment Note  [facility]     Patient Name: Kenneth Harper Jr.  : 1934  MRN: [id number]  Today's Date: 11/3/2017  Referring Physician: Pratibha      Admit Date: 10/30/2017  1. Patient will demonstrate orientation to day, month, year, place, situation with 90% acc with min cues.  60% with mod cues and a calendar to look at.  Requires frequent cues to stay on track.        2. Patient will complete delayed word recall with 80% acc with min cues: delayed recall of 4 related words (same as yesterday)  Recall is 66% accuracy at 10 , 20 min intervals with strategies and mod cues.    3. Patient will complete organization/home management task with 90% acc with min cues:   pt identified information on a bill and then filled out a check.  Required mod cues to find information and min cues to fill out check -- but extra time to fill in blanks.  60% overall accuracy.    4. Patient will complete compare/contrast activity with 90% acc with min cues: GOAL MET previously.  wife present for half of session.   Clementina Murrell, CCC-SLP 11/3/2017 2:01 PM    Visit Dx:      ICD-10-CM ICD-9-CM   1. Symbolic dysfunction R48.9 784.60   2. Impaired mobility and ADLs Z74.09 799.89   3. Abnormality of gait and mobility R26.9 781.2   4. Muscle weakness (generalized) M62.81 728.87     Patient Active Problem List   Diagnosis   • Spinal stenosis, lumbar region, with neurogenic claudication   • Former smoker   • Overweight   • Left-sided weakness   • Right-sided lacunar stroke   • Essential hypertension   • Diabetes mellitus   • Chronic anxiety   • Chronic back pain greater than 3 months duration   • Prostatic hypertrophy   • Degenerative joint disease involving multiple joints   • Atrial fibrillation, chronic   • Encounter for rehabilitation   • Obstructive sleep apnea syndrome              Adult Rehabilitation Note       17 1300 17 [id number] 17 [id number]    Rehab  Assessment/Intervention    Discipline speech language pathologist  -EC physical therapy assistant  -RW speech language pathologist  -EC    Document Type therapy note (daily note)  -EC therapy note (daily note)  -RW therapy note (daily note)  -EC    Subjective Information agree to therapy  -EC agree to therapy  -RW agree to therapy  -EC    Patient Effort, Rehab Treatment  good  -RW good  -EC    Precautions/Limitations  fall precautions  -RW     Recorded by [EC] MERI Schultz [RW] Pipe Gruber PTA [EC] MERI Schultz    Pain Assessment    Pain Assessment No/denies pain  -EC No/denies pain  -RW No/denies pain  -EC    Recorded by [EC] MERI Schultz [RW] Pipe Gruber PTA [EC] MERI Schultz    Vision Assessment/Intervention    Visual Impairment  WFL with corrective lenses  -RW     Recorded by  [RW] Pipe Gruber PTA     Cognitive Assessment/Intervention    Current Cognitive/Communication Assessment  impaired  -RW     Orientation Status  oriented to;person;place  -RW     Recorded by  [RW] Pipe Gruber PTA     Cognitive Assessment Intervention    Behavior/Mood Observations  behavior appropriate to situation, WNL/WFL  -RW     Recorded by  [RW] Pipe Gruber PTA     Communication Treatment Objective and Progress    Cognitive Linguistic Treatment Objectives Improve orientation;Improve memory skills;Improve functional problem solving  -EC  Improve orientation;Improve memory skills;Improve functional problem solving  -EC    Recorded by [EC] MERI Schultz  [EC] MERI Schultz    Improve orientation    Improve orientation through: demonstrating orientation to day;demonstrating orientation to month;demonstrating orientation to year;demonstrating orientation to place;demonstrating orientation to disease/impairment;90%;with inconsistent cues  -EC  demonstrating orientation to day;demonstrating orientation to month;demonstrating orientation  to year;demonstrating orientation to place;demonstrating orientation to disease/impairment;90%;with inconsistent cues  -EC    Status: Improve orientation through Progress slower than expected  -EC  Progress slower than expected  -EC    Orientation Progress 50%  -EC  10%  -EC    Comments: Improve orientation through with mod cues and calendar to look at  -EC      Recorded by [EC] MERI Schultz  [EC] MERI Schultz    Improve memory skills    Improve memory skills through: recalling related word lists with an imposed delay;90%;with inconsistent cues  -EC  recalling related word lists with an imposed delay;90%;with inconsistent cues  -EC    Status: Improve memory skills Progress slower than expected  -EC  Progress slower than expected  -EC    Memory Skills Progress 60%  -EC  70%  -EC    Comments: Improve memory skills using the same 4 words from previous 2 days.  patient continues to only get 3/4 even with memory st rategies.   -EC      Recorded by [EC] MERI Schultz  [EC] MERI Schultz    Improve functional problem solving    Improve functional problem solving through: complete organization/home management task;tell similarity between items  -EC  complete organization/home management task;tell similarity between items  -EC    Status: Improve functional problem solving Progress slower than expected  -EC  Progress slower than expected  -EC    Functional Problem Solving Progress 60%  -EC  30%  -EC    Comments: Improve functional problem solving moderate cues to read infomation on a bill and write a check  -EC      Recorded by [EC] MERI Schultz  [EC] MERI Schultz    Transfer Assessment/Treatment    Transfers, Bed-Chair [place]  minimum assist (75% patient effort)  -RW     Transfers, Chair-Bed [place]  minimum assist (75% patient effort)  -RW     Transfers, Bed-Chair-Bed, Assist Device  rolling walker  -RW     Transfers,  Sit-Stand [place]  verbal cues required;stand by assist  -RW     Transfers, Stand-Sit [place]  verbal cues required;stand by assist  -RW     Transfers, Sit-Stand-Sit, Assist Device  rolling walker  -RW     Recorded by  [RW] Pipe Gruber PTA     Gait Assessment/Treatment    Gait, [place] Level  minimum assist (75% patient effort);contact guard assist;verbal cues required  -RW     Gait, Assistive Device  rolling walker  -RW     Gait, Distance (Feet)  40    x 5  -RW     Recorded by  [RW] Pipe Gruber PTA     Stairs Assessment/Treatment    Number of Stairs  4  -RW     Stairs, Handrail Location  left side (ascending)  -RW     Stairs, [place] Level  minimum assist (75% patient effort);verbal cues required  -RW     Stairs, Comment  pt not getting weak leg onto step  fully which poses a risk for accident  -RW     Recorded by  [RW] Pipe Gruber PTA     Lower Body Dressing Assessment/Training    LB Dressing Assess/Train, Clothing Type  donning:;shoes  -RW     LB Dressing Assess/Train, [place]  set up required  -RW     Recorded by  [RW] Pipe Gruber PTA     Balance Skills Training    Standing-Level of Assistance  Contact guard  -RW     Static Standing Balance Support  assistive device  -RW     Standing-Balance Activities  --   tucking in shirt-tail  -RW     Gait Balance Support  parallel bars  -RW     Gait Balance Activities  side-stepping  -RW     Recorded by  [RW] Pipe Gruber PTA     Therapy Exercises    Bilateral Lower Extremities  AROM:;20 reps;heel raises  -RW     Trunk Exercises  --   hooklying  -RW     Recorded by  [RW] Pipe Gruber PTA     Positioning and Restraints    Pre-Treatment Position  in bed   eob  -RW     Post Treatment Position  wheelchair  -RW     Recorded by  [RW] iPpe Gruber PTA       11/02/17 1405 11/02/17 0902 11/02/17 0715    Rehab Assessment/Intervention    Discipline physical therapy assistant  -KATHIE physical therapy assistant  -RW occupational therapy  assistant  -KD    Document Type therapy note (daily note)  -RW therapy note (daily note)  -RW therapy note (daily note)  -KD    Subjective Information agree to therapy  -RW agree to therapy  -RW agree to therapy  -KD    Patient Effort, Rehab Treatment good  -RW good  -RW good  -KD    Precautions/Limitations fall precautions  -RW fall precautions  -RW fall precautions  -KD    Recorded by [RW] Pipe Gruber PTA [RW] Pipe Gruber PTA [KD] Therese King, LARA/L    Vital Signs    Pre Systolic BP Rehab   105  -KD    Pre Treatment Diastolic BP   57  -KD    Pretreatment Heart Rate (beats/min)  80  -RW 83  -KD    Posttreatment Heart Rate (beats/min)   74  -KD    Pre SpO2 (%)  93  -RW 94  -KD    O2 Delivery Pre Treatment  room air  -RW room air  -KD    Post SpO2 (%)   96  -KD    O2 Delivery Post Treatment   room air  -KD    Pre Patient Position   Sitting  -KD    Intra Patient Position   Standing  -KD    Post Patient Position   Sitting  -KD    Recorded by  [RW] Pipe Gruber PTA [KD] Therese King, LARA/L    Pain Assessment    Pain Assessment No/denies pain  -RW No/denies pain  -RW No/denies pain  -KD    Recorded by [RW] Pipe Gruber PTA [RW] Pipe Gruber PTA [KD] Therese King, LARA/L    Vision Assessment/Intervention    Visual Impairment WFL with corrective lenses  -RW WFL with corrective lenses  -RW     Recorded by [RW] Pipe Gruber PTA [RW] Pipe Gruber PTA     Cognitive Assessment/Intervention    Current Cognitive/Communication Assessment impaired  -RW impaired  -RW impaired  -KD    Orientation Status oriented to;person;place  -RW oriented to;person;place  -RW oriented to;person;place  -KD    Follows Commands/Answers Questions   100% of the time;able to follow single-step instructions  -KD    Personal Safety Interventions gait belt;nonskid shoes/slippers when out of bed  -RW gait belt;nonskid shoes/slippers when out of bed  -RW gait belt;nonskid shoes/slippers when out of bed  -KD    Recorded by [RW]  Pipe Gruber PTA [RW] Pipe Gruber PTA [KD] Therese King, LARA/L    Cognitive Assessment Intervention    Behavior/Mood Observations behavior appropriate to situation, WNL/WFL  -RW behavior appropriate to situation, WNL/WFL  -RW     Recorded by [RW] Pipe Gruber PTA [RW] Pipe Gruber PTA     Bed Mobility, Assessment/Treatment    Bed Mob, Supine to Sit, [place]  supervision required  -RW     Bed Mob, Sit to Supine, [place]  supervision required  -RW     Recorded by  [RW] Pipe Gruber PTA     Transfer Assessment/Treatment    Transfers, Bed-Chair [place] minimum assist (75% patient effort)  -RW minimum assist (75% patient effort)  -RW contact guard assist  -KD    Transfers, Chair-Bed [place] minimum assist (75% patient effort)  -RW minimum assist (75% patient effort)  -RW     Transfers, Bed-Chair-Bed, Assist Device rolling walker  -RW rolling walker  -RW rolling walker  -KD    Transfers, Sit-Stand [place] contact guard assist;verbal cues required  -RW contact guard assist;verbal cues required  -RW contact guard assist  -KD    Transfers, Stand-Sit [place] verbal cues required;contact guard assist  -RW verbal cues required;contact guard assist  -RW contact guard assist  -KD    Transfers, Sit-Stand-Sit, Assist Device rolling walker  -RW rolling walker  -RW rolling walker  -KD    Walk-In Shower Transfer, [place]   contact guard assist  -KD    Walk-In Shower Transfer, Assist Device   rolling walker  -KD    Transfer, Comment sit to stand x 5  -RW  Pt completed 10 sit-stands w/ 1 RB  -KD    Recorded by [RW] Pipe Gruber PTA [RW] Pipe Gruber, MARTIN [KD] Therese King, LARA/L    Gait Assessment/Treatment    Gait, [place] Level minimum assist (75% patient effort)  -RW minimum assist (75% patient effort)  -RW     Gait, Assistive Device rolling walker  -RW rolling walker  -RW     Gait, Distance (Feet) 150   2 standing / stop to regroup, 60 x 3  -RW 60    x 4  -RW      Gait, Gait Deviations --   left foot drag at times  -RW left:   occ foot drag  -RW     Recorded by [RW] Pipe Gruber PTA [RW] Pipe Gruber PTA     Stairs Assessment/Treatment    Number of Stairs  4  -RW     Stairs, Handrail Location  both sides  -RW     Stairs, [place] Level  contact guard assist;verbal cues required  -RW     Recorded by  [RW] Pipe Gruber PTA     Functional Mobility    Functional Mobility- Ind. Level   contact guard assist  -KD    Functional Mobility- Device   rolling walker  -KD    Functional Mobility-Distance (Feet)   --   10 x 2  -KD    Recorded by   [KD] MARCO Vidales/L    Wheelchair Training/Management    Wheelchair, Distance Propelled  150 cga  - cga w/ vc's  -KD    Recorded by  [RW] Pipe Gruber PTA [KD] MARCO Vidales/L    Upper Body Bathing Assessment/Training    UB Bathing Assess/Train Assistive Device   bath mitt;grab bars;hand-held shower head;long-handled sponge;shower chair without back  -KD    UB Bathing Assess/Train, Position   edge of bed;sitting  -KD    UB Bathing Assess/Train, [place] Level   set up required  -KD    Recorded by   [KD] MARCO Vidales/L    Lower Body Bathing Assessment/Training    LB Bathing Assess/Train Assistive Device   bath mitt;grab bars;hand-held shower head;long-handled sponge;shower chair without back  -KD    LB Bathing Assess/Train, Position   sitting  -KD    LB Bathing Assess/Train, [place] Level   set up required  -KD    Recorded by   [KD] MARCO Vidales/L    Upper Body Dressing Assessment/Training    UB Dressing Assess/Train, Clothing Type   doffing:;donning:;pull over  -KD    UB Dressing Assess/Train, Position   edge of bed;sitting  -KD    UB Dressing Assess/Train, [place]   contact guard assist  -KD    Recorded by   [KD] MARCO Vidales/L    Lower Body Dressing Assessment/Training    LB Dressing Assess/Train, Clothing Type   doffing:;donning:;pants;shorts;socks  -KD    LB Dressing  Assess/Train, Position   edge of bed;sitting  -KD    LB Dressing Assess/Train, [place]   minimum assist (75% patient effort)  -KD    LB Dressing Assess/Train, Impairments   impaired balance  -KD    LB Dressing Assess/Train, Comment   Pt demonstrated impaired balance with LB dressing , pt leans to L and need vc's to self correct    -KD    Recorded by   [KD] LEIGH VidalesA/L    Grooming Assessment/Training    Grooming Assess/Train, Assistive Device   --   comb hair/ brush teeth  -KD    Grooming Assess/Train, Position   sitting  -KD    Grooming Assess/Train, [place] Level   set up required  -KD    Recorded by   [KD] LEIGH VidalesA/L    Self-Feeding Assessment/Training    Self-Feeding Assess/Train, Position   sitting  -KD    Self-Feeding Assess/Train, [place]   conditional independence  -KD    Self-Feeding Assess/Train, Spill[place] Amount   minimal  -KD    Recorded by   [KD] LEIGH VidalesA/L    Balance Skills Training    Standing-Level of Assistance   Contact guard  -KD    Static Standing Balance Support  Right upper extremity supported;Left upper extremity supported  -RW assistive device  -KD    Standing-Balance Activities  Weight Shift A-P  -RW Weight Shift A-P   [place] volleyball  -KD    Standing Balance # of Minutes   --   3  -KD    Recorded by  [RW] Pipe Gruber, PTA [KD] MARCO Vidales/L    Therapy Exercises    Bilateral Lower Extremities AROM:;sitting;knee flexion;LAQ;ankle pumps/circles;20 reps;hip flexion;standing;heel slides  -RW AROM:;10 reps;supine;hip abduction/adduction;sitting;knee flexion;LAQ;ankle pumps/circles  -RW     Bilateral Upper Extremity   AROM:;20 reps;sitting;elbow flexion/extension;hand pumps;pronation/supination;shoulder circles;shoulder ER/IR;shoulder extension/flexion  -KD    BUE Resistance   manual resistance- minimal  -KD    Trunk Exercises --   hooklying  -RW 10 reps;trunk rotation;supine   hooklying  -RW     Recorded by [RW] Pipe Gruber, PTA [RW]  Pipe Gruber PTA [KD] Therese King, LARA/L    Positioning and Restraints    Pre-Treatment Position sitting in chair/recliner  -RW sitting in chair/recliner  -RW sitting in chair/recliner  -KD    Post Treatment Position wheelchair  -RW wheelchair  -RW wheelchair  -KD    In Bed  with family/caregiver  -RW     In Wheelchair   sitting;call light within reach;encouraged to call for assist;exit alarm on  -KD    Recorded by [RW] Pipe Gruber PTA [RW] Pipe Gruber PTA [KD] Therese King, LARA/L      11/01/17 1524 11/01/17 1346 11/01/17 1122    Rehab Assessment/Intervention    Discipline occupational therapist  -RWA physical therapy assistant  -RW occupational therapist  -[id number]    Document Type therapy note (daily note)  -RWA therapy note (daily note)  -RW therapy note (daily note)  -RWA    Subjective Information agree to therapy;complains of;pain  -RWA agree to therapy  -RW agree to therapy;no complaints  -RWA    Patient Effort, Rehab Treatment good  -RWA good  -RW good  -RWA    Symptoms Noted During/After Treatment none  -RWA  none  -RWA    Precautions/Limitations fall precautions  -RWA fall precautions  -RW fall precautions  -RWA    Recorded by [RWA] Jacinta Pitts, OTR/L [RW] Pipe Gruber PTA [RWA] Jacinta Pitts, OTR/L    Vital Signs    Pretreatment Heart Rate (beats/min) 81  -RWA 81  -RW 86  -RWA    Posttreatment Heart Rate (beats/min) 72  -RWA  87  -RWA    Pre SpO2 (%) 96  -RWA 98  -RW 93  -RWA    O2 Delivery Pre Treatment room air  -RWA room air  -RW room air  -RWA    Post SpO2 (%) 95  -RWA  95  -RWA    O2 Delivery Post Treatment room air  -RWA  room air  -RWA    Pre Patient Position Supine  -RWA  Sitting  -RWA    Post Patient Position Supine  -RWA  Sitting  -RWA    Recorded by [RWA] Jacinta Pitts, OTR/L [RW] Pipe Gruber PTA [RWA] Jacinta Pitts, OTR/L    Pain Assessment    Pain Assessment 0-10  -RWA --   gives no rating but c/o left back/flank pain  -RW No/denies pain  -RWA    Pain  Score 4  -RWA      Post Pain Score 3  -RWA      Pain Location Back  -RWA      Pain Orientation Lower;Upper  -RWA      Recorded by [RWA] Jacinta Pitts, OTR/L [RW] Pipe Gruber PTA [RWA] Jacinta Pitts, OTR/L    Vision Assessment/Intervention    Visual Impairment  WFL with corrective lenses  -RW     Recorded by  [RW] Pipe Gruber PTA     Cognitive Assessment/Intervention    Current Cognitive/Communication Assessment impaired  -RWA impaired  -RW impaired  -RWA    Orientation Status oriented to;person;place;situation  -RWA oriented to;person;place  -RW oriented to;person;place;situation  -RWA    Follows Commands/Answers Questions able to follow single-step instructions;100% of the time  -RWA  able to follow single-step instructions;100% of the time  -RWA    Personal Safety mild impairment  -RWA mild impairment  -RW mild impairment  -RWA    Personal Safety Interventions  gait belt;nonskid shoes/slippers when out of bed  -RW gait belt;fall prevention program maintained;nonskid shoes/slippers when out of bed;supervised activity;muscle strengthening facilitated  -RWA    Recorded by [RWA] Jacinta Pitts, OTR/L [RW] Pipe Gruber PTA [RWA] Jacinta Pitts, OTR/L    Cognitive Assessment Intervention    Behavior/Mood Observations  behavior appropriate to situation, WNL/WFL  -RW     Recorded by  [RW] Pipe Gruber PTA     Bed Mobility, Assessment/Treatment    Bed Mob, Supine to Sit, [place]  minimum assist (75% patient effort)  -RW     Bed Mob, Sit to Supine, [place]  contact guard assist  -RW     Recorded by  [RW] Pipe Gruber PTA     Transfer Assessment/Treatment    Transfers, Bed-Chair [place]  minimum assist (75% patient effort)  -RW     Transfers, Chair-Bed [place]  minimum assist (75% patient effort)  -RW     Transfers, Bed-Chair-Bed, Assist Device  rolling walker  -RW     Transfers, Sit-Stand [place]  minimum assist (75% patient effort);contact guard assist  -RW  minimum assist (75% patient effort)  -RWA    Transfers, Stand-Sit [place]  minimum assist (75% patient effort);verbal cues required;contact guard assist  -RW minimum assist (75% patient effort)  -RWA    Transfers, Sit-Stand-Sit, Assist Device  rolling walker  -RW rolling walker  -RWA    Toilet Transfer, [place]   minimum assist (75% patient effort)  -RWA    Toilet Transfer, Assistive Device   rolling walker   grab bar  -RWA    Transfer, Safety Issues   step length decreased  -RWA    Transfer, Comment   sit-stand from toilet x3 for clothing management, lis care, & application of cream  -RWA    Recorded by  [RW] Pipe Gruber PTA [RWA] Jacinta Pitts, OTR/L    Gait Assessment/Treatment    Gait, [place] Level  minimum assist (75% patient effort)  -RW     Gait, Assistive Device  rolling walker  -RW     Gait, Distance (Feet)  40   x 3  -RW     Gait, Gait Deviations  left:;step length decreased   improved  -RW     Recorded by  [RW] Pipe Gruber PTA     Functional Mobility    Functional Mobility- Ind. Level   contact guard assist  -RWA    Functional Mobility- Device   rolling walker  -RWA    Functional Mobility-Distance (Feet)   12   x2  -RWA    Functional Mobility- Safety Issues   step length decreased;weight-shifting ability decreased   too far away from AD  -RWA    Recorded by   [RWA] Jacinta Pitts, OTR/L    Wheelchair Training/Management    Wheelchair, Distance Propelled   25 ft with SBA  -RWA    Recorded by   [RWA] Jacinta Pitts, OTR/L    Lower Body Bathing Assessment/Training    LB Bathing Assess/Train, Position   standing  -RWA    LB Bathing Assess/Train, [place] Level   minimum assist (75% patient effort)   for balance  -RWA    LB Bathing Assess/Train, Impairments   impaired balance;strength decreased  -RWA    LB Bathing Assess/Train, Comment   Pt washed lis area only & applied magic butt.  -RWA    Recorded by   [RWA] Jacinta Pitts OTR/L    Grooming Assessment/Training     Grooming Assess/Train, Position   sitting;sink side   w/c  -RWA    Grooming Assess/Train, [place] Level   supervision required;verbal cues required  -RWA    Grooming Assess/Train, Impairments   strength decreased;coordination impaired  -RWA    Grooming Assess/Train, Comment   Pt brushing teeth upon arrival, washed face & combed hair.  -RWA    Recorded by   [RWA] Jacinta Pitts OTR/L    Balance Skills Training    Static Standing Balance Support   assistive device  -RWA    Standing-Balance Activities   Weight Shift R-L   ADL task  -RWA    Standing Balance # of Minutes   5  -RWA    Recorded by   [RWA] Jacinta Pitts OTR/L    Therapy Exercises    Bilateral Lower Extremities  AROM:;10 reps;supine;ankle pumps/circles;heel slides;hip abduction/adduction;SAQ;standing;heel raises   2 sets of hs . hooklying hip aa x 10 radha  -RW     Left Upper Extremity 5 reps  -RWA      LUE Resistance theraputty   red  -RWA      BUE Resistance theraputty   removed beads from theraputty x3, requiring approx 15 min  -RWA      Trunk Exercises  10 reps;trunk rotation;supine   hooklying  -RW     Recorded by [RWA] Jacinta Pitts OTR/L [RW] Pipe Gruber PTA     Fine Motor Coordination Training    Detail (Fine Motor Coordination Training) pegboard ax & Connect 4 game to increase LUE fine motor coordination & strength  -RWA      Recorded by [RWA] ESTEBAN Silver/L      Positioning and Restraints    Pre-Treatment Position in bed  -RWA other (comment)   eom  -RW sitting in chair/recliner   w/c  -RWA    Post Treatment Position bed  -RWA wheelchair  -RW wheelchair   at table for lunch  -RWA    In Bed supine;call light within reach;encouraged to call for assist;with family/caregiver  -RWA      In Wheelchair  with family/caregiver  -RW sitting;with family/caregiver  -RWA    Recorded by [RWA] ESTEBAN Silver/L [RW] Pipe Gruber PTA [RWA] ESTEBAN Silver/L      11/01/17 1020 11/01/17 0930       Rehab  Assessment/Intervention    Discipline speech language pathologist  -EC physical therapy assistant  -RW     Document Type therapy note (daily note)  -EC therapy note (daily note)  -RW     Subjective Information agree to therapy  -EC agree to therapy  -RW     Patient Effort, Rehab Treatment good  -EC good  -RW     Precautions/Limitations  fall precautions  -RW     Recorded by [EC] Clementina Murrell CCC-SLP [RW] Pipe Gruber PTA     Vital Signs    Pretreatment Heart Rate (beats/min)  70  -RW     Pre SpO2 (%)  94  -RW     O2 Delivery Pre Treatment  room air  -RW     Recorded by  [RW] Pipe Gruber PTA     Pain Assessment    Pain Assessment No/denies pain  -EC No/denies pain  -RW     Recorded by [EC] Clementina Murrell CCC-SLP [RW] Pipe Gruber PTA     Vision Assessment/Intervention    Visual Impairment  WFL with corrective lenses  -RW     Recorded by  [RW] Pipe Gruber PTA     Cognitive Assessment/Intervention    Current Cognitive/Communication Assessment  impaired  -RW     Orientation Status  oriented to;person;place  -RW     Personal Safety Interventions  gait belt;nonskid shoes/slippers when out of bed  -RW     Recorded by  [RW] Pipe Gruber PTA     Cognitive Assessment Intervention    Behavior/Mood Observations  behavior appropriate to situation, WNL/WFL  -RW     Recorded by  [RW] Pipe Gruber PTA     Communication Treatment Objective and Progress    Cognitive Linguistic Treatment Objectives Improve orientation;Improve memory skills;Improve functional problem solving  -EC      Recorded by [EC] Clementina Murrell CCC-SLP      Improve orientation    Improve orientation through: demonstrating orientation to day;demonstrating orientation to month;demonstrating orientation to year;demonstrating orientation to place;demonstrating orientation to disease/impairment;90%;with inconsistent cues  -EC      Status: Improve orientation through Progress slower than expected  -EC      Orientation Progress 0%   -EC      Comments: Improve orientation through pt was not oriented to a single question this date.  only recalled that yesterday was halloween, but not date/month  -EC      Recorded by [EC] SHELTON SchultzSLP      Improve memory skills    Improve memory skills through: recalling related word lists with an imposed delay;90%;with inconsistent cues  -EC      Status: Improve memory skills Progress slower than expected  -EC      Memory Skills Progress 60%  -EC      Comments: Improve memory skills recall of 3 related words  -EC      Recorded by [EC] SHELTON SchultzSLP      Improve functional problem solving    Improve functional problem solving through: complete organization/home management task;tell similarity between items  -EC      Status: Improve functional problem solving Progressing as expected  -EC      Functional Problem Solving Progress 70%  -EC      Comments: Improve functional problem solving pt identified early/late with 90% asccuracy and identified information on a bill and performed check writing with 60%   -EC      Recorded by [EC] SHELTON SchultzSLP      Bed Mobility, Assessment/Treatment    Bed Mob, Supine to Sit, [place]  minimum assist (75% patient effort)  -RW     Bed Mob, Sit to Supine, [place]  contact guard assist  -RW     Recorded by  [RW] Pipe Gruber, PTA     Transfer Assessment/Treatment    Transfers, Bed-Chair [place]  minimum assist (75% patient effort)  -RW     Transfers, Chair-Bed [place]  minimum assist (75% patient effort)  -RW     Transfers, Bed-Chair-Bed, Assist Device  rolling walker  -RW     Transfers, Sit-Stand [place]  minimum assist (75% patient effort);contact guard assist  -RW     Transfers, Stand-Sit [place]  minimum assist (75% patient effort);verbal cues required;contact guard assist  -RW     Transfers, Sit-Stand-Sit, Assist Device  rolling walker  -RW     Transfer, Comment  sit to stand x 5, one lob  -RW     Recorded  by  [RW] Pipe Gruber PTA     Gait Assessment/Treatment    Gait, [place] Level  minimum assist (75% patient effort)  -RW     Gait, Assistive Device  rolling walker  -RW     Gait, Distance (Feet)  70  -RW     Gait, Gait Deviations  leans to the left;step length decreased;toe-to-floor clearance decreased;left:  -RW     Recorded by  [RW] Pipe Gruber PTA     Wheelchair Training/Management    Wheelchair, Distance Propelled  150 sba and lots of vcues  -RW     Recorded by  [RW] Pipe Gruber PTA     Therapy Exercises    Bilateral Lower Extremities  AROM:;10 reps;supine;ankle pumps/circles;heel slides;hip abduction/adduction;SAQ;standing;heel raises  -RW     Recorded by  [RW] Pipe Gruber PTA     Positioning and Restraints    Pre-Treatment Position  sitting in chair/recliner  -RW     Post Treatment Position  wheelchair  -RW     In Wheelchair  with SLP  -RW     Recorded by  [RW] Pipe Gruber PTA       User Key  (r) = Recorded By, (t) = Taken By, (c) = Cosigned By    Initials Name Effective Dates    EC Clementina Murrell, CCC-SLP 12/30/16 -     RWA Jacinta Pitts, OTR/L 10/17/16 -     RW Pipe Gruber PTA 10/17/16 -     JOSS King LARA/DAWN 10/17/16 -               IP SLP Goals       11/03/17 1346 11/02/17 1614 11/01/17 1159    Cognitive Linguistic- Improve Safety and Awareness    Cognitive Linguistic Improve Safety and Awareness- SLP, Date Goal Reviewed 11/03/17  -EC 11/02/17  -EC 11/01/17  -EC    Cognitive Linguistic Improve Safety and Awareness- SLP, Outcome goal not met  -EC goal not met  -EC goal not met  -EC      10/31/17 1244          Cognitive Linguistic- Improve Safety and Awareness    Cognitive Linguistic Improve Safety and Awareness- SLP, Date Established 10/31/17  -HR      Cognitive Linguistic Improve Safety and Awareness- SLP, Time to Achieve by discharge  -HR      Cognitive Linguistic Improve Safety and Awareness- SLP, Activity Level Patient will improve orientation for increased  safety in environment;Patient will improve memory skills for increased safety in environment;Patient will improve functional problem solving skills for increased safety in environment  -HR      Cognitive Linguistic Improve Safety and Awareness- SLP, Date Goal Reviewed 10/31/17  -HR      Cognitive Linguistic Improve Safety and Awareness- SLP, Outcome goal ongoing  -HR        User Key  (r) = Recorded By, (t) = Taken By, (c) = Cosigned By    Initials Name Provider Type    EC Clementina Murrell CCC-SLP Speech and Language Pathologist    HR Shante Otero, MS CCC-SLP Speech and Language Pathologist          EDUCATION  The patient has been educated in the following areas:   Cognitive Impairment.    SLP Recommendation and Plan                               Plan of Care Review  Plan Of Care Reviewed With: patient  Progress: progress toward functional goals as expected  Outcome Summary/Follow up Plan: ST therapy: pt orientatiion improved with use of calendar, but requires cues to use.            Time Calculation:         Time Calculation- SLP       11/03/17 1347          Time Calculation- SLP    SLP Start Time 1300  -EC      SLP Stop Time 1338  -EC      SLP Time Calculation (min) 38 min  -EC      Total Timed Code Minutes- SLP 38 minute(s)  -EC      SLP Received On 11/03/17  -EC        User Key  (r) = Recorded By, (t) = Taken By, (c) = Cosigned By    Initials Name Provider Type    EC Clementina Murrell CCC-SLP Speech and Language Pathologist          Therapy Charges for Today     Code Description Service Date Service Provider Modifiers Qty    [id number]  ST TREATMENT SPEECH 4 11/2/2017 SHELTON SchultzSLP GN 1    [id number] HC ST TREATMENT SPEECH 3 11/3/2017 MERI Schultz GN 1               MERI Casey  11/3/2017

## 2017-11-03 NOTE — PLAN OF CARE
Problem: Patient Care Overview (Adult)  Goal: Plan of Care Review  Outcome: Ongoing (interventions implemented as appropriate)  Goal: Adult Individualization and Mutuality  Outcome: Ongoing (interventions implemented as appropriate)  Goal: Discharge Needs Assessment  Outcome: Ongoing (interventions implemented as appropriate)    Problem: Fall Risk (Adult)  Goal: Absence of Falls  Outcome: Ongoing (interventions implemented as appropriate)    Problem: Stroke (Ischemic) (Adult)  Goal: Signs and Symptoms of Listed Potential Problems Will be Absent or Manageable (Stroke)  Outcome: Ongoing (interventions implemented as appropriate)    Problem: Diabetes, Type 2 (Adult)  Goal: Signs and Symptoms of Listed Potential Problems Will be Absent or Manageable (Diabetes, Type 2)  Outcome: Ongoing (interventions implemented as appropriate)    11/03/17 0313   Diabetes, Type 2   Problems Assessed (Type 2 Diabetes) all   Problems Present (Type 2 Diabetes) none

## 2017-11-03 NOTE — THERAPY TREATMENT NOTE
Inpatient Rehabilitation - Occupational Therapy Treatment Note  [facility]     Patient Name: Kenneth Harper Jr.  : 1934  MRN: [id number]  Today's Date: 11/3/2017  Onset of Illness/Injury or Date of Surgery Date: 10/30/17  Date of Referral to OT: 10/30/17  Referring Physician: TERRENCE Mckinnon MD      Admit Date: 10/30/2017    Visit Dx:     ICD-10-CM ICD-9-CM   1. Symbolic dysfunction R48.9 784.60   2. Impaired mobility and ADLs Z74.09 799.89   3. Abnormality of gait and mobility R26.9 781.2   4. Muscle weakness (generalized) M62.81 728.87     Patient Active Problem List   Diagnosis   • Spinal stenosis, lumbar region, with neurogenic claudication   • Former smoker   • Overweight   • Left-sided weakness   • Right-sided lacunar stroke   • Essential hypertension   • Diabetes mellitus   • Chronic anxiety   • Chronic back pain greater than 3 months duration   • Prostatic hypertrophy   • Degenerative joint disease involving multiple joints   • Atrial fibrillation, chronic   • Encounter for rehabilitation   • Obstructive sleep apnea syndrome             Adult Rehabilitation Note       17 1300 17 1025 17 [id number]    Rehab Assessment/Intervention    Discipline speech language pathologist  -EC occupational therapy assistant  -LM physical therapy assistant  -RW    Document Type therapy note (daily note)  -EC therapy note (daily note)  -LM therapy note (daily note)  -RW    Subjective Information agree to therapy  -EC agree to therapy  -LM agree to therapy  -RW    Patient Effort, Rehab Treatment  good  -LM good  -RW    Symptoms Noted During/After Treatment  none  -LM     Precautions/Limitations   fall precautions  -RW    Recorded by [EC] Clementina Murrell CCC-SLP [LM] MARCO Piña/L [RW] Pipe Gruber, PTA    Pain Assessment    Pain Assessment No/denies pain  -EC No/denies pain  -LM No/denies pain  -RW    Pain Score  4  -LM     Post Pain Score  3  -LM     Pain Type  Acute pain  -LM      Pain Location  Back  -LM     Recorded by [EC] Clementina Murrell CCC-SLP [LM] Carmen Boucher, LARA/L [RW] Pipe Gruber PTA    Vision Assessment/Intervention    Visual Impairment  WFL  -LM WFL with corrective lenses  -RW    Recorded by  [LM] Carmen Boucher LARA/L [RW] Pipe Gruber PTA    Cognitive Assessment/Intervention    Current Cognitive/Communication Assessment  impaired  -LM impaired  -RW    Orientation Status  oriented x 4  -LM oriented to;person;place  -RW    Follows Commands/Answers Questions  100% of the time  -LM     Personal Safety  mild impairment  -LM     Recorded by  [LM] Carmen Boucher LARA/L [RW] Pipe Gruber PTA    Cognitive Assessment Intervention    Behavior/Mood Observations   behavior appropriate to situation, WNL/WFL  -RW    Recorded by   [RW] Pipe Gruber PTA    Communication Treatment Objective and Progress    Cognitive Linguistic Treatment Objectives Improve orientation;Improve memory skills;Improve functional problem solving  -EC      Recorded by [EC] Clementina Murrell CCC-SLP      Improve orientation    Improve orientation through: demonstrating orientation to day;demonstrating orientation to month;demonstrating orientation to year;demonstrating orientation to place;demonstrating orientation to disease/impairment;90%;with inconsistent cues  -EC      Status: Improve orientation through Progress slower than expected  -EC      Orientation Progress 50%  -EC      Comments: Improve orientation through with mod cues and calendar to look at  -EC      Recorded by [EC] Clementina Murrell CCC-SLP      Improve memory skills    Improve memory skills through: recalling related word lists with an imposed delay;90%;with inconsistent cues  -EC      Status: Improve memory skills Progress slower than expected  -EC      Memory Skills Progress 60%  -EC      Comments: Improve memory skills using the same 4 words from previous 2 days.  patient continues to only get 3/4 even with memory  st bal.   -EC      Recorded by [EC] Clementina Murrell CCC-SLP      Improve functional problem solving    Improve functional problem solving through: complete organization/home management task;tell similarity between items  -EC      Status: Improve functional problem solving Progress slower than expected  -EC      Functional Problem Solving Progress 60%  -EC      Comments: Improve functional problem solving moderate cues to read infomation on a bill and write a check  -EC      Recorded by [EC] Clementina Murrell CCC-SLP      Transfer Assessment/Treatment    Transfers, Bed-Chair [place]   minimum assist (75% patient effort)  -RW    Transfers, Chair-Bed [place]   minimum assist (75% patient effort)  -RW    Transfers, Bed-Chair-Bed, Assist Device   rolling walker  -RW    Transfers, Sit-Stand [place]   verbal cues required;stand by assist  -RW    Transfers, Stand-Sit [place]   verbal cues required;stand by assist  -RW    Transfers, Sit-Stand-Sit, Assist Device   rolling walker  -RW    Recorded by   [RW] Pipe Gruber PTA    Gait Assessment/Treatment    Gait, [place] Level   minimum assist (75% patient effort);contact guard assist;verbal cues required  -RW    Gait, Assistive Device   rolling walker  -RW    Gait, Distance (Feet)   40    x 5  -RW    Recorded by   [RW] Pipe Gruber PTA    Stairs Assessment/Treatment    Number of Stairs   4  -RW    Stairs, Handrail Location   left side (ascending)  -RW    Stairs, [place] Level   minimum assist (75% patient effort);verbal cues required  -RW    Stairs, Comment   pt not getting weak leg onto step  fully which poses a risk for accident  -RW    Recorded by   [RW] Pipe Gruber PTA    Wheelchair Training/Management    Wheelchair, Distance Propelled  --   150 ft   -LM     Recorded by  [LM] Carmen Boucher, LARA/L     Lower Body Dressing Assessment/Training    LB Dressing Assess/Train, Clothing Type   donning:;shoes  -RW    LB Dressing  Assess/Train, [place]   set up required  -RW    Recorded by   [RW] Pipe Gruber PTA    Grooming Assessment/Training    Grooming Assess/Train, [place] Level  independent  -LM     Recorded by  [LM] MARCO Piña/L     Motor Skills/Interventions    Motor Response Observations  --   ther act with multi act to incresae rom pulley over head  -LM     Additional Documentation  --   coordination act B UE to incresae L UE  -LM     Recorded by  [LM] MARCO Piña/L     Balance Skills Training    Standing-Level of Assistance   Contact guard  -RW    Static Standing Balance Support   assistive device  -RW    Standing-Balance Activities   --   tucking in shirt-tail  -RW    Gait Balance Support   parallel bars  -RW    Gait Balance Activities   side-stepping  -RW    Recorded by   [RW] Pipe Gruber PTA    Therapy Exercises    Bilateral Lower Extremities   AROM:;20 reps;heel raises  -RW    Bilateral Upper Extremity  AROM:;elbow flexion/extension;shoulder abduction/adduction;shoulder extension/flexion;shoulder horizontal abd/add;shoulder protraction/retraction;shoulder rolls/shrugs   also perf tband all planes x 20 x 2   -LM     BUE Resistance  manual resistance- minimal   also UEE bike x 15 min   -LM     Trunk Exercises   --   hooklying  -RW    Recorded by  [LM] MARCO Piña/L [RW] Pipe Gruber PTA    Positioning and Restraints    Pre-Treatment Position  sitting in chair/recliner  -LM in bed   eob  -RW    Post Treatment Position  wheelchair  -LM wheelchair  -RW    Recorded by  [LM] MARCO Piña/L [RW] Pipe Gruber PTA      11/02/17 1455 11/02/17 1405 11/02/17 0902    Rehab Assessment/Intervention    Discipline speech language pathologist  -EC physical therapy assistant  -RW physical therapy assistant  -RW    Document Type therapy note (daily note)  -EC therapy note (daily note)  -RW therapy note (daily note)  -RW    Subjective Information agree to therapy  -EC agree to therapy   -RW agree to therapy  -RW    Patient Effort, Rehab Treatment good  -EC good  -RW good  -RW    Precautions/Limitations  fall precautions  -RW fall precautions  -RW    Recorded by [EC] Clementina Murrell CCC-SLP [RW] Pipe Gruber PTA [RW] Pipe Gruber PTA    Vital Signs    Pretreatment Heart Rate (beats/min)   80  -RW    Pre SpO2 (%)   93  -RW    O2 Delivery Pre Treatment   room air  -RW    Recorded by   [RW] Pipe Gruber PTA    Pain Assessment    Pain Assessment No/denies pain  -EC No/denies pain  -RW No/denies pain  -RW    Recorded by [EC] Clementina Murrell CCC-SLP [RW] Pipe Gruber PTA [RW] Pipe Gruber PTA    Vision Assessment/Intervention    Visual Impairment  WFL with corrective lenses  -RW WFL with corrective lenses  -RW    Recorded by  [RW] Pipe Gruber PTA [RW] Pipe Gruber PTA    Cognitive Assessment/Intervention    Current Cognitive/Communication Assessment  impaired  -RW impaired  -RW    Orientation Status  oriented to;person;place  -RW oriented to;person;place  -RW    Personal Safety Interventions  gait belt;nonskid shoes/slippers when out of bed  -RW gait belt;nonskid shoes/slippers when out of bed  -RW    Recorded by  [RW] Pipe Gruber PTA [RW] Pipe Gruber PTA    Cognitive Assessment Intervention    Behavior/Mood Observations  behavior appropriate to situation, WNL/WFL  -RW behavior appropriate to situation, WNL/WFL  -RW    Recorded by  [RW] Pipe Gruber PTA [RW] Pipe Gruber PTA    Communication Treatment Objective and Progress    Cognitive Linguistic Treatment Objectives Improve orientation;Improve memory skills;Improve functional problem solving  -EC      Recorded by [EC] Clementina Murrell CCC-SLP      Improve orientation    Improve orientation through: demonstrating orientation to day;demonstrating orientation to month;demonstrating orientation to year;demonstrating orientation to place;demonstrating orientation to disease/impairment;90%;with inconsistent cues   -EC      Status: Improve orientation through Progress slower than expected  -EC      Orientation Progress 10%  -EC      Recorded by [EC] Clementina Murrell CCC-SLP      Improve memory skills    Improve memory skills through: recalling related word lists with an imposed delay;90%;with inconsistent cues  -EC      Status: Improve memory skills Progress slower than expected  -EC      Memory Skills Progress 70%  -EC      Recorded by [EC] Clementina Murrell CCC-SLP      Improve functional problem solving    Improve functional problem solving through: complete organization/home management task;tell similarity between items  -EC      Status: Improve functional problem solving Progress slower than expected  -EC      Functional Problem Solving Progress 30%  -EC      Recorded by [EC] Clementina Murrell CCC-SLP      Bed Mobility, Assessment/Treatment    Bed Mob, Supine to Sit, [place]   supervision required  -RW    Bed Mob, Sit to Supine, [place]   supervision required  -RW    Recorded by   [RW] Pipe Gruber PTA    Transfer Assessment/Treatment    Transfers, Bed-Chair [place]  minimum assist (75% patient effort)  -RW minimum assist (75% patient effort)  -RW    Transfers, Chair-Bed [place]  minimum assist (75% patient effort)  -RW minimum assist (75% patient effort)  -RW    Transfers, Bed-Chair-Bed, Assist Device  rolling walker  -RW rolling walker  -RW    Transfers, Sit-Stand [place]  contact guard assist;verbal cues required  -RW contact guard assist;verbal cues required  -RW    Transfers, Stand-Sit [place]  verbal cues required;contact guard assist  -RW verbal cues required;contact guard assist  -RW    Transfers, Sit-Stand-Sit, Assist Device  rolling walker  -RW rolling walker  -RW    Transfer, Comment  sit to stand x 5  -RW     Recorded by  [RW] Pipe Gruber PTA [RW] Pipe Gruber PTA    Gait Assessment/Treatment    Gait, [place] Level  minimum assist (75% patient effort)  -RW  minimum assist (75% patient effort)  -RW    Gait, Assistive Device  rolling walker  -RW rolling walker  -RW    Gait, Distance (Feet)  150   2 standing / stop to regroup, 60 x 3  -RW 60    x 4  -RW    Gait, Gait Deviations  --   left foot drag at times  -RW left:   occ foot drag  -RW    Recorded by  [RW] Pipe Gruber PTA [RW] Pipe Gruber PTA    Stairs Assessment/Treatment    Number of Stairs   4  -RW    Stairs, Handrail Location   both sides  -RW    Stairs, [place] Level   contact guard assist;verbal cues required  -RW    Recorded by   [RW] Pipe Gruber PTA    Wheelchair Training/Management    Wheelchair, Distance Propelled   150 cga  -RW    Recorded by   [RW] Pipe Gruber PTA    Balance Skills Training    Static Standing Balance Support   Right upper extremity supported;Left upper extremity supported  -RW    Standing-Balance Activities   Weight Shift A-P  -RW    Recorded by   [RW] Pipe Gruber PTA    Therapy Exercises    Bilateral Lower Extremities  AROM:;sitting;knee flexion;LAQ;ankle pumps/circles;20 reps;hip flexion;standing;heel slides  -RW AROM:;10 reps;supine;hip abduction/adduction;sitting;knee flexion;LAQ;ankle pumps/circles  -RW    Trunk Exercises  --   hooklying  -RW 10 reps;trunk rotation;supine   hooklying  -RW    Recorded by  [RW] Pipe Gruber PTA [RW] Pipe Gruber PTA    Positioning and Restraints    Pre-Treatment Position  sitting in chair/recliner  -RW sitting in chair/recliner  -RW    Post Treatment Position  wheelchair  -RW wheelchair  -RW    In Bed   with family/caregiver  -RW    Recorded by  [RW] Pipe Gruber PTA [RW] Pipe Gruber PTA      11/02/17 0715 11/01/17 1524 11/01/17 1346    Rehab Assessment/Intervention    Discipline occupational therapy assistant  -JOSS occupational therapist  -RWA physical therapy assistant  -RW    Document Type therapy note (daily note)  -JOSS therapy note (daily note)  -THERESA therapy note (daily note)  -RW    Subjective Information agree  to therapy  -KD agree to therapy;complains of;pain  -RWA agree to therapy  -RW    Patient Effort, Rehab Treatment good  -KD good  -RWA good  -RW    Symptoms Noted During/After Treatment  none  -RWA     Precautions/Limitations fall precautions  -KD fall precautions  -RWA fall precautions  -RW    Recorded by [KD] Therese King LARA/L [RWA] Jacinta Pitts, OTR/L [RW] Pipe Gruber, MARTIN    Vital Signs    Pre Systolic BP Rehab 105  -KD      Pre Treatment Diastolic BP 57  -KD      Pretreatment Heart Rate (beats/min) 83  -KD 81  -RWA 81  -RW    Posttreatment Heart Rate (beats/min) 74  -KD 72  -RWA     Pre SpO2 (%) 94  -KD 96  -RWA 98  -RW    O2 Delivery Pre Treatment room air  -KD room air  -RWA room air  -RW    Post SpO2 (%) 96  -KD 95  -RWA     O2 Delivery Post Treatment room air  -KD room air  -RWA     Pre Patient Position Sitting  -KD Supine  -RWA     Intra Patient Position Standing  -KD      Post Patient Position Sitting  -KD Supine  -RWA     Recorded by [KD] LEIGH VidalesA/L [RWA] Jacinta Pitts, OTR/L [RW] Pipe Gruber, MARTIN    Pain Assessment    Pain Assessment No/denies pain  -KD 0-10  -RWA --   gives no rating but c/o left back/flank pain  -RW    Pain Score  4  -RWA     Post Pain Score  3  -RWA     Pain Location  Back  -RWA     Pain Orientation  Lower;Upper  -RWA     Recorded by [KD] LEIGH VidalesA/L [RWA] Jacinta Pitts, OTR/L [RW] Pipe Gruber, MARTIN    Vision Assessment/Intervention    Visual Impairment   WFL with corrective lenses  -RW    Recorded by   [RW] Pipe Gruber PTA    Cognitive Assessment/Intervention    Current Cognitive/Communication Assessment impaired  -KD impaired  -RWA impaired  -RW    Orientation Status oriented to;person;place  -KD oriented to;person;place;situation  -RWA oriented to;person;place  -RW    Follows Commands/Answers Questions 100% of the time;able to follow single-step instructions  -KD able to follow single-step instructions;100% of the time  -RWA      Personal Safety  mild impairment  -RWA mild impairment  -RW    Personal Safety Interventions gait belt;nonskid shoes/slippers when out of bed  -KD  gait belt;nonskid shoes/slippers when out of bed  -RW    Recorded by [KD] LEIGH VidalesA/L [RWA] Jacinta Pitts, OTR/L [RW] Pipe Gruber PTA    Cognitive Assessment Intervention    Behavior/Mood Observations   behavior appropriate to situation, WNL/WFL  -RW    Recorded by   [RW] Pipe Gruber PTA    Bed Mobility, Assessment/Treatment    Bed Mob, Supine to Sit, [place]   minimum assist (75% patient effort)  -RW    Bed Mob, Sit to Supine, [place]   contact guard assist  -RW    Recorded by   [RW] Pipe Gruber PTA    Transfer Assessment/Treatment    Transfers, Bed-Chair [place] contact guard assist  -KD  minimum assist (75% patient effort)  -RW    Transfers, Chair-Bed [place]   minimum assist (75% patient effort)  -RW    Transfers, Bed-Chair-Bed, Assist Device rolling walker  -KD  rolling walker  -RW    Transfers, Sit-Stand [place] contact guard assist  -KD  minimum assist (75% patient effort);contact guard assist  -RW    Transfers, Stand-Sit [place] contact guard assist  -KD  minimum assist (75% patient effort);verbal cues required;contact guard assist  -RW    Transfers, Sit-Stand-Sit, Assist Device rolling walker  -KD  rolling walker  -RW    Walk-In Shower Transfer, [place] contact guard assist  -KD      Walk-In Shower Transfer, Assist Device rolling walker  -KD      Transfer, Comment Pt completed 10 sit-stands w/ 1 RB  -KD      Recorded by [KD] LEIGH VidalesA/L  [RW] Pipe Gruber PTA    Gait Assessment/Treatment    Gait, [place] Level   minimum assist (75% patient effort)  -RW    Gait, Assistive Device   rolling walker  -RW    Gait, Distance (Feet)   40   x 3  -RW    Gait, Gait Deviations   left:;step length decreased   improved  -RW    Recorded by   [RW] Pipe Gruber PTA    Functional Mobility     Functional Mobility- Ind. Level contact guard assist  -KD      Functional Mobility- Device rolling walker  -KD      Functional Mobility-Distance (Feet) --   10 x 2  -KD      Recorded by [KD] DAYDAY Vidales      Wheelchair Training/Management    Wheelchair, Distance Propelled 150 cga w/ vc's  -KD      Recorded by [KD] DAYDAY Vidales      Upper Body Bathing Assessment/Training    UB Bathing Assess/Train Assistive Device bath mitt;grab bars;hand-held shower head;long-handled sponge;shower chair without back  -KD      UB Bathing Assess/Train, Position edge of bed;sitting  -KD      UB Bathing Assess/Train, [place] Level set up required  -KD      Recorded by [KD] DAYDAY Vidales      Lower Body Bathing Assessment/Training    LB Bathing Assess/Train Assistive Device bath mitt;grab bars;hand-held shower head;long-handled sponge;shower chair without back  -KD      LB Bathing Assess/Train, Position sitting  -KD      LB Bathing Assess/Train, [place] Level set up required  -KD      Recorded by [KD] DAYDAY Vidales      Upper Body Dressing Assessment/Training    UB Dressing Assess/Train, Clothing Type doffing:;donning:;pull over  -KD      UB Dressing Assess/Train, Position edge of bed;sitting  -KD      UB Dressing Assess/Train, [place] contact guard assist  -KD      Recorded by [KD] DAYDAY Vidales      Lower Body Dressing Assessment/Training    LB Dressing Assess/Train, Clothing Type doffing:;donning:;pants;shorts;socks  -KD      LB Dressing Assess/Train, Position edge of bed;sitting  -KD      LB Dressing Assess/Train, [place] minimum assist (75% patient effort)  -KD      LB Dressing Assess/Train, Impairments impaired balance  -KD      LB Dressing Assess/Train, Comment Pt demonstrated impaired balance with LB dressing , pt leans to L and need vc's to self correct    -KD      Recorded by [KD] DAYDAY Vidales      Grooming Assessment/Training    Grooming  Assess/Train, Assistive Device --   comb hair/ brush teeth  -KD      Grooming Assess/Train, Position sitting  -KD      Grooming Assess/Train, [place] Level set up required  -KD      Recorded by [KD] MARCO Vidales/L      Self-Feeding Assessment/Training    Self-Feeding Assess/Train, Position sitting  -KD      Self-Feeding Assess/Train, [place] conditional independence  -KD      Self-Feeding Assess/Train, [place]ill[place] Amount minimal  -KD      Recorded by [KD] MARCO Vidales/L      Balance Skills Training    Standing-Level of Assistance Contact guard  -KD      Static Standing Balance Support assistive device  -KD      Standing-Balance Activities Weight Shift A-P   Baloon volleyball  -KD      Standing Balance # of Minutes --   3  -KD      Recorded by [KD] MARCO Vidales/L      Therapy Exercises    Bilateral Lower Extremities   AROM:;10 reps;supine;ankle pumps/circles;heel slides;hip abduction/adduction;SAQ;standing;heel raises   2 sets of hs . hooklying hip aa x 10 radha  -RW    Left Upper Extremity  5 reps  -RWA     LUE Resistance  theraputty   red  -RWA     Bilateral Upper Extremity AROM:;20 reps;sitting;elbow flexion/extension;hand pumps;pronation/supination;shoulder circles;shoulder ER/IR;shoulder extension/flexion  -KD      BUE Resistance manual resistance- minimal  -KD theraputty   removed beads from theraputty x3, requiring approx 15 min  -RWA     Trunk Exercises   10 reps;trunk rotation;supine   hooklying  -RW    Recorded by [KD] MARCO Vidales/L [RWA] Jacinta Pitts, OTR/L [RW] Pipe Gruber, PTA    Fine Motor Coordination Training    Detail (Fine Motor Coordination Training)  pegboard ax & Connect 4 game to increase LUE fine motor coordination & strength  -RWA     Recorded by  [RWA] Jacinta Pitts, ALPHONSER/L     Positioning and Restraints    Pre-Treatment Position sitting in chair/recliner  -KD in bed  -RWA other (comment)   eom  -RW    Post Treatment Position wheelchair  -KD bed   -RWA wheelchair  -RW    In Bed  supine;call light within reach;encouraged to call for assist;with family/caregiver  -RWA     In Wheelchair sitting;call light within reach;encouraged to call for assist;exit alarm on  -KD  with family/caregiver  -RW    Recorded by [KD] LEIGH VidalesA/DAWN [RWA] Jacinta Pitts, OTR/L [RW] Pipe Gruber PTA      11/01/17 1122 11/01/17 1020 11/01/17 0930    Rehab Assessment/Intervention    Discipline occupational therapist  -RWA speech language pathologist  -EC physical therapy assistant  -RW    Document Type therapy note (daily note)  -RWA therapy note (daily note)  -EC therapy note (daily note)  -RW    Subjective Information agree to therapy;no complaints  -RWA agree to therapy  -EC agree to therapy  -RW    Patient Effort, Rehab Treatment good  -RWA good  -EC good  -RW    Symptoms Noted During/After Treatment none  -RWA      Precautions/Limitations fall precautions  -RWA  fall precautions  -RW    Recorded by [RWA] Jacinta Pitts, OTR/L [EC] Clementina Murrell CCC-SLP [RW] Pipe Gruber, MARTIN    Vital Signs    Pretreatment Heart Rate (beats/min) 86  -RWA  70  -RW    Posttreatment Heart Rate (beats/min) 87  -RWA      Pre SpO2 (%) 93  -RWA  94  -RW    O2 Delivery Pre Treatment room air  -RWA  room air  -RW    Post SpO2 (%) 95  -RWA      O2 Delivery Post Treatment room air  -RWA      Pre Patient Position Sitting  -RWA      Post Patient Position Sitting  -RWA      Recorded by [RWA] Jacinta Pitts, OTR/L  [RW] Pipe Gruber PTA    Pain Assessment    Pain Assessment No/denies pain  -RWA No/denies pain  -EC No/denies pain  -RW    Recorded by [RWA] Jacinta Pitts, OTR/L [EC] Clementina Murrell CCC-SLP [RW] Pipe Gruber PTA    Vision Assessment/Intervention    Visual Impairment   WFL with corrective lenses  -RW    Recorded by   [RW] Pipe Gruber PTA    Cognitive Assessment/Intervention    Current Cognitive/Communication Assessment impaired  -RWA  impaired  -RW     Orientation Status oriented to;person;place;situation  -RWA  oriented to;person;place  -RW    Follows Commands/Answers Questions able to follow single-step instructions;100% of the time  -RWA      Personal Safety mild impairment  -RWA      Personal Safety Interventions gait belt;fall prevention program maintained;nonskid shoes/slippers when out of bed;supervised activity;muscle strengthening facilitated  -RWA  gait belt;nonskid shoes/slippers when out of bed  -RW    Recorded by [RWA] Jacinta Pitts, OTR/L  [RW] Pipe Gruber PTA    Cognitive Assessment Intervention    Behavior/Mood Observations   behavior appropriate to situation, WNL/WFL  -RW    Recorded by   [RW] Pipe Gruber PTA    Communication Treatment Objective and Progress    Cognitive Linguistic Treatment Objectives  Improve orientation;Improve memory skills;Improve functional problem solving  -EC     Recorded by  [EC] Clementina Murrell CCC-SLP     Improve orientation    Improve orientation through:  demonstrating orientation to day;demonstrating orientation to month;demonstrating orientation to year;demonstrating orientation to place;demonstrating orientation to disease/impairment;90%;with inconsistent cues  -EC     Status: Improve orientation through  Progress slower than expected  -EC     Orientation Progress  0%  -EC     Comments: Improve orientation through  pt was not oriented to a single question this date.  only recalled that yesterday was halloween, but not date/month  -EC     Recorded by  [EC] MERI Schultz     Improve memory skills    Improve memory skills through:  recalling related word lists with an imposed delay;90%;with inconsistent cues  -EC     Status: Improve memory skills  Progress slower than expected  -EC     Memory Skills Progress  60%  -EC     Comments: Improve memory skills  recall of 3 related words  -EC     Recorded by  [EC] MERI Schultz     Improve functional problem solving    Improve  functional problem solving through:  complete organization/home management task;tell similarity between items  -EC     Status: Improve functional problem solving  Progressing as expected  -EC     Functional Problem Solving Progress  70%  -EC     Comments: Improve functional problem solving  pt identified early/late with 90% asccuracy and identified information on a bill and performed check writing with 60%   -EC     Recorded by  [EC] Clementina Murrell, CCC-SLP     Bed Mobility, Assessment/Treatment    Bed Mob, Supine to Sit, [place]   minimum assist (75% patient effort)  -RW    Bed Mob, Sit to Supine, [place]   contact guard assist  -RW    Recorded by   [RW] Pipe Gruber PTA    Transfer Assessment/Treatment    Transfers, Bed-Chair [place]   minimum assist (75% patient effort)  -RW    Transfers, Chair-Bed [place]   minimum assist (75% patient effort)  -RW    Transfers, Bed-Chair-Bed, Assist Device   rolling walker  -RW    Transfers, Sit-Stand [place] minimum assist (75% patient effort)  -RWA  minimum assist (75% patient effort);contact guard assist  -RW    Transfers, Stand-Sit [place] minimum assist (75% patient effort)  -RWA  minimum assist (75% patient effort);verbal cues required;contact guard assist  -RW    Transfers, Sit-Stand-Sit, Assist Device rolling walker  -RWA  rolling walker  -RW    Toilet Transfer, [place] minimum assist (75% patient effort)  -RWA      Toilet Transfer, Assistive Device rolling walker   grab bar  -RWA      Transfer, Safety Issues step length decreased  -RWA      Transfer, Comment sit-stand from toilet x3 for clothing management, lis care, & application of cream  -RWA  sit to stand x 5, one lob  -RW    Recorded by [RWA] Jacinta Pitts, OTR/L  [RW] Pipe Gruber PTA    Gait Assessment/Treatment    Gait, [place] Level   minimum assist (75% patient effort)  -RW    Gait, Assistive Device   rolling walker  -RW    Gait, Distance (Feet)   70  -RW     Gait, Gait Deviations   leans to the left;step length decreased;toe-to-floor clearance decreased;left:  -RW    Recorded by   [RW] Pipe Gruber PTA    Functional Mobility    Functional Mobility- Ind. Level contact guard assist  -RWA      Functional Mobility- Device rolling walker  -RWA      Functional Mobility-Distance (Feet) 12   x2  -RWA      Functional Mobility- Safety Issues step length decreased;weight-shifting ability decreased   too far away from AD  -RWA      Recorded by [RWA] Jacinta Pitts, OTR/L      Wheelchair Training/Management    Wheelchair, Distance Propelled 25 ft with SBA  -RWA  150 sba and lots of vcues  -RW    Recorded by [RWA] Jacinta Pitts, OTR/L  [RW] Pipe Gruber PTA    Lower Body Bathing Assessment/Training    LB Bathing Assess/Train, Position standing  -RWA      LB Bathing Assess/Train, [place] Level minimum assist (75% patient effort)   for balance  -RWA      LB Bathing Assess/Train, Impairments impaired balance;strength decreased  -RWA      LB Bathing Assess/Train, Comment Pt washed lis area only & applied magic butt.  -RWA      Recorded by [RWA] Jacinta Pitts, OTR/L      Grooming Assessment/Training    Grooming Assess/Train, Position sitting;sink side   w/c  -RWA      Grooming Assess/Train, [place] Level supervision required;verbal cues required  -RWA      Grooming Assess/Train, Impairments strength decreased;coordination impaired  -RWA      Grooming Assess/Train, Comment Pt brushing teeth upon arrival, washed face & combed hair.  -RWA      Recorded by [RWA] Jacinta Pitts, OTR/L      Balance Skills Training    Static Standing Balance Support assistive device  -RWA      Standing-Balance Activities Weight Shift R-L   ADL task  -RWA      Standing Balance # of Minutes 5  -RWA      Recorded by [RWA] Jacinta Pitts, OTR/L      Therapy Exercises    Bilateral Lower Extremities   AROM:;10 reps;supine;ankle pumps/circles;heel slides;hip  abduction/adduction;SAQ;standing;heel raises  -RW    Recorded by   [RW] Pipe Gruber PTA    Positioning and Restraints    Pre-Treatment Position sitting in chair/recliner   w/c  -RWA  sitting in chair/recliner  -RW    Post Treatment Position wheelchair   at table for lunch  -RWA  wheelchair  -RW    In Wheelchair sitting;with family/caregiver  -RWA  with SLP  -RW    Recorded by [RWA] Jacinta Pitts, OTR/L  [RW] Pipe Gruber PTA      User Key  (r) = Recorded By, (t) = Taken By, (c) = Cosigned By    Initials Name Effective Dates    EC Clementina Murrell, CCC-SLP 12/30/16 -     RWA Jcainta Pitts, OTR/L 10/17/16 -     RW Pipe Gruber PTA 10/17/16 -     KD Therese King, LARA/L 10/17/16 -     LM Carmen Boucher, LARA/L 10/17/16 -                 OT Goals       11/03/17 1359 11/02/17 0715 11/01/17 1635    Transfer Training OT LTG    Transfer Training OT LTG, Date Goal Reviewed 11/03/17  -LM 11/02/17  -KD 11/01/17  -RW    Transfer Training OT LTG, Outcome goal ongoing  -LM goal not met  -KD goal ongoing  -RW    Patient Education OT LTG    Patient Education OT LTG, Date Goal Reviewed 11/03/17  -LM 11/02/17  -KD 11/01/17  -RW    Patient Education OT LTG Outcome goal not met  -LM goal not met  -KD goal ongoing  -RW    Safety Awareness OT LTG    Safety Awareness OT LTG, Date Goal Reviewed 11/03/17  -LM 11/02/17  -KD 11/01/17  -RW    Safety Awareness OT LTG, Outcome goal not met  -LM goal not met  -KD goal ongoing  -RW    ADL OT LTG    ADL OT LTG, Date Goal Reviewed 11/03/17  -LM  11/01/17  -RW    ADL OT LTG, Outcome goal not met  -LM goal not met  -KD goal ongoing  -RW    Endurance OT LTG    Endurance Goal OT LTG, Date Goal Reviewed 11/03/17  -LM 11/02/17  -KD 11/01/17  -RW    Endurance Goal OT LTG, Outcome goal not met  -LM goal not met  -KD goal ongoing  -RW      10/31/17 0925          Transfer Training OT LTG    Transfer Training OT LTG, Date Established 10/31/17  -BH (r) SP (t) BH (c)      Transfer  Training OT LTG, Time to Achieve by discharge  -BH (r) SP (t) BH (c)      Transfer Training OT LTG, Activity Type all transfers  -BH (r) SP (t) BH (c)      Transfer Training OT LTG, [place] Level contact guard assist  -BH (r) SP (t) BH (c)      Patient Education OT LTG    Patient Education OT LTG, Date Established 10/31/17  -BH (r) SP (t) BH (c)      Patient Education OT LTG, Time to Achieve by discharge  -BH (r) SP (t) BH (c)      Patient Education OT LTG, Education Type HEP;posture/body mechanics;1 hand/anni technique;home safety;adaptive equipment mgmt;energy conservation  -BH (r) SP (t) BH (c)      Patient Education OT LTG, Education Understanding independent;demonstrates adequately;verbalizes understanding   with caregiver assist as necessary  -BH (r) SP (t) BH (c)      Safety Awareness OT LTG    Safety Awareness OT LTG, Date Established 10/31/17  -BH (r) SP (t) BH (c)      Safety Awareness OT LTG, Time to Achieve by discharge  -BH (r) SP (t) BH (c)      Safety Awareness OT LTG, Activity Type good safety awareness;with kitchen mobility;with ADL's  -BH (r) SP (t) BH (c)      Safety Awareness OT LTG, [place] Level min verbal cues  -BH (r) SP (t) BH (c)      ADL OT LTG    ADL OT LTG, Date Established 10/31/17  -BH (r) SP (t) BH (c)      ADL OT LTG, Time to Achieve by discharge  -BH (r) SP (t) BH (c)      ADL OT LTG, Activity Type ADL skills  -BH (r) SP (t) BH (c)      ADL OT LTG, Additional Goal Set-up A with self-feeding and grooming; VC for UB bathing and UB dressing; CGA with LB bathing and LB dressing  -BH (r) SP (t) BH (c)      Endurance OT LTG    Endurance Goal OT LTG, Date Established 10/31/17  -BH (r) SP (t) BH (c)      Endurance Goal OT LTG, Time to Achieve by discharge  -BH (r) SP (t) BH (c)      Endurance Goal OT LTG, Activity Level endurance 2 good-  -BH (r) SP (t) BH (c)      Endurance Goal OT LTG, Additional Goal During 30 minutes of functional tasks, ADL, and therapeutic exercise  -BH (r)  SP (t) BH (c)        User Key  (r) = Recorded By, (t) = Taken By, (c) = Cosigned By    Initials Name Provider Type     Karoline Huang, OTR/L Occupational Therapist    RW Jacinta Pitts, OTR/L Occupational Therapist    KD Therese King, LARA/L Occupational Therapy Assistant    MOSHE Boucher, LARA/L Occupational Therapy Assistant    SP Alem Bruce, OT Student OT Student          Occupational Therapy Education     Title: PT OT SLP Therapies (Active)     Topic: Occupational Therapy (Active)     Point: ADL training (Active)    Description: Instruct learner(s) on proper safety adaptation and remediation techniques during self care or transfers.   Instruct in proper use of assistive devices.    Learning Progress Summary    Learner Readiness Method Response Comment Documented by Status   Patient Acceptance TB NR  KD 11/02/17 1108 Active    Acceptance E,D NR   11/01/17 1518 Active    Acceptance E VU Educated about OT and POC. Educated about anni dressing technique. Educated about safety with ADL and t/f. Educated to call for assist and not to stand independently. SP 10/31/17 1354 Done   Family Acceptance E,D NR   11/01/17 1518 Active    Acceptance E VU Educated about OT and POC. Educated about anni dressing technique. Educated about safety with ADL and t/f. Educated to call for assist and not to stand independently. SP 10/31/17 1354 Done               Point: Home exercise program (Active)    Description: Instruct learner(s) on appropriate technique for monitoring, assisting and/or progressing therapeutic exercises/activities.    Learning Progress Summary    Learner Readiness Method Response Comment Documented by Status   Patient Acceptance E,D NR theraputty  11/01/17 1634 Active   Family Acceptance E,D NR theraputty  11/01/17 1634 Active               Point: Precautions (Active)    Description: Instruct learner(s) on prescribed precautions during self-care and functional transfers.    Learning  Progress Summary    Learner Readiness Method Response Comment Documented by Status   Patient Acceptance E,D NR  RW 11/01/17 1518 Active    Acceptance E VU Educated about OT and POC. Educated about anni dressing technique. Educated about safety with ADL and t/f. Educated to call for assist and not to stand independently. SP 10/31/17 1354 Done   Family Acceptance E,D NR  RW 11/01/17 1518 Active    Acceptance E VU Educated about OT and POC. Educated about anni dressing technique. Educated about safety with ADL and t/f. Educated to call for assist and not to stand independently. SP 10/31/17 1354 Done               Point: Body mechanics (Done)    Description: Instruct learner(s) on proper positioning and spine alignment during self-care, functional mobility activities and/or exercises.    Learning Progress Summary    Learner Readiness Method Response Comment Documented by Status   Patient Acceptance E VU Educated about OT and POC. Educated about anni dressing technique. Educated about safety with ADL and t/f. Educated to call for assist and not to stand independently. SP 10/31/17 1354 Done   Family Acceptance E VU Educated about OT and POC. Educated about anni dressing technique. Educated about safety with ADL and t/f. Educated to call for assist and not to stand independently. SP 10/31/17 1354 Done                      User Key     Initials Effective Dates Name Provider Type Discipline    RW 10/17/16 -  ESTEBAN Silver/L Occupational Therapist OT    KD 10/17/16 -  MARCO Vidales/L Occupational Therapy Assistant OT    SP 01/31/17 -  Alem Bruce OT Student OT Student OT                  OT Recommendation and Plan  Anticipated Equipment Needs At Discharge: bathing equipment, dressing equipment, front wheeled walker  Anticipated Discharge Disposition: home with 24/7 care, home with home health  Planned Therapy Interventions: activity intolerance, adaptive equipment training, ADL retraining, IADL  retraining, balance training, bed mobility training, energy conservation, fine motor coordination training, home exercise program, motor coordination training, neuromuscular re-education, ROM (Range of Motion), strengthening, transfer training  Therapy Frequency: other (see comments) (3-14x/wk)  Plan of Care Review  Plan Of Care Reviewed With: patient  Progress: improving  Outcome Summary/Follow up Plan: improving toward all goals cont weakness to left UE        Outcome Measures       11/03/17 0900 11/02/17 1000 11/02/17 0715    How much help from another person do you currently need...    Turning from your back to your side while in flat bed without using bedrails? 3  -RW 3  -RW     Moving from lying on back to sitting on the side of a flat bed without bedrails? 3  -RW 3  -RW     Moving to and from a bed to a chair (including a wheelchair)? 3  -RW 3  -RW     Standing up from a chair using your arms (e.g., wheelchair, bedside chair)? 3  -RW 3  -RW     Climbing 3-5 steps with a railing? 3  -RW 3  -RW     To walk in hospital room? 3  -RW 3  -RW     AM-PAC 6 Clicks Score 18  -RW 18  -RW     How much help from another is currently needed...    Putting on and taking off regular lower body clothing?   2  -KD    Bathing (including washing, rinsing, and drying)   3  -KD    Toileting (which includes using toilet bed pan or urinal)   2  -KD    Putting on and taking off regular upper body clothing   3  -KD    Taking care of personal grooming (such as brushing teeth)   3  -KD    Eating meals   3  -KD    Score   16  -KD      11/01/17 1122 11/01/17 1000       How much help from another person do you currently need...    Turning from your back to your side while in flat bed without using bedrails?  3  -RW     Moving from lying on back to sitting on the side of a flat bed without bedrails?  3  -RW     Moving to and from a bed to a chair (including a wheelchair)?  3  -RW     Standing up from a chair using your arms (e.g.,  wheelchair, bedside chair)?  3  -RW     Climbing 3-5 steps with a railing?  2  -RW     To walk in hospital room?  3  -RW     AM-PAC 6 Clicks Score  17  -RW     How much help from another is currently needed...    Putting on and taking off regular lower body clothing? 2  -RWA      Bathing (including washing, rinsing, and drying) 2  -RWA      Toileting (which includes using toilet bed pan or urinal) 2  -RWA      Putting on and taking off regular upper body clothing 3  -RWA      Taking care of personal grooming (such as brushing teeth) 3  -RWA      Eating meals 3  -RWA      Score 15  -RWA      Functional Assessment    Outcome Measure Options AM-PAC 6 Clicks Daily Activity (OT)  -RWA        User Key  (r) = Recorded By, (t) = Taken By, (c) = Cosigned By    Initials Name Provider Type    RWA Jacinta Pitts OTR/L Occupational Therapist    KATHIE Gruber, PTA Physical Therapy Assistant    JOSS King LARA/L Occupational Therapy Assistant           Time Calculation:         Time Calculation- OT       11/03/17 1354          Time Calculation- OT    OT Start Time 1025  -LM      OT Stop Time 1155  -LM      OT Time Calculation (min) 90 min  -LM      Total Timed Code Minutes- OT 90 minute(s)  -LM      OT Received On 11/03/17  -LM        User Key  (r) = Recorded By, (t) = Taken By, (c) = Cosigned By    Initials Name Provider Type     MARCO Piña/L Occupational Therapy Assistant           Therapy Charges for Today     Code Description Service Date Service Provider Modifiers Qty    36646041918 HC OT SELF CARE/MGMT/TRAIN EA 15 MIN 11/3/2017 MARCO Piña/L GO 1    60177553252 HC OT THERAPEUTIC ACT EA 15 MIN 11/3/2017 MARCO Piña/L GO 2    01646497869 HC OT THER PROC EA 15 MIN 11/3/2017 MARCO Piña/L GO 3          OT G-codes  OT Professional Judgement Used?: Yes  OT Functional Scales Options: AM-PAC 6 Clicks Daily Activity (OT)  Score: 15  Functional Limitation: Self  care  Self Care Current Status (): At least 40 percent but less than 60 percent impaired, limited or restricted  Self Care Goal Status (): At least 1 percent but less than 20 percent impaired, limited or restricted    MARCO Piña/DAWN  11/3/2017

## 2017-11-03 NOTE — PLAN OF CARE
Problem: Patient Care Overview (Adult)  Goal: Plan of Care Review  Outcome: Ongoing (interventions implemented as appropriate)    11/03/17 1359   Coping/Psychosocial Response Interventions   Plan Of Care Reviewed With patient   Patient Care Overview   Progress improving   Outcome Evaluation   Outcome Summary/Follow up Plan improving toward all goals cont weakness to left UE         Problem: Inpatient Occupational Therapy  Goal: Transfer Training Goal 1 LTG- OT  Outcome: Ongoing (interventions implemented as appropriate)    11/03/17 1359   Transfer Training OT LTG   Transfer Training OT LTG, Date Goal Reviewed 11/03/17   Transfer Training OT LTG, Outcome goal ongoing       Goal: Patient Education Goal LTG- OT  Outcome: Ongoing (interventions implemented as appropriate)    10/31/17 0925 11/03/17 1359   Patient Education OT LTG   Patient Education OT LTG, Date Established 10/31/17 --    Patient Education OT LTG, Time to Achieve by discharge --    Patient Education OT LTG, Education Type HEP;posture/body mechanics;1 hand/anni technique;home safety;adaptive equipment mgmt;energy conservation --    Patient Education OT LTG, Education Understanding independent;demonstrates adequately;verbalizes understanding  (with caregiver assist as necessary) --    Patient Education OT LTG, Date Goal Reviewed --  11/03/17   Patient Education OT LTG Outcome --  goal not met       Goal: Safety Awareness Goal LTG- OT  Outcome: Ongoing (interventions implemented as appropriate)    10/31/17 0925 11/03/17 1359   Safety Awareness OT LTG   Safety Awareness OT LTG, Date Established 10/31/17 --    Safety Awareness OT LTG, Time to Achieve by discharge --    Safety Awareness OT LTG, Activity Type good safety awareness;with kitchen mobility;with ADL's --    Safety Awareness OT LTG, [place] Level min verbal cues --    Safety Awareness OT LTG, Date Goal Reviewed --  11/03/17   Safety Awareness OT LTG, Outcome --  goal not met       Goal: ADL Goal  LTG- OT  Outcome: Ongoing (interventions implemented as appropriate)    10/31/17 0925 11/03/17 1359   ADL OT LTG   ADL OT LTG, Date Established 10/31/17 --    ADL OT LTG, Time to Achieve by discharge --    ADL OT LTG, Activity Type ADL skills --    ADL OT LTG, Additional Goal Set-up A with self-feeding and grooming; VC for UB bathing and UB dressing; CGA with LB bathing and LB dressing --    ADL OT LTG, Date Goal Reviewed --  11/03/17   ADL OT LTG, Outcome --  goal not met       Goal: Endurance Goal LTG- OT  Outcome: Ongoing (interventions implemented as appropriate)    10/31/17 0925 11/03/17 1359   Endurance OT LTG   Endurance Goal OT LTG, Date Established 10/31/17 --    Endurance Goal OT LTG, Time to Achieve by discharge --    Endurance Goal OT LTG, Activity Level endurance 2 good- --    Endurance Goal OT LTG, Additional Goal During 30 minutes of functional tasks, ADL, and therapeutic exercise --    Endurance Goal OT LTG, Date Goal Reviewed --  11/03/17   Endurance Goal OT LTG, Outcome --  goal not met

## 2017-11-03 NOTE — PLAN OF CARE
Problem: Patient Care Overview (Adult)  Goal: Plan of Care Review  Outcome: Ongoing (interventions implemented as appropriate)    11/03/17 0313 11/03/17 1242   Coping/Psychosocial Response Interventions   Plan Of Care Reviewed With --  patient   Patient Care Overview   Progress progress toward functional goals as expected --        Goal: Adult Individualization and Mutuality  Outcome: Ongoing (interventions implemented as appropriate)  Goal: Discharge Needs Assessment  Outcome: Ongoing (interventions implemented as appropriate)    Problem: Fall Risk (Adult)  Goal: Absence of Falls  Outcome: Ongoing (interventions implemented as appropriate)    Problem: Stroke (Ischemic) (Adult)  Goal: Signs and Symptoms of Listed Potential Problems Will be Absent or Manageable (Stroke)  Outcome: Ongoing (interventions implemented as appropriate)    Problem: Diabetes, Type 2 (Adult)  Goal: Signs and Symptoms of Listed Potential Problems Will be Absent or Manageable (Diabetes, Type 2)  Outcome: Ongoing (interventions implemented as appropriate)

## 2017-11-04 LAB
GLUCOSE BLDC GLUCOMTR-MCNC: 107 MG/DL (ref 70–130)
GLUCOSE BLDC GLUCOMTR-MCNC: 109 MG/DL (ref 70–130)
GLUCOSE BLDC GLUCOMTR-MCNC: 139 MG/DL (ref 70–130)

## 2017-11-04 PROCEDURE — 97116 GAIT TRAINING THERAPY: CPT

## 2017-11-04 PROCEDURE — 97530 THERAPEUTIC ACTIVITIES: CPT

## 2017-11-04 PROCEDURE — 82962 GLUCOSE BLOOD TEST: CPT

## 2017-11-04 PROCEDURE — 92507 TX SP LANG VOICE COMM INDIV: CPT | Performed by: SPEECH-LANGUAGE PATHOLOGIST

## 2017-11-04 PROCEDURE — 97110 THERAPEUTIC EXERCISES: CPT

## 2017-11-04 PROCEDURE — 97112 NEUROMUSCULAR REEDUCATION: CPT

## 2017-11-04 PROCEDURE — 25010000002 ENOXAPARIN PER 10 MG: Performed by: FAMILY MEDICINE

## 2017-11-04 RX ADMIN — CLOPIDOGREL BISULFATE 75 MG: 75 TABLET ORAL at 08:52

## 2017-11-04 RX ADMIN — LISINOPRIL 10 MG: 10 TABLET ORAL at 08:52

## 2017-11-04 RX ADMIN — ASPIRIN 81 MG 81 MG: 81 TABLET ORAL at 08:52

## 2017-11-04 RX ADMIN — GLIPIZIDE 10 MG: 10 TABLET ORAL at 08:52

## 2017-11-04 RX ADMIN — HYDROCORTISONE: 1 CREAM TOPICAL at 10:24

## 2017-11-04 RX ADMIN — METOPROLOL SUCCINATE 50 MG: 50 TABLET, EXTENDED RELEASE ORAL at 20:13

## 2017-11-04 RX ADMIN — ATORVASTATIN CALCIUM 10 MG: 10 TABLET, FILM COATED ORAL at 20:13

## 2017-11-04 RX ADMIN — AMLODIPINE BESYLATE 10 MG: 10 TABLET ORAL at 20:13

## 2017-11-04 RX ADMIN — TERAZOSIN HYDROCHLORIDE ANHYDROUS 5 MG: 5 CAPSULE ORAL at 20:13

## 2017-11-04 RX ADMIN — METFORMIN HYDROCHLORIDE 1000 MG: 500 TABLET ORAL at 08:51

## 2017-11-04 RX ADMIN — MAGNESIUM OXIDE TAB 400 MG (241.3 MG ELEMENTAL MG) 400 MG: 400 (241.3 MG) TAB at 08:52

## 2017-11-04 RX ADMIN — FLUTICASONE PROPIONATE 2 SPRAY: 50 SPRAY, METERED NASAL at 10:24

## 2017-11-04 RX ADMIN — LIDOCAINE 1 PATCH: 50 PATCH TOPICAL at 10:00

## 2017-11-04 RX ADMIN — METFORMIN HYDROCHLORIDE 1000 MG: 500 TABLET ORAL at 17:43

## 2017-11-04 RX ADMIN — POTASSIUM CHLORIDE 10 MEQ: 750 CAPSULE, EXTENDED RELEASE ORAL at 08:52

## 2017-11-04 RX ADMIN — THERA TABS 1 TABLET: TAB at 08:52

## 2017-11-04 RX ADMIN — PANTOPRAZOLE SODIUM 40 MG: 40 TABLET, DELAYED RELEASE ORAL at 06:10

## 2017-11-04 RX ADMIN — FINASTERIDE 5 MG: 5 TABLET, FILM COATED ORAL at 08:51

## 2017-11-04 RX ADMIN — ENOXAPARIN SODIUM 40 MG: 40 INJECTION SUBCUTANEOUS at 08:52

## 2017-11-04 NOTE — PROGRESS NOTES
[facility]  INPATIENT PROGRESS NOTE    Length of Stay: 5  Date of Admission: 10/30/2017  Primary Care Physician: Evan Marrero MD    Subjective   Chief Complaint:  Stroke  HPI: 81 yo with recent CVA and weakness to the left leg/foot. Doing well. Ambulating    Review of Systems   Constitutional: Negative for fever.   Respiratory: Negative for wheezing and stridor.    Cardiovascular: Negative for chest pain.   Gastrointestinal: Negative for nausea and vomiting.   Genitourinary: Negative for flank pain.   Musculoskeletal: Negative for neck stiffness.   Neurological: Negative for dizziness and weakness.        All pertinent negatives and positives are as above. All other systems have been reviewed and are negative unless otherwise stated.     Objective    Temp:  [96.5 °F (35.8 °C)-97.8 °F (36.6 °C)] 97.8 °F (36.6 °C)  Heart Rate:  [70-75] 75  Resp:  [18] 18  BP: (126-141)/(61-73) 126/73  Physical Exam   Constitutional: He appears well-developed and well-nourished.   Eyes: EOM are normal. Pupils are equal, round, and reactive to light.   Neck: Normal range of motion.   Cardiovascular: Normal rate and regular rhythm.    Pulmonary/Chest: Effort normal and breath sounds normal.   Abdominal: Soft.   Musculoskeletal: Normal range of motion.   Neurological: He is alert.   Skin: Skin is warm and dry.             Results Review:  I have reviewed the labs, radiology results, and diagnostic studies.    Laboratory Data:   Lab Results (last 24 hours)     Procedure Component Value Units Date/Time    POC Glucose Fingerstick [[id number]]  (Normal) Collected:  11/03/17 0515    Specimen:  Blood Updated:  11/03/17 1625     Glucose 104 mg/dL       Meter: [id number]: [id number] KEYLA JONES       POC Glucose Fingerstick [[id number]]  (Normal) Collected:  11/03/17 1612    Specimen:  Blood Updated:  11/03/17 1626     Glucose 82 mg/dL       RN NotifiedMeter:  [id number]: [id number] RIGOBERTO KLARISSA       POC Glucose Fingerstick [[id number]]  (Abnormal) Collected:  11/03/17 2004    Specimen:  Blood Updated:  11/03/17 2046     Glucose 142 (H) mg/dL       Meter: [id number]: [id number] [facility] PRAVIN        POC Glucose Fingerstick [[id number]]  (Normal) Collected:  11/04/17 0545    Specimen:  Blood Updated:  11/04/17 0606     Glucose 107 mg/dL       Meter: [id number]: [id number] [facility] PRAVIN        POC Glucose Fingerstick [[id number]]  (Abnormal) Collected:  11/04/17 1042    Specimen:  Blood Updated:  11/04/17 1055     Glucose 139 (H) mg/dL       RN NotifiedMeter: [id number]: [id number] TIANA LANIER             Culture Data:   No results found for: BLOODCX  No results found for: URINECX  No results found for: RESPCX  No results found for: WOUNDCX  No results found for: STOOLCX  No components found for: BODYFLD    Radiology Data:   Imaging Results (last 24 hours)     ** No results found for the last 24 hours. **          I have reviewed the patient current medications.     Assessment/Plan     Hospital Problem List     * (Principal)Right-sided lacunar stroke    Former smoker    Left-sided weakness    Essential hypertension    Diabetes mellitus    Chronic anxiety    Chronic back pain greater than 3 months duration    Degenerative joint disease involving multiple joints    Atrial fibrillation, chronic    Encounter for rehabilitation    Obstructive sleep apnea syndrome          Plan: CVA            Doing well. Continue strength training. Weakness in the left has improved.          Josafat Dee DO   11/04/17   12:31 PM

## 2017-11-04 NOTE — PLAN OF CARE
Problem: Patient Care Overview (Adult)  Goal: Plan of Care Review  Outcome: Ongoing (interventions implemented as appropriate)    11/04/17 1411   Coping/Psychosocial Response Interventions   Plan Of Care Reviewed With patient   Patient Care Overview   Progress improving   Outcome Evaluation   Outcome Summary/Follow up Plan Pt able to answer all orientation questions correctly w/use of calendar this date. Pt was 100% acc w/recall of related words from previous 2 session using SRT. Pt was 70% acc w/organizing check registry.         Problem: Inpatient SLP  Goal: Cognitive-linguistic-Patient will improve Cognitive-linguistic skills to improve safety and safety awareness in environment  Outcome: Ongoing (interventions implemented as appropriate)    10/31/17 1244 11/04/17 1411   Cognitive Linguistic- Improve Safety and Awareness   Cognitive Linguistic Improve Safety and Awareness- SLP, Date Established 10/31/17 --    Cognitive Linguistic Improve Safety and Awareness- SLP, Time to Achieve by discharge --    Cognitive Linguistic Improve Safety and Awareness- SLP, Activity Level Patient will improve orientation for increased safety in environment;Patient will improve memory skills for increased safety in environment;Patient will improve functional problem solving skills for increased safety in environment --    Cognitive Linguistic Improve Safety and Awareness- SLP, Date Goal Reviewed --  11/04/17   Cognitive Linguistic Improve Safety and Awareness- SLP, Outcome --  goal ongoing

## 2017-11-04 NOTE — THERAPY TREATMENT NOTE
AR[facility] - Speech Language Pathology Treatment Note  [facility]     Patient Name: Kenneth Harper Jr.  : 1934  MRN: [id number]  Today's Date: 2017  Referring Physician: Pratibha      Admit Date: 10/30/2017       1. Patient will demonstrate orientation to day, month, year, place, situation with 90% acc with min cues.  Pt was 100% acc w/orientation questions w/use of calendar.   2. Patient will complete delayed word recall with 80% acc with min cues: Pt able to recall 4/4 words from two previous session using SRT w/a max time delay of 16 min.    3. Patient will complete organization/home management task with 90% acc with min cues:  Pt was 70% acc w/organizing information from check registry.      Concetta Springer, MS CCC-SLP 2017 2:19 PM      Visit Dx:      ICD-10-CM ICD-9-CM   1. Symbolic dysfunction R48.9 784.60   2. Impaired mobility and ADLs Z74.09 799.89   3. Abnormality of gait and mobility R26.9 781.2   4. Muscle weakness (generalized) M62.81 728.87     Patient Active Problem List   Diagnosis   • Spinal stenosis, lumbar region, with neurogenic claudication   • Former smoker   • Overweight   • Left-sided weakness   • Right-sided lacunar stroke   • Essential hypertension   • Diabetes mellitus   • Chronic anxiety   • Chronic back pain greater than 3 months duration   • Prostatic hypertrophy   • Degenerative joint disease involving multiple joints   • Atrial fibrillation, chronic   • Encounter for rehabilitation   • Obstructive sleep apnea syndrome              Adult Rehabilitation Note       17 1120 17 1011 17 0738    Rehab Assessment/Intervention    Discipline physical therapy assistant  -JENA speech language pathologist  -CK occupational therapy assistant  -RC    Document Type therapy note (daily note)  -JENA therapy note (daily note)  -CK therapy note (daily note)  -RC    Subjective Information agree to therapy  -JW agree to therapy  -CK agree to therapy  -RC    Patient  Effort, Rehab Treatment good  -JW good  -CK good  -RC    Precautions/Limitations fall precautions  -JW  fall precautions  -RC    Recorded by [JW] Felicia Haynes, MARTIN [CK] Concetta Springer, MS CCC-SLP [RC] Cassie Carpio, LARA/L    Vital Signs    Pretreatment Heart Rate (beats/min) 56  -JW      Pre SpO2 (%) 97  -JW      O2 Delivery Pre Treatment room air  -JW      Pre Patient Position Sitting  -JW      Post Patient Position Sitting  -JW      Recorded by [JW] Felicia Haynes PTA      Pain Assessment    Pain Assessment 0-10  -JW No/denies pain  -CK No/denies pain  -RC    Pain Score 0  -JW 0  -CK     Post Pain Score 0  -JW 0  -CK     Recorded by [JW] Felicia Haynes PTA [CK] Concetta Springer, MS CCC-SLP [RC] LEIGH CannonA/L    Cognitive Assessment/Intervention    Current Cognitive/Communication Assessment impaired  -JW  impaired  -    Orientation Status oriented to;person;place     -JW      Follows Commands/Answers Questions 100% of the time  -JW      Personal Safety mild impairment  -      Personal Safety Interventions fall prevention program maintained;gait belt;nonskid shoes/slippers when out of bed  -JW      Recorded by [JW] Felicia aHynes PTA  [RC] LEIGH CannonA/DAWN    Communication Treatment Objective and Progress    Cognitive Linguistic Treatment Objectives  Improve orientation;Improve memory skills;Improve functional problem solving  -CK     Recorded by  [CK] Concetta Springer, MS CCC-SLP     Improve orientation    Improve orientation through:  demonstrating orientation to day;demonstrating orientation to month;demonstrating orientation to year;demonstrating orientation to place;demonstrating orientation to disease/impairment;90%;with inconsistent cues  -CK     Status: Improve orientation through  Progressing as expected  -CK     Orientation Progress  80%  -CK     Comments: Improve orientation through  use of calendar  -CK     Recorded by  [CK] Concetta Springer,  MS CCC-SLP     Improve memory skills    Improve memory skills through:  recalling related word lists with an imposed delay;90%;with inconsistent cues  -CK     Status: Improve memory skills  Progressing as expected  -CK     Memory Skills Progress  70%  -CK     Comments: Improve memory skills  Used same words from previous session.  Pt able to recall 4/4 using SRT  -CK     Recorded by  [CK] Concetta Springer MS CCC-SLP     Improve functional problem solving    Improve functional problem solving through:  complete organization/home management task;tell similarity between items  -CK     Status: Improve functional problem solving  Progressing as expected  -CK     Functional Problem Solving Progress  70%  -CK     Recorded by  [CK] Concetta Springer MS CCC-SLP     Transfer Assessment/Treatment    Transfers, Bed-Chair [place]   minimum assist (75% patient effort);verbal cues required   chair to chair  -RC    Transfers, Chair-Bed [place]   minimum assist (75% patient effort);verbal cues required   chair to mat to chair  -RC    Transfers, Bed-Chair-Bed, Assist Device   rolling walker  -RC    Transfers, Sit-Stand [place] stand by assist;verbal cues required  -JW      Transfers, Stand-Sit [place] stand by assist;verbal cues required  -JW      Transfers, Sit-Stand-Sit, Assist Device rolling walker  -JW      Transfer, Safety Issues   sequencing ability decreased  -RC    Recorded by [JW] Felicia Haynes PTA  [RC] Cassie Carpio, LARA/L    Gait Assessment/Treatment    Gait, [place] Level minimum assist (75% patient effort);verbal cues required  -JW      Gait, Assistive Device rolling walker  -      Gait, Distance (Feet) 84   82, 74  -      Gait, Comment as pt fatigued w/ gait, his L foot would drag requiring vc's to  foot or a rest break  -      Recorded by [JW] Felicia Haynes PTA      Functional Mobility    Functional Mobility- Ind. Level   contact guard assist;verbal cues  required  -RC    Functional Mobility- Device   rolling walker  -RC    Functional Mobility- Safety Issues   --   needs vc's for safe tech  -RC    Recorded by   [RC] MARCO Cannon/L    Wheelchair Training/Management    Wheelchair, Distance Propelled   150  -RC    Recorded by   [RC] LEIGH CannonA/L    ADL Assessment/Intervention    Additional Documentation   --   declined bathing/dressing  -RC    Recorded by   [RC] LEIGH CannonA/L    Lower Body Dressing Assessment/Training    LB Dressing Assess/Train, Clothing Type   shoes  -RC    LB Dressing Assess/Train, Position   sitting  -RC    Recorded by   [RC] LEIGH CannonA/L    Therapy Exercises    Bilateral Lower Extremities AROM:;20 reps;sitting;ankle pumps/circles;glut sets;hip abduction/adduction;hip flexion;LAQ  -JW      BLE Resistance theraband   level 2  -      Bilateral Upper Extremity   --   ue bike 10 min, pulley ex 5 min  -RC    Recorded by [JW] Felicia Haynes, PTA  [RC] LEIGH CannonA/L    Fine Motor Coordination Training    Detail (Fine Motor Coordination Training)   --   sml pegs on eseal, rom arc, clothes pin tree  -RC    Recorded by   [RC] MARCO Cannon/L    Positioning and Restraints    Pre-Treatment Position sitting in chair/recliner  -JW  sitting in chair/recliner  -RC    Post Treatment Position wheelchair  -JW  wheelchair  -RC    In Wheelchair sitting;call light within reach;encouraged to call for assist  -JW  encouraged to call for assist  -RC    Recorded by [JW] Felicia Haynes, PTA  [RC] LEIGH CannonA/DAWN      11/03/17 1421 11/03/17 1300 11/03/17 1025    Rehab Assessment/Intervention    Discipline physical therapy assistant  -RW speech language pathologist  -EC occupational therapy assistant  -LM    Document Type therapy note (daily note)  -RW therapy note (daily note)  -EC therapy note (daily note)  -LM    Subjective Information agree to therapy  -RW agree to therapy  -EC agree to therapy   -LM    Patient Effort, Rehab Treatment good  -RW  good  -LM    Symptoms Noted During/After Treatment   none  -LM    Precautions/Limitations fall precautions  -RW      Recorded by [RW] Pipe Gruber PTA [EC] Clementina Murrell CCC-SLP [LM] LEIGH PiñaA/L    Pain Assessment    Pain Assessment  No/denies pain  -EC No/denies pain  -LM    Pain Score   4  -LM    Post Pain Score   3  -LM    Pain Type   Acute pain  -LM    Pain Location   Back  -LM    Recorded by  [EC] SHELTON SchultzSLP [LM] LEIGH PiñaA/L    Vision Assessment/Intervention    Visual Impairment WFL with corrective lenses  -RW  WFL  -LM    Recorded by [RW] Pipe Gruber PTA  [LM] MARCO Piña/L    Cognitive Assessment/Intervention    Current Cognitive/Communication Assessment impaired  -RW  impaired  -LM    Orientation Status oriented to;person;place  -RW  oriented x 4  -LM    Follows Commands/Answers Questions   100% of the time  -LM    Personal Safety   mild impairment  -LM    Recorded by [RW] Pipe Gruber PTA  [LM] MARCO Piña/L    Cognitive Assessment Intervention    Behavior/Mood Observations behavior appropriate to situation, WNL/WFL  -RW      Recorded by [RW] Pipe Gruber PTA      Communication Treatment Objective and Progress    Cognitive Linguistic Treatment Objectives  Improve orientation;Improve memory skills;Improve functional problem solving  -EC     Recorded by  [EC] SHELTON SchultzSLP     Improve orientation    Improve orientation through:  demonstrating orientation to day;demonstrating orientation to month;demonstrating orientation to year;demonstrating orientation to place;demonstrating orientation to disease/impairment;90%;with inconsistent cues  -EC     Status: Improve orientation through  Progress slower than expected  -EC     Orientation Progress  50%  -EC     Comments: Improve orientation through  with mod cues and calendar to look at  -EC     Recorded by  [EC]  MERI Schultz     Improve memory skills    Improve memory skills through:  recalling related word lists with an imposed delay;90%;with inconsistent cues  -EC     Status: Improve memory skills  Progress slower than expected  -EC     Memory Skills Progress  60%  -EC     Comments: Improve memory skills  using the same 4 words from previous 2 days.  patient continues to only get 3/4 even with memory st rategies.   -EC     Recorded by  [EC] MERI Schultz     Improve functional problem solving    Improve functional problem solving through:  complete organization/home management task;tell similarity between items  -EC     Status: Improve functional problem solving  Progress slower than expected  -EC     Functional Problem Solving Progress  60%  -EC     Comments: Improve functional problem solving  moderate cues to read infomation on a bill and write a check  -EC     Recorded by  [EC] MERI Schultz     Bed Mobility, Assessment/Treatment    Bed Mobility, Assistive Device head of bed elevated;bed rails  -RW      Bed Mobility, Scoot/Bridge, Early supervision required  -RW      Bed Mob, Sit to Supine, Early supervision required  -RW      Recorded by [RW] Pipe Gruber PTA      Transfer Assessment/Treatment    Transfers, Bed-Chair [place] --  -RW      Transfers, Chair-Bed [place] contact guard assist  -RW      Transfers, Bed-Chair-Bed, Assist Device rolling walker  -RW      Transfers, Sit-Stand [place] verbal cues required;stand by assist  -RW      Transfers, Stand-Sit [place] verbal cues required;stand by assist  -RW      Transfers, Sit-Stand-Sit, Assist Device rolling walker  -RW      Recorded by [RW] Pipe Gruber PTA      Gait Assessment/Treatment    Gait, [place] Level minimum assist (75% patient effort);contact guard assist;verbal cues required  -RW      Gait, Assistive Device rolling walker  -RW      Gait, Distance (Feet) 10  -RW       Recorded by [RW] Pipe Gruber PTA      Stairs Assessment/Treatment    Stairs, Handrail Location --  -RW      Stairs, [place] Level --  -RW      Recorded by [RW] Pipe Gruber PTA      Wheelchair Training/Management    Wheelchair, Distance Propelled   --   150 ft   -LM    Recorded by   [LM] LEIGH PiñaA/L    Lower Body Dressing Assessment/Training    LB Dressing Assess/Train, Clothing Type shoes;doffing:  -RW      Recorded by [RW] Pipe Gruber PTA      Grooming Assessment/Training    Grooming Assess/Train, Indepen Level   independent  -LM    Recorded by   [LM] LEIGH PiñaA/L    Motor Skills/Interventions    Motor Response Observations   --   ther act with multi act to incresae rom pulley over head  -LM    Additional Documentation   --   coordination act B UE to incresae L UE  -LM    Recorded by   [LM] LEIGH PiñaA/L    Balance Skills Training    Standing-Level of Assistance --  -RW      Static Standing Balance Support --  -RW      Standing-Balance Activities --  -RW      Gait Balance Support --  -RW      Gait Balance Activities --  -RW      Recorded by [RW] Pipe Gruber PTA      Therapy Exercises    Bilateral Lower Extremities AROM:;20 reps;supine;ankle pumps/circles;glut sets;heel slides;hip abduction/adduction;SAQ  -RW      Bilateral Upper Extremity   AROM:;elbow flexion/extension;shoulder abduction/adduction;shoulder extension/flexion;shoulder horizontal abd/add;shoulder protraction/retraction;shoulder rolls/shrugs   also perf tband all planes x 20 x 2   -LM    BUE Resistance   manual resistance- minimal   also UEE bike x 15 min   -LM    Trunk Exercises trunk rotation;supine;15 reps   hooklying  -RW      Recorded by [RW] Pipe Gruber PTA  [LM] LEIGH PiñaA/L    Positioning and Restraints    Pre-Treatment Position sitting in chair/recliner  -RW  sitting in chair/recliner  -LM    Post Treatment Position bed  -RW  wheelchair  -LM    In Bed call light  within reach  -RW      Recorded by [RW] Pipe Gruber PTA  [LM] Carmen Boucher, LARA/DAWN      11/03/17 0807 11/02/17 1455 11/02/17 1405    Rehab Assessment/Intervention    Discipline physical therapy assistant  -RW speech language pathologist  -EC physical therapy assistant  -RW    Document Type therapy note (daily note)  -RW therapy note (daily note)  -EC therapy note (daily note)  -RW    Subjective Information agree to therapy  -RW agree to therapy  -EC agree to therapy  -RW    Patient Effort, Rehab Treatment good  -RW good  -EC good  -RW    Precautions/Limitations fall precautions  -RW  fall precautions  -RW    Recorded by [RW] Pipe Gruber PTA [EC] Clementina Murrell CCC-SLP [RW] Pipe Gruber PTA    Pain Assessment    Pain Assessment No/denies pain  -RW No/denies pain  -EC No/denies pain  -RW    Recorded by [RW] Pipe Gruber PTA [EC] Clementina Murrell CCC-SLP [RW] Pipe Gruber PTA    Vision Assessment/Intervention    Visual Impairment WFL with corrective lenses  -RW  WFL with corrective lenses  -RW    Recorded by [RW] Pipe Gruber PTA  [RW] Pipe Gruber PTA    Cognitive Assessment/Intervention    Current Cognitive/Communication Assessment impaired  -RW  impaired  -RW    Orientation Status oriented to;person;place  -RW  oriented to;person;place  -RW    Personal Safety Interventions   gait belt;nonskid shoes/slippers when out of bed  -RW    Recorded by [RW] Pipe Gruber PTA  [RW] Pipe Gruber PTA    Cognitive Assessment Intervention    Behavior/Mood Observations behavior appropriate to situation, WNL/WFL  -RW  behavior appropriate to situation, WNL/WFL  -RW    Recorded by [RW] Pipe Gruber PTA  [RW] Pipe Gruber PTA    Communication Treatment Objective and Progress    Cognitive Linguistic Treatment Objectives  Improve orientation;Improve memory skills;Improve functional problem solving  -EC     Recorded by  [EC] Clementina Murrell CCC-SLP     Improve orientation    Improve  orientation through:  demonstrating orientation to day;demonstrating orientation to month;demonstrating orientation to year;demonstrating orientation to place;demonstrating orientation to disease/impairment;90%;with inconsistent cues  -EC     Status: Improve orientation through  Progress slower than expected  -EC     Orientation Progress  10%  -EC     Recorded by  [EC] Clementina Murrell CCC-SLP     Improve memory skills    Improve memory skills through:  recalling related word lists with an imposed delay;90%;with inconsistent cues  -EC     Status: Improve memory skills  Progress slower than expected  -EC     Memory Skills Progress  70%  -EC     Recorded by  [EC] Clementina Murrell CCC-SLP     Improve functional problem solving    Improve functional problem solving through:  complete organization/home management task;tell similarity between items  -EC     Status: Improve functional problem solving  Progress slower than expected  -EC     Functional Problem Solving Progress  30%  -EC     Recorded by  [EC] Clementina Murrell CCC-SLP     Transfer Assessment/Treatment    Transfers, Bed-Chair [place] minimum assist (75% patient effort)  -RW  minimum assist (75% patient effort)  -RW    Transfers, Chair-Bed [place] minimum assist (75% patient effort)  -RW  minimum assist (75% patient effort)  -RW    Transfers, Bed-Chair-Bed, Assist Device rolling walker  -RW  rolling walker  -RW    Transfers, Sit-Stand [place] verbal cues required;stand by assist  -RW  contact guard assist;verbal cues required  -RW    Transfers, Stand-Sit [place] verbal cues required;stand by assist  -RW  verbal cues required;contact guard assist  -RW    Transfers, Sit-Stand-Sit, Assist Device rolling walker  -RW  rolling walker  -RW    Transfer, Comment   sit to stand x 5  -RW    Recorded by [RW] Pipe Gruber, PTA  [RW] Pipe Gruber, PTA    Gait Assessment/Treatment    Gait, [place] Level minimum assist (75% patient  effort);contact guard assist;verbal cues required  -RW  minimum assist (75% patient effort)  -RW    Gait, Assistive Device rolling walker  -RW  rolling walker  -RW    Gait, Distance (Feet) 40    x 5  -RW  150   2 standing / stop to regroup, 60 x 3  -RW    Gait, Gait Deviations   --   left foot drag at times  -RW    Recorded by [RW] Pipe Gruber PTA  [RW] Pipe Gruber PTA    Stairs Assessment/Treatment    Number of Stairs 4  -RW      Stairs, Handrail Location left side (ascending)  -RW      Stairs, [place] Level minimum assist (75% patient effort);verbal cues required  -RW      Stairs, Comment pt not getting weak leg onto step  fully which poses a risk for accident  -RW      Recorded by [RW] Pipe Gruber PTA      Lower Body Dressing Assessment/Training    LB Dressing Assess/Train, Clothing Type donning:;shoes  -RW      LB Dressing Assess/Train, [place] set up required  -RW      Recorded by [RW] Pipe Gruber PTA      Balance Skills Training    Standing-Level of Assistance Contact guard  -RW      Static Standing Balance Support assistive device  -RW      Standing-Balance Activities --   tucking in shirt-tail  -RW      Gait Balance Support parallel bars  -RW      Gait Balance Activities side-stepping  -RW      Recorded by [RW] Pipe Gruber PTA      Therapy Exercises    Bilateral Lower Extremities AROM:;20 reps;heel raises  -RW  AROM:;sitting;knee flexion;LAQ;ankle pumps/circles;20 reps;hip flexion;standing;heel slides  -RW    Trunk Exercises --   hooklying  -RW  --   hooklying  -RW    Recorded by [RW] Pipe Gruber PTA  [RW] Pipe Gruber PTA    Positioning and Restraints    Pre-Treatment Position in bed   eob  -RW  sitting in chair/recliner  -RW    Post Treatment Position wheelchair  -RW  wheelchair  -RW    Recorded by [RW] Pipe Gruber PTA  [RW] Pipe Gruber PTA      11/02/17 0902 11/02/17 0715 11/01/17 1524    Rehab Assessment/Intervention    Discipline physical therapy assistant   -RW occupational therapy assistant  -KD occupational therapist  -RWA    Document Type therapy note (daily note)  -RW therapy note (daily note)  -KD therapy note (daily note)  -RWA    Subjective Information agree to therapy  -RW agree to therapy  -KD agree to therapy;complains of;pain  -RWA    Patient Effort, Rehab Treatment good  -RW good  -KD good  -RWA    Symptoms Noted During/After Treatment   none  -RWA    Precautions/Limitations fall precautions  -RW fall precautions  -KD fall precautions  -RWA    Recorded by [RW] Pipe Gruber PTA [KD] Therese King LARA/L [RWA] Jacinta Pitts, OTR/L    Vital Signs    Pre Systolic BP Rehab  105  -KD     Pre Treatment Diastolic BP  57  -KD     Pretreatment Heart Rate (beats/min) 80  -RW 83  -KD 81  -RWA    Posttreatment Heart Rate (beats/min)  74  -KD 72  -RWA    Pre SpO2 (%) 93  -RW 94  -KD 96  -RWA    O2 Delivery Pre Treatment room air  -RW room air  -KD room air  -RWA    Post SpO2 (%)  96  -KD 95  -RWA    O2 Delivery Post Treatment  room air  -KD room air  -RWA    Pre Patient Position  Sitting  -KD Supine  -RWA    Intra Patient Position  Standing  -KD     Post Patient Position  Sitting  -KD Supine  -RWA    Recorded by [RW] Pipe Gruber PTA [KD] Therese King, LARA/L [RWA] Jacinta Pitts, OTR/L    Pain Assessment    Pain Assessment No/denies pain  -RW No/denies pain  -KD 0-10  -RWA    Pain Score   4  -RWA    Post Pain Score   3  -RWA    Pain Location   Back  -RWA    Pain Orientation   Lower;Upper  -RWA    Recorded by [RW] Pipe Gruber PTA [KD] LEIGH VidalesA/L [RWA] Jacinta Pitts, OTR/L    Vision Assessment/Intervention    Visual Impairment WFL with corrective lenses  -RW      Recorded by [RW] Pipe Gruber PTA      Cognitive Assessment/Intervention    Current Cognitive/Communication Assessment impaired  -RW impaired  -KD impaired  -RWA    Orientation Status oriented to;person;place  -RW oriented to;person;place  -KD oriented  to;person;place;situation  -RWA    Follows Commands/Answers Questions  100% of the time;able to follow single-step instructions  -KD able to follow single-step instructions;100% of the time  -RWA    Personal Safety   mild impairment  -RWA    Personal Safety Interventions gait belt;nonskid shoes/slippers when out of bed  -RW gait belt;nonskid shoes/slippers when out of bed  -KD     Recorded by [RW] Pipe Gruber PTA [KD] Therese King, LARA/L [RWA] Jacinta Pitts, OTR/L    Cognitive Assessment Intervention    Behavior/Mood Observations behavior appropriate to situation, WNL/WFL  -RW      Recorded by [RW] Pipe Gruber PTA      Bed Mobility, Assessment/Treatment    Bed Mob, Supine to Sit, [place] supervision required  -RW      Bed Mob, Sit to Supine, [place] supervision required  -RW      Recorded by [RW] Pipe Gruber PTA      Transfer Assessment/Treatment    Transfers, Bed-Chair [place] minimum assist (75% patient effort)  -RW contact guard assist  -KD     Transfers, Chair-Bed [place] minimum assist (75% patient effort)  -RW      Transfers, Bed-Chair-Bed, Assist Device rolling walker  -RW rolling walker  -KD     Transfers, Sit-Stand [place] contact guard assist;verbal cues required  -RW contact guard assist  -KD     Transfers, Stand-Sit [place] verbal cues required;contact guard assist  -RW contact guard assist  -KD     Transfers, Sit-Stand-Sit, Assist Device rolling walker  -RW rolling walker  -KD     Walk-In Shower Transfer, [place]  contact guard assist  -KD     Walk-In Shower Transfer, Assist Device  rolling walker  -KD     Transfer, Comment  Pt completed 10 sit-stands w/ 1 RB  -KD     Recorded by [RW] Pipe Gruber PTA [KD] Therese King, LARA/L     Gait Assessment/Treatment    Gait, [place] Level minimum assist (75% patient effort)  -RW      Gait, Assistive Device rolling walker  -RW      Gait, Distance (Feet) 60    x 4  -RW      Gait, Gait Deviations left:    occ foot drag  -RW      Recorded by [RW] Pipe Gruber PTA      Stairs Assessment/Treatment    Number of Stairs 4  -RW      Stairs, Handrail Location both sides  -RW      Stairs, [place] Level contact guard assist;verbal cues required  -RW      Recorded by [RW] Pipe Gruber PTA      Functional Mobility    Functional Mobility- Ind. Level  contact guard assist  -KD     Functional Mobility- Device  rolling walker  -KD     Functional Mobility-Distance (Feet)  --   10 x 2  -KD     Recorded by  [KD] MARCO Vidales/L     Wheelchair Training/Management    Wheelchair, Distance Propelled 150 cga  - cga w/ vc's  -KD     Recorded by [RW] Pipe Gruber PTA [KD] MARCO Vidales/L     Upper Body Bathing Assessment/Training    UB Bathing Assess/Train Assistive Device  bath mitt;grab bars;hand-held shower head;long-handled sponge;shower chair without back  -KD     UB Bathing Assess/Train, Position  edge of bed;sitting  -KD     UB Bathing Assess/Train, [place] Level  set up required  -KD     Recorded by  [KD] MARCO Vidales/L     Lower Body Bathing Assessment/Training    LB Bathing Assess/Train Assistive Device  bath mitt;grab bars;hand-held shower head;long-handled sponge;shower chair without back  -KD     LB Bathing Assess/Train, Position  sitting  -KD     LB Bathing Assess/Train, [place] Level  set up required  -KD     Recorded by  [KD] MARCO Vidales/L     Upper Body Dressing Assessment/Training    UB Dressing Assess/Train, Clothing Type  doffing:;donning:;pull over  -KD     UB Dressing Assess/Train, Position  edge of bed;sitting  -KD     UB Dressing Assess/Train, [place]  contact guard assist  -KD     Recorded by  [KD] MARCO Vidales/L     Lower Body Dressing Assessment/Training    LB Dressing Assess/Train, Clothing Type  doffing:;donning:;pants;shorts;socks  -KD     LB Dressing Assess/Train, Position  edge of bed;sitting  -KD     LB Dressing Assess/Train,  [place]  minimum assist (75% patient effort)  -KD     LB Dressing Assess/Train, Impairments  impaired balance  -KD     LB Dressing Assess/Train, Comment  Pt demonstrated impaired balance with LB dressing , pt leans to L and need vc's to self correct    -KD     Recorded by  [KD] MARCO Vidales/L     Grooming Assessment/Training    Grooming Assess/Train, Assistive Device  --   comb hair/ brush teeth  -KD     Grooming Assess/Train, Position  sitting  -KD     Grooming Assess/Train, Indepen Level  set up required  -KD     Recorded by  [KD] MARCO Vidales/L     Self-Feeding Assessment/Training    Self-Feeding Assess/Train, Position  sitting  -KD     Self-Feeding Assess/Train, [place]  conditional independence  -KD     Self-Feeding Assess/Train, Spillage Amount  minimal  -KD     Recorded by  [KD] MARCO Vidales/L     Balance Skills Training    Standing-Level of Assistance  Contact guard  -KD     Static Standing Balance Support Right upper extremity supported;Left upper extremity supported  -RW assistive device  -KD     Standing-Balance Activities Weight Shift A-P  -RW Weight Shift A-P   Baloon volleyball  -KD     Standing Balance # of Minutes  --   3  -KD     Recorded by [RW] Pipe Gruber, PTA [KD] MARCO Vidales/L     Therapy Exercises    Bilateral Lower Extremities AROM:;10 reps;supine;hip abduction/adduction;sitting;knee flexion;LAQ;ankle pumps/circles  -RW      Left Upper Extremity   5 reps  -RWA    LUE Resistance   theraputty   red  -RWA    Bilateral Upper Extremity  AROM:;20 reps;sitting;elbow flexion/extension;hand pumps;pronation/supination;shoulder circles;shoulder ER/IR;shoulder extension/flexion  -KD     BUE Resistance  manual resistance- minimal  -KD theraputty   removed beads from theraputty x3, requiring approx 15 min  -RWA    Trunk Exercises 10 reps;trunk rotation;supine   hooklying  -RW      Recorded by [RW] Pipe Gruber PTA [KD] MARCO Vidales/DAWN [RWA] Jacinta TOUSSAINT  Hong OTR/L    Fine Motor Coordination Training    Detail (Fine Motor Coordination Training)   pegboard ax & Connect 4 game to increase LUE fine motor coordination & strength  -RWA    Recorded by   [RWA] Jacinta Pitts OTR/L    Positioning and Restraints    Pre-Treatment Position sitting in chair/recliner  -RW sitting in chair/recliner  -KD in bed  -RWA    Post Treatment Position wheelchair  -RW wheelchair  -KD bed  -RWA    In Bed with family/caregiver  -RW  supine;call light within reach;encouraged to call for assist;with family/caregiver  -RWA    In Wheelchair  sitting;call light within reach;encouraged to call for assist;exit alarm on  -KD     Recorded by [RW] Pipe Gruber, PTA [KD] Therese King LARA/DAWN [RWA] Jacinta Pitts, OTR/L      User Key  (r) = Recorded By, (t) = Taken By, (c) = Cosigned By    Initials Name Effective Dates    JW Felicia Haynes, PTA 08/11/15 -     EC Clementina Murrell, CCC-SLP 12/30/16 -     RWJADIEL Pitts, OTR/L 10/17/16 -     CK Concetta Springer, MS CCC-SLP 10/17/16 -     RW Pipe Gruber, PTA 10/17/16 -     RC Cassie Carpio, LARA/L 10/17/16 -     KD Therese King, LARA/L 10/17/16 -     LM Carmen Boucher, LARA/L 10/17/16 -               IP SLP Goals       11/04/17 1411 11/03/17 1346 11/02/17 1614    Cognitive Linguistic- Improve Safety and Awareness    Cognitive Linguistic Improve Safety and Awareness- SLP, Date Goal Reviewed 11/04/17  -CK 11/03/17  -EC 11/02/17  -EC    Cognitive Linguistic Improve Safety and Awareness- SLP, Outcome goal ongoing  -CK goal not met  -EC goal not met  -EC      11/01/17 1159 10/31/17 1244       Cognitive Linguistic- Improve Safety and Awareness    Cognitive Linguistic Improve Safety and Awareness- SLP, Date Established  10/31/17  -HR     Cognitive Linguistic Improve Safety and Awareness- SLP, Time to Achieve  by discharge  -HR     Cognitive Linguistic Improve Safety and Awareness- SLP, Activity Level  Patient will  improve orientation for increased safety in environment;Patient will improve memory skills for increased safety in environment;Patient will improve functional problem solving skills for increased safety in environment  -HR     Cognitive Linguistic Improve Safety and Awareness- SLP, Date Goal Reviewed 11/01/17  -EC 10/31/17  -HR     Cognitive Linguistic Improve Safety and Awareness- SLP, Outcome goal not met  -EC goal ongoing  -HR       User Key  (r) = Recorded By, (t) = Taken By, (c) = Cosigned By    Initials Name Provider Type    EC Clementina Murrell, CCC-SLP Speech and Language Pathologist    HR Shante Otero, MS CCC-SLP Speech and Language Pathologist    DANIELLA Springer, MS CCC-SLP Speech and Language Pathologist          EDUCATION  The patient has been educated in the following areas:   Cognitive Impairment.    SLP Recommendation and Plan                               Plan of Care Review  Plan Of Care Reviewed With: patient  Progress: improving  Outcome Summary/Follow up Plan: Pt able to answer all orientation questions correctly w/use of calendar this date.  Pt was 100% acc w/recall of related words from previous 2 session using SRT.  Pt was 70% acc w/organizing check registry.          Time Calculation:         Time Calculation- SLP       11/04/17 1413          Time Calculation- SLP    SLP Start Time 1011  -CK      SLP Stop Time 1058  -      SLP Time Calculation (min) 47 min  -CK      Total Timed Code Minutes- SLP 47 minute(s)  -CK      SLP Received On 11/04/17  -CK      SLP Goal Re-Cert Due Date 11/14/17  -        User Key  (r) = Recorded By, (t) = Taken By, (c) = Cosigned By    Initials Name Provider Type    DANIELLA Springer MS CCC-SLP Speech and Language Pathologist          Therapy Charges for Today     Code Description Service Date Service Provider Modifiers Qty    [id number]  ST TREATMENT SPEECH 3 11/4/2017 Concetta Springer MS CCC-SLP GN 1               Concetta Springer MS  CCC-SLP  11/4/2017

## 2017-11-04 NOTE — PLAN OF CARE
Problem: Patient Care Overview (Adult)  Goal: Plan of Care Review  Outcome: Ongoing (interventions implemented as appropriate)    11/04/17 1440   Coping/Psychosocial Response Interventions   Plan Of Care Reviewed With patient   Patient Care Overview   Progress improving   Outcome Evaluation   Outcome Summary/Follow up Plan pt fatigued this afternoon, pt required vc's for safety to stay up in wx and to  L LE w/ gt, pt tends to drag L LE w/ gt as he fatigues, pt able to perform 20 reps of B LE ther ex         Problem: Inpatient Physical Therapy  Goal: Transfer Training Goal 1 LTG- PT  Outcome: Ongoing (interventions implemented as appropriate)    10/31/17 0825 11/04/17 1440   Transfer Training PT LTG   Transfer Training PT LTG, Date Established 10/31/17 --    Transfer Training PT LTG, Time to Achieve by discharge --    Transfer Training PT LTG, Activity Type bed to chair /chair to bed --    Transfer Training PT LTG, [place] Level supervision required --    Transfer Training PT LTG, Assist Device (AAD) --    Transfer Training PT LTG, Date Goal Reviewed --  11/04/17   Transfer Training PT LTG, Outcome --  goal ongoing       Goal: Gait Training Goal LTG- PT  Outcome: Ongoing (interventions implemented as appropriate)    10/31/17 0825 11/04/17 1440   Gait Training PT LTG   Gait Training Goal PT LTG, Date Established 10/31/17 --    Gait Training Goal PT LTG, Time to Achieve by discharge --    Gait Training Goal PT LTG, [place] Level supervision required --    Gait Training Goal PT LTG, Assist Device (AAD) --    Gait Training Goal PT LTG, Distance to Achieve 150 feet --    Gait Training Goal PT LTG, Date Goal Reviewed --  11/04/17   Gait Training Goal PT LTG, Outcome --  goal ongoing       Goal: Stair Training Goal STG- PT  Outcome: Ongoing (interventions implemented as appropriate)    10/31/17 0825 11/04/17 1440   Stair Training PT STG   Stair Training Goal PT STG, Date Established 10/31/17 --    Stair  Training Goal PT STG, Time to Achieve 4 days --    Stair Training Goal PT STG, Number of Steps 4 steps --    Stair Training Goal PT STG, [place] Level contact guard assist --    Stair Training Goal PT STG, Assist Device 1 handrail --    Stair Training Goal PT STG, Date Goal Reviewed --  11/04/17   Stair Training Goal PT STG, Outcome --  goal ongoing       Goal: Stair Training Goal LTG- PT  Outcome: Ongoing (interventions implemented as appropriate)    10/31/17 0825 11/04/17 1440   Stair Training PT LTG   Stair Training Goal PT LTG, Date Established --  11/04/17   Stair Training Goal PT LTG, Time to Achieve by discharge --    Stair Training Goal PT LTG, Number of Steps 1 flight --    Stair Training Goal PT LTG, [place] Level supervision required --    Stair Training Goal PT LTG, Assist Device 2 handrails --    Stair Training Goal PT LTG, Date Goal Reviewed --  11/04/17   Stair Training Goal PT LTG, Outcome --  goal ongoing

## 2017-11-04 NOTE — THERAPY TREATMENT NOTE
Inpatient Rehabilitation - Physical Therapy Treatment Note  [facility]     Patient Name: Kenneth Harper Jr.  : 1934  MRN: [id number]  Today's Date: 2017  Onset of Illness/Injury or Date of Surgery Date: 10/30/17  Date of Referral to PT: 10/30/17  Referring Physician: TERRENCE Mckinnon MD    Admit Date: 10/30/2017    Visit Dx:    ICD-10-CM ICD-9-CM   1. Symbolic dysfunction R48.9 784.60   2. Impaired mobility and ADLs Z74.09 799.89   3. Abnormality of gait and mobility R26.9 781.2   4. Muscle weakness (generalized) M62.81 728.87     Patient Active Problem List   Diagnosis   • Spinal stenosis, lumbar region, with neurogenic claudication   • Former smoker   • Overweight   • Left-sided weakness   • Right-sided lacunar stroke   • Essential hypertension   • Diabetes mellitus   • Chronic anxiety   • Chronic back pain greater than 3 months duration   • Prostatic hypertrophy   • Degenerative joint disease involving multiple joints   • Atrial fibrillation, chronic   • Encounter for rehabilitation   • Obstructive sleep apnea syndrome               Adult Rehabilitation Note       17 1440 17 1400 17 1120    Rehab Assessment/Intervention    Discipline physical therapy assistant  -JW  physical therapy assistant  -    Document Type therapy note (daily note)  -JW  therapy note (daily note)  -JW    Subjective Information agree to therapy  -JW  agree to therapy  -JW    Patient Effort, Rehab Treatment good  -JW  good  -JW    Precautions/Limitations fall precautions  -JW  fall precautions  -JW    Recorded by [JW] Felicia Haynes PTA  [JW] Felicia Haynes PTA    Vital Signs    Pretreatment Heart Rate (beats/min)   56  -JW    Pre SpO2 (%)   97  -JW    O2 Delivery Pre Treatment   room air  -JW    Pre Patient Position Sitting  -JW  Sitting  -JW    Post Patient Position Supine  -JW  Sitting  -JW    Recorded by [JW] Felicia Haynes PTA  [JW] Felicia Haynes PTA    Pain Assessment     Pain Assessment 0-10  -JW  0-10  -JW    Pain Score 0  -JW  0  -JW    Post Pain Score 0  -JW  0  -JW    Recorded by [JW] Felicia Haynes PTA  [JW] Felicia Haynes PTA    Cognitive Assessment/Intervention    Current Cognitive/Communication Assessment impaired  -JW  impaired  -JW    Orientation Status oriented to;oriented x 4  -JW  oriented to;person;place     -JW    Follows Commands/Answers Questions 100% of the time  -JW  100% of the time  -JW    Personal Safety   mild impairment  -JW    Personal Safety Interventions fall prevention program maintained;gait belt;nonskid shoes/slippers when out of bed  -JW  fall prevention program maintained;gait belt;nonskid shoes/slippers when out of bed  -JW    Recorded by [JW] Felicia Haynes PTA  [JW] Felicia Haynes PTA    Bed Mobility, Assessment/Treatment    Bed Mobility, Assistive Device --   HOB down, no bedrails  -JW      Bed Mob, Sit to Supine, [place] supervision required  -JW      Recorded by [JW] Felicia Haynes PTA      Transfer Assessment/Treatment    Transfers, Sit-Stand [place] stand by assist;verbal cues required  -JW --  -JW stand by assist;verbal cues required  -JW    Transfers, Stand-Sit [place] stand by assist;verbal cues required  -JW --  -JW stand by assist;verbal cues required  -JW    Transfers, Sit-Stand-Sit, Assist Device rolling walker  -JW --  -JW rolling walker  -JW    Recorded by [JW] Felicia Haynes PTA [JW] Felicia Haynes, MARTIN [JW] Felicia Haynes, PTA    Gait Assessment/Treatment    Gait, [place] Level minimum assist (75% patient effort);verbal cues required  -JW --  - minimum assist (75% patient effort);verbal cues required  -JW    Gait, Assistive Device rolling walker  -JW --  -JW rolling walker  -JW    Gait, Distance (Feet) 58  -JW --  -JW 84   82, 74  -JW    Gait, Comment pt very fatigued this afternoon, requesting to lie down  -JW --  -JW as pt fatigued w/ gait, his L  foot would drag requiring vc's to  foot or a rest break  -JW    Recorded by [JW] Felicia Haynes PTA [JW] Felicia Haynes PTA [JW] Felicia Haynes PTA    Stairs Assessment/Treatment    Number of Stairs '  -JW      Recorded by [JW] Felicia Haynes PTA      Therapy Exercises    Bilateral Lower Extremities AROM:;20 reps;supine;ankle pumps/circles;glut sets;quad sets;heel slides;hip abduction/adduction  -JW --  -JW AROM:;20 reps;sitting;ankle pumps/circles;glut sets;hip abduction/adduction;hip flexion;LAQ  -JW    BLE Resistance   theraband   level 2  -JW    Trunk Exercises supine;bridging;15 reps   hooklying trunk rotation  -JW      Recorded by [JW] Felicia Haynes PTA [JW] Felicia Haynes PTA [JW] Felicia Haynes PTA    Positioning and Restraints    Pre-Treatment Position in bed  -JW  sitting in chair/recliner  -JW    Post Treatment Position bed  -JW  wheelchair  -JW    In Bed supine;call light within reach;encouraged to call for assist  -JW      In Wheelchair   sitting;call light within reach;encouraged to call for assist  -JW    Recorded by [JW] Felicia Haynes PTA  [JW] Felicia Haynes PTA      11/04/17 1011 11/04/17 0738 11/03/17 1421    Rehab Assessment/Intervention    Discipline speech language pathologist  -CK occupational therapy assistant  -RC physical therapy assistant  -RW    Document Type therapy note (daily note)  -CK therapy note (daily note)  -RC therapy note (daily note)  -RW    Subjective Information agree to therapy  -CK agree to therapy  -RC agree to therapy  -RW    Patient Effort, Rehab Treatment good  -CK good  -RC good  -RW    Precautions/Limitations  fall precautions  -RC fall precautions  -RW    Recorded by [CK] Concetta Springer MS CCC-SLP [RC] MARCO Cannon/L [RW] Pipe Gruber PTA    Pain Assessment    Pain Assessment No/denies pain  -CK No/denies pain  -RC     Pain Score 0  -CK      Post Pain Score 0  -CK      Recorded  by [CK] Concetta Springer MS CCC-SLP [RC] Cassie Carpio, LARA/L     Vision Assessment/Intervention    Visual Impairment   WFL with corrective lenses  -RW    Recorded by   [RW] Pipe Gruber PTA    Cognitive Assessment/Intervention    Current Cognitive/Communication Assessment  impaired  -RC impaired  -RW    Orientation Status   oriented to;person;place  -RW    Recorded by  [RC] Cassie Carpio, LARA/L [RW] Pipe Gruber PTA    Cognitive Assessment Intervention    Behavior/Mood Observations   behavior appropriate to situation, WNL/WFL  -RW    Recorded by   [RW] Pipe Gruber PTA    Communication Treatment Objective and Progress    Cognitive Linguistic Treatment Objectives Improve orientation;Improve memory skills;Improve functional problem solving  -CK      Recorded by [CK] Concetta Springer MS CCC-SLP      Improve orientation    Improve orientation through: demonstrating orientation to day;demonstrating orientation to month;demonstrating orientation to year;demonstrating orientation to place;demonstrating orientation to disease/impairment;90%;with inconsistent cues  -CK      Status: Improve orientation through Progressing as expected  -CK      Orientation Progress 80%  -CK      Comments: Improve orientation through use of calendar  -CK      Recorded by [CK] Concetta Springer MS CCC-SLP      Improve memory skills    Improve memory skills through: recalling related word lists with an imposed delay;90%;with inconsistent cues  -CK      Status: Improve memory skills Progressing as expected  -CK      Memory Skills Progress 70%  -CK      Comments: Improve memory skills Used same words from previous session.  Pt able to recall 4/4 using SRT  -CK      Recorded by [CK] Concetta Springer MS CCC-SLP      Improve functional problem solving    Improve functional problem solving through: complete organization/home management task;tell similarity between items  -CK      Status: Improve functional problem solving  Progressing as expected  -CK      Functional Problem Solving Progress 70%  -CK      Recorded by [CK] Concetta Springer MS CCC-SLP      Bed Mobility, Assessment/Treatment    Bed Mobility, Assistive Device   head of bed elevated;bed rails  -RW    Bed Mobility, Scoot/Bridge, [place]   supervision required  -RW    Bed Mob, Sit to Supine, [place]   supervision required  -RW    Recorded by   [RW] Pipe Gruber PTA    Transfer Assessment/Treatment    Transfers, Bed-Chair [place]  minimum assist (75% patient effort);verbal cues required   chair to chair  -RC --  -RW    Transfers, Chair-Bed [place]  minimum assist (75% patient effort);verbal cues required   chair to mat to chair  -RC contact guard assist  -RW    Transfers, Bed-Chair-Bed, Assist Device  rolling walker  -RC rolling walker  -RW    Transfers, Sit-Stand [place]   verbal cues required;stand by assist  -RW    Transfers, Stand-Sit [place]   verbal cues required;stand by assist  -RW    Transfers, Sit-Stand-Sit, Assist Device   rolling walker  -RW    Transfer, Safety Issues  sequencing ability decreased  -RC     Recorded by  [RC] MARCO Cannon/L [RW] Pipe Gruber PTA    Gait Assessment/Treatment    Gait, [place] Level   minimum assist (75% patient effort);contact guard assist;verbal cues required  -RW    Gait, Assistive Device   rolling walker  -RW    Gait, Distance (Feet)   10  -RW    Recorded by   [RW] Pipe Gruber PTA    Stairs Assessment/Treatment    Stairs, Handrail Location   --  -RW    Stairs, [place] Level   --  -RW    Recorded by   [RW] Pipe Gruber PTA    Functional Mobility    Functional Mobility- Ind. Level  contact guard assist;verbal cues required  -RC     Functional Mobility- Device  rolling walker  -RC     Functional Mobility- Safety Issues  --   needs vc's for safe tech  -RC     Recorded by  [RC] MARCO Cannon/L     Wheelchair Training/Management    Wheelchair, Distance Propelled  150   -RC     Recorded by  [RC] MARCO Cannon/L     ADL Assessment/Intervention    Additional Documentation  --   declined bathing/dressing  -RC     Recorded by  [RC] MARCO Cannon/L     Lower Body Dressing Assessment/Training    LB Dressing Assess/Train, Clothing Type  shoes  -RC shoes;doffing:  -RW    LB Dressing Assess/Train, Position  sitting  -RC     Recorded by  [RC] MARCO Cannon/L [RW] Pipe Gruber, PTA    Balance Skills Training    Standing-Level of Assistance   --  -RW    Static Standing Balance Support   --  -RW    Standing-Balance Activities   --  -RW    Gait Balance Support   --  -RW    Gait Balance Activities   --  -RW    Recorded by   [RW] Pipe Gruber, MARTIN    Therapy Exercises    Bilateral Lower Extremities   AROM:;20 reps;supine;ankle pumps/circles;glut sets;heel slides;hip abduction/adduction;SAQ  -RW    Bilateral Upper Extremity  --   ue bike 10 min, pulley ex 5 min  -RC     Trunk Exercises   trunk rotation;supine;15 reps   hooklying  -RW    Recorded by  [RC] MARCO Cannon/L [RW] Pipe Gruber, [facility]    Fine Motor Coordination Training    Detail (Fine Motor Coordination Training)  --   sml pegs on eseal, rom arc, clothes pin tree  -RC     Recorded by  [RC] MARCO Cannon/L     Positioning and Restraints    Pre-Treatment Position  sitting in chair/recliner  -RC sitting in chair/recliner  -RW    Post Treatment Position  wheelchair  -RC bed  -RW    In Bed   call light within reach  -RW    In Wheelchair  encouraged to call for assist  -RC     Recorded by  [RC] AMRCO Cannon/L [RW] Pipe Gruber, [facility]      11/03/17 1300 11/03/17 1025 11/03/17 0807    Rehab Assessment/Intervention    Discipline speech language pathologist  -EC occupational therapy assistant  -LM physical therapy assistant  -RW    Document Type therapy note (daily note)  -EC therapy note (daily note)  -LM therapy note (daily note)  -RW    Subjective Information agree to therapy  -EC agree to  therapy  -LM agree to therapy  -RW    Patient Effort, Rehab Treatment  good  -LM good  -RW    Symptoms Noted During/After Treatment  none  -LM     Precautions/Limitations   fall precautions  -RW    Recorded by [EC] Clementina Murrell CCC-SLP [LM] LEIGH PiñaA/L [RW] Pipe Gruber PTA    Pain Assessment    Pain Assessment No/denies pain  -EC No/denies pain  -LM No/denies pain  -RW    Pain Score  4  -LM     Post Pain Score  3  -LM     Pain Type  Acute pain  -LM     Pain Location  Back  -LM     Recorded by [EC] MERI Schultz [LM] LEIGH PiñaA/L [RW] Pipe Gruber PTA    Vision Assessment/Intervention    Visual Impairment  WFL  -LM WFL with corrective lenses  -RW    Recorded by  [LM] LEIGH PiñaA/L [RW] Pipe Gruber PTA    Cognitive Assessment/Intervention    Current Cognitive/Communication Assessment  impaired  -LM impaired  -RW    Orientation Status  oriented x 4  -LM oriented to;person;place  -RW    Follows Commands/Answers Questions  100% of the time  -LM     Personal Safety  mild impairment  -LM     Recorded by  [LM] LEIGH PiñaA/L [RW] Pipe Gruber PTA    Cognitive Assessment Intervention    Behavior/Mood Observations   behavior appropriate to situation, WNL/WFL  -RW    Recorded by   [RW] Pipe Gruber PTA    Communication Treatment Objective and Progress    Cognitive Linguistic Treatment Objectives Improve orientation;Improve memory skills;Improve functional problem solving  -EC      Recorded by [EC] MERI Schultz      Improve orientation    Improve orientation through: demonstrating orientation to day;demonstrating orientation to month;demonstrating orientation to year;demonstrating orientation to place;demonstrating orientation to disease/impairment;90%;with inconsistent cues  -EC      Status: Improve orientation through Progress slower than expected  -EC      Orientation Progress 50%  -EC      Comments: Improve orientation  through with mod cues and calendar to look at  -EC      Recorded by [EC] SHELTON SchultzSLP      Improve memory skills    Improve memory skills through: recalling related word lists with an imposed delay;90%;with inconsistent cues  -EC      Status: Improve memory skills Progress slower than expected  -EC      Memory Skills Progress 60%  -EC      Comments: Improve memory skills using the same 4 words from previous 2 days.  patient continues to only get 3/4 even with memory st rategies.   -EC      Recorded by [EC] MERI Schultz      Improve functional problem solving    Improve functional problem solving through: complete organization/home management task;tell similarity between items  -EC      Status: Improve functional problem solving Progress slower than expected  -EC      Functional Problem Solving Progress 60%  -EC      Comments: Improve functional problem solving moderate cues to read infomation on a bill and write a check  -EC      Recorded by [EC] SHELTON SchultzSLP      Transfer Assessment/Treatment    Transfers, Bed-Chair [place]   minimum assist (75% patient effort)  -RW    Transfers, Chair-Bed [place]   minimum assist (75% patient effort)  -RW    Transfers, Bed-Chair-Bed, Assist Device   rolling walker  -RW    Transfers, Sit-Stand [place]   verbal cues required;stand by assist  -RW    Transfers, Stand-Sit [place]   verbal cues required;stand by assist  -RW    Transfers, Sit-Stand-Sit, Assist Device   rolling walker  -RW    Recorded by   [RW] Pipe Gruber PTA    Gait Assessment/Treatment    Gait, [place] Level   minimum assist (75% patient effort);contact guard assist;verbal cues required  -RW    Gait, Assistive Device   rolling walker  -RW    Gait, Distance (Feet)   40    x 5  -RW    Recorded by   [RW] Pipe Gruber PTA    Stairs Assessment/Treatment    Number of Stairs   4  -RW    Stairs, Handrail Location   left side (ascending)  -RW     Stairs, [place] Level   minimum assist (75% patient effort);verbal cues required  -RW    Stairs, Comment   pt not getting weak leg onto step  fully which poses a risk for accident  -RW    Recorded by   [RW] Pipe Gruber PTA    Wheelchair Training/Management    Wheelchair, Distance Propelled  --   150 ft   -LM     Recorded by  [LM] LEIGH PiñaA/L     Lower Body Dressing Assessment/Training    LB Dressing Assess/Train, Clothing Type   donning:;shoes  -RW    LB Dressing Assess/Train, [place]   set up required  -RW    Recorded by   [RW] Pipe Gruber PTA    Grooming Assessment/Training    Grooming Assess/Train, Indepen Level  independent  -LM     Recorded by  [LM] LEIGH PiñaA/L     Motor Skills/Interventions    Motor Response Observations  --   ther act with multi act to incresae rom pulley over head  -LM     Additional Documentation  --   coordination act B UE to incresae L UE  -LM     Recorded by  [LM] LEIGH PiñaA/L     Balance Skills Training    Standing-Level of Assistance   Contact guard  -RW    Static Standing Balance Support   assistive device  -RW    Standing-Balance Activities   --   tucking in shirt-tail  -RW    Gait Balance Support   parallel bars  -RW    Gait Balance Activities   side-stepping  -RW    Recorded by   [RW] Pipe Gruber PTA    Therapy Exercises    Bilateral Lower Extremities   AROM:;20 reps;heel raises  -RW    Bilateral Upper Extremity  AROM:;elbow flexion/extension;shoulder abduction/adduction;shoulder extension/flexion;shoulder horizontal abd/add;shoulder protraction/retraction;shoulder rolls/shrugs   also perf tband all planes x 20 x 2   -LM     BUE Resistance  manual resistance- minimal   also UEE bike x 15 min   -LM     Trunk Exercises   --   hooklying  -RW    Recorded by  [LM] MARCO Piña/L [RW] Pipe Gruber PTA    Positioning and Restraints    Pre-Treatment Position  sitting in chair/recliner  -LM in bed   eob  -RW     Post Treatment Position  wheelchair  -LM wheelchair  -RW    Recorded by  [LM] MARCO Piña/L [RW] Pipe Gruber PTA      11/02/17 1455 11/02/17 1405 11/02/17 0902    Rehab Assessment/Intervention    Discipline speech language pathologist  -EC physical therapy assistant  -RW physical therapy assistant  -RW    Document Type therapy note (daily note)  -EC therapy note (daily note)  -RW therapy note (daily note)  -RW    Subjective Information agree to therapy  -EC agree to therapy  -RW agree to therapy  -RW    Patient Effort, Rehab Treatment good  -EC good  -RW good  -RW    Precautions/Limitations  fall precautions  -RW fall precautions  -RW    Recorded by [EC] Clementina Murrell CCC-SLP [RW] Pipe Gruber PTA [RW] Pipe Gruber PTA    Vital Signs    Pretreatment Heart Rate (beats/min)   80  -RW    Pre SpO2 (%)   93  -RW    O2 Delivery Pre Treatment   room air  -RW    Recorded by   [RW] Pipe Gruber PTA    Pain Assessment    Pain Assessment No/denies pain  -EC No/denies pain  -RW No/denies pain  -RW    Recorded by [EC] Clementina Murrell CCC-SLP [RW] Pipe Gruber PTA [RW] Pipe Gruber PTA    Vision Assessment/Intervention    Visual Impairment  WFL with corrective lenses  -RW WFL with corrective lenses  -RW    Recorded by  [RW] Pipe Gruber PTA [RW] Pipe Gruber PTA    Cognitive Assessment/Intervention    Current Cognitive/Communication Assessment  impaired  -RW impaired  -RW    Orientation Status  oriented to;person;place  -RW oriented to;person;place  -RW    Personal Safety Interventions  gait belt;nonskid shoes/slippers when out of bed  -RW gait belt;nonskid shoes/slippers when out of bed  -RW    Recorded by  [RW] Pipe Gruber PTA [RW] Pipe Gruber PTA    Cognitive Assessment Intervention    Behavior/Mood Observations  behavior appropriate to situation, WNL/WFL  -RW behavior appropriate to situation, WNL/WFL  -RW    Recorded by  [RW] Pipe Gruber PTA [RW] Pipe Gruber PTA     Communication Treatment Objective and Progress    Cognitive Linguistic Treatment Objectives Improve orientation;Improve memory skills;Improve functional problem solving  -EC      Recorded by [EC] MERI Schultz      Improve orientation    Improve orientation through: demonstrating orientation to day;demonstrating orientation to month;demonstrating orientation to year;demonstrating orientation to place;demonstrating orientation to disease/impairment;90%;with inconsistent cues  -EC      Status: Improve orientation through Progress slower than expected  -EC      Orientation Progress 10%  -EC      Recorded by [EC] MERI Schultz      Improve memory skills    Improve memory skills through: recalling related word lists with an imposed delay;90%;with inconsistent cues  -EC      Status: Improve memory skills Progress slower than expected  -EC      Memory Skills Progress 70%  -EC      Recorded by [EC] MERI Schultz      Improve functional problem solving    Improve functional problem solving through: complete organization/home management task;tell similarity between items  -EC      Status: Improve functional problem solving Progress slower than expected  -EC      Functional Problem Solving Progress 30%  -EC      Recorded by [EC] SHELTON SchultzSLP      Bed Mobility, Assessment/Treatment    Bed Mob, Supine to Sit, [place]   supervision required  -RW    Bed Mob, Sit to Supine, [place]   supervision required  -RW    Recorded by   [RW] Pipe Gruber, PTA    Transfer Assessment/Treatment    Transfers, Bed-Chair [place]  minimum assist (75% patient effort)  -RW minimum assist (75% patient effort)  -RW    Transfers, Chair-Bed [place]  minimum assist (75% patient effort)  -RW minimum assist (75% patient effort)  -RW    Transfers, Bed-Chair-Bed, Assist Device  rolling walker  -RW rolling walker  -RW    Transfers, Sit-Stand [place]  contact guard  assist;verbal cues required  -RW contact guard assist;verbal cues required  -RW    Transfers, Stand-Sit [place]  verbal cues required;contact guard assist  -RW verbal cues required;contact guard assist  -RW    Transfers, Sit-Stand-Sit, Assist Device  rolling walker  -RW rolling walker  -RW    Transfer, Comment  sit to stand x 5  -RW     Recorded by  [RW] Pipe Gruber PTA [RW] Pipe Gruber PTA    Gait Assessment/Treatment    Gait, [place] Level  minimum assist (75% patient effort)  -RW minimum assist (75% patient effort)  -RW    Gait, Assistive Device  rolling walker  -RW rolling walker  -RW    Gait, Distance (Feet)  150   2 standing / stop to regroup, 60 x 3  -RW 60    x 4  -RW    Gait, Gait Deviations  --   left foot drag at times  -RW left:   occ foot drag  -RW    Recorded by  [RW] Pipe Gruber PTA [RW] Pipe Gruber PTA    Stairs Assessment/Treatment    Number of Stairs   4  -RW    Stairs, Handrail Location   both sides  -RW    Stairs, [place] Level   contact guard assist;verbal cues required  -RW    Recorded by   [RW] Pipe Gruber PTA    Wheelchair Training/Management    Wheelchair, Distance Propelled   150 cga  -RW    Recorded by   [RW] Pipe Gruber PTA    Balance Skills Training    Static Standing Balance Support   Right upper extremity supported;Left upper extremity supported  -RW    Standing-Balance Activities   Weight Shift A-P  -RW    Recorded by   [RW] Pipe Gruber PTA    Therapy Exercises    Bilateral Lower Extremities  AROM:;sitting;knee flexion;LAQ;ankle pumps/circles;20 reps;hip flexion;standing;heel slides  -RW AROM:;10 reps;supine;hip abduction/adduction;sitting;knee flexion;LAQ;ankle pumps/circles  -RW    Trunk Exercises  --   hooklying  -RW 10 reps;trunk rotation;supine   hooklying  -RW    Recorded by  [RW] Pipe Gruber PTA [RW] Pipe Gruber PTA    Positioning and Restraints    Pre-Treatment Position  sitting in chair/recliner  -RW sitting in chair/recliner   -RW    Post Treatment Position  wheelchair  -RW wheelchair  -RW    In Bed   with family/caregiver  -RW    Recorded by  [RW] Pipe Gruber PTA [RW] Pipe Gruber PTA      11/02/17 0715 11/01/17 1524       Rehab Assessment/Intervention    Discipline occupational therapy assistant  -KD occupational therapist  -RWA     Document Type therapy note (daily note)  -KD therapy note (daily note)  -RWA     Subjective Information agree to therapy  -KD agree to therapy;complains of;pain  -RWA     Patient Effort, Rehab Treatment good  -KD good  -RWA     Symptoms Noted During/After Treatment  none  -RWA     Precautions/Limitations fall precautions  -KD fall precautions  -RWA     Recorded by [KD] MARCO Vidales/L [RWA] Jacinta Pitts, OTR/L     Vital Signs    Pre Systolic BP Rehab 105  -KD      Pre Treatment Diastolic BP 57  -KD      Pretreatment Heart Rate (beats/min) 83  -KD 81  -RWA     Posttreatment Heart Rate (beats/min) 74  -KD 72  -RWA     Pre SpO2 (%) 94  -KD 96  -RWA     O2 Delivery Pre Treatment room air  -KD room air  -RWA     Post SpO2 (%) 96  -KD 95  -RWA     O2 Delivery Post Treatment room air  -KD room air  -RWA     Pre Patient Position Sitting  -KD Supine  -RWA     Intra Patient Position Standing  -KD      Post Patient Position Sitting  -KD Supine  -RWA     Recorded by [KD] LEIGH VidalesA/DAWN [RWA] Jacinta Pitts, OTR/L     Pain Assessment    Pain Assessment No/denies pain  -KD 0-10  -RWA     Pain Score  4  -RWA     Post Pain Score  3  -RWA     Pain Location  Back  -RWA     Pain Orientation  Lower;Upper  -RWA     Recorded by [KD] MARCO Vidales/DAWN [RWA] Jacinta Pitts, OTR/L     Cognitive Assessment/Intervention    Current Cognitive/Communication Assessment impaired  -KD impaired  -RWA     Orientation Status oriented to;person;place  -KD oriented to;person;place;situation  -RWA     Follows Commands/Answers Questions 100% of the time;able to follow single-step instructions  -KD able to  follow single-step instructions;100% of the time  -RWA     Personal Safety  mild impairment  -RWA     Personal Safety Interventions gait belt;nonskid shoes/slippers when out of bed  -KD      Recorded by [KD] MARCO Vidales/DAWN [RWA] Jacinta Pitts, OTR/L     Transfer Assessment/Treatment    Transfers, Bed-Chair [place] contact guard assist  -KD      Transfers, Bed-Chair-Bed, Assist Device rolling walker  -KD      Transfers, Sit-Stand [place] contact guard assist  -KD      Transfers, Stand-Sit [place] contact guard assist  -KD      Transfers, Sit-Stand-Sit, Assist Device rolling walker  -KD      Walk-In Shower Transfer, [place] contact guard assist  -KD      Walk-In Shower Transfer, Assist Device rolling walker  -KD      Transfer, Comment Pt completed 10 sit-stands w/ 1 RB  -KD      Recorded by [KD] MARCO Vidales/L      Functional Mobility    Functional Mobility- Ind. Level contact guard assist  -KD      Functional Mobility- Device rolling walker  -KD      Functional Mobility-Distance (Feet) --   10 x 2  -KD      Recorded by [KD] MARCO Vidales/L      Wheelchair Training/Management    Wheelchair, Distance Propelled 150 cga w/ vc's  -KD      Recorded by [KD] MARCO Vidales/L      Upper Body Bathing Assessment/Training    UB Bathing Assess/Train Assistive Device bath mitt;grab bars;hand-held shower head;long-handled sponge;shower chair without back  -KD      UB Bathing Assess/Train, Position edge of bed;sitting  -KD      UB Bathing Assess/Train, [place] Level set up required  -KD      Recorded by [KD] MARCO Vidales/L      Lower Body Bathing Assessment/Training    LB Bathing Assess/Train Assistive Device bath mitt;grab bars;hand-held shower head;long-handled sponge;shower chair without back  -KD      LB Bathing Assess/Train, Position sitting  -KD      LB Bathing Assess/Train, [place] Level set up required  -KD      Recorded by [KD] MARCO Vidales/DAWN       Upper Body Dressing Assessment/Training    UB Dressing Assess/Train, Clothing Type doffing:;donning:;pull over  -KD      UB Dressing Assess/Train, Position edge of bed;sitting  -KD      UB Dressing Assess/Train, [place] contact guard assist  -KD      Recorded by [KD] DAYDAY Vidales      Lower Body Dressing Assessment/Training    LB Dressing Assess/Train, Clothing Type doffing:;donning:;pants;shorts;socks  -KD      LB Dressing Assess/Train, Position edge of bed;sitting  -KD      LB Dressing Assess/Train, [place] minimum assist (75% patient effort)  -KD      LB Dressing Assess/Train, Impairments impaired balance  -KD      LB Dressing Assess/Train, Comment Pt demonstrated impaired balance with LB dressing , pt leans to L and need vc's to self correct    -KD      Recorded by [KD] DAYDAY Vidales      Grooming Assessment/Training    Grooming Assess/Train, Assistive Device --   comb hair/ brush teeth  -KD      Grooming Assess/Train, Position sitting  -KD      Grooming Assess/Train, [place] Level set up required  -KD      Recorded by [KD] DAYDAY Vidales      Self-Feeding Assessment/Training    Self-Feeding Assess/Train, Position sitting  -KD      Self-Feeding Assess/Train, [place] conditional independence  -KD      Self-Feeding Assess/Train, Spillage Amount minimal  -KD      Recorded by [KD] DAYDAY Vidales      Balance Skills Training    Standing-Level of Assistance Contact guard  -KD      Static Standing Balance Support assistive device  -KD      Standing-Balance Activities Weight Shift A-P   Baloon volleyball  -KD      Standing Balance # of Minutes --   3  -KD      Recorded by [KD] DAYDAY Vidales      Therapy Exercises    Left Upper Extremity  5 reps  -RWA     LUE Resistance  theraputty   red  -RWA     Bilateral Upper Extremity AROM:;20 reps;sitting;elbow flexion/extension;hand pumps;pronation/supination;shoulder circles;shoulder ER/IR;shoulder extension/flexion   -KD      BUE Resistance manual resistance- minimal  -KD theraputty   removed beads from theraputty x3, requiring approx 15 min  -RWA     Recorded by [KD] Therese King LARA/L [RWA] Jacinta Pitts, OTR/L     Fine Motor Coordination Training    Detail (Fine Motor Coordination Training)  pegboard ax & Connect 4 game to increase LUE fine motor coordination & strength  -RWA     Recorded by  [RWA] Jacinta Pitts, OTR/L     Positioning and Restraints    Pre-Treatment Position sitting in chair/recliner  -KD in bed  -RWA     Post Treatment Position wheelchair  -KD bed  -RWA     In Bed  supine;call light within reach;encouraged to call for assist;with family/caregiver  -RWA     In Wheelchair sitting;call light within reach;encouraged to call for assist;exit alarm on  -KD      Recorded by [KD] Therees King LARA/L [RWA] Jacinta Pitts, OTR/L       User Key  (r) = Recorded By, (t) = Taken By, (c) = Cosigned By    Initials Name Effective Dates     Felicia Haynes, PTA 08/11/15 -     EC Clementina Murrell, CCC-SLP 12/30/16 -     RWA Jacinta Pitts, OTR/L 10/17/16 -     CK Concetta Springer, MS CCC-SLP 10/17/16 -     RW Pipe Gruber, PTA 10/17/16 -     RC Cassie Carpio, LARA/L 10/17/16 -     KD Therese King, LARA/L 10/17/16 -     LM Carmen Boucher, LARA/L 10/17/16 -                 IP PT Goals       11/04/17 1440 11/03/17 1540 11/02/17 1631    Bed Mobility PT LTG    Bed Mobility PT LTG, Date Goal Reviewed   11/02/17  -RW    Bed Mobility PT LTG, Outcome   goal met  -RW    Transfer Training PT LTG    Transfer Training PT  LTG, Date Goal Reviewed 11/04/17  -JW 11/03/17  -RW 11/02/17  -RW    Transfer Training PT LTG, Outcome goal ongoing  -JW goal ongoing  -RW goal ongoing  -RW    Gait Training PT LTG    Gait Training Goal PT LTG, Date Goal Reviewed 11/04/17  -JW 11/03/17  -RW 11/02/17  -RW    Gait Training Goal PT LTG, Outcome goal ongoing  -JW goal ongoing  -RW goal ongoing  -RW    Stair Training  PT STG    Stair Training Goal PT STG, Date Goal Reviewed 11/04/17  -JW 11/03/17  -RW 11/02/17  -RW    Stair Training Goal PT STG, Outcome goal ongoing  -JW goal ongoing  -RW goal ongoing  -RW    Stair Training PT LTG    Stair Training Goal PT LTG, Date Established 11/04/17  -JW      Stair Training Goal PT LTG, Date Goal Reviewed 11/04/17  -JW 11/03/17  -RW 11/02/17  -RW    Stair Training Goal PT LTG, Outcome goal ongoing  -JW goal ongoing  -RW goal ongoing  -RW      11/01/17 1543 10/31/17 0825       Bed Mobility PT LTG    Bed Mobility PT LTG, Date Established  10/31/17  -LM     Bed Mobility PT LTG, Time to Achieve  by discharge  -LM     Bed Mobility PT LTG, Activity Type  supine to sit/sit to supine  -LM     Bed Mobility PT LTG, [place] Level  supervision required  -LM     Bed Mobility PT LTG, Additional Goal  HOB flat; No BR's  -LM     Bed Mobility PT LTG, Date Goal Reviewed 11/01/17  -RW      Bed Mobility PT LTG, Outcome goal ongoing  -RW goal ongoing  -LM     Transfer Training PT LTG    Transfer Training PT LTG, Date Established  10/31/17  -LM     Transfer Training PT LTG, Time to Achieve  by discharge  -LM     Transfer Training PT LTG, Activity Type  bed to chair /chair to bed  -LM     Transfer Training PT LTG, [place] Level  supervision required  -LM     Transfer Training PT LTG, Assist Device  --   AAD  -LM     Transfer Training PT  LTG, Date Goal Reviewed 11/01/17  -RW      Transfer Training PT LTG, Outcome goal ongoing  -RW goal ongoing  -LM     Gait Training PT LTG    Gait Training Goal PT LTG, Date Established  10/31/17  -LM     Gait Training Goal PT LTG, Time to Achieve  by discharge  -LM     Gait Training Goal PT LTG, [place] Level  supervision required  -LM     Gait Training Goal PT LTG, Assist Device  --   AAD  -LM     Gait Training Goal PT LTG, Distance to Achieve  150 feet  -LM     Gait Training Goal PT LTG, Date Goal Reviewed 11/01/17  -RW      Gait Training Goal PT LTG, Outcome goal  ongoing  -RW goal ongoing  -LM     Stair Training PT STG    Stair Training Goal PT STG, Date Established  10/31/17  -LM     Stair Training Goal PT STG, Time to Achieve  4 days  -LM     Stair Training Goal PT STG, Number of Steps  4 steps  -LM     Stair Training Goal PT STG, [place] Level  contact guard assist  -LM     Stair Training Goal PT STG, Assist Device  1 handrail  -LM     Stair Training Goal PT STG, Date Goal Reviewed 11/01/17  -RW      Stair Training Goal PT STG, Outcome goal ongoing  -RW goal ongoing  -LM     Stair Training PT LTG    Stair Training Goal PT LTG, Date Established  10/31/17  -LM     Stair Training Goal PT LTG, Time to Achieve  by discharge  -LM     Stair Training Goal PT LTG, Number of Steps  1 flight  -LM     Stair Training Goal PT LTG, [place] Level  supervision required  -LM     Stair Training Goal PT LTG, Assist Device  2 handrails  -LM     Stair Training Goal PT LTG, Date Goal Reviewed 11/01/17  -RW      Stair Training Goal PT LTG, Outcome goal ongoing  -RW goal ongoing  -LM       User Key  (r) = Recorded By, (t) = Taken By, (c) = Cosigned By    Initials Name Provider Type    JW Felicia Haynes, PTA Physical Therapy Assistant     Landy Mckeon, PT Physical Therapist    RW Pipe Gruber, PTA Physical Therapy Assistant          Physical Therapy Education     Title: PT OT SLP Therapies (Active)     Topic: Physical Therapy (Active)     Point: Mobility training (Done)    Learning Progress Summary    Learner Readiness Method Response Comment Documented by Status   Patient Acceptance E VU again reviewed correct gt and transfer safey. RW 11/03/17 0910 Done    Acceptance E VU reviewed safe transfers and risks of falls RW 11/01/17 1052 Done    Acceptance E NR Reviewed safety with transfers and ambulation.  10/31/17 1506 Active               Point: Precautions (Done)    Learning Progress Summary    Learner Readiness Method Response Comment Documented by Status   Patient  Acceptance E VU again reviewed correct gt and transfer safey. RW 11/03/17 0910 Done    Acceptance E VU reviewed safe transfers and risks of falls RW 11/01/17 1052 Done    Acceptance E NR Reviewed safety with transfers and ambulation.  10/31/17 1506 Active                      User Key     Initials Effective Dates Name Provider Type Discipline    LM 06/15/16 -  Landy Mckeon, PT Physical Therapist PT    RW 10/17/16 -  Pipe Gruber, PTA Physical Therapy Assistant PT                    PT Recommendation and Plan  Anticipated Equipment Needs At Discharge: front wheeled walker  Anticipated Discharge Disposition: home with 24/7 care, home with home health  Planned Therapy Interventions: balance training, bed mobility training, gait training, home exercise program, motor coordination training, neuromuscular re-education, patient/family education, postural re-education, ROM (Range of Motion), stair training, strengthening, stretching, transfer training, wheelchair management/propulsion training  PT Frequency: other (see comments) (5-14 times/wk)  Plan of Care Review  Plan Of Care Reviewed With: patient  Progress: improving  Outcome Summary/Follow up Plan: pt fatigued this afternoon, pt required vc's for safety to stay up in wx and to  L LE w/ gt, pt tends to drag L LE w/ gt as he fatigues, pt able to perform 20 reps of B LE ther ex          Outcome Measures       11/04/17 1120 11/04/17 0738 11/03/17 0900    How much help from another person do you currently need...    Turning from your back to your side while in flat bed without using bedrails? 3  -JW  3  -RW    Moving from lying on back to sitting on the side of a flat bed without bedrails? 3  -JW  3  -RW    Moving to and from a bed to a chair (including a wheelchair)? 3  -JW  3  -RW    Standing up from a chair using your arms (e.g., wheelchair, bedside chair)? 3  -JW  3  -RW    Climbing 3-5 steps with a railing? 3  -JW  3  -RW    To walk in hospital room? 3  -JW   3  -RW    AM-PAC 6 Clicks Score 18  -JW  18  -RW    How much help from another is currently needed...    Putting on and taking off regular lower body clothing?  3  -RC     Bathing (including washing, rinsing, and drying)  3  -RC     Toileting (which includes using toilet bed pan or urinal)  2  -RC     Putting on and taking off regular upper body clothing  3  -RC     Taking care of personal grooming (such as brushing teeth)  3  -RC     Eating meals  3  -RC     Score  17  -RC     Functional Assessment    Outcome Measure Options AM-PAC 6 Clicks Daily Activity (OT)  -JW        11/02/17 1000 11/02/17 0715       How much help from another person do you currently need...    Turning from your back to your side while in flat bed without using bedrails? 3  -RW      Moving from lying on back to sitting on the side of a flat bed without bedrails? 3  -RW      Moving to and from a bed to a chair (including a wheelchair)? 3  -RW      Standing up from a chair using your arms (e.g., wheelchair, bedside chair)? 3  -RW      Climbing 3-5 steps with a railing? 3  -RW      To walk in hospital room? 3  -RW      AM-PAC 6 Clicks Score 18  -RW      How much help from another is currently needed...    Putting on and taking off regular lower body clothing?  2  -KD     Bathing (including washing, rinsing, and drying)  3  -KD     Toileting (which includes using toilet bed pan or urinal)  2  -KD     Putting on and taking off regular upper body clothing  3  -KD     Taking care of personal grooming (such as brushing teeth)  3  -KD     Eating meals  3  -KD     Score  16  -KD       User Key  (r) = Recorded By, (t) = Taken By, (c) = Cosigned By    Initials Name Provider Type    JENA Haynes, MARTIN Physical Therapy Assistant    RW Pipe Gruber PTA Physical Therapy Assistant    RC MARCO Cannon/L Occupational Therapy Assistant    MARCO Sánchez/L Occupational Therapy Assistant           Time Calculation:         PT Charges        11/04/17 1440 11/04/17 1120       Time Calculation    Start Time 1440  -JW 1120  -JW     Stop Time 1515  -JW 1203  -JW     Time Calculation (min) 35 min  -JW 43 min  -JW     PT Received On 11/04/17  -JW 11/04/17  -JW     Time Calculation- PT    Total Timed Code Minutes- PT 35 minute(s)  -JW 43 minute(s)  -JW       User Key  (r) = Recorded By, (t) = Taken By, (c) = Cosigned By    Initials Name Provider Type    JENA Haynes PTA Physical Therapy Assistant          Therapy Charges for Today     Code Description Service Date Service Provider Modifiers Qty    74189750166 HC GAIT TRAINING EA 15 MIN 11/4/2017 Felicia Haynes, MARTIN GP 1    25612886099 HC PT THER PROC EA 15 MIN 11/4/2017 Felicia Haynes, MARTIN GP 2    84146001072 HC GAIT TRAINING EA 15 MIN 11/4/2017 Felicia Haynes, MARTIN GP 1    11173173809 HC PT THER PROC EA 15 MIN 11/4/2017 Felicia Haynes, MARTIN GP 1          PT G-Codes  PT Professional Judgement Used?: Yes  Outcome Measure Options: AM-PAC 6 Clicks Daily Activity (OT)  Score: 15  Functional Limitation: Mobility: Walking and moving around  Mobility: Walking and Moving Around Current Status (): At least 40 percent but less than 60 percent impaired, limited or restricted  Mobility: Walking and Moving Around Goal Status (): At least 1 percent but less than 20 percent impaired, limited or restricted    Felicia Haynes PTA  11/4/2017

## 2017-11-04 NOTE — PLAN OF CARE
Problem: Patient Care Overview (Adult)  Goal: Plan of Care Review  Outcome: Ongoing (interventions implemented as appropriate)    11/04/17 0738   Coping/Psychosocial Response Interventions   Plan Of Care Reviewed With patient   Patient Care Overview   Progress improving   Outcome Evaluation   Outcome Summary/Follow up Plan worked well w/ tx this Am cont poc         Problem: Inpatient Occupational Therapy  Goal: Transfer Training Goal 1 LTG- OT  Outcome: Ongoing (interventions implemented as appropriate)    10/31/17 0925 11/03/17 1359 11/04/17 0738   Transfer Training OT LTG   Transfer Training OT LTG, Date Established 10/31/17 --  --    Transfer Training OT LTG, Time to Achieve by discharge --  --    Transfer Training OT LTG, Activity Type all transfers --  --    Transfer Training OT LTG, [place] Level contact guard assist --  --    Transfer Training OT LTG, Date Goal Reviewed --  --  11/04/17   Transfer Training OT LTG, Outcome --  goal ongoing --        Goal: Patient Education Goal LTG- OT  Outcome: Ongoing (interventions implemented as appropriate)    10/31/17 0925 11/03/17 1359 11/04/17 0738   Patient Education OT LTG   Patient Education OT LTG, Date Established 10/31/17 --  --    Patient Education OT LTG, Time to Achieve by discharge --  --    Patient Education OT LTG, Education Type HEP;posture/body mechanics;1 hand/anni technique;home safety;adaptive equipment mgmt;energy conservation --  --    Patient Education OT LTG, Education Understanding independent;demonstrates adequately;verbalizes understanding  (with caregiver assist as necessary) --  --    Patient Education OT LTG, Date Goal Reviewed --  --  11/04/17   Patient Education OT LTG Outcome --  goal not met --        Goal: Safety Awareness Goal LTG- OT  Outcome: Ongoing (interventions implemented as appropriate)    10/31/17 0925 11/03/17 1359 11/04/17 0738   Safety Awareness OT LTG   Safety Awareness OT LTG, Date Established 10/31/17 --  --     Safety Awareness OT LTG, Time to Achieve by discharge --  --    Safety Awareness OT LTG, Activity Type good safety awareness;with kitchen mobility;with ADL's --  --    Safety Awareness OT LTG, [place] Level min verbal cues --  --    Safety Awareness OT LTG, Date Goal Reviewed --  --  11/04/17   Safety Awareness OT LTG, Outcome --  goal not met --        Goal: ADL Goal LTG- OT  Outcome: Ongoing (interventions implemented as appropriate)    10/31/17 0925 11/03/17 1359 11/04/17 0738   ADL OT LTG   ADL OT LTG, Date Established 10/31/17 --  --    ADL OT LTG, Time to Achieve by discharge --  --    ADL OT LTG, Activity Type ADL skills --  --    ADL OT LTG, Additional Goal Set-up A with self-feeding and grooming; VC for UB bathing and UB dressing; CGA with LB bathing and LB dressing --  --    ADL OT LTG, Date Goal Reviewed --  --  11/04/17   ADL OT LTG, Outcome --  goal not met --        Goal: Endurance Goal LTG- OT  Outcome: Ongoing (interventions implemented as appropriate)    10/31/17 0925 11/04/17 0738   Endurance OT LTG   Endurance Goal OT LTG, Date Established 10/31/17 --    Endurance Goal OT LTG, Time to Achieve by discharge --    Endurance Goal OT LTG, Activity Level endurance 2 good- --    Endurance Goal OT LTG, Additional Goal During 30 minutes of functional tasks, ADL, and therapeutic exercise --    Endurance Goal OT LTG, Date Goal Reviewed --  11/04/17   Endurance Goal OT LTG, Outcome --  goal ongoing

## 2017-11-04 NOTE — THERAPY TREATMENT NOTE
Inpatient Rehabilitation - Occupational Therapy Treatment Note  [facility]     Patient Name: Kenneth Harper Jr.  : 1934  MRN: [id number]  Today's Date: 2017  Onset of Illness/Injury or Date of Surgery Date: 10/30/17  Date of Referral to OT: 10/30/17  Referring Physician: TERRENCE Mckinnon MD      Admit Date: 10/30/2017    Visit Dx:     ICD-10-CM ICD-9-CM   1. Symbolic dysfunction R48.9 784.60   2. Impaired mobility and ADLs Z74.09 799.89   3. Abnormality of gait and mobility R26.9 781.2   4. Muscle weakness (generalized) M62.81 728.87     Patient Active Problem List   Diagnosis   • Spinal stenosis, lumbar region, with neurogenic claudication   • Former smoker   • Overweight   • Left-sided weakness   • Right-sided lacunar stroke   • Essential hypertension   • Diabetes mellitus   • Chronic anxiety   • Chronic back pain greater than 3 months duration   • Prostatic hypertrophy   • Degenerative joint disease involving multiple joints   • Atrial fibrillation, chronic   • Encounter for rehabilitation   • Obstructive sleep apnea syndrome             Adult Rehabilitation Note       17 0738 17 1421 17 1300    Rehab Assessment/Intervention    Discipline occupational therapy assistant  -RC physical therapy assistant  -RW speech language pathologist  -EC    Document Type therapy note (daily note)  -RC therapy note (daily note)  -RW therapy note (daily note)  -EC    Subjective Information agree to therapy  -RC agree to therapy  -RW agree to therapy  -EC    Patient Effort, Rehab Treatment good  -RC good  -RW     Precautions/Limitations fall precautions  -RC fall precautions  -RW     Recorded by [RC] MARCO Cannon/L [RW] Pipe Gruber PTA [EC] Clementina Murrell CCC-SLP    Pain Assessment    Pain Assessment No/denies pain  -RC  No/denies pain  -EC    Recorded by [RC] MARCO Cannon/DAWN  [EC] Clementina Murrell CCC-SLP    Vision Assessment/Intervention    Visual Impairment  WFL  with corrective lenses  -RW     Recorded by  [RW] Pipe Gruber PTA     Cognitive Assessment/Intervention    Current Cognitive/Communication Assessment impaired  -RC impaired  -RW     Orientation Status  oriented to;person;place  -RW     Recorded by [RC] MARCO Cannon/L [RW] Pipe Gruber PTA     Cognitive Assessment Intervention    Behavior/Mood Observations  behavior appropriate to situation, WNL/WFL  -RW     Recorded by  [RW] Pipe Gruber PTA     Communication Treatment Objective and Progress    Cognitive Linguistic Treatment Objectives   Improve orientation;Improve memory skills;Improve functional problem solving  -EC    Recorded by   [EC] Clementina Murrell CCC-SLP    Improve orientation    Improve orientation through:   demonstrating orientation to day;demonstrating orientation to month;demonstrating orientation to year;demonstrating orientation to place;demonstrating orientation to disease/impairment;90%;with inconsistent cues  -EC    Status: Improve orientation through   Progress slower than expected  -EC    Orientation Progress   50%  -EC    Comments: Improve orientation through   with mod cues and calendar to look at  -EC    Recorded by   [EC] Clementina Murrell CCC-SLP    Improve memory skills    Improve memory skills through:   recalling related word lists with an imposed delay;90%;with inconsistent cues  -EC    Status: Improve memory skills   Progress slower than expected  -EC    Memory Skills Progress   60%  -EC    Comments: Improve memory skills   using the same 4 words from previous 2 days.  patient continues to only get 3/4 even with memory st rategies.   -EC    Recorded by   [EC] Clementina Murrell CCC-SLP    Improve functional problem solving    Improve functional problem solving through:   complete organization/home management task;tell similarity between items  -EC    Status: Improve functional problem solving   Progress slower than expected  -EC    Functional Problem Solving  Progress   60%  -EC    Comments: Improve functional problem solving   moderate cues to read infomation on a bill and write a check  -EC    Recorded by   [EC] Clementina Murrell, CCC-SLP    Bed Mobility, Assessment/Treatment    Bed Mobility, Assistive Device  head of bed elevated;bed rails  -RW     Bed Mobility, Scoot/Bridge, [place]  supervision required  -RW     Bed Mob, Sit to Supine, [place]  supervision required  -RW     Recorded by  [RW] Pipe Gruber PTA     Transfer Assessment/Treatment    Transfers, Bed-Chair [place] minimum assist (75% patient effort);verbal cues required   chair to chair  -RC --  -RW     Transfers, Chair-Bed [place] minimum assist (75% patient effort);verbal cues required   chair to mat to chair  -RC contact guard assist  -RW     Transfers, Bed-Chair-Bed, Assist Device rolling walker  -RC rolling walker  -RW     Transfers, Sit-Stand [place]  verbal cues required;stand by assist  -RW     Transfers, Stand-Sit [place]  verbal cues required;stand by assist  -RW     Transfers, Sit-Stand-Sit, Assist Device  rolling walker  -RW     Transfer, Safety Issues sequencing ability decreased  -RC      Recorded by [RC] LEIGH CannonA/L [RW] Pipe Gruber PTA     Gait Assessment/Treatment    Gait, [place] Level  minimum assist (75% patient effort);contact guard assist;verbal cues required  -RW     Gait, Assistive Device  rolling walker  -RW     Gait, Distance (Feet)  10  -RW     Recorded by  [RW] Pipe Gruber PTA     Stairs Assessment/Treatment    Stairs, Handrail Location  --  -RW     Stairs, [place] Level  --  -RW     Recorded by  [RW] Pipe Gruber PTA     Functional Mobility    Functional Mobility- Ind. Level contact guard assist;verbal cues required  -RC      Functional Mobility- Device rolling walker  -RC      Functional Mobility- Safety Issues --   needs vc's for safe tech  -RC      Recorded by [RC] LEIGH CannonA/L      Wheelchair  Training/Management    Wheelchair, Distance Propelled 150  -RC      Recorded by [RC] MARCO Cannon/L      ADL Assessment/Intervention    Additional Documentation --   declined bathing/dressing  -RC      Recorded by [RC] MARCO Cannon/L      Lower Body Dressing Assessment/Training    LB Dressing Assess/Train, Clothing Type shoes  -RC shoes;doffing:  -RW     LB Dressing Assess/Train, Position sitting  -RC      Recorded by [RC] MARCO Cannon/L [RW] Pipe Gruber, PTA     Balance Skills Training    Standing-Level of Assistance  --  -RW     Static Standing Balance Support  --  -RW     Standing-Balance Activities  --  -RW     Gait Balance Support  --  -RW     Gait Balance Activities  --  -RW     Recorded by  [RW] Pipe Gruber, MARTIN     Therapy Exercises    Bilateral Lower Extremities  AROM:;20 reps;supine;ankle pumps/circles;glut sets;heel slides;hip abduction/adduction;SAQ  -RW     Bilateral Upper Extremity --   ue bike 10 min, pulley ex 5 min  -RC      Trunk Exercises  trunk rotation;supine;15 reps   hooklying  -RW     Recorded by [RC] MARCO Cannon/L [RW] Pipe Gruber, PTA     Fine Motor Coordination Training    Detail (Fine Motor Coordination Training) --   sml pegs on eseal, rom arc, clothes pin tree  -RC      Recorded by [RC] MARCO Cannon/L      Positioning and Restraints    Pre-Treatment Position sitting in chair/recliner  -RC sitting in chair/recliner  -RW     Post Treatment Position wheelchair  -RC bed  -RW     In Bed  call light within reach  -RW     In Wheelchair encouraged to call for assist  -RC      Recorded by [RC] MARCO Cannon/L [RW] Pipe Gruber, PTA       11/03/17 1025 11/03/17 0807 11/02/17 1455    Rehab Assessment/Intervention    Discipline occupational therapy assistant  -LM physical therapy assistant  -RW speech language pathologist  -EC    Document Type therapy note (daily note)  -LM therapy note (daily note)  -RW therapy note (daily note)  -EC     Subjective Information agree to therapy  -LM agree to therapy  -RW agree to therapy  -EC    Patient Effort, Rehab Treatment good  -LM good  -RW good  -EC    Symptoms Noted During/After Treatment none  -LM      Precautions/Limitations  fall precautions  -RW     Recorded by [LM] LEIGH PiñaA/L [RW] Pipe Gruber PTA [EC] Clementina Murrell CCC-SLP    Pain Assessment    Pain Assessment No/denies pain  -LM No/denies pain  -RW No/denies pain  -EC    Pain Score 4  -LM      Post Pain Score 3  -LM      Pain Type Acute pain  -LM      Pain Location Back  -LM      Recorded by [LM] LEIGH PiñaA/L [RW] Pipe Gruber PTA [EC] MERI Schultz    Vision Assessment/Intervention    Visual Impairment WFL  -LM WFL with corrective lenses  -RW     Recorded by [LM] LEIGH PiñaA/L [RW] Pipe Gruber PTA     Cognitive Assessment/Intervention    Current Cognitive/Communication Assessment impaired  -LM impaired  -RW     Orientation Status oriented x 4  -LM oriented to;person;place  -RW     Follows Commands/Answers Questions 100% of the time  -LM      Personal Safety mild impairment  -LM      Recorded by [LM] LEIGH PiñaA/L [RW] Pipe Gruber PTA     Cognitive Assessment Intervention    Behavior/Mood Observations  behavior appropriate to situation, WNL/WFL  -RW     Recorded by  [RW] Pipe Gruber PTA     Communication Treatment Objective and Progress    Cognitive Linguistic Treatment Objectives   Improve orientation;Improve memory skills;Improve functional problem solving  -EC    Recorded by   [EC] MERI Schultz    Improve orientation    Improve orientation through:   demonstrating orientation to day;demonstrating orientation to month;demonstrating orientation to year;demonstrating orientation to place;demonstrating orientation to disease/impairment;90%;with inconsistent cues  -EC    Status: Improve orientation through   Progress slower than expected  -EC     Orientation Progress   10%  -EC    Recorded by   [EC] Clementina Murrell CCC-SLP    Improve memory skills    Improve memory skills through:   recalling related word lists with an imposed delay;90%;with inconsistent cues  -EC    Status: Improve memory skills   Progress slower than expected  -EC    Memory Skills Progress   70%  -EC    Recorded by   [EC] SHELTON SchultzSLP    Improve functional problem solving    Improve functional problem solving through:   complete organization/home management task;tell similarity between items  -EC    Status: Improve functional problem solving   Progress slower than expected  -EC    Functional Problem Solving Progress   30%  -EC    Recorded by   [EC] SHELTON SchultzSLP    Transfer Assessment/Treatment    Transfers, Bed-Chair [place]  minimum assist (75% patient effort)  -RW     Transfers, Chair-Bed [place]  minimum assist (75% patient effort)  -RW     Transfers, Bed-Chair-Bed, Assist Device  rolling walker  -RW     Transfers, Sit-Stand [place]  verbal cues required;stand by assist  -RW     Transfers, Stand-Sit [place]  verbal cues required;stand by assist  -RW     Transfers, Sit-Stand-Sit, Assist Device  rolling walker  -RW     Recorded by  [RW] Pipe Gruber PTA     Gait Assessment/Treatment    Gait, [place] Level  minimum assist (75% patient effort);contact guard assist;verbal cues required  -RW     Gait, Assistive Device  rolling walker  -RW     Gait, Distance (Feet)  40    x 5  -RW     Recorded by  [RW] Pipe Gruber PTA     Stairs Assessment/Treatment    Number of Stairs  4  -RW     Stairs, Handrail Location  left side (ascending)  -RW     Stairs, [place] Level  minimum assist (75% patient effort);verbal cues required  -RW     Stairs, Comment  pt not getting weak leg onto step  fully which poses a risk for accident  -RW     Recorded by  [RW] Pipe Gruber PTA     Wheelchair Training/Management    Wheelchair, Distance  Propelled --   150 ft   -LM      Recorded by [LM] LEIGH PiñaA/L      Lower Body Dressing Assessment/Training    LB Dressing Assess/Train, Clothing Type  donning:;shoes  -RW     LB Dressing Assess/Train, [place]  set up required  -RW     Recorded by  [RW] Pipe Gruber PTA     Grooming Assessment/Training    Grooming Assess/Train, [place] Level independent  -LM      Recorded by [LM] MARCO Piña/L      Motor Skills/Interventions    Motor Response Observations --   ther act with multi act to incresae rom pulley over head  -LM      Additional Documentation --   coordination act B UE to incresae L UE  -LM      Recorded by [LM] MARCO Piña/L      Balance Skills Training    Standing-Level of Assistance  Contact guard  -RW     Static Standing Balance Support  assistive device  -RW     Standing-Balance Activities  --   tucking in shirt-tail  -RW     Gait Balance Support  parallel bars  -RW     Gait Balance Activities  side-stepping  -RW     Recorded by  [RW] Pipe Gruber PTA     Therapy Exercises    Bilateral Lower Extremities  AROM:;20 reps;heel raises  -RW     Bilateral Upper Extremity AROM:;elbow flexion/extension;shoulder abduction/adduction;shoulder extension/flexion;shoulder horizontal abd/add;shoulder protraction/retraction;shoulder rolls/shrugs   also perf tband all planes x 20 x 2   -LM      BUE Resistance manual resistance- minimal   also UEE bike x 15 min   -LM      Trunk Exercises  --   hooklying  -RW     Recorded by [LM] MARCO Piña/L [RW] Pipe Gruber PTA     Positioning and Restraints    Pre-Treatment Position sitting in chair/recliner  -LM in bed   eob  -RW     Post Treatment Position wheelchair  -LM wheelchair  -RW     Recorded by [LM] MARCO Piña/L [RW] Pipe Gruber PTA       11/02/17 1405 11/02/17 0902 11/02/17 0715    Rehab Assessment/Intervention    Discipline physical therapy assistant  -RW physical therapy assistant  -RW  occupational therapy assistant  -KD    Document Type therapy note (daily note)  -RW therapy note (daily note)  -RW therapy note (daily note)  -KD    Subjective Information agree to therapy  -RW agree to therapy  -RW agree to therapy  -KD    Patient Effort, Rehab Treatment good  -RW good  -RW good  -KD    Precautions/Limitations fall precautions  -RW fall precautions  -RW fall precautions  -KD    Recorded by [RW] Pipe Gruber PTA [RW] Pipe Gruber PTA [KD] Therese King LARA/L    Vital Signs    Pre Systolic BP Rehab   105  -KD    Pre Treatment Diastolic BP   57  -KD    Pretreatment Heart Rate (beats/min)  80  -RW 83  -KD    Posttreatment Heart Rate (beats/min)   74  -KD    Pre SpO2 (%)  93  -RW 94  -KD    O2 Delivery Pre Treatment  room air  -RW room air  -KD    Post SpO2 (%)   96  -KD    O2 Delivery Post Treatment   room air  -KD    Pre Patient Position   Sitting  -KD    Intra Patient Position   Standing  -KD    Post Patient Position   Sitting  -KD    Recorded by  [RW] Pipe Gruber PTA [KD] LEIGH VidalesA/L    Pain Assessment    Pain Assessment No/denies pain  -RW No/denies pain  -RW No/denies pain  -KD    Recorded by [RW] Pipe Gruber PTA [RW] Pipe Gruber PTA [KD] LEIGH VidalesA/L    Vision Assessment/Intervention    Visual Impairment WFL with corrective lenses  -RW WFL with corrective lenses  -RW     Recorded by [RW] Pipe Gruber PTA [RW] Pipe Gruber PTA     Cognitive Assessment/Intervention    Current Cognitive/Communication Assessment impaired  -RW impaired  -RW impaired  -KD    Orientation Status oriented to;person;place  -RW oriented to;person;place  -RW oriented to;person;place  -KD    Follows Commands/Answers Questions   100% of the time;able to follow single-step instructions  -KD    Personal Safety Interventions gait belt;nonskid shoes/slippers when out of bed  -RW gait belt;nonskid shoes/slippers when out of bed  -RW gait belt;nonskid shoes/slippers when out of bed   -KD    Recorded by [RW] Pipe Gruber PTA [RW] Pipe Gruber PTA [KD] Therese King, LARA/L    Cognitive Assessment Intervention    Behavior/Mood Observations behavior appropriate to situation, WNL/WFL  -RW behavior appropriate to situation, WNL/WFL  -RW     Recorded by [RW] Pipe Gruber PTA [RW] Pipe Gruber PTA     Bed Mobility, Assessment/Treatment    Bed Mob, Supine to Sit, [place]  supervision required  -RW     Bed Mob, Sit to Supine, [place]  supervision required  -RW     Recorded by  [RW] Pipe Gruber PTA     Transfer Assessment/Treatment    Transfers, Bed-Chair [place] minimum assist (75% patient effort)  -RW minimum assist (75% patient effort)  -RW contact guard assist  -KD    Transfers, Chair-Bed [place] minimum assist (75% patient effort)  -RW minimum assist (75% patient effort)  -RW     Transfers, Bed-Chair-Bed, Assist Device rolling walker  -RW rolling walker  -RW rolling walker  -KD    Transfers, Sit-Stand [place] contact guard assist;verbal cues required  -RW contact guard assist;verbal cues required  -RW contact guard assist  -KD    Transfers, Stand-Sit [place] verbal cues required;contact guard assist  -RW verbal cues required;contact guard assist  -RW contact guard assist  -KD    Transfers, Sit-Stand-Sit, Assist Device rolling walker  -RW rolling walker  -RW rolling walker  -KD    Walk-In Shower Transfer, [place]   contact guard assist  -KD    Walk-In Shower Transfer, Assist Device   rolling walker  -KD    Transfer, Comment sit to stand x 5  -RW  Pt completed 10 sit-stands w/ 1 RB  -KD    Recorded by [RW] Pipe Gruber PTA [RW] Pipe Gruber, MARTIN [KD] Therese King, LARA/L    Gait Assessment/Treatment    Gait, [place] Level minimum assist (75% patient effort)  -RW minimum assist (75% patient effort)  -RW     Gait, Assistive Device rolling walker  -RW rolling walker  -RW     Gait, Distance (Feet) 150   2 standing / stop to regroup, 60 x 3   -RW 60    x 4  -RW     Gait, Gait Deviations --   left foot drag at times  -RW left:   occ foot drag  -RW     Recorded by [RW] Pipe Gruber PTA [RW] Pipe Gruber PTA     Stairs Assessment/Treatment    Number of Stairs  4  -RW     Stairs, Handrail Location  both sides  -RW     Stairs, [place] Level  contact guard assist;verbal cues required  -RW     Recorded by  [RW] Pipe Gruber PTA     Functional Mobility    Functional Mobility- Ind. Level   contact guard assist  -KD    Functional Mobility- Device   rolling walker  -KD    Functional Mobility-Distance (Feet)   --   10 x 2  -KD    Recorded by   [KD] MARCO Vidales/L    Wheelchair Training/Management    Wheelchair, Distance Propelled  150 cga  - cga w/ vc's  -KD    Recorded by  [RW] Pipe Gruber PTA [KD] LEIGH VidalesA/L    Upper Body Bathing Assessment/Training    UB Bathing Assess/Train Assistive Device   bath mitt;grab bars;hand-held shower head;long-handled sponge;shower chair without back  -KD    UB Bathing Assess/Train, Position   edge of bed;sitting  -KD    UB Bathing Assess/Train, [place] Level   set up required  -KD    Recorded by   [KD] MARCO Vidales/L    Lower Body Bathing Assessment/Training    LB Bathing Assess/Train Assistive Device   bath mitt;grab bars;hand-held shower head;long-handled sponge;shower chair without back  -KD    LB Bathing Assess/Train, Position   sitting  -KD    LB Bathing Assess/Train, [place] Level   set up required  -KD    Recorded by   [KD] MARCO Vidales/L    Upper Body Dressing Assessment/Training    UB Dressing Assess/Train, Clothing Type   doffing:;donning:;pull over  -KD    UB Dressing Assess/Train, Position   edge of bed;sitting  -KD    UB Dressing Assess/Train, [place]   contact guard assist  -KD    Recorded by   [KD] MARCO Vidales/L    Lower Body Dressing Assessment/Training    LB Dressing Assess/Train, Clothing Type   doffing:;donning:;pants;shorts;socks   -KD    LB Dressing Assess/Train, Position   edge of bed;sitting  -KD    LB Dressing Assess/Train, [place]   minimum assist (75% patient effort)  -KD    LB Dressing Assess/Train, Impairments   impaired balance  -KD    LB Dressing Assess/Train, Comment   Pt demonstrated impaired balance with LB dressing , pt leans to L and need vc's to self correct    -KD    Recorded by   [KD] Therese King LARA/L    Grooming Assessment/Training    Grooming Assess/Train, Assistive Device   --   comb hair/ brush teeth  -KD    Grooming Assess/Train, Position   sitting  -KD    Grooming Assess/Train, [place] Level   set up required  -KD    Recorded by   [KD] Therese King LARA/L    Self-Feeding Assessment/Training    Self-Feeding Assess/Train, Position   sitting  -KD    Self-Feeding Assess/Train, [place]   conditional independence  -KD    Self-Feeding Assess/Train, [place] Amount   minimal  -KD    Recorded by   [KD] Therese King, LARA/L    Balance Skills Training    Standing-Level of Assistance   Contact guard  -KD    Static Standing Balance Support  Right upper extremity supported;Left upper extremity supported  -RW assistive device  -KD    Standing-Balance Activities  Weight Shift A-P  -RW Weight Shift A-P   [place] volleyball  -KD    Standing Balance # of Minutes   --   3  -KD    Recorded by  [RW] Pipe Gruber, PTA [KD] LEIGH VidalesA/L    Therapy Exercises    Bilateral Lower Extremities AROM:;sitting;knee flexion;LAQ;ankle pumps/circles;20 reps;hip flexion;standing;heel slides  -RW AROM:;10 reps;supine;hip abduction/adduction;sitting;knee flexion;LAQ;ankle pumps/circles  -RW     Bilateral Upper Extremity   AROM:;20 reps;sitting;elbow flexion/extension;hand pumps;pronation/supination;shoulder circles;shoulder ER/IR;shoulder extension/flexion  -KD    BUE Resistance   manual resistance- minimal  -KD    Trunk Exercises --   hooklying  -RW 10 reps;trunk rotation;supine   hooklying  -RW     Recorded by [RW] Pipe HOWARD  MARTIN Gruber [RW] Pipe Gruber PTA [KD] Therese King, LARA/L    Positioning and Restraints    Pre-Treatment Position sitting in chair/recliner  -RW sitting in chair/recliner  -RW sitting in chair/recliner  -KD    Post Treatment Position wheelchair  -RW wheelchair  -RW wheelchair  -KD    In Bed  with family/caregiver  -RW     In Wheelchair   sitting;call light within reach;encouraged to call for assist;exit alarm on  -KD    Recorded by [RW] Pipe Gruber PTA [RW] Pipe Gruber PTA [KD] Therese King, LARA/L      11/01/17 1524 11/01/17 1346 11/01/17 1122    Rehab Assessment/Intervention    Discipline occupational therapist  -RWA physical therapy assistant  -RW occupational therapist  -THERESA    Document Type therapy note (daily note)  -RWA therapy note (daily note)  -RW therapy note (daily note)  -RWA    Subjective Information agree to therapy;complains of;pain  -RWA agree to therapy  -RW agree to therapy;no complaints  -RWA    Patient Effort, Rehab Treatment good  -RWA good  -RW good  -RWA    Symptoms Noted During/After Treatment none  -RWA  none  -RWA    Precautions/Limitations fall precautions  -RWA fall precautions  -RW fall precautions  -RWA    Recorded by [RWA] Jacinta Pitts, OTR/L [RW] Pipe Gruber PTA [RWA] Jacinta Pitts, OTR/L    Vital Signs    Pretreatment Heart Rate (beats/min) 81  -RWA 81  -RW 86  -RWA    Posttreatment Heart Rate (beats/min) 72  -RWA  87  -RWA    Pre SpO2 (%) 96  -RWA 98  -RW 93  -RWA    O2 Delivery Pre Treatment room air  -RWA room air  -RW room air  -RWA    Post SpO2 (%) 95  -RWA  95  -RWA    O2 Delivery Post Treatment room air  -RWA  room air  -RWA    Pre Patient Position Supine  -RWA  Sitting  -RWA    Post Patient Position Supine  -RWA  Sitting  -RWA    Recorded by [RWA] Jacinta Pitts, OTR/L [RW] Pipe Gruber PTA [RWA] Jacinta Pitts, OTR/L    Pain Assessment    Pain Assessment 0-10  -RWA --   gives no rating but c/o left back/flank pain  -RW No/denies  pain  -RWA    Pain Score 4  -RWA      Post Pain Score 3  -RWA      Pain Location Back  -RWA      Pain Orientation Lower;Upper  -RWA      Recorded by [RWA] Jacinta Pitts, OTR/L [RW] Pipe Gruber PTA [RWA] Jacinta Pitts, OTR/L    Vision Assessment/Intervention    Visual Impairment  WFL with corrective lenses  -RW     Recorded by  [RW] Pipe Gruber PTA     Cognitive Assessment/Intervention    Current Cognitive/Communication Assessment impaired  -RWA impaired  -RW impaired  -RWA    Orientation Status oriented to;person;place;situation  -RWA oriented to;person;place  -RW oriented to;person;place;situation  -RWA    Follows Commands/Answers Questions able to follow single-step instructions;100% of the time  -RWA  able to follow single-step instructions;100% of the time  -RWA    Personal Safety mild impairment  -RWA mild impairment  -RW mild impairment  -RWA    Personal Safety Interventions  gait belt;nonskid shoes/slippers when out of bed  -RW gait belt;fall prevention program maintained;nonskid shoes/slippers when out of bed;supervised activity;muscle strengthening facilitated  -RWA    Recorded by [RWA] Jacinta Pitts, OTR/L [RW] Pipe Gruber PTA [RWA] Jacinta Pitts, OTR/L    Cognitive Assessment Intervention    Behavior/Mood Observations  behavior appropriate to situation, WNL/WFL  -RW     Recorded by  [RW] Pipe Gruber PTA     Bed Mobility, Assessment/Treatment    Bed Mob, Supine to Sit, [place]  minimum assist (75% patient effort)  -RW     Bed Mob, Sit to Supine, [place]  contact guard assist  -RW     Recorded by  [RW] Pipe Gruber PTA     Transfer Assessment/Treatment    Transfers, Bed-Chair [place]  minimum assist (75% patient effort)  -RW     Transfers, Chair-Bed [place]  minimum assist (75% patient effort)  -RW     Transfers, Bed-Chair-Bed, Assist Device  rolling walker  -RW     Transfers, Sit-Stand [place]  minimum assist (75% patient effort);contact guard  assist  -RW minimum assist (75% patient effort)  -RWA    Transfers, Stand-Sit [place]  minimum assist (75% patient effort);verbal cues required;contact guard assist  -RW minimum assist (75% patient effort)  -RWA    Transfers, Sit-Stand-Sit, Assist Device  rolling walker  -RW rolling walker  -RWA    Toilet Transfer, [place]   minimum assist (75% patient effort)  -RWA    Toilet Transfer, Assistive Device   rolling walker   grab bar  -RWA    Transfer, Safety Issues   step length decreased  -RWA    Transfer, Comment   sit-stand from toilet x3 for clothing management, lis care, & application of cream  -RWA    Recorded by  [RW] Pipe Gruber PTA [RWA] Jacinta Pitts, OTR/L    Gait Assessment/Treatment    Gait, [place] Level  minimum assist (75% patient effort)  -RW     Gait, Assistive Device  rolling walker  -RW     Gait, Distance (Feet)  40   x 3  -RW     Gait, Gait Deviations  left:;step length decreased   improved  -RW     Recorded by  [RW] Pipe Gruber PTA     Functional Mobility    Functional Mobility- Ind. Level   contact guard assist  -RWA    Functional Mobility- Device   rolling walker  -RWA    Functional Mobility-Distance (Feet)   12   x2  -RWA    Functional Mobility- Safety Issues   step length decreased;weight-shifting ability decreased   too far away from AD  -RWA    Recorded by   [RWA] Jacinta Pitts, OTR/L    Wheelchair Training/Management    Wheelchair, Distance Propelled   25 ft with SBA  -RWA    Recorded by   [RWA] Jacinta Pitts, OTR/L    Lower Body Bathing Assessment/Training    LB Bathing Assess/Train, Position   standing  -RWA    LB Bathing Assess/Train, [place] Level   minimum assist (75% patient effort)   for balance  -RWA    LB Bathing Assess/Train, Impairments   impaired balance;strength decreased  -RWA    LB Bathing Assess/Train, Comment   Pt washed lis area only & applied magic butt.  -RWA    Recorded by   [RWA] Jacinta Pitts OTR/L    Grooming  Assessment/Training    Grooming Assess/Train, Position   sitting;sink side   w/c  -RWA    Grooming Assess/Train, Indepen Level   supervision required;verbal cues required  -RWA    Grooming Assess/Train, Impairments   strength decreased;coordination impaired  -RWA    Grooming Assess/Train, Comment   Pt brushing teeth upon arrival, washed face & combed hair.  -RWA    Recorded by   [RWA] Jacinta Pitts OTR/L    Balance Skills Training    Static Standing Balance Support   assistive device  -RWA    Standing-Balance Activities   Weight Shift R-L   ADL task  -RWA    Standing Balance # of Minutes   5  -RWA    Recorded by   [RWA] Jacinta Pitts OTR/L    Therapy Exercises    Bilateral Lower Extremities  AROM:;10 reps;supine;ankle pumps/circles;heel slides;hip abduction/adduction;SAQ;standing;heel raises   2 sets of hs . hooklying hip aa x 10 radha  -RW     Left Upper Extremity 5 reps  -RWA      LUE Resistance theraputty   red  -RWA      BUE Resistance theraputty   removed beads from theraputty x3, requiring approx 15 min  -RWA      Trunk Exercises  10 reps;trunk rotation;supine   hooklying  -RW     Recorded by [RWA] Jacinta Pitts OTR/L [RW] Pipe Gruber PTA     Fine Motor Coordination Training    Detail (Fine Motor Coordination Training) pegboard ax & Connect 4 game to increase LUE fine motor coordination & strength  -RWA      Recorded by [RWA] Jacinta Pitts OTR/L      Positioning and Restraints    Pre-Treatment Position in bed  -RWA other (comment)   eom  -RW sitting in chair/recliner   w/c  -RWA    Post Treatment Position bed  -RWA wheelchair  -RW wheelchair   at table for lunch  -RWA    In Bed supine;call light within reach;encouraged to call for assist;with family/caregiver  -RWA      In Wheelchair  with family/caregiver  -RW sitting;with family/caregiver  -RWA    Recorded by [RWA] ESTEBAN Silver/L [RW] Pipe Gruber PTA [RWA] ESTEBAN Silver/L      11/01/17 1020          Rehab  Assessment/Intervention    Discipline speech language pathologist  -EC      Document Type therapy note (daily note)  -EC      Subjective Information agree to therapy  -EC      Patient Effort, Rehab Treatment good  -EC      Recorded by [EC] MERI Schultz      Pain Assessment    Pain Assessment No/denies pain  -EC      Recorded by [EC] MERI Schultz      Communication Treatment Objective and Progress    Cognitive Linguistic Treatment Objectives Improve orientation;Improve memory skills;Improve functional problem solving  -EC      Recorded by [EC] MERI Schultz      Improve orientation    Improve orientation through: demonstrating orientation to day;demonstrating orientation to month;demonstrating orientation to year;demonstrating orientation to place;demonstrating orientation to disease/impairment;90%;with inconsistent cues  -EC      Status: Improve orientation through Progress slower than expected  -EC      Orientation Progress 0%  -EC      Comments: Improve orientation through pt was not oriented to a single question this date.  only recalled that yesterday was halloween, but not date/month  -EC      Recorded by [EC] MERI Schultz      Improve memory skills    Improve memory skills through: recalling related word lists with an imposed delay;90%;with inconsistent cues  -EC      Status: Improve memory skills Progress slower than expected  -EC      Memory Skills Progress 60%  -EC      Comments: Improve memory skills recall of 3 related words  -EC      Recorded by [EC] MERI Schultz      Improve functional problem solving    Improve functional problem solving through: complete organization/home management task;tell similarity between items  -EC      Status: Improve functional problem solving Progressing as expected  -EC      Functional Problem Solving Progress 70%  -EC      Comments: Improve functional problem solving pt identified early/late with 90%  asccuracy and identified information on a bill and performed check writing with 60%   -EC      Recorded by [EC] Clementina Murrell, CCC-SLP        User Key  (r) = Recorded By, (t) = Taken By, (c) = Cosigned By    Initials Name Effective Dates    EC Clementina Murrell, CCC-SLP 12/30/16 -     RWA Jacinta Pitts, OTR/L 10/17/16 -     RW Pipe Gruber, PTA 10/17/16 -     RC Cassie Carpio, LARA/L 10/17/16 -     KD Therese King, LARA/L 10/17/16 -     LM Carmen Boucher, LARA/L 10/17/16 -                 OT Goals       11/04/17 0738 11/03/17 1359 11/02/17 0715    Transfer Training OT LTG    Transfer Training OT LTG, Date Goal Reviewed 11/04/17  -RC 11/03/17  -LM 11/02/17  -KD    Transfer Training OT LTG, Outcome  goal ongoing  -LM goal not met  -KD    Patient Education OT LTG    Patient Education OT LTG, Date Goal Reviewed 11/04/17  -RC 11/03/17  -LM 11/02/17  -KD    Patient Education OT LTG Outcome  goal not met  -LM goal not met  -KD    Safety Awareness OT LTG    Safety Awareness OT LTG, Date Goal Reviewed 11/04/17  -RC 11/03/17  -LM 11/02/17  -KD    Safety Awareness OT LTG, Outcome  goal not met  -LM goal not met  -KD    ADL OT LTG    ADL OT LTG, Date Goal Reviewed 11/04/17  -RC 11/03/17  -LM     ADL OT LTG, Outcome  goal not met  -LM goal not met  -KD    Endurance OT LTG    Endurance Goal OT LTG, Date Goal Reviewed 11/04/17  -RC 11/03/17  -LM 11/02/17  -KD    Endurance Goal OT LTG, Outcome goal ongoing  -RC goal not met  -LM goal not met  -KD      11/01/17 1635 10/31/17 0925       Transfer Training OT LTG    Transfer Training OT LTG, Date Established  10/31/17  -BH (r) SP (t) BH (c)     Transfer Training OT LTG, Time to Achieve  by discharge  -BH (r) SP (t) BH (c)     Transfer Training OT LTG, Activity Type  all transfers  -BH (r) SP (t) BH (c)     Transfer Training OT LTG, [place] Level  contact guard assist  -BH (r) SP (t) BH (c)     Transfer Training OT LTG, Date Goal Reviewed 11/01/17  -RW       Transfer Training OT LTG, Outcome goal ongoing  -RW      Patient Education OT LTG    Patient Education OT LTG, Date Established  10/31/17  -BH (r) SP (t) BH (c)     Patient Education OT LTG, Time to Achieve  by discharge  -BH (r) SP (t) BH (c)     Patient Education OT LTG, Education Type  HEP;posture/body mechanics;1 hand/anni technique;home safety;adaptive equipment mgmt;energy conservation  -BH (r) SP (t) BH (c)     Patient Education OT LTG, Education Understanding  independent;demonstrates adequately;verbalizes understanding   with caregiver assist as necessary  -BH (r) SP (t) BH (c)     Patient Education OT LTG, Date Goal Reviewed 11/01/17  -RW      Patient Education OT LTG Outcome goal ongoing  -RW      Safety Awareness OT LTG    Safety Awareness OT LTG, Date Established  10/31/17  -BH (r) SP (t) BH (c)     Safety Awareness OT LTG, Time to Achieve  by discharge  -BH (r) SP (t) BH (c)     Safety Awareness OT LTG, Activity Type  good safety awareness;with kitchen mobility;with ADL's  -BH (r) SP (t) BH (c)     Safety Awareness OT LTG, [place] Level  min verbal cues  -BH (r) SP (t) BH (c)     Safety Awareness OT LTG, Date Goal Reviewed 11/01/17  -RW      Safety Awareness OT LTG, Outcome goal ongoing  -RW      ADL OT LTG    ADL OT LTG, Date Established  10/31/17  -BH (r) SP (t) BH (c)     ADL OT LTG, Time to Achieve  by discharge  -BH (r) SP (t) BH (c)     ADL OT LTG, Activity Type  ADL skills  -BH (r) SP (t) BH (c)     ADL OT LTG, Additional Goal  Set-up A with self-feeding and grooming; VC for UB bathing and UB dressing; CGA with LB bathing and LB dressing  -BH (r) SP (t) BH (c)     ADL OT LTG, Date Goal Reviewed 11/01/17  -RW      ADL OT LTG, Outcome goal ongoing  -RW      Endurance OT LTG    Endurance Goal OT LTG, Date Established  10/31/17  -BH (r) SP (t) BH (c)     Endurance Goal OT LTG, Time to Achieve  by discharge  -BH (r) SP (t) BH (c)     Endurance Goal OT LTG, Activity Level  endurance 2 good-  -BH  (r) SP (t) BH (c)     Endurance Goal OT LTG, Additional Goal  During 30 minutes of functional tasks, ADL, and therapeutic exercise  - (r) SP (t) BH (c)     Endurance Goal OT LTG, Date Goal Reviewed 11/01/17  -      Endurance Goal OT LTG, Outcome goal ongoing  -        User Key  (r) = Recorded By, (t) = Taken By, (c) = Cosigned By    Initials Name Provider Type     Karoline Huang, OTR/L Occupational Therapist    RW Jacinta Pitts, OTR/L Occupational Therapist    RC Cassie Carpio, LARA/L Occupational Therapy Assistant    KD Therese King, LARA/L Occupational Therapy Assistant    MOSHE Boucher, LARA/L Occupational Therapy Assistant    SP Alem Bruce, OT Student OT Student          Occupational Therapy Education     Title: PT OT SLP Therapies (Active)     Topic: Occupational Therapy (Active)     Point: ADL training (Active)    Description: Instruct learner(s) on proper safety adaptation and remediation techniques during self care or transfers.   Instruct in proper use of assistive devices.    Learning Progress Summary    Learner Readiness Method Response Comment Documented by Status   Patient Acceptance TB NR   11/02/17 1108 Active    Acceptance E,D NR   11/01/17 1518 Active    Acceptance E VU Educated about OT and POC. Educated about anni dressing technique. Educated about safety with ADL and t/f. Educated to call for assist and not to stand independently. SP 10/31/17 1354 Done   Family Acceptance E,D NR   11/01/17 1518 Active    Acceptance E VU Educated about OT and POC. Educated about anni dressing technique. Educated about safety with ADL and t/f. Educated to call for assist and not to stand independently. SP 10/31/17 1354 Done               Point: Home exercise program (Active)    Description: Instruct learner(s) on appropriate technique for monitoring, assisting and/or progressing therapeutic exercises/activities.    Learning Progress Summary    Learner Readiness Method Response  Comment Documented by Status   Patient Acceptance E,D NR theraputty  11/01/17 1634 Active   Family Acceptance E,D NR theraputty  11/01/17 1634 Active               Point: Precautions (Active)    Description: Instruct learner(s) on prescribed precautions during self-care and functional transfers.    Learning Progress Summary    Learner Readiness Method Response Comment Documented by Status   Patient Acceptance E NR   11/04/17 0930 Active    Acceptance E,D NR   11/01/17 1518 Active    Acceptance E VU Educated about OT and POC. Educated about anni dressing technique. Educated about safety with ADL and t/f. Educated to call for assist and not to stand independently. SP 10/31/17 1354 Done   Family Acceptance E,D NR   11/01/17 1518 Active    Acceptance E VU Educated about OT and POC. Educated about anni dressing technique. Educated about safety with ADL and t/f. Educated to call for assist and not to stand independently. SP 10/31/17 1354 Done               Point: Body mechanics (Active)    Description: Instruct learner(s) on proper positioning and spine alignment during self-care, functional mobility activities and/or exercises.    Learning Progress Summary    Learner Readiness Method Response Comment Documented by Status   Patient Acceptance E NR   11/04/17 0930 Active    Acceptance E VU Educated about OT and POC. Educated about anni dressing technique. Educated about safety with ADL and t/f. Educated to call for assist and not to stand independently. SP 10/31/17 1354 Done   Family Acceptance E VU Educated about OT and POC. Educated about anni dressing technique. Educated about safety with ADL and t/f. Educated to call for assist and not to stand independently. SP 10/31/17 1354 Done                      User Key     Initials Effective Dates Name Provider Type Discipline     10/17/16 -  ESTEBAN Silver/L Occupational Therapist OT     10/17/16 -  MARCO Cannon/L Occupational Therapy  Assistant OT    JOSS 10/17/16 -  DAYDAY Vidales Occupational Therapy Assistant OT    SP 01/31/17 -  Alem Bruce OT Student OT Student OT                  OT Recommendation and Plan  Anticipated Equipment Needs At Discharge: bathing equipment, dressing equipment, front wheeled walker  Anticipated Discharge Disposition: home with 24/7 care, home with home health  Planned Therapy Interventions: activity intolerance, adaptive equipment training, ADL retraining, IADL retraining, balance training, bed mobility training, energy conservation, fine motor coordination training, home exercise program, motor coordination training, neuromuscular re-education, ROM (Range of Motion), strengthening, transfer training  Therapy Frequency: other (see comments) (3-14x/wk)  Plan of Care Review  Plan Of Care Reviewed With: patient  Progress: improving  Outcome Summary/Follow up Plan: worked well w/ tx this Am   cont poc        Outcome Measures       11/04/17 0738 11/03/17 0900 11/02/17 1000    How much help from another person do you currently need...    Turning from your back to your side while in flat bed without using bedrails?  3  -RW 3  -RW    Moving from lying on back to sitting on the side of a flat bed without bedrails?  3  -RW 3  -RW    Moving to and from a bed to a chair (including a wheelchair)?  3  -RW 3  -RW    Standing up from a chair using your arms (e.g., wheelchair, bedside chair)?  3  -RW 3  -RW    Climbing 3-5 steps with a railing?  3  -RW 3  -RW    To walk in hospital room?  3  -RW 3  -RW    AM-PAC 6 Clicks Score  18  -RW 18  -RW    How much help from another is currently needed...    Putting on and taking off regular lower body clothing? 3  -RC      Bathing (including washing, rinsing, and drying) 3  -RC      Toileting (which includes using toilet bed pan or urinal) 2  -RC      Putting on and taking off regular upper body clothing 3  -RC      Taking care of personal grooming (such as brushing teeth) 3  -RC       Eating meals 3  -RC      Score 17  -RC        11/02/17 0715 11/01/17 1122 11/01/17 1000    How much help from another person do you currently need...    Turning from your back to your side while in flat bed without using bedrails?   3  -RW    Moving from lying on back to sitting on the side of a flat bed without bedrails?   3  -RW    Moving to and from a bed to a chair (including a wheelchair)?   3  -RW    Standing up from a chair using your arms (e.g., wheelchair, bedside chair)?   3  -RW    Climbing 3-5 steps with a railing?   2  -RW    To walk in hospital room?   3  -RW    AM-PAC 6 Clicks Score   17  -RW    How much help from another is currently needed...    Putting on and taking off regular lower body clothing? 2  -KD 2  -RWA     Bathing (including washing, rinsing, and drying) 3  -KD 2  -RWA     Toileting (which includes using toilet bed pan or urinal) 2  -KD 2  -RWA     Putting on and taking off regular upper body clothing 3  -KD 3  -RWA     Taking care of personal grooming (such as brushing teeth) 3  -KD 3  -RWA     Eating meals 3  -KD 3  -RWA     Score 16  -KD 15  -RWA     Functional Assessment    Outcome Measure Options  AM-PAC 6 Clicks Daily Activity (OT)  -RWA       User Key  (r) = Recorded By, (t) = Taken By, (c) = Cosigned By    Initials Name Provider Type    [facility] Jacinta Pitts, OTR/L Occupational Therapist    KATHIE Gruber, MARTIN Physical Therapy Assistant    LEIGH YoungA/L Occupational Therapy Assistant    LEIGH SánchezA/L Occupational Therapy Assistant           Time Calculation:         Time Calculation- OT       11/04/17 0934          Time Calculation- OT    OT Start Time 0738  -      OT Stop Time 0908  -      OT Time Calculation (min) 90 min  -      Total Timed Code Minutes- OT 90 minute(s)  -      OT Received On 11/04/17  -        User Key  (r) = Recorded By, (t) = Taken By, (c) = Cosigned By    Initials Name Provider Type    JIA Carpio, LARA/L  Occupational Therapy Assistant           Therapy Charges for Today     Code Description Service Date Service Provider Modifiers Qty    53164996727 HC OT THERAPEUTIC ACT EA 15 MIN 11/4/2017 Cassie Carpio, LARA/L GO 4    86483216652 HC OT THER PROC EA 15 MIN 11/4/2017 Cassie Carpio, LARA/L GO 1    02237308196 HC OT NEUROMUSC RE EDUCATION EA 15 MIN 11/4/2017 Cassie Carpio, LARA/L GO 1          OT G-codes  OT Professional Judgement Used?: Yes  OT Functional Scales Options: AM-PAC 6 Clicks Daily Activity (OT)  Score: 15  Functional Limitation: Self care  Self Care Current Status (): At least 40 percent but less than 60 percent impaired, limited or restricted  Self Care Goal Status (): At least 1 percent but less than 20 percent impaired, limited or restricted    Cassie Carpio, LARA/L  11/4/2017

## 2017-11-04 NOTE — PLAN OF CARE
Problem: Patient Care Overview (Adult)  Goal: Plan of Care Review  Outcome: Ongoing (interventions implemented as appropriate)    11/04/17 0401   Coping/Psychosocial Response Interventions   Plan Of Care Reviewed With patient   Patient Care Overview   Progress improving   Outcome Evaluation   Outcome Summary/Follow up Plan patient is showing cognitive improvement this shift, pt has called every time he has needed to go to the bathroom, knows where he is, and has been telling stories. Overall improving.          Problem: Fall Risk (Adult)  Goal: Absence of Falls  Outcome: Ongoing (interventions implemented as appropriate)    Problem: Stroke (Ischemic) (Adult)  Goal: Signs and Symptoms of Listed Potential Problems Will be Absent or Manageable (Stroke)  Outcome: Ongoing (interventions implemented as appropriate)    Problem: Diabetes, Type 2 (Adult)  Goal: Signs and Symptoms of Listed Potential Problems Will be Absent or Manageable (Diabetes, Type 2)  Outcome: Ongoing (interventions implemented as appropriate)

## 2017-11-05 LAB
ALBUMIN SERPL-MCNC: 3.7 G/DL (ref 3.4–4.8)
ANION GAP SERPL CALCULATED.3IONS-SCNC: 12 MMOL/L (ref 5–15)
BUN BLD-MCNC: 22 MG/DL (ref 7–21)
BUN/CREAT SERPL: 22 (ref 7–25)
CALCIUM SPEC-SCNC: 9.2 MG/DL (ref 8.4–10.2)
CHLORIDE SERPL-SCNC: 104 MMOL/L (ref 95–110)
CO2 SERPL-SCNC: 26 MMOL/L (ref 22–31)
CREAT BLD-MCNC: 1 MG/DL (ref 0.7–1.3)
GFR SERPL CREATININE-BSD FRML MDRD: 72 ML/MIN/1.73 (ref 42–98)
GLUCOSE BLD-MCNC: 116 MG/DL (ref 60–100)
GLUCOSE BLDC GLUCOMTR-MCNC: 113 MG/DL (ref 70–130)
GLUCOSE BLDC GLUCOMTR-MCNC: 114 MG/DL (ref 70–130)
GLUCOSE BLDC GLUCOMTR-MCNC: 74 MG/DL (ref 70–130)
GLUCOSE BLDC GLUCOMTR-MCNC: 98 MG/DL (ref 70–130)
PHOSPHATE SERPL-MCNC: 3.3 MG/DL (ref 2.4–4.4)
POTASSIUM BLD-SCNC: 4 MMOL/L (ref 3.5–5.1)
SODIUM BLD-SCNC: 142 MMOL/L (ref 137–145)

## 2017-11-05 PROCEDURE — 80069 RENAL FUNCTION PANEL: CPT | Performed by: FAMILY MEDICINE

## 2017-11-05 PROCEDURE — 82962 GLUCOSE BLOOD TEST: CPT

## 2017-11-05 PROCEDURE — 25010000002 ENOXAPARIN PER 10 MG: Performed by: FAMILY MEDICINE

## 2017-11-05 RX ADMIN — PANTOPRAZOLE SODIUM 40 MG: 40 TABLET, DELAYED RELEASE ORAL at 06:15

## 2017-11-05 RX ADMIN — METFORMIN HYDROCHLORIDE 1000 MG: 500 TABLET ORAL at 07:09

## 2017-11-05 RX ADMIN — FLUTICASONE PROPIONATE 2 SPRAY: 50 SPRAY, METERED NASAL at 08:02

## 2017-11-05 RX ADMIN — LIDOCAINE 1 PATCH: 50 PATCH TOPICAL at 10:02

## 2017-11-05 RX ADMIN — CLOPIDOGREL BISULFATE 75 MG: 75 TABLET ORAL at 08:02

## 2017-11-05 RX ADMIN — METOPROLOL SUCCINATE 50 MG: 50 TABLET, EXTENDED RELEASE ORAL at 20:09

## 2017-11-05 RX ADMIN — HYDROCORTISONE: 1 CREAM TOPICAL at 17:29

## 2017-11-05 RX ADMIN — THERA TABS 1 TABLET: TAB at 08:02

## 2017-11-05 RX ADMIN — ATORVASTATIN CALCIUM 10 MG: 10 TABLET, FILM COATED ORAL at 20:09

## 2017-11-05 RX ADMIN — POTASSIUM CHLORIDE 10 MEQ: 750 CAPSULE, EXTENDED RELEASE ORAL at 08:02

## 2017-11-05 RX ADMIN — ALPRAZOLAM 0.25 MG: 0.25 TABLET ORAL at 20:09

## 2017-11-05 RX ADMIN — HYDROCORTISONE: 1 CREAM TOPICAL at 10:01

## 2017-11-05 RX ADMIN — FINASTERIDE 5 MG: 5 TABLET, FILM COATED ORAL at 08:02

## 2017-11-05 RX ADMIN — LISINOPRIL 10 MG: 10 TABLET ORAL at 08:02

## 2017-11-05 RX ADMIN — METFORMIN HYDROCHLORIDE 1000 MG: 500 TABLET ORAL at 17:30

## 2017-11-05 RX ADMIN — ENOXAPARIN SODIUM 40 MG: 40 INJECTION SUBCUTANEOUS at 08:01

## 2017-11-05 RX ADMIN — AMLODIPINE BESYLATE 10 MG: 10 TABLET ORAL at 20:09

## 2017-11-05 RX ADMIN — MAGNESIUM OXIDE TAB 400 MG (241.3 MG ELEMENTAL MG) 400 MG: 400 (241.3 MG) TAB at 08:02

## 2017-11-05 RX ADMIN — ASPIRIN 81 MG 81 MG: 81 TABLET ORAL at 08:02

## 2017-11-05 RX ADMIN — TERAZOSIN HYDROCHLORIDE ANHYDROUS 5 MG: 5 CAPSULE ORAL at 20:09

## 2017-11-05 RX ADMIN — GLIPIZIDE 10 MG: 10 TABLET ORAL at 07:09

## 2017-11-05 NOTE — PROGRESS NOTES
[facility] Medicine Services  INPATIENT PROGRESS NOTE    Length of Stay: 6  Date of Admission: 10/30/2017  Primary Care Physician: Evan Marrero MD    Subjective   Chief Complaint:  Confusion  HPI: 83 yo with recent CVA and weakness to the left leg/foot. Doing well despite an episode of confusion last night. He thought it was afternoon instead of 2 am. Pt is alert and oriented now.    Review of Systems   Constitutional: Negative for fever.   Respiratory: Negative for wheezing and stridor.    Cardiovascular: Negative for chest pain.   Gastrointestinal: Negative for nausea and vomiting.   Genitourinary: Negative for flank pain.   Musculoskeletal: Negative for neck stiffness.   Neurological: Negative for dizziness and weakness.   All pertinent negatives and positives are as above. All other systems have been reviewed and are negative unless otherwise stated.     Objective    Temp:  [96.9 °F (36.1 °C)-97 °F (36.1 °C)] 96.9 °F (36.1 °C)  Heart Rate:  [82-88] 82  Resp:  [18] 18  BP: ()/(54-71) 109/56  Physical Exam    Constitutional: He appears well-developed and well-nourished.   Eyes: EOM are normal. Pupils are equal, round, and reactive to light.   Neck: Normal range of motion.   Cardiovascular: Normal rate and regular rhythm.    Pulmonary/Chest: Effort normal and breath sounds normal.   Abdominal: Soft.   Musculoskeletal: Normal range of motion.   Neurological: He is alert.   Skin: Skin is warm and dry.       Results Review:  I have reviewed the labs, radiology results, and diagnostic studies.    Laboratory Data:   Lab Results (last 24 hours)     Procedure Component Value Units Date/Time    POC Glucose Fingerstick [[id number]]  (Normal) Collected:  11/04/17 2011    Specimen:  Blood Updated:  11/04/17 2034     Glucose 109 mg/dL       Meter: [id number]: [id number] [facility]        POC Glucose Fingerstick [[id number]]  (Normal) Collected:  11/05/17  0456    Specimen:  Blood Updated:  11/05/17 0513     Glucose 113 mg/dL       Meter: [id number]: [id number] [facility] PRAVIN        Renal Function Panel [[id number]]  (Abnormal) Collected:  11/05/17 0456    Specimen:  Blood Updated:  11/05/17 0658     Glucose 116 (H) mg/dL      BUN 22 (H) mg/dL      Creatinine 1.00 mg/dL      Sodium 142 mmol/L      Potassium 4.0 mmol/L      Chloride 104 mmol/L      CO2 26.0 mmol/L      Calcium 9.2 mg/dL      Albumin 3.70 g/dL      Phosphorus 3.3 mg/dL      Anion Gap 12.0 mmol/L      BUN/Creatinine Ratio 22.0     eGFR Non African Amer 72 mL/min/1.73     Narrative:       The MDRD GFR formula is only valid for adults with stable renal function between ages 18 and 70.          Culture Data:   No results found for: BLOODCX  No results found for: URINECX  No results found for: RESPCX  No results found for: WOUNDCX  No results found for: STOOLCX  No components found for: BODYFLD    Radiology Data:   Imaging Results (last 24 hours)     ** No results found for the last 24 hours. **          I have reviewed the patient current medications.     Assessment/Plan     Hospital Problem List     * (Principal)Right-sided lacunar stroke    Former smoker    Left-sided weakness    Essential hypertension    Diabetes mellitus    Chronic anxiety    Chronic back pain greater than 3 months duration    Degenerative joint disease involving multiple joints    Atrial fibrillation, chronic    Encounter for rehabilitation    Obstructive sleep apnea syndrome             Plan: CVA            Doing well. Continue strength training. Weakness in the left has improved.            Confusion. Pt and family note some occasional confusion episodes after the CVA but pt is now at baseline. Likely a sundowning event, now resolved    Josafat Dee DO   11/05/17   11:33 AM

## 2017-11-05 NOTE — PLAN OF CARE
Problem: Patient Care Overview (Adult)  Goal: Plan of Care Review  Outcome: Ongoing (interventions implemented as appropriate)    11/05/17 0259   Coping/Psychosocial Response Interventions   Plan Of Care Reviewed With patient   Patient Care Overview   Progress no change   Outcome Evaluation   Outcome Summary/Follow up Plan patient has not slept well tonight, been up and down in w/c, offered pt prn xanax, pt declined it.          Problem: Fall Risk (Adult)  Goal: Absence of Falls  Outcome: Ongoing (interventions implemented as appropriate)    Problem: Stroke (Ischemic) (Adult)  Goal: Signs and Symptoms of Listed Potential Problems Will be Absent or Manageable (Stroke)  Outcome: Ongoing (interventions implemented as appropriate)

## 2017-11-06 LAB
GLUCOSE BLDC GLUCOMTR-MCNC: 110 MG/DL (ref 70–130)
GLUCOSE BLDC GLUCOMTR-MCNC: 113 MG/DL (ref 70–130)
GLUCOSE BLDC GLUCOMTR-MCNC: 114 MG/DL (ref 70–130)
GLUCOSE BLDC GLUCOMTR-MCNC: 65 MG/DL (ref 70–130)
GLUCOSE BLDC GLUCOMTR-MCNC: 67 MG/DL (ref 70–130)
GLUCOSE BLDC GLUCOMTR-MCNC: 85 MG/DL (ref 70–130)

## 2017-11-06 PROCEDURE — 25010000002 ENOXAPARIN PER 10 MG: Performed by: FAMILY MEDICINE

## 2017-11-06 PROCEDURE — 99232 SBSQ HOSP IP/OBS MODERATE 35: CPT | Performed by: FAMILY MEDICINE

## 2017-11-06 PROCEDURE — 97530 THERAPEUTIC ACTIVITIES: CPT

## 2017-11-06 PROCEDURE — 97116 GAIT TRAINING THERAPY: CPT

## 2017-11-06 PROCEDURE — 97110 THERAPEUTIC EXERCISES: CPT

## 2017-11-06 PROCEDURE — 82962 GLUCOSE BLOOD TEST: CPT

## 2017-11-06 PROCEDURE — 92507 TX SP LANG VOICE COMM INDIV: CPT | Performed by: SPEECH-LANGUAGE PATHOLOGIST

## 2017-11-06 PROCEDURE — 97535 SELF CARE MNGMENT TRAINING: CPT

## 2017-11-06 RX ADMIN — ASPIRIN 81 MG 81 MG: 81 TABLET ORAL at 09:49

## 2017-11-06 RX ADMIN — FLUTICASONE PROPIONATE 2 SPRAY: 50 SPRAY, METERED NASAL at 09:49

## 2017-11-06 RX ADMIN — LISINOPRIL 10 MG: 10 TABLET ORAL at 07:08

## 2017-11-06 RX ADMIN — HYDROCORTISONE: 1 CREAM TOPICAL at 11:06

## 2017-11-06 RX ADMIN — CLOPIDOGREL BISULFATE 75 MG: 75 TABLET ORAL at 09:49

## 2017-11-06 RX ADMIN — POTASSIUM CHLORIDE 10 MEQ: 750 CAPSULE, EXTENDED RELEASE ORAL at 09:49

## 2017-11-06 RX ADMIN — METFORMIN HYDROCHLORIDE 1000 MG: 500 TABLET ORAL at 17:13

## 2017-11-06 RX ADMIN — ENOXAPARIN SODIUM 40 MG: 40 INJECTION SUBCUTANEOUS at 09:50

## 2017-11-06 RX ADMIN — THERA TABS 1 TABLET: TAB at 09:49

## 2017-11-06 RX ADMIN — FINASTERIDE 5 MG: 5 TABLET, FILM COATED ORAL at 09:49

## 2017-11-06 RX ADMIN — ACETAMINOPHEN 650 MG: 325 TABLET ORAL at 08:08

## 2017-11-06 RX ADMIN — MAGNESIUM OXIDE TAB 400 MG (241.3 MG ELEMENTAL MG) 400 MG: 400 (241.3 MG) TAB at 09:49

## 2017-11-06 RX ADMIN — METFORMIN HYDROCHLORIDE 1000 MG: 500 TABLET ORAL at 07:08

## 2017-11-06 RX ADMIN — HYDROCORTISONE: 1 CREAM TOPICAL at 17:13

## 2017-11-06 RX ADMIN — LIDOCAINE 1 PATCH: 50 PATCH TOPICAL at 09:53

## 2017-11-06 RX ADMIN — AMLODIPINE BESYLATE 10 MG: 10 TABLET ORAL at 20:08

## 2017-11-06 RX ADMIN — ATORVASTATIN CALCIUM 10 MG: 10 TABLET, FILM COATED ORAL at 20:08

## 2017-11-06 RX ADMIN — PANTOPRAZOLE SODIUM 40 MG: 40 TABLET, DELAYED RELEASE ORAL at 05:42

## 2017-11-06 RX ADMIN — GLIPIZIDE 10 MG: 10 TABLET ORAL at 07:08

## 2017-11-06 RX ADMIN — TERAZOSIN HYDROCHLORIDE ANHYDROUS 5 MG: 5 CAPSULE ORAL at 20:08

## 2017-11-06 NOTE — PROGRESS NOTES
Good spirits sleeping well.  Still has some episodes of confusion when he awakens but he is much better.  Still has some episodes of confusion when he awakens but he is much better     LOS: 7 days   Patient Care Team:  Evan Marrero MD as PCP - General (Family Medicine)    Chief Complaint:   Right-sided lacunar stroke          Interval History:     SUBJECTIVE:  No new complaints  History taken from: patient chart RN And therapy staff    Objective     Vital Signs  Temp:  [96.6 °F (35.9 °C)-97.8 °F (36.6 °C)] 96.6 °F (35.9 °C)  Heart Rate:  [62-86] 82  Resp:  [18] 18  BP: ()/(53-65) 124/65  Last 3 weights    11/03/17  0500 11/04/17  0431 11/06/17  0400   Weight: 207 lb 14.4 oz (94.3 kg) 207 lb 9.6 oz (94.2 kg) 202 lb 8 oz (91.9 kg)         Physical Exam:     General Appearance:    Alert, cooperative, in no acute distress   Head:    Normocephalic, without obvious abnormality, atraumatic   Eyes:            Lids and lashes normal, conjunctivae and sclerae normal, no   icterus, no pallor, corneas clear, PERRLA   Throat:   No oral lesions, no thrush, oral mucosa moist   Neck:   No adenopathy, supple, trachea midline, no thyromegaly, no   carotid bruit, no JVD       Lungs:     Clear to auscultation,respirations regular, even and                  Unlabored     Heart:    Regular rhythm and normal rate, normal S1 and S2, no            murmur, no gallop, no rub, no click    Chest Wall:    No abnormalities observed   Abdomen:     Normal bowel sounds, no masses, no organomegaly, soft        non-tender, non-distended, no guarding, no rebound                Tenderness    Extremities:   Moves all extremities well, no edema, no cyanosis, no             Redness Strength is improving ataxia is improving        Skin:   No bleeding, bruising or rash   Lymph nodes:   No palpable adenopathy   Neurologic:   Cranial nerves 2 - 12 grossly intact, sensation intact, DTR       present and equal bilaterally Still some left-sided  weakness but improved       RESULTS REVIEW:     Lab Results (last 24 hours)     Procedure Component Value Units Date/Time    POC Glucose Fingerstick [[id number]]  (Normal) Collected:  11/05/17 1142    Specimen:  Blood Updated:  11/05/17 1154     Glucose 74 mg/dL       Meter: [id number]: [id number] MONTIEL YOLANDE       POC Glucose Fingerstick [[id number]]  (Normal) Collected:  11/05/17 1649    Specimen:  Blood Updated:  11/05/17 1703     Glucose 98 mg/dL       Meter: [id number]: [id number] MONTIEL YOLANDE       POC Glucose Fingerstick [[id number]]  (Normal) Collected:  11/05/17 2008    Specimen:  Blood Updated:  11/05/17 2024     Glucose 114 mg/dL       Meter: [id number]: [id number] [facility]        POC Glucose Fingerstick [[id number]]  (Normal) Collected:  11/06/17 0543    Specimen:  Blood Updated:  11/06/17 0707     Glucose 110 mg/dL       Meter: [id number]: [id number] [facility]            Imaging Results (last 72 hours)     ** No results found for the last 72 hours. **          Assessment/Plan     Principal Problem:    Right-sided lacunar stroke  Active Problems:    Left-sided weakness    Atrial fibrillation, chronic    Essential hypertension    Diabetes mellitus    Chronic anxiety    Chronic back pain greater than 3 months duration    Degenerative joint disease involving multiple joints    Encounter for rehabilitation    Obstructive sleep apnea syndrome    Former smoker        Participating very well with therapy making good progress.  His back pain is stable his diabetes is stable blood pressures doing well.  He does have chronic atrial fibrillation and will continue current medications.  Continue intensive therapy  Lab work was reviewed and will be rechecked before discharge    Vishnu Mckinnon MD  11/06/17  9:07 AM

## 2017-11-06 NOTE — THERAPY TREATMENT NOTE
[facility] - Speech Language Pathology Treatment Note  [facility]     Patient Name: Kenneth Harper Jr.  : 1934  MRN: [id number]  Today's Date: 2017  Referring Physician: Pratibha      Admit Date: 10/30/2017      Goals:  1. Patient will demonstrate orientation to day, month, year, place, situation with 90% acc with min cues.  Pt was 100% acc w/orientation questions w/independent use of calendar on his phone.  Goal Met   2. Patient will complete delayed word recall with 80% acc with min cues: Pt was given 4 new related words to complete w/SRT.  Pt was ablet to consistently recall words at immediate level and 1 min delayed recall.  Pt was unable to recall all 4 beyond 2 min delay.    3. Patient will complete organization/home management task with 90% acc with min cues:  Pt was given 5 questions related to map layout.  Pt was 60% acc w/max assist for this task.    Concetta Springer, MS CCC-SLP 2017 1:19 PM    Visit Dx:      ICD-10-CM ICD-9-CM   1. Symbolic dysfunction R48.9 784.60   2. Impaired mobility and ADLs Z74.09 799.89   3. Abnormality of gait and mobility R26.9 781.2   4. Muscle weakness (generalized) M62.81 728.87     Patient Active Problem List   Diagnosis   • Spinal stenosis, lumbar region, with neurogenic claudication   • Former smoker   • Overweight   • Left-sided weakness   • Right-sided lacunar stroke   • Essential hypertension   • Diabetes mellitus   • Chronic anxiety   • Chronic back pain greater than 3 months duration   • Prostatic hypertrophy   • Degenerative joint disease involving multiple joints   • Atrial fibrillation, chronic   • Encounter for rehabilitation   • Obstructive sleep apnea syndrome              Adult Rehabilitation Note       17 1002 17 0901 17 1440    Rehab Assessment/Intervention    Discipline physical therapy assistant  -KATHIE speech language pathologist  -CK physical therapy assistant  -JW    Document Type therapy note (daily note)  -KATHIE  therapy note (daily note)  -CK therapy note (daily note)  -JW    Subjective Information agree to therapy  -RW agree to therapy  -CK agree to therapy  -JW    Patient Effort, Rehab Treatment good  -RW good  -CK good  -JW    Precautions/Limitations fall precautions  -RW  fall precautions  -JW    Recorded by [RW] Pipe Gruber PTA [CK] Concetta Springer, MS CCC-SLP [JW] Felicia Haynes PTA    Vital Signs    Pretreatment Heart Rate (beats/min) 74  -RW      Pre SpO2 (%) 96  -RW      O2 Delivery Pre Treatment room air  -RW      Pre Patient Position   Sitting  -JW    Post Patient Position   Supine  -JW    Recorded by [RW] Pipe Gruber PTA  [JW] Felicia Haynes PTA    Pain Assessment    Pain Assessment No/denies pain  -RW No/denies pain  -CK 0-10  -JW    Pain Score  0  -CK 0  -JW    Post Pain Score  0  -CK 0  -JW    Recorded by [RW] Pipe Gruber PTA [CK] Concetta Springer, MS CCC-SLP [JW] Felicia Haynes PTA    Cognitive Assessment/Intervention    Current Cognitive/Communication Assessment functional  -RW  impaired  -JW    Orientation Status oriented to;oriented x 4  -RW  oriented to;oriented x 4  -JW    Follows Commands/Answers Questions 100% of the time  -RW  100% of the time  -JW    Personal Safety Interventions gait belt;nonskid shoes/slippers when out of bed  -RW  fall prevention program maintained;gait belt;nonskid shoes/slippers when out of bed  -JW    Recorded by [RW] Pipe Gruber PTA  [JW] Felicia Haynes PTA    Cognitive Assessment Intervention    Behavior/Mood Observations behavior appropriate to situation, WNL/WFL  -RW      Recorded by [RW] Pipe Gruber PTA      Communication Treatment Objective and Progress    Cognitive Linguistic Treatment Objectives  Improve orientation;Improve memory skills;Improve functional problem solving  -CK     Recorded by  [CK] Concetta Springer, MS CCC-SLP     Improve orientation    Improve orientation through:  demonstrating orientation to  day;demonstrating orientation to month;demonstrating orientation to year;demonstrating orientation to place;demonstrating orientation to disease/impairment;90%;with inconsistent cues  -CK     Status: Improve orientation through  Achieved  -CK     Orientation Progress  100%   w/independent use of phone for calendar  -CK     Recorded by  [CK] Concetta Springer MS CCC-SLP     Improve memory skills    Improve memory skills through:  recalling related word lists with an imposed delay;90%;with inconsistent cues  -CK     Status: Improve memory skills  Progress slower than expected  -CK     Memory Skills Progress  50%  -CK     Comments: Improve memory skills  pt given new word set this date  -CK     Recorded by  [CK] Concetta Springer MS CCC-SLP     Improve functional problem solving    Improve functional problem solving through:  complete organization/home management task;tell similarity between items  -CK     Status: Improve functional problem solving  Progress slower than expected  -CK     Functional Problem Solving Progress  60%  -CK     Comments: Improve functional problem solving  max assist for task  -CK     Recorded by  [CK] Concetta Springer MS CCC-SLP     Bed Mobility, Assessment/Treatment    Bed Mobility, Assistive Device   --   HOB down, no bedrails  -JW    Bed Mob, Sit to Supine, [place]   supervision required  -JW    Recorded by   [JW] Felicia Haynes, PTA    Transfer Assessment/Treatment    Transfers, Sit-Stand [place] stand by assist;verbal cues required  -RW  stand by assist;verbal cues required  -JW    Transfers, Stand-Sit [place] stand by assist;verbal cues required  -RW  stand by assist;verbal cues required  -JW    Transfers, Sit-Stand-Sit, Assist Device rolling walker  -RW  rolling walker  -JW    Recorded by [RW] Pipe Gruber PTA  [JW] Felicia Haynes, PTA    Gait Assessment/Treatment    Gait, [place] Level minimum assist (75% patient effort);verbal cues  required;contact guard assist  -RW  minimum assist (75% patient effort);verbal cues required  -JW    Gait, Assistive Device rolling walker  -RW  rolling walker  -JW    Gait, Distance (Feet) 150    x 2  -RW  58  -JW    Gait, Comment   pt very fatigued this afternoon, requesting to lie down  -JW    Recorded by [RW] Pipe Gruber PTA  [JW] Felicia Haynes PTA    Stairs Assessment/Treatment    Number of Stairs   '  -JW    Recorded by   [JW] Felicia Haynes PTA    Balance Skills Training    Gait Balance-Level of Assistance Contact guard  -RW      Gait Balance Support parallel bars  -RW      Gait Balance Activities --   gt w/o ad in // bars  -RW      Recorded by [RW] Pipe Gruber PTA      Therapy Exercises    Bilateral Lower Extremities AROM:;20 reps;sitting;hip flexion;knee flexion;LAQ   2 sets  -RW  AROM:;20 reps;supine;ankle pumps/circles;glut sets;quad sets;heel slides;hip abduction/adduction  -JW    BLE Resistance theraband  -RW      Bilateral Upper Extremity AROM:;15 reps;20 reps;sitting   tband rows 2 sets  -RW      BUE Resistance theraband  -RW      Trunk Exercises   supine;bridging;15 reps   hooklying trunk rotation  -JW    Recorded by [RW] Pipe Gruber PTA  [JW] Felicia Haynes, MARTIN    Positioning and Restraints    Pre-Treatment Position sitting in chair/recliner  -RW  in bed  -JW    Post Treatment Position wheelchair  -RW  bed  -JW    In Bed   supine;call light within reach;encouraged to call for assist  -JW    In Wheelchair call light within reach  -RW      Recorded by [RW] Pipe Gruber PTA  [JW] Felicia Haynes PTA      11/04/17 1400 11/04/17 1120 11/04/17 1011    Rehab Assessment/Intervention    Discipline  physical therapy assistant  -JW speech language pathologist  -CK    Document Type  therapy note (daily note)  -JW therapy note (daily note)  -CK    Subjective Information  agree to therapy  -JW agree to therapy  -CK    Patient Effort, Rehab Treatment  good  -JW good  -CK     Precautions/Limitations  fall precautions  -JW     Recorded by  [JW] Felicia Haynes PTA [CK] Concetta Springer, MS CCC-SLP    Vital Signs    Pretreatment Heart Rate (beats/min)  56  -JW     Pre SpO2 (%)  97  -JW     O2 Delivery Pre Treatment  room air  -JW     Pre Patient Position  Sitting  -JW     Post Patient Position  Sitting  -JW     Recorded by  [JW] Felicia Haynes PTA     Pain Assessment    Pain Assessment  0-10  -JW No/denies pain  -CK    Pain Score  0  -JW 0  -CK    Post Pain Score  0  -JW 0  -CK    Recorded by  [JW] Felicia Haynes PTA [CK] Concetta Springer MS CCC-SLP    Cognitive Assessment/Intervention    Current Cognitive/Communication Assessment  impaired  -JW     Orientation Status  oriented to;person;place     -JW     Follows Commands/Answers Questions  100% of the time  -JW     Personal Safety  mild impairment  -JW     Personal Safety Interventions  fall prevention program maintained;gait belt;nonskid shoes/slippers when out of bed  -JW     Recorded by  [JW] Felicia Haynes PTA     Communication Treatment Objective and Progress    Cognitive Linguistic Treatment Objectives   Improve orientation;Improve memory skills;Improve functional problem solving  -CK    Recorded by   [CK] Concetta Springer MS CCC-SLP    Improve orientation    Improve orientation through:   demonstrating orientation to day;demonstrating orientation to month;demonstrating orientation to year;demonstrating orientation to place;demonstrating orientation to disease/impairment;90%;with inconsistent cues  -CK    Status: Improve orientation through   Progressing as expected  -CK    Orientation Progress   80%  -CK    Comments: Improve orientation through   use of calendar  -CK    Recorded by   [CK] Concetta Springer MS CCC-SLP    Improve memory skills    Improve memory skills through:   recalling related word lists with an imposed delay;90%;with inconsistent cues  -CK    Status: Improve memory skills    Progressing as expected  -CK    Memory Skills Progress   70%  -CK    Comments: Improve memory skills   Used same words from previous session.  Pt able to recall 4/4 using SRT  -CK    Recorded by   [CK] Concetta Springer MS CCC-SLP    Improve functional problem solving    Improve functional problem solving through:   complete organization/home management task;tell similarity between items  -CK    Status: Improve functional problem solving   Progressing as expected  -CK    Functional Problem Solving Progress   70%  -CK    Recorded by   [CK] Concetta Springer MS CCC-SLP    Transfer Assessment/Treatment    Transfers, Sit-Stand [place] --  -JW stand by assist;verbal cues required  -JW     Transfers, Stand-Sit [place] --  -JW stand by assist;verbal cues required  -JW     Transfers, Sit-Stand-Sit, Assist Device --  -JW rolling walker  -JW     Recorded by [JW] Felicia Haynes PTA [JW] Felicia Haynes PTA     Gait Assessment/Treatment    Gait, [place] Level --  -JW minimum assist (75% patient effort);verbal cues required  -JW     Gait, Assistive Device --  -JW rolling walker  -JW     Gait, Distance (Feet) --  -JW 84   82, 74  -JW     Gait, Comment --  -JW as pt fatigued w/ gait, his L foot would drag requiring vc's to  foot or a rest break  -JW     Recorded by [JW] Felicia Haynes PTA [JW] Felicia Hayens PTA     Therapy Exercises    Bilateral Lower Extremities --  -JW AROM:;20 reps;sitting;ankle pumps/circles;glut sets;hip abduction/adduction;hip flexion;LAQ  -JW     BLE Resistance  theraband   level 2  -JW     Recorded by [JW] Felicia Haynes PTA [JW] Felicia Haynes PTA     Positioning and Restraints    Pre-Treatment Position  sitting in chair/recliner  -JW     Post Treatment Position  wheelchair  -JW     In Wheelchair  sitting;call light within reach;encouraged to call for assist  -JW     Recorded by  [JENA] Felicia Haynes PTA       11/04/17 0738 11/03/17  1421       Rehab Assessment/Intervention    Discipline occupational therapy assistant  -RC physical therapy assistant  -RW     Document Type therapy note (daily note)  -RC therapy note (daily note)  -RW     Subjective Information agree to therapy  -RC agree to therapy  -RW     Patient Effort, Rehab Treatment good  -RC good  -RW     Precautions/Limitations fall precautions  -RC fall precautions  -RW     Recorded by [RC] LEIGH CannonA/L [RW] Pipe Gruber PTA     Pain Assessment    Pain Assessment No/denies pain  -RC      Recorded by [RC] Cassie Carpio LARA/L      Vision Assessment/Intervention    Visual Impairment  WFL with corrective lenses  -RW     Recorded by  [RW] Pipe Gruber PTA     Cognitive Assessment/Intervention    Current Cognitive/Communication Assessment impaired  -RC impaired  -RW     Orientation Status  oriented to;person;place  -RW     Recorded by [RC] LEIGH CannonA/L [RW] Pipe Gruber PTA     Cognitive Assessment Intervention    Behavior/Mood Observations  behavior appropriate to situation, WNL/WFL  -RW     Recorded by  [RW] Pipe Gruber PTA     Bed Mobility, Assessment/Treatment    Bed Mobility, Assistive Device  head of bed elevated;bed rails  -RW     Bed Mobility, Scoot/Bridge, [place]  supervision required  -RW     Bed Mob, Sit to Supine, [place]  supervision required  -RW     Recorded by  [RW] Pipe Gruber PTA     Transfer Assessment/Treatment    Transfers, Bed-Chair [place] minimum assist (75% patient effort);verbal cues required   chair to chair  -RC --  -RW     Transfers, Chair-Bed [place] minimum assist (75% patient effort);verbal cues required   chair to mat to chair  -RC contact guard assist  -RW     Transfers, Bed-Chair-Bed, Assist Device rolling walker  -RC rolling walker  -RW     Transfers, Sit-Stand [place]  verbal cues required;stand by assist  -RW     Transfers, Stand-Sit [place]  verbal cues required;stand by assist  -RW      Transfers, Sit-Stand-Sit, Assist Device  rolling walker  -RW     Transfer, Safety Issues sequencing ability decreased  -RC      Recorded by [RC] MARCO Cannon/L [RW] Pipe Gruber PTA     Gait Assessment/Treatment    Gait, [place] Level  minimum assist (75% patient effort);contact guard assist;verbal cues required  -RW     Gait, Assistive Device  rolling walker  -RW     Gait, Distance (Feet)  10  -RW     Recorded by  [RW] Pipe Gruber PTA     Stairs Assessment/Treatment    Stairs, Handrail Location  --  -RW     Stairs, [place] Level  --  -RW     Recorded by  [RW] Pipe Gruber PTA     Functional Mobility    Functional Mobility- Ind. Level contact guard assist;verbal cues required  -RC      Functional Mobility- Device rolling walker  -RC      Functional Mobility- Safety Issues --   needs vc's for safe tech  -RC      Recorded by [RC] MARCO Cannon/L      Wheelchair Training/Management    Wheelchair, Distance Propelled 150  -RC      Recorded by [RC] MARCO Cannon/L      ADL Assessment/Intervention    Additional Documentation --   declined bathing/dressing  -RC      Recorded by [RC] MARCO Cannon/L      Lower Body Dressing Assessment/Training    LB Dressing Assess/Train, Clothing Type shoes  -RC shoes;doffing:  -RW     LB Dressing Assess/Train, Position sitting  -RC      Recorded by [RC] MARCO Cannon/L [RW] Pipe Gruber PTA     Balance Skills Training    Standing-Level of Assistance  --  -RW     Static Standing Balance Support  --  -RW     Standing-Balance Activities  --  -RW     Gait Balance Support  --  -RW     Gait Balance Activities  --  -RW     Recorded by  [RW] Pipe Gruber PTA     Therapy Exercises    Bilateral Lower Extremities  AROM:;20 reps;supine;ankle pumps/circles;glut sets;heel slides;hip abduction/adduction;SAQ  -RW     Bilateral Upper Extremity --   ue bike 10 min, pulley ex 5 min  -RC      Trunk Exercises  trunk rotation;supine;15 reps    hooklying  -RW     Recorded by [RC] LEIGH CannonA/L [RW] Pipe Gruber, PTA     Fine Motor Coordination Training    Detail (Fine Motor Coordination Training) --   sml pegs on eseal, rom arc, clothes pin tree  -RC      Recorded by [RC] Cassie Carpio LARA/L      Positioning and Restraints    Pre-Treatment Position sitting in chair/recliner  -RC sitting in chair/recliner  -RW     Post Treatment Position wheelchair  -RC bed  -RW     In Bed  call light within reach  -RW     In Wheelchair encouraged to call for assist  -RC      Recorded by [RC] LEIGH CannonA/L [RW] Pipe Gruber, PTA       User Key  (r) = Recorded By, (t) = Taken By, (c) = Cosigned By    Initials Name Effective Dates    JW Felicia Haynes, PTA 08/11/15 -     CK Concetta Springer, MS CCC-SLP 10/17/16 -     RW Pipe Gruber, PTA 10/17/16 -     RC LEIGH CannonA/DAWN 10/17/16 -               IP SLP Goals       11/06/17 1314 11/04/17 1411 11/03/17 1346    Cognitive Linguistic- Improve Safety and Awareness    Cognitive Linguistic Improve Safety and Awareness- SLP, Date Goal Reviewed 11/06/17  -CK 11/04/17  -CK 11/03/17  -EC    Cognitive Linguistic Improve Safety and Awareness- SLP, Outcome goal ongoing  -CK goal ongoing  -CK goal not met  -EC      11/02/17 1614 11/01/17 1159 10/31/17 1244    Cognitive Linguistic- Improve Safety and Awareness    Cognitive Linguistic Improve Safety and Awareness- SLP, Date Established   10/31/17  -HR    Cognitive Linguistic Improve Safety and Awareness- SLP, Time to Achieve   by discharge  -HR    Cognitive Linguistic Improve Safety and Awareness- SLP, Activity Level   Patient will improve orientation for increased safety in environment;Patient will improve memory skills for increased safety in environment;Patient will improve functional problem solving skills for increased safety in environment  -HR    Cognitive Linguistic Improve Safety and Awareness- SLP, Date Goal Reviewed 11/02/17  -EC  11/01/17  -EC 10/31/17  -HR    Cognitive Linguistic Improve Safety and Awareness- SLP, Outcome goal not met  -EC goal not met  -EC goal ongoing  -HR      User Key  (r) = Recorded By, (t) = Taken By, (c) = Cosigned By    Initials Name Provider Type    EC Clementina Murrell, CCC-SLP Speech and Language Pathologist    HR Shante Otero, MS CCC-SLP Speech and Language Pathologist    DANIELLA Springer, MS CCC-SLP Speech and Language Pathologist          EDUCATION  The patient has been educated in the following areas:   Cognitive Impairment.    SLP Recommendation and Plan                               Plan of Care Review  Plan Of Care Reviewed With: patient  Progress: progress towards functional goals is fair  Outcome Summary/Follow up Plan: Pt met orientation goal this date.  Pt was 100% acc w/independent use of phone to assist w/orientation.  Pt struggled w/memory and organizational tasks this date.          Time Calculation:         Time Calculation- SLP       11/06/17 1316          Time Calculation- SLP    SLP Start Time 0901  -CK      SLP Stop Time 0947  -CK      SLP Time Calculation (min) 46 min  -CK      Total Timed Code Minutes- SLP 46 minute(s)  -CK      SLP Received On 11/06/17  -CK      SLP Goal Re-Cert Due Date 11/14/17  -CK        User Key  (r) = Recorded By, (t) = Taken By, (c) = Cosigned By    Initials Name Provider Type    DANIELLA Springer MS CCC-SLP Speech and Language Pathologist          Therapy Charges for Today     Code Description Service Date Service Provider Modifiers Qty    [id number] HC ST TREATMENT SPEECH 3 11/6/2017 Concetta Springer MS CCC-SLP GN 1               MS MERI Mohr  11/6/2017

## 2017-11-06 NOTE — THERAPY TREATMENT NOTE
Inpatient Rehabilitation - Physical Therapy Treatment Note  [facility]     Patient Name: Kenneth Harper Jr.  : 1934  MRN: [id number]  Today's Date: 2017  Onset of Illness/Injury or Date of Surgery Date: 10/30/17  Date of Referral to PT: 10/30/17  Referring Physician: TERRENCE Mckinnon MD    Admit Date: 10/30/2017    Visit Dx:    ICD-10-CM ICD-9-CM   1. Symbolic dysfunction R48.9 784.60   2. Impaired mobility and ADLs Z74.09 799.89   3. Abnormality of gait and mobility R26.9 781.2   4. Muscle weakness (generalized) M62.81 728.87     Patient Active Problem List   Diagnosis   • Spinal stenosis, lumbar region, with neurogenic claudication   • Former smoker   • Overweight   • Left-sided weakness   • Right-sided lacunar stroke   • Essential hypertension   • Diabetes mellitus   • Chronic anxiety   • Chronic back pain greater than 3 months duration   • Prostatic hypertrophy   • Degenerative joint disease involving multiple joints   • Atrial fibrillation, chronic   • Encounter for rehabilitation   • Obstructive sleep apnea syndrome               Adult Rehabilitation Note       17 1332 17 1002 17 0901    Rehab Assessment/Intervention    Discipline physical therapy assistant  -RW physical therapy assistant  -RW speech language pathologist  -CK    Document Type therapy note (daily note)  -RW therapy note (daily note)  -RW therapy note (daily note)  -CK    Subjective Information agree to therapy  -RW agree to therapy  -RW agree to therapy  -CK    Patient Effort, Rehab Treatment good  -RW good  -RW good  -CK    Precautions/Limitations fall precautions  -RW fall precautions  -RW     Recorded by [RW] Pipe Gruber PTA [RW] Pipe Gruber PTA [CK] Concetta Springer MS CCC-SLP    Vital Signs    Pretreatment Heart Rate (beats/min)  74  -RW     Pre SpO2 (%)  96  -RW     O2 Delivery Pre Treatment  room air  -RW     Recorded by  [RW] Pipe Gruber PTA     Pain Assessment    Pain Assessment  No/denies pain  -RW No/denies pain  -RW No/denies pain  -CK    Pain Score   0  -CK    Post Pain Score   0  -CK    Recorded by [RW] Pipe Gruber PTA [RW] Pipe Gruber PTA [CK] Concetta Springer MS CCC-SLP    Cognitive Assessment/Intervention    Current Cognitive/Communication Assessment functional  -RW functional  -RW     Orientation Status oriented to;oriented x 4  -RW oriented to;oriented x 4  -RW     Follows Commands/Answers Questions 100% of the time  -% of the time  -RW     Personal Safety Interventions gait belt;nonskid shoes/slippers when out of bed  -RW gait belt;nonskid shoes/slippers when out of bed  -RW     Recorded by [RW] Pipe Gruber PTA [RW] Pipe Gruber PTA     Cognitive Assessment Intervention    Behavior/Mood Observations behavior appropriate to situation, WNL/WFL  -RW behavior appropriate to situation, WNL/WFL  -RW     Recorded by [RW] Pipe Gruber PTA [RW] Pipe Gruber PTA     Communication Treatment Objective and Progress    Cognitive Linguistic Treatment Objectives   Improve orientation;Improve memory skills;Improve functional problem solving  -CK    Recorded by   [CK] Concetta Springer MS CCC-SLP    Improve orientation    Improve orientation through:   demonstrating orientation to day;demonstrating orientation to month;demonstrating orientation to year;demonstrating orientation to place;demonstrating orientation to disease/impairment;90%;with inconsistent cues  -CK    Status: Improve orientation through   Achieved  -CK    Orientation Progress   100%   w/independent use of phone for calendar  -CK    Recorded by   [CK] Concetta Springer MS CCC-SLP    Improve memory skills    Improve memory skills through:   recalling related word lists with an imposed delay;90%;with inconsistent cues  -CK    Status: Improve memory skills   Progress slower than expected  -CK    Memory Skills Progress   50%  -CK    Comments: Improve memory skills   pt given new word set this date  -CK     Recorded by   [CK] Concetta Springer MS CCC-SLP    Improve functional problem solving    Improve functional problem solving through:   complete organization/home management task;tell similarity between items  -CK    Status: Improve functional problem solving   Progress slower than expected  -CK    Functional Problem Solving Progress   60%  -CK    Comments: Improve functional problem solving   max assist for task  -CK    Recorded by   [CK] Concetta Springer MS CCC-SLP    Transfer Assessment/Treatment    Transfers, Sit-Stand [place] stand by assist;verbal cues required  -RW stand by assist;verbal cues required  -RW     Transfers, Stand-Sit [place] stand by assist;verbal cues required  -RW stand by assist;verbal cues required  -RW     Transfers, Sit-Stand-Sit, Assist Device rolling walker  -RW rolling walker  -RW     Transfer, Comment sit to stand 5 x 2  -RW      Recorded by [RW] Pipe Gruber PTA [RW] Pipe Gruber PTA     Gait Assessment/Treatment    Gait, [place] Level verbal cues required;contact guard assist  -RW minimum assist (75% patient effort);verbal cues required;contact guard assist  -RW     Gait, Assistive Device rolling walker  -RW rolling walker  -RW     Gait, Distance (Feet) 150   70 x 3  -    x 2  -RW     Gait, Comment improved gt quality. minimal foot drag on left  -RW      Recorded by [RW] Pipe Gruber PTA [RW] Pipe Gruber PTA     Balance Skills Training    Gait Balance-Level of Assistance --  -RW Contact guard  -RW     Gait Balance Support --  -RW parallel bars  -RW     Gait Balance Activities --  -RW --   gt w/o ad in // bars  -RW     Recorded by [RW] Pipe Gruber PTA [RW] Pipe Gruber PTA     Therapy Exercises    Bilateral Lower Extremities AROM:;20 reps;sitting;hip flexion;knee flexion;LAQ   2 sets  -RW AROM:;20 reps;sitting;hip flexion;knee flexion;LAQ   2 sets  -RW     BLE Resistance theraband  -RW theraband  -RW     Bilateral Upper Extremity AROM:;15  reps;20 reps;sitting   tband rows 2 sets  -RW AROM:;15 reps;20 reps;sitting   tband rows 2 sets  -RW     BUE Resistance theraband  -RW theraband  -RW     Recorded by [RW] Pipe Gruber PTA [RW] Pipe Gruber PTA     Positioning and Restraints    Pre-Treatment Position sitting in chair/recliner  -RW sitting in chair/recliner  -RW     Post Treatment Position wheelchair  -RW wheelchair  -RW     In Wheelchair call light within reach  -RW call light within reach  -RW     Recorded by [RW] Pipe Gruber PTA [RW] Pipe Gruber PTA       11/06/17 0724 11/04/17 1440 11/04/17 1400    Rehab Assessment/Intervention    Discipline occupational therapy assistant  -KD physical therapy assistant  -JW     Document Type therapy note (daily note)  -KD therapy note (daily note)  -     Subjective Information agree to therapy  -KD agree to therapy  -JW     Patient Effort, Rehab Treatment  good  -JW     Precautions/Limitations  fall precautions  -JW     Recorded by [KD] MARCO Vidales/L [JW] Felicia Haynes, PTA     Vital Signs    Pre Treatment Diastolic   -KD      Intra Systolic BP Rehab 65  -KD      Pretreatment Heart Rate (beats/min) 74  -KD      Posttreatment Heart Rate (beats/min) 76  -KD      Pre SpO2 (%) 96  -KD      O2 Delivery Pre Treatment room air  -KD      Post SpO2 (%) 94  -KD      O2 Delivery Post Treatment room air  -KD      Pre Patient Position Sitting  -KD Sitting  -JW     Intra Patient Position Standing  -KD      Post Patient Position Sitting  -KD Supine  -JW     Recorded by [KD] MARCO Vidales/L [JW] Felicia Haynes, MARTIN     Pain Assessment    Pain Assessment No/denies pain  -KD 0-10  -JW     Pain Score  0  -JW     Post Pain Score  0  -JW     Recorded by [KD] MARCO Vidales/DAWN [JW] Felicia Haynes, PTA     Cognitive Assessment/Intervention    Current Cognitive/Communication Assessment functional  -KD impaired  -JW     Orientation Status oriented x 4  -KD oriented to;oriented  x 4  -JW     Follows Commands/Answers Questions 100% of the time  -% of the time  -JW     Personal Safety Interventions  fall prevention program maintained;gait belt;nonskid shoes/slippers when out of bed  -JW     Recorded by [KD] MARCO Vidales/L [JW] Felicia Haynes PTA     Bed Mobility, Assessment/Treatment    Bed Mobility, Assistive Device  --   HOB down, no bedrails  -JW     Bed Mob, Sit to Supine, [place]  supervision required  -JW     Recorded by  [JW] Felicia Haynes PTA     Transfer Assessment/Treatment    Transfers, Bed-Chair [place] contact guard assist  -KD      Transfers, Sit-Stand [place] contact guard assist  -KD stand by assist;verbal cues required  -JW --  -JW    Transfers, Stand-Sit [place] contact guard assist  -KD stand by assist;verbal cues required  -JW --  -JW    Transfers, Sit-Stand-Sit, Assist Device rolling walker  -KD rolling walker  -JW --  -JW    Transfer, Comment 4 sit-stands  -KD      Recorded by [KD] MARCO Vidales/DAWN [JW] Felicia Haynes PTA [JW] Felicia Haynes, PTA    Gait Assessment/Treatment    Gait, [place] Level  minimum assist (75% patient effort);verbal cues required  -JW --  -JW    Gait, Assistive Device  rolling walker  -JW --  -JW    Gait, Distance (Feet)  58  -JW --  -JW    Gait, Comment  pt very fatigued this afternoon, requesting to lie down  -JW --  -JW    Recorded by  [JW] Felicia Haynes PTA [JW] Felicia Haynes PTA    Stairs Assessment/Treatment    Number of Stairs  '  -JW     Recorded by  [JW] Felicia Haynes PTA     Functional Mobility    Functional Mobility- Ind. Level contact guard assist  -KD      Functional Mobility- Device rolling walker  -KD      Functional Mobility-Distance (Feet) 150   x 2  -KD      Recorded by [KD] MARCO Vidales/L      Grooming Assessment/Training    Grooming Assess/Train, Assistive Device electric razor   comb hair  -KD      Grooming Assess/Train,  Position sitting;sink side  -KD      Grooming Assess/Train, Indepen Level conditional independence  -KD      Recorded by [KD] MARCO Vidales/L      Self-Feeding Assessment/Training    Self-Feeding Assess/Train, Position sitting  -KD      Self-Feeding Assess/Train, [place] conditional independence  -KD      Self-Feeding Assess/Train, Spillage Amount minimal  -KD      Recorded by [KD] MARCO Vidales/L      Therapy Exercises    Bilateral Lower Extremities  AROM:;20 reps;supine;ankle pumps/circles;glut sets;quad sets;heel slides;hip abduction/adduction  -JW --  -JW    Bilateral Upper Extremity AROM:;20 reps;sitting;elbow flexion/extension;pronation/supination;shoulder abduction/adduction;shoulder circles;shoulder extension/flexion;shoulder horizontal abd/add   UEE x 15 mins with 1 RB  -KD      BUE Resistance manual resistance- moderate   [place]ulley system x 15 mins with RB's PRN  -KD      Trunk Exercises  supine;bridging;15 reps   hooklying trunk rotation  -JW     Recorded by [KD] MARCO iVdales/DAWN [JW] Felicia Haynes, PTA [JW] Felicia Haynes, PTA    Positioning and Restraints    Pre-Treatment Position sitting in chair/recliner  -KD in bed  -JW     Post Treatment Position wheelchair  -KD bed  -JW     In Bed sitting;sitting EOB;call light within reach;encouraged to call for assist;exit alarm on  -KD supine;call light within reach;encouraged to call for assist  -JW     Recorded by [KD] MARCO Vidales/DAWN [JW] Felicia Haynes, PTA       11/04/17 1120 11/04/17 1011 11/04/17 0738    Rehab Assessment/Intervention    Discipline physical therapy assistant  -JENA speech language pathologist  -CK occupational therapy assistant  -RC    Document Type therapy note (daily note)  -JENA therapy note (daily note)  -CK therapy note (daily note)  -RC    Subjective Information agree to therapy  -JW agree to therapy  -CK agree to therapy  -RC    Patient Effort, Rehab Treatment good  -JW good  -CK good  -RC     Precautions/Limitations fall precautions  -JW  fall precautions  -RC    Recorded by [JW] Felicia Haynes PTA [CK] Concetta Springer, MS CCC-SLP [RC] Cassie Carpio, LARA/L    Vital Signs    Pretreatment Heart Rate (beats/min) 56  -JW      Pre SpO2 (%) 97  -JW      O2 Delivery Pre Treatment room air  -JW      Pre Patient Position Sitting  -JW      Post Patient Position Sitting  -JW      Recorded by [JW] Felicia Haynes PTA      Pain Assessment    Pain Assessment 0-10  -JW No/denies pain  -CK No/denies pain  -RC    Pain Score 0  -JW 0  -CK     Post Pain Score 0  -JW 0  -CK     Recorded by [JW] Felicia Haynes PTA [CK] Concetta Springer, MS CCC-SLP [RC] LEIGH CannonA/L    Cognitive Assessment/Intervention    Current Cognitive/Communication Assessment impaired  -  impaired  -    Orientation Status oriented to;person;place     -JW      Follows Commands/Answers Questions 100% of the time  -JW      Personal Safety mild impairment  -      Personal Safety Interventions fall prevention program maintained;gait belt;nonskid shoes/slippers when out of bed  -JW      Recorded by [JW] Felicia Haynes PTA  [RC] LEIGH CannonA/DAWN    Communication Treatment Objective and Progress    Cognitive Linguistic Treatment Objectives  Improve orientation;Improve memory skills;Improve functional problem solving  -CK     Recorded by  [CK] Concetta Springer, MS CCC-SLP     Improve orientation    Improve orientation through:  demonstrating orientation to day;demonstrating orientation to month;demonstrating orientation to year;demonstrating orientation to place;demonstrating orientation to disease/impairment;90%;with inconsistent cues  -CK     Status: Improve orientation through  Progressing as expected  -CK     Orientation Progress  80%  -CK     Comments: Improve orientation through  use of calendar  -CK     Recorded by  [CK] Concetta Springer, MS CCC-SLP     Improve memory skills    Improve memory  skills through:  recalling related word lists with an imposed delay;90%;with inconsistent cues  -CK     Status: Improve memory skills  Progressing as expected  -CK     Memory Skills Progress  70%  -CK     Comments: Improve memory skills  Used same words from previous session.  Pt able to recall 4/4 using SRT  -CK     Recorded by  [CK] Concetta Springer MS CCC-SLP     Improve functional problem solving    Improve functional problem solving through:  complete organization/home management task;tell similarity between items  -CK     Status: Improve functional problem solving  Progressing as expected  -CK     Functional Problem Solving Progress  70%  -CK     Recorded by  [CK] Concetta Springer MS CCC-SLP     Transfer Assessment/Treatment    Transfers, Bed-Chair [place]   minimum assist (75% patient effort);verbal cues required   chair to chair  -RC    Transfers, Chair-Bed [place]   minimum assist (75% patient effort);verbal cues required   chair to mat to chair  -RC    Transfers, Bed-Chair-Bed, Assist Device   rolling walker  -    Transfers, Sit-Stand [place] stand by assist;verbal cues required  -JW      Transfers, Stand-Sit [place] stand by assist;verbal cues required  -      Transfers, Sit-Stand-Sit, Assist Device rolling walker  -      Transfer, Safety Issues   sequencing ability decreased  -    Recorded by [JW] Felicia Haynes, PTA  [RC] Cassie Carpio, LARA/L    Gait Assessment/Treatment    Gait, [place] Level minimum assist (75% patient effort);verbal cues required  -      Gait, Assistive Device rolling walker  -      Gait, Distance (Feet) 84   82, 74  -      Gait, Comment as pt fatigued w/ gait, his L foot would drag requiring vc's to  foot or a rest break  -      Recorded by [JW] Felicia Haynes, PTA      Functional Mobility    Functional Mobility- Ind. Level   contact guard assist;verbal cues required  -RC    Functional Mobility- Device   rolling  walker  -RC    Functional Mobility- Safety Issues   --   needs vc's for safe tech  -RC    Recorded by   [RC] MARCO Cannon/L    Wheelchair Training/Management    Wheelchair, Distance Propelled   150  -RC    Recorded by   [RC] LEIGH CannonA/L    ADL Assessment/Intervention    Additional Documentation   --   declined bathing/dressing  -RC    Recorded by   [RC] LEIGH CannonA/L    Lower Body Dressing Assessment/Training    LB Dressing Assess/Train, Clothing Type   shoes  -RC    LB Dressing Assess/Train, Position   sitting  -RC    Recorded by   [RC] LEIGH CannonA/L    Therapy Exercises    Bilateral Lower Extremities AROM:;20 reps;sitting;ankle pumps/circles;glut sets;hip abduction/adduction;hip flexion;LAQ  -JW      BLE Resistance theraband   level 2  -JW      Bilateral Upper Extremity   --   ue bike 10 min, pulley ex 5 min  -RC    Recorded by [JW] Felicia Haynes, PTA  [RC] LEIGH CannonA/L    Fine Motor Coordination Training    Detail (Fine Motor Coordination Training)   --   sml pegs on eseal, rom arc, clothes pin tree  -RC    Recorded by   [RC] LEIGH CannonA/L    Positioning and Restraints    Pre-Treatment Position sitting in chair/recliner  -JW  sitting in chair/recliner  -RC    Post Treatment Position wheelchair  -JW  wheelchair  -RC    In Wheelchair sitting;call light within reach;encouraged to call for assist  -JW  encouraged to call for assist  -RC    Recorded by [JW] Felicia Haynes, PTA  [RC] MARCO Cannon/L      11/03/17 1421          Rehab Assessment/Intervention    Discipline physical therapy assistant  -RW      Document Type therapy note (daily note)  -RW      Subjective Information agree to therapy  -RW      Patient Effort, Rehab Treatment good  -RW      Precautions/Limitations fall precautions  -RW      Recorded by [RW] Pipe Gruber PTA      Vision Assessment/Intervention    Visual Impairment WFL with corrective lenses  -RW      Recorded  by [RW] Pipe Gruber PTA      Cognitive Assessment/Intervention    Current Cognitive/Communication Assessment impaired  -RW      Orientation Status oriented to;person;place  -RW      Recorded by [RW] Pipe Gruber PTA      Cognitive Assessment Intervention    Behavior/Mood Observations behavior appropriate to situation, WNL/WFL  -RW      Recorded by [RW] Pipe Gruber PTA      Bed Mobility, Assessment/Treatment    Bed Mobility, Assistive Device head of bed elevated;bed rails  -RW      Bed Mobility, Scoot/Bridge, [place] supervision required  -RW      Bed Mob, Sit to Supine, [place] supervision required  -RW      Recorded by [RW] Pipe Gruber PTA      Transfer Assessment/Treatment    Transfers, Bed-Chair [place] --  -RW      Transfers, Chair-Bed [place] contact guard assist  -RW      Transfers, Bed-Chair-Bed, Assist Device rolling walker  -RW      Transfers, Sit-Stand [place] verbal cues required;stand by assist  -RW      Transfers, Stand-Sit [place] verbal cues required;stand by assist  -RW      Transfers, Sit-Stand-Sit, Assist Device rolling walker  -RW      Recorded by [RW] Pipe Gruber PTA      Gait Assessment/Treatment    Gait, [place] Level minimum assist (75% patient effort);contact guard assist;verbal cues required  -RW      Gait, Assistive Device rolling walker  -RW      Gait, Distance (Feet) 10  -RW      Recorded by [RW] Pipe Gruber PTA      Stairs Assessment/Treatment    Stairs, Handrail Location --  -RW      Stairs, [place] Level --  -RW      Recorded by [RW] Pipe Gruber PTA      Lower Body Dressing Assessment/Training    LB Dressing Assess/Train, Clothing Type shoes;doffing:  -RW      Recorded by [RW] Pipe Gruber PTA      Balance Skills Training    Standing-Level of Assistance --  -RW      Static Standing Balance Support --  -RW      Standing-Balance Activities --  -RW      Gait Balance Support --  -RW      Gait Balance Activities --  -RW       Recorded by [RW] Pipe Gruber PTA      Therapy Exercises    Bilateral Lower Extremities AROM:;20 reps;supine;ankle pumps/circles;glut sets;heel slides;hip abduction/adduction;SAQ  -RW      Trunk Exercises trunk rotation;supine;15 reps   hooklying  -RW      Recorded by [RW] Pipe Gruber PTA      Positioning and Restraints    Pre-Treatment Position sitting in chair/recliner  -RW      Post Treatment Position bed  -RW      In Bed call light within reach  -RW      Recorded by [RW] Pipe Gruber PTA        User Key  (r) = Recorded By, (t) = Taken By, (c) = Cosigned By    Initials Name Effective Dates    JW Felicia HOWARD [place], PTA 08/11/15 -     CK Concetta Springer, MS CCC-SLP 10/17/16 -     RW Pipe Gruber, PTA 10/17/16 -     RC Cassie Carpio, LARA/L 10/17/16 -     KD Therese King, LARA/L 10/17/16 -                 IP PT Goals       11/06/17 1415 11/04/17 1440 11/03/17 1540    Transfer Training PT LTG    Transfer Training PT  LTG, Date Goal Reviewed 11/06/17  -RW 11/04/17  -JW 11/03/17  -RW    Transfer Training PT LTG, Outcome goal ongoing  -RW goal ongoing  - goal ongoing  -RW    Gait Training PT LTG    Gait Training Goal PT LTG, Date Goal Reviewed 11/06/17  -RW 11/04/17  -JW 11/03/17  -RW    Gait Training Goal PT LTG, Outcome goal ongoing  -RW goal ongoing  - goal ongoing  -RW    Stair Training PT STG    Stair Training Goal PT STG, Date Goal Reviewed 11/06/17  -RW 11/04/17  -JW 11/03/17  -RW    Stair Training Goal PT STG, Outcome goal ongoing  -RW goal ongoing  - goal ongoing  -RW    Stair Training PT LTG    Stair Training Goal PT LTG, Date Established  11/04/17  -JW     Stair Training Goal PT LTG, Date Goal Reviewed 11/06/17  -RW 11/04/17  -JW 11/03/17  -RW    Stair Training Goal PT LTG, Outcome goal ongoing  -RW goal ongoing  - goal ongoing  -RW      11/02/17 1631 11/01/17 1543 10/31/17 0825    Bed Mobility PT LTG    Bed Mobility PT LTG, Date Established   10/31/17  -    Bed Mobility PT  LTG, Time to Achieve   by discharge  -LM    Bed Mobility PT LTG, Activity Type   supine to sit/sit to supine  -LM    Bed Mobility PT LTG, [place] Level   supervision required  -LM    Bed Mobility PT LTG, Additional Goal   HOB flat; No BR's  -LM    Bed Mobility PT LTG, Date Goal Reviewed 11/02/17  -RW 11/01/17 -RW     Bed Mobility PT LTG, Outcome goal met  -RW goal ongoing  -RW goal ongoing  -LM    Transfer Training PT LTG    Transfer Training PT LTG, Date Established   10/31/17  -LM    Transfer Training PT LTG, Time to Achieve   by discharge  -LM    Transfer Training PT LTG, Activity Type   bed to chair /chair to bed  -LM    Transfer Training PT LTG, [place] Level   supervision required  -LM    Transfer Training PT LTG, Assist Device   --   AAD  -LM    Transfer Training PT  LTG, Date Goal Reviewed 11/02/17  -RW 11/01/17  -RW     Transfer Training PT LTG, Outcome goal ongoing  -RW goal ongoing  -RW goal ongoing  -LM    Gait Training PT LTG    Gait Training Goal PT LTG, Date Established   10/31/17  -LM    Gait Training Goal PT LTG, Time to Achieve   by discharge  -LM    Gait Training Goal PT LTG, [place] Level   supervision required  -LM    Gait Training Goal PT LTG, Assist Device   --   AAD  -LM    Gait Training Goal PT LTG, Distance to Achieve   150 feet  -LM    Gait Training Goal PT LTG, Date Goal Reviewed 11/02/17  -RW 11/01/17  -RW     Gait Training Goal PT LTG, Outcome goal ongoing  -RW goal ongoing  -RW goal ongoing  -LM    Stair Training PT STG    Stair Training Goal PT STG, Date Established   10/31/17  -LM    Stair Training Goal PT STG, Time to Achieve   4 days  -LM    Stair Training Goal PT STG, Number of Steps   4 steps  -LM    Stair Training Goal PT STG, [place] Level   contact guard assist  -LM    Stair Training Goal PT STG, Assist Device   1 handrail  -LM    Stair Training Goal PT STG, Date Goal Reviewed 11/02/17  -RW 11/01/17  -RW     Stair Training Goal PT STG, Outcome goal ongoing   -RW goal ongoing  -RW goal ongoing  -LM    Stair Training PT LTG    Stair Training Goal PT LTG, Date Established   10/31/17  -LM    Stair Training Goal PT LTG, Time to Achieve   by discharge  -LM    Stair Training Goal PT LTG, Number of Steps   1 flight  -LM    Stair Training Goal PT LTG, [place] Level   supervision required  -LM    Stair Training Goal PT LTG, Assist Device   2 handrails  -LM    Stair Training Goal PT LTG, Date Goal Reviewed 11/02/17  -RW 11/01/17  -RW     Stair Training Goal PT LTG, Outcome goal ongoing  -RW goal ongoing  -RW goal ongoing  -LM      User Key  (r) = Recorded By, (t) = Taken By, (c) = Cosigned By    Initials Name Provider Type    JW Felicia Haynes, PTA Physical Therapy Assistant     Landy Mckeon, PT Physical Therapist    RW Pipe Gruber, PTA Physical Therapy Assistant          Physical Therapy Education     Title: PT OT SLP Therapies (Active)     Topic: Physical Therapy (Active)     Point: Mobility training (Done)    Learning Progress Summary    Learner Readiness Method Response Comment Documented by Status   Patient Acceptance E VU again reviewed correct gt and transfer safey.  11/03/17 0910 Done    Acceptance E VU reviewed safe transfers and risks of falls RW 11/01/17 1052 Done    Acceptance E NR Reviewed safety with transfers and ambulation.  10/31/17 1506 Active               Point: Precautions (Done)    Learning Progress Summary    Learner Readiness Method Response Comment Documented by Status   Patient Acceptance E VU again reviewed correct gt and transfer safey. RW 11/03/17 0910 Done    Acceptance E VU reviewed safe transfers and risks of falls RW 11/01/17 1052 Done    Acceptance E NR Reviewed safety with transfers and ambulation.  10/31/17 1506 Active                      User Key     Initials Effective Dates Name Provider Type Discipline     06/15/16 -  Landy Mckeon, PT Physical Therapist PT    RW 10/17/16 -  Pipe Gruber, PTA Physical Therapy  Assistant PT                    PT Recommendation and Plan  Anticipated Equipment Needs At Discharge: front wheeled walker  Anticipated Discharge Disposition: home with 24/7 care, home with home health  Planned Therapy Interventions: balance training, bed mobility training, gait training, home exercise program, motor coordination training, neuromuscular re-education, patient/family education, postural re-education, ROM (Range of Motion), stair training, strengthening, stretching, transfer training, wheelchair management/propulsion training  PT Frequency: other (see comments) (5-14 times/wk)  Plan of Care Review  Plan Of Care Reviewed With: patient  Outcome Summary/Follow up Plan: pt transfers wiht cga and has improved his gt qualtiy.          Outcome Measures       11/06/17 1000 11/04/17 1120 11/04/17 0738    How much help from another person do you currently need...    Turning from your back to your side while in flat bed without using bedrails? 3  -RW 3  -JW     Moving from lying on back to sitting on the side of a flat bed without bedrails? 3  -RW 3  -JW     Moving to and from a bed to a chair (including a wheelchair)? 3  -RW 3  -JW     Standing up from a chair using your arms (e.g., wheelchair, bedside chair)? 3  -RW 3  -JW     Climbing 3-5 steps with a railing? 3  -RW 3  -JW     To walk in hospital room? 3  -RW 3  -JW     AM-PAC 6 Clicks Score 18  -RW 18  -JW     How much help from another is currently needed...    Putting on and taking off regular lower body clothing?   3  -RC    Bathing (including washing, rinsing, and drying)   3  -RC    Toileting (which includes using toilet bed pan or urinal)   2  -RC    Putting on and taking off regular upper body clothing   3  -RC    Taking care of personal grooming (such as brushing teeth)   3  -RC    Eating meals   3  -RC    Score   17  -RC    Functional Assessment    Outcome Measure Options  AM-PAC 6 Clicks Daily Activity (OT)  -JW       User Key  (r) = Recorded By,  (t) = Taken By, (c) = Cosigned By    Initials Name Provider Type    JENA Haynes, MARTIN Physical Therapy Assistant    KATHIE Gruber, MARTIN Physical Therapy Assistant    MARCO Young/L Occupational Therapy Assistant           Time Calculation:         PT Charges       11/06/17 1417 11/06/17 1057       Time Calculation    Start Time 1332  -RW 1002  -RW     Stop Time 1412  -RW 1050  -RW     Time Calculation (min) 40 min  -RW 48 min  -RW     Time Calculation- PT    Total Timed Code Minutes- PT 40 minute(s)  -RW 48 minute(s)  -RW       User Key  (r) = Recorded By, (t) = Taken By, (c) = Cosigned By    Initials Name Provider Type    KATHIE Gruber PTA Physical Therapy Assistant          Therapy Charges for Today     Code Description Service Date Service Provider Modifiers Qty    86768446361 HC GAIT TRAINING EA 15 MIN 11/6/2017 Pipe Gruber, PTA GP 1    51742676783 HC PT THER PROC EA 15 MIN 11/6/2017 Pipe Gruber PTA GP 1    10999671739 HC PT THERAPEUTIC ACT EA 15 MIN 11/6/2017 Pipe Gruber PTA GP 1    97031133778 HC GAIT TRAINING EA 15 MIN 11/6/2017 Pipe Gruber, PTA GP 1    16818997906 HC PT THER PROC EA 15 MIN 11/6/2017 Pipe Gruber, PTA GP 1    03633638749 HC PT THERAPEUTIC ACT EA 15 MIN 11/6/2017 Pipe Gruber, PTA GP 1          PT G-Codes  PT Professional Judgement Used?: Yes  Outcome Measure Options: AM-PAC 6 Clicks Daily Activity (OT)  Score: 15  Functional Limitation: Mobility: Walking and moving around  Mobility: Walking and Moving Around Current Status (): At least 40 percent but less than 60 percent impaired, limited or restricted  Mobility: Walking and Moving Around Goal Status (): At least 1 percent but less than 20 percent impaired, limited or restricted    Pipe Gruber PTA  11/6/2017

## 2017-11-06 NOTE — PLAN OF CARE
Problem: Patient Care Overview (Adult)  Goal: Plan of Care Review  Outcome: Ongoing (interventions implemented as appropriate)    11/06/17 0258   Coping/Psychosocial Response Interventions   Plan Of Care Reviewed With patient   Patient Care Overview   Progress no change   Outcome Evaluation   Outcome Summary/Follow up Plan patient continues to not use call light to call for assistance prior to getting up. patient has been educated several times on the safety precautions with no evidence of learning. Will continue to reiterate the need to call for assistance to patient.          Problem: Fall Risk (Adult)  Goal: Absence of Falls  Outcome: Ongoing (interventions implemented as appropriate)    Problem: Stroke (Ischemic) (Adult)  Goal: Signs and Symptoms of Listed Potential Problems Will be Absent or Manageable (Stroke)  Outcome: Ongoing (interventions implemented as appropriate)    Problem: Diabetes, Type 2 (Adult)  Goal: Signs and Symptoms of Listed Potential Problems Will be Absent or Manageable (Diabetes, Type 2)  Outcome: Ongoing (interventions implemented as appropriate)

## 2017-11-06 NOTE — PLAN OF CARE
Problem: Patient Care Overview (Adult)  Goal: Plan of Care Review  Outcome: Ongoing (interventions implemented as appropriate)    11/06/17 0724 11/06/17 1314 11/06/17 1415   Coping/Psychosocial Response Interventions   Plan Of Care Reviewed With --  --  patient   Patient Care Overview   Progress --  progress towards functional goals is fair --    Outcome Evaluation   Outcome Summary/Follow up Plan No new goals met this date --  --        Goal: Discharge Needs Assessment  Outcome: Ongoing (interventions implemented as appropriate)    10/31/17 0825 10/31/17 0925 11/03/17 1242   Discharge Needs Assessment   Concerns To Be Addressed --  --  no discharge needs identified   Equipment Needed After Discharge walker, rolling --  --    Discharge Facility/Level Of Care Needs home with home health  (Home with 24/7) --  --    Living Environment   Transportation Available --  car;family or friend will provide --    Self-Care   Equipment Currently Used at Home --  cane, straight;shower chair;raised toilet --          Problem: Inpatient Occupational Therapy  Goal: Transfer Training Goal 1 LTG- OT  Outcome: Ongoing (interventions implemented as appropriate)    10/31/17 0925 11/06/17 0724   Transfer Training OT LTG   Transfer Training OT LTG, Date Established 10/31/17 --    Transfer Training OT LTG, Time to Achieve by discharge --    Transfer Training OT LTG, Activity Type all transfers --    Transfer Training OT LTG, [place] Level contact guard assist --    Transfer Training OT LTG, Assist Device --  walker, rolling platform   Transfer Training OT LTG, Date Goal Reviewed --  11/06/17   Transfer Training OT LTG, Outcome --  goal not met       Goal: Patient Education Goal LTG- OT  Outcome: Ongoing (interventions implemented as appropriate)    10/31/17 0925 11/06/17 0724   Patient Education OT LTG   Patient Education OT LTG, Date Established 10/31/17 --    Patient Education OT LTG, Time to Achieve by discharge --    Patient  Education OT LTG, Education Type HEP;posture/body mechanics;1 hand/anni technique;home safety;adaptive equipment mgmt;energy conservation --    Patient Education OT LTG, Education Understanding independent;demonstrates adequately;verbalizes understanding  (with caregiver assist as necessary) --    Patient Education OT LTG, Date Goal Reviewed --  11/06/17   Patient Education OT LTG Outcome --  goal not met       Goal: Safety Awareness Goal LTG- OT  Outcome: Ongoing (interventions implemented as appropriate)    10/31/17 0925 11/06/17 1425   Safety Awareness OT LTG   Safety Awareness OT LTG, Date Established 10/31/17 --    Safety Awareness OT LTG, Time to Achieve by discharge --    Safety Awareness OT LTG, Activity Type good safety awareness;with kitchen mobility;with ADL's --    Safety Awareness OT LTG, [place] Level min verbal cues --    Safety Awareness OT LTG, Date Goal Reviewed --  11/06/17   Safety Awareness OT LTG, Outcome --  goal not met       Goal: ADL Goal LTG- OT  Outcome: Ongoing (interventions implemented as appropriate)    10/31/17 0925 11/06/17 0724   ADL OT LTG   ADL OT LTG, Date Established 10/31/17 --    ADL OT LTG, Time to Achieve by discharge --    ADL OT LTG, Activity Type ADL skills --    ADL OT LTG, Additional Goal Set-up A with self-feeding and grooming; VC for UB bathing and UB dressing; CGA with LB bathing and LB dressing --    ADL OT LTG, Date Goal Reviewed --  11/06/17   ADL OT LTG, Outcome --  goal not met       Goal: Endurance Goal LTG- OT  Outcome: Ongoing (interventions implemented as appropriate)    10/31/17 0925 11/06/17 0724   Endurance OT LTG   Endurance Goal OT LTG, Date Established 10/31/17 --    Endurance Goal OT LTG, Time to Achieve by discharge --    Endurance Goal OT LTG, Activity Level endurance 2 good- --    Endurance Goal OT LTG, Additional Goal During 30 minutes of functional tasks, ADL, and therapeutic exercise --    Endurance Goal OT LTG, Date Goal Reviewed --   11/06/17   Endurance Goal OT LTG, Outcome --  goal not met

## 2017-11-06 NOTE — CONSULTS
Adult Nutrition  Assessment    Patient Name:  Kenneth Harper Jr.  YOB: 1934  MRN: [id number]  Admit Date:  10/30/2017    Assessment Date:  11/6/2017    Comments:  Pt seen per follow up protocol. Pt eating at 68% on average. Pt states he is feeling much better. Pt states he is still drinking his Glucerna twice a day. Pt states his lunch was very good. RDN staff to continue to monitor.          Reason for Assessment       11/06/17 1410    Reason for Assessment    Reason For Assessment/Visit (P)  follow up protocol                Nutrition/Diet History       11/06/17 1411    Nutrition/Diet History    Food Preferences (P)  Pt states the roast beef, potatoes, and carrots lunch today was his favorite meal he has had so far while in the hospital.              Labs/Tests/Procedures/Meds       11/06/17 1412    Labs/Tests/Procedures/Meds    Labs/Tests Review (P)  Reviewed;Glucose;BUN    Medication Review (P)  Reviewed, pertinent;Insulin;Diabetic Oral Agent                  Evaluation of Received Nutrient/Fluid Intake       11/06/17 1415    PO Evaluation    Number of Meals (P)  7    % PO Intake (P)  68%            Electronically signed by:  Polina Noland  11/06/17 2:16 PM

## 2017-11-06 NOTE — PLAN OF CARE
Problem: Patient Care Overview (Adult)  Goal: Plan of Care Review  Outcome: Ongoing (interventions implemented as appropriate)    11/06/17 1415   Coping/Psychosocial Response Interventions   Plan Of Care Reviewed With patient   Outcome Evaluation   Outcome Summary/Follow up Plan pt transfers wiht cga and has improved his gt qualtiy.         Problem: Inpatient Physical Therapy  Goal: Transfer Training Goal 1 LTG- PT  Outcome: Ongoing (interventions implemented as appropriate)    10/31/17 0825 11/06/17 1415   Transfer Training PT LTG   Transfer Training PT LTG, Date Established 10/31/17 --    Transfer Training PT LTG, Time to Achieve by discharge --    Transfer Training PT LTG, Activity Type bed to chair /chair to bed --    Transfer Training PT LTG, [place] Level supervision required --    Transfer Training PT LTG, Assist Device (AAD) --    Transfer Training PT LTG, Date Goal Reviewed --  11/06/17   Transfer Training PT LTG, Outcome --  goal ongoing       Goal: Gait Training Goal LTG- PT  Outcome: Ongoing (interventions implemented as appropriate)    10/31/17 0825 11/06/17 1415   Gait Training PT LTG   Gait Training Goal PT LTG, Date Established 10/31/17 --    Gait Training Goal PT LTG, Time to Achieve by discharge --    Gait Training Goal PT LTG, [place] Level supervision required --    Gait Training Goal PT LTG, Assist Device (AAD) --    Gait Training Goal PT LTG, Distance to Achieve 150 feet --    Gait Training Goal PT LTG, Date Goal Reviewed --  11/06/17   Gait Training Goal PT LTG, Outcome --  goal ongoing       Goal: Stair Training Goal STG- PT  Outcome: Ongoing (interventions implemented as appropriate)    10/31/17 0825 11/06/17 1415   Stair Training PT STG   Stair Training Goal PT STG, Date Established 10/31/17 --    Stair Training Goal PT STG, Time to Achieve 4 days --    Stair Training Goal PT STG, Number of Steps 4 steps --    Stair Training Goal PT STG, [place] Level contact guard assist --     Stair Training Goal PT STG, Assist Device 1 handrail --    Stair Training Goal PT STG, Date Goal Reviewed --  11/06/17   Stair Training Goal PT STG, Outcome --  goal ongoing       Goal: Stair Training Goal LTG- PT  Outcome: Ongoing (interventions implemented as appropriate)    10/31/17 0825 11/04/17 1440 11/06/17 1415   Stair Training PT LTG   Stair Training Goal PT LTG, Date Established --  11/04/17 --    Stair Training Goal PT LTG, Time to Achieve by discharge --  --    Stair Training Goal PT LTG, Number of Steps 1 flight --  --    Stair Training Goal PT LTG, [place] Level supervision required --  --    Stair Training Goal PT LTG, Assist Device 2 handrails --  --    Stair Training Goal PT LTG, Date Goal Reviewed --  --  11/06/17   Stair Training Goal PT LTG, Outcome --  --  goal ongoing

## 2017-11-06 NOTE — PLAN OF CARE
Problem: Patient Care Overview (Adult)  Goal: Plan of Care Review  Outcome: Ongoing (interventions implemented as appropriate)    11/06/17 1314   Coping/Psychosocial Response Interventions   Plan Of Care Reviewed With patient   Patient Care Overview   Progress progress towards functional goals is fair   Outcome Evaluation   Outcome Summary/Follow up Plan Pt met orientation goal this date. Pt was 100% acc w/independent use of phone to assist w/orientation. Pt struggled w/memory and organizational tasks this date.         Problem: Inpatient SLP  Goal: Cognitive-linguistic-Patient will improve Cognitive-linguistic skills to improve safety and safety awareness in environment  Outcome: Ongoing (interventions implemented as appropriate)    10/31/17 1244 11/06/17 1314   Cognitive Linguistic- Improve Safety and Awareness   Cognitive Linguistic Improve Safety and Awareness- SLP, Date Established 10/31/17 --    Cognitive Linguistic Improve Safety and Awareness- SLP, Time to Achieve by discharge --    Cognitive Linguistic Improve Safety and Awareness- SLP, Activity Level Patient will improve orientation for increased safety in environment;Patient will improve memory skills for increased safety in environment;Patient will improve functional problem solving skills for increased safety in environment --    Cognitive Linguistic Improve Safety and Awareness- SLP, Date Goal Reviewed --  11/06/17   Cognitive Linguistic Improve Safety and Awareness- SLP, Outcome --  goal ongoing

## 2017-11-06 NOTE — THERAPY TREATMENT NOTE
Acute Care - Occupational Therapy Treatment Note  [facility]     Patient Name: Kenneth Harper Jr.  : 1934  MRN: [id number]  Today's Date: 2017  Onset of Illness/Injury or Date of Surgery Date: 10/30/17  Date of Referral to OT: 10/30/17  Referring Physician: TERRENCE Mckinnon MD      Admit Date: 10/30/2017    Visit Dx:     ICD-10-CM ICD-9-CM   1. Symbolic dysfunction R48.9 784.60   2. Impaired mobility and ADLs Z74.09 799.89   3. Abnormality of gait and mobility R26.9 781.2   4. Muscle weakness (generalized) M62.81 728.87     Patient Active Problem List   Diagnosis   • Spinal stenosis, lumbar region, with neurogenic claudication   • Former smoker   • Overweight   • Left-sided weakness   • Right-sided lacunar stroke   • Essential hypertension   • Diabetes mellitus   • Chronic anxiety   • Chronic back pain greater than 3 months duration   • Prostatic hypertrophy   • Degenerative joint disease involving multiple joints   • Atrial fibrillation, chronic   • Encounter for rehabilitation   • Obstructive sleep apnea syndrome             Adult Rehabilitation Note       17 1332 17 1002 17 0901    Rehab Assessment/Intervention    Discipline physical therapy assistant  -RW physical therapy assistant  -RW speech language pathologist  -CK    Document Type therapy note (daily note)  -RW therapy note (daily note)  -RW therapy note (daily note)  -CK    Subjective Information agree to therapy  -RW agree to therapy  -RW agree to therapy  -CK    Patient Effort, Rehab Treatment good  -RW good  -RW good  -CK    Precautions/Limitations fall precautions  -RW fall precautions  -RW     Recorded by [RW] Pipe Gruber PTA [RW] Pipe Gruber PTA [CK] Concetta Springer MS CCC-SLP    Vital Signs    Pretreatment Heart Rate (beats/min)  74  -RW     Pre SpO2 (%)  96  -RW     O2 Delivery Pre Treatment  room air  -RW     Recorded by  [RW] Pipe Gruber PTA     Pain Assessment    Pain Assessment No/denies  pain  -RW No/denies pain  -RW No/denies pain  -CK    Pain Score   0  -CK    Post Pain Score   0  -CK    Recorded by [RW] Pipe Gruber PTA [RW] Pipe Gruber PTA [CK] Concetta Springer MS CCC-SLP    Cognitive Assessment/Intervention    Current Cognitive/Communication Assessment functional  -RW functional  -RW     Orientation Status oriented to;oriented x 4  -RW oriented to;oriented x 4  -RW     Follows Commands/Answers Questions 100% of the time  -% of the time  -RW     Personal Safety Interventions gait belt;nonskid shoes/slippers when out of bed  -RW gait belt;nonskid shoes/slippers when out of bed  -RW     Recorded by [RW] Pipe Gruber PTA [RW] Pipe Gruber PTA     Cognitive Assessment Intervention    Behavior/Mood Observations behavior appropriate to situation, WNL/WFL  -RW behavior appropriate to situation, WNL/WFL  -RW     Recorded by [RW] Pipe Gruber PTA [RW] Pipe Gruber PTA     Communication Treatment Objective and Progress    Cognitive Linguistic Treatment Objectives   Improve orientation;Improve memory skills;Improve functional problem solving  -CK    Recorded by   [CK] Concetta Springer MS CCC-SLP    Improve orientation    Improve orientation through:   demonstrating orientation to day;demonstrating orientation to month;demonstrating orientation to year;demonstrating orientation to place;demonstrating orientation to disease/impairment;90%;with inconsistent cues  -CK    Status: Improve orientation through   Achieved  -CK    Orientation Progress   100%   w/independent use of phone for calendar  -CK    Recorded by   [CK] Concetta Springer MS CCC-SLP    Improve memory skills    Improve memory skills through:   recalling related word lists with an imposed delay;90%;with inconsistent cues  -CK    Status: Improve memory skills   Progress slower than expected  -CK    Memory Skills Progress   50%  -CK    Comments: Improve memory skills   pt given new word set this date  -CK    Recorded  by   [CK] Concetta Springer, MS CCC-SLP    Improve functional problem solving    Improve functional problem solving through:   complete organization/home management task;tell similarity between items  -CK    Status: Improve functional problem solving   Progress slower than expected  -CK    Functional Problem Solving Progress   60%  -CK    Comments: Improve functional problem solving   max assist for task  -CK    Recorded by   [CK] Concetta Springer MS CCC-SLP    Transfer Assessment/Treatment    Transfers, Sit-Stand [place] stand by assist;verbal cues required  -RW stand by assist;verbal cues required  -RW     Transfers, Stand-Sit [place] stand by assist;verbal cues required  -RW stand by assist;verbal cues required  -RW     Transfers, Sit-Stand-Sit, Assist Device rolling walker  -RW rolling walker  -RW     Transfer, Comment sit to stand 5 x 2  -RW      Recorded by [RW] Pipe Gruber PTA [RW] Pipe Gruber PTA     Gait Assessment/Treatment    Gait, [place] Level verbal cues required;contact guard assist  -RW minimum assist (75% patient effort);verbal cues required;contact guard assist  -RW     Gait, Assistive Device rolling walker  -RW rolling walker  -RW     Gait, Distance (Feet) 150   70 x 3  -    x 2  -RW     Gait, Comment improved gt quality. minimal foot drag on left  -RW      Recorded by [RW] Pipe Gruber PTA [RW] Pipe Gruber PTA     Balance Skills Training    Gait Balance-Level of Assistance --  -RW Contact guard  -RW     Gait Balance Support --  -RW parallel bars  -RW     Gait Balance Activities --  -RW --   gt w/o ad in // bars  -RW     Recorded by [RW] Pipe Gruber PTA [RW] Pipe Gruber PTA     Therapy Exercises    Bilateral Lower Extremities AROM:;20 reps;sitting;hip flexion;knee flexion;LAQ   2 sets  -RW AROM:;20 reps;sitting;hip flexion;knee flexion;LAQ   2 sets  -RW     BLE Resistance theraband  -RW theraband  -RW     Bilateral Upper Extremity AROM:;15 reps;20  reps;sitting   tband rows 2 sets  -RW AROM:;15 reps;20 reps;sitting   tband rows 2 sets  -RW     BUE Resistance theraband  -RW theraband  -RW     Recorded by [RW] Pipe Gruber PTA [RW] Pipe Gruber PTA     Positioning and Restraints    Pre-Treatment Position sitting in chair/recliner  -RW sitting in chair/recliner  -RW     Post Treatment Position wheelchair  -RW wheelchair  -RW     In Wheelchair call light within reach  -RW call light within reach  -RW     Recorded by [RW] Pipe Gruber PTA [RW] Pipe Gruber PTA       11/06/17 0724 11/04/17 1440 11/04/17 1400    Rehab Assessment/Intervention    Discipline occupational therapy assistant  -KD physical therapy assistant  -JW     Document Type therapy note (daily note)  -KD therapy note (daily note)  -     Subjective Information agree to therapy  -KD agree to therapy  -JW     Patient Effort, Rehab Treatment  good  -JW     Precautions/Limitations  fall precautions  -JW     Recorded by [KD] MARCO Vidales/L [JW] Felicia Haynes PTA     Vital Signs    Pre Treatment Diastolic   -KD      Intra Systolic BP Rehab 65  -KD      Pretreatment Heart Rate (beats/min) 74  -KD      Posttreatment Heart Rate (beats/min) 76  -KD      Pre SpO2 (%) 96  -KD      O2 Delivery Pre Treatment room air  -KD      Post SpO2 (%) 94  -KD      O2 Delivery Post Treatment room air  -KD      Pre Patient Position Sitting  -KD Sitting  -JW     Intra Patient Position Standing  -KD      Post Patient Position Sitting  -KD Supine  -JW     Recorded by [KD] MARCO Vidales/L [JW] Felicia Haynes, MARTIN     Pain Assessment    Pain Assessment No/denies pain  -KD 0-10  -JW     Pain Score  0  -JW     Post Pain Score  0  -JW     Recorded by [KD] MARCO Vidales/L [JW] Felicia Haynes, PTA     Cognitive Assessment/Intervention    Current Cognitive/Communication Assessment functional  -KD impaired  -JW     Orientation Status oriented x 4  -KD oriented to;oriented x 4   -JW     Follows Commands/Answers Questions 100% of the time  -% of the time  -JW     Personal Safety Interventions  fall prevention program maintained;gait belt;nonskid shoes/slippers when out of bed  -JW     Recorded by [KD] MARCO Vidales/DAWN [JW] Felicia Haynes PTA     Bed Mobility, Assessment/Treatment    Bed Mobility, Assistive Device  --   HOB down, no bedrails  -JW     Bed Mob, Sit to Supine, [place]  supervision required  -JW     Recorded by  [JW] Felicia Hyanes PTA     Transfer Assessment/Treatment    Transfers, Bed-Chair [place] contact guard assist  -KD      Transfers, Sit-Stand [place] contact guard assist  -KD stand by assist;verbal cues required  -JW --  -JW    Transfers, Stand-Sit [place] contact guard assist  -KD stand by assist;verbal cues required  -JW --  -JW    Transfers, Sit-Stand-Sit, Assist Device rolling walker  -KD rolling walker  -JW --  -JW    Transfer, Comment 4 sit-stands  -KD      Recorded by [KD] MARCO Vidales/DAWN [JW] Felicia Haynes PTA [JW] Felicia Haynes PTA    Gait Assessment/Treatment    Gait, [place] Level  minimum assist (75% patient effort);verbal cues required  -JW --  -JW    Gait, Assistive Device  rolling walker  -JW --  -JW    Gait, Distance (Feet)  58  -JW --  -JW    Gait, Comment  pt very fatigued this afternoon, requesting to lie down  -JW --  -JW    Recorded by  [JW] Felicia Haynes PTA [JW] Felicia Haynes PTA    Stairs Assessment/Treatment    Number of Stairs  '  -JW     Recorded by  [JW] Felicia Haynes PTA     Functional Mobility    Functional Mobility- Ind. Level contact guard assist  -KD      Functional Mobility- Device rolling walker  -KD      Functional Mobility-Distance (Feet) 150   x 2  -KD      Recorded by [KD] MARCO Vidales/L      Grooming Assessment/Training    Grooming Assess/Train, Assistive Device electric razor   comb hair  -KD      Grooming Assess/Train,  Position sitting;sink side  -KD      Grooming Assess/Train, Indepen Level conditional independence  -KD      Recorded by [KD] MARCO Vidales/L      Self-Feeding Assessment/Training    Self-Feeding Assess/Train, Position sitting  -KD      Self-Feeding Assess/Train, [place] conditional independence  -KD      Self-Feeding Assess/Train, Spillage Amount minimal  -KD      Recorded by [KD] MARCO Vidales/L      Therapy Exercises    Bilateral Lower Extremities  AROM:;20 reps;supine;ankle pumps/circles;glut sets;quad sets;heel slides;hip abduction/adduction  -JW --  -JW    Bilateral Upper Extremity AROM:;20 reps;sitting;elbow flexion/extension;pronation/supination;shoulder abduction/adduction;shoulder circles;shoulder extension/flexion;shoulder horizontal abd/add   UEE x 15 mins with 1 RB  -KD      BUE Resistance manual resistance- moderate   Pulley system x 15 mins with RB's PRN  -KD      Trunk Exercises  supine;bridging;15 reps   hooklying trunk rotation  -JW     Recorded by [KD] MARCO Vidales/DAWN [JW] Felicia Haynes, PTA [JW] Felicia Haynes, PTA    Positioning and Restraints    Pre-Treatment Position sitting in chair/recliner  -KD in bed  -JW     Post Treatment Position wheelchair  -KD bed  -JW     In Bed sitting;sitting EOB;call light within reach;encouraged to call for assist;exit alarm on  -KD supine;call light within reach;encouraged to call for assist  -JW     Recorded by [KD] MARCO Vidales/DAWN [JW] Felicia Haynes, PTA       11/04/17 1120 11/04/17 1011 11/04/17 0738    Rehab Assessment/Intervention    Discipline physical therapy assistant  -JENA speech language pathologist  -CK occupational therapy assistant  -RC    Document Type therapy note (daily note)  -JENA therapy note (daily note)  -CK therapy note (daily note)  -RC    Subjective Information agree to therapy  -JW agree to therapy  -CK agree to therapy  -RC    Patient Effort, Rehab Treatment good  -JW good  -CK good  -RC     Precautions/Limitations fall precautions  -JW  fall precautions  -RC    Recorded by [JW] Felicia Haynes PTA [CK] Concetta Springer, MS CCC-SLP [RC] Cassie Carpio, LARA/L    Vital Signs    Pretreatment Heart Rate (beats/min) 56  -JW      Pre SpO2 (%) 97  -JW      O2 Delivery Pre Treatment room air  -JW      Pre Patient Position Sitting  -JW      Post Patient Position Sitting  -JW      Recorded by [JW] Felicia Haynes PTA      Pain Assessment    Pain Assessment 0-10  -JW No/denies pain  -CK No/denies pain  -RC    Pain Score 0  -JW 0  -CK     Post Pain Score 0  -JW 0  -CK     Recorded by [JW] Felicia Haynes PTA [CK] Concetta Springer, MS CCC-SLP [RC] LEIGH CannonA/L    Cognitive Assessment/Intervention    Current Cognitive/Communication Assessment impaired  -  impaired  -    Orientation Status oriented to;person;place     -JW      Follows Commands/Answers Questions 100% of the time  -JW      Personal Safety mild impairment  -      Personal Safety Interventions fall prevention program maintained;gait belt;nonskid shoes/slippers when out of bed  -JW      Recorded by [JW] Felicia Haynes PTA  [RC] LEIGH CannonA/DAWN    Communication Treatment Objective and Progress    Cognitive Linguistic Treatment Objectives  Improve orientation;Improve memory skills;Improve functional problem solving  -CK     Recorded by  [CK] Concetta Springer, MS CCC-SLP     Improve orientation    Improve orientation through:  demonstrating orientation to day;demonstrating orientation to month;demonstrating orientation to year;demonstrating orientation to place;demonstrating orientation to disease/impairment;90%;with inconsistent cues  -CK     Status: Improve orientation through  Progressing as expected  -CK     Orientation Progress  80%  -CK     Comments: Improve orientation through  use of calendar  -CK     Recorded by  [CK] Concetta Springer, MS CCC-SLP     Improve memory skills    Improve memory  skills through:  recalling related word lists with an imposed delay;90%;with inconsistent cues  -CK     Status: Improve memory skills  Progressing as expected  -CK     Memory Skills Progress  70%  -CK     Comments: Improve memory skills  Used same words from previous session.  Pt able to recall 4/4 using SRT  -CK     Recorded by  [CK] Concetta Springer MS CCC-SLP     Improve functional problem solving    Improve functional problem solving through:  complete organization/home management task;tell similarity between items  -CK     Status: Improve functional problem solving  Progressing as expected  -CK     Functional Problem Solving Progress  70%  -CK     Recorded by  [CK] Concetta Springer MS CCC-SLP     Transfer Assessment/Treatment    Transfers, Bed-Chair [place]   minimum assist (75% patient effort);verbal cues required   chair to chair  -RC    Transfers, Chair-Bed [place]   minimum assist (75% patient effort);verbal cues required   chair to mat to chair  -RC    Transfers, Bed-Chair-Bed, Assist Device   rolling walker  -    Transfers, Sit-Stand [place] stand by assist;verbal cues required  -JW      Transfers, Stand-Sit [place] stand by assist;verbal cues required  -      Transfers, Sit-Stand-Sit, Assist Device rolling walker  -      Transfer, Safety Issues   sequencing ability decreased  -    Recorded by [JW] Felicia Haynes, PTA  [RC] Cassie Carpio, LARA/L    Gait Assessment/Treatment    Gait, [place] Level minimum assist (75% patient effort);verbal cues required  -      Gait, Assistive Device rolling walker  -      Gait, Distance (Feet) 84   82, 74  -      Gait, Comment as pt fatigued w/ gait, his L foot would drag requiring vc's to  foot or a rest break  -      Recorded by [JW] Felicia Haynes, PTA      Functional Mobility    Functional Mobility- Ind. Level   contact guard assist;verbal cues required  -RC    Functional Mobility- Device   rolling  walker  -RC    Functional Mobility- Safety Issues   --   needs vc's for safe tech  -RC    Recorded by   [RC] LEIGH CannonA/L    Wheelchair Training/Management    Wheelchair, Distance Propelled   150  -RC    Recorded by   [RC] LEIGH CannonA/L    ADL Assessment/Intervention    Additional Documentation   --   declined bathing/dressing  -RC    Recorded by   [RC] Cassie Carpio LARA/L    Lower Body Dressing Assessment/Training    LB Dressing Assess/Train, Clothing Type   shoes  -RC    LB Dressing Assess/Train, Position   sitting  -RC    Recorded by   [RC] LEIGH CannonA/L    Therapy Exercises    Bilateral Lower Extremities AROM:;20 reps;sitting;ankle pumps/circles;glut sets;hip abduction/adduction;hip flexion;LAQ  -JW      BLE Resistance theraband   level 2  -      Bilateral Upper Extremity   --   ue bike 10 min, pulley ex 5 min  -RC    Recorded by [JW] Felicia Haynes, PTA  [RC] LEIGH CannonA/L    Fine Motor Coordination Training    Detail (Fine Motor Coordination Training)   --   sml pegs on eseal, rom arc, clothes pin tree  -RC    Recorded by   [RC] LEIGH CannonA/L    Positioning and Restraints    Pre-Treatment Position sitting in chair/recliner  -JW  sitting in chair/recliner  -RC    Post Treatment Position wheelchair  -JW  wheelchair  -RC    In Wheelchair sitting;call light within reach;encouraged to call for assist  -JW  encouraged to call for assist  -RC    Recorded by [JW] Felicia Haynes, PTA  [RC] Cassie Carpio LARA/L      User Key  (r) = Recorded By, (t) = Taken By, (c) = Cosigned By    Initials Name Effective Dates     Felicia Haynes, PTA 08/11/15 -     CK Concetta Springer, MS CCC-SLP 10/17/16 -     RW Pipe Gruber, PTA 10/17/16 -     RC Cassie Carpio LARA/L 10/17/16 -     JOSS King LARA/L 10/17/16 -                 OT Goals       11/06/17 1425 11/06/17 0724 11/04/17 0738    Transfer Training OT LTG    Transfer Training OT LTG,  Assist Device  walker, rolling platform  -KD     Transfer Training OT LTG, Date Goal Reviewed  11/06/17  -KD 11/04/17  -RC    Transfer Training OT LTG, Outcome  goal not met  -KD     Patient Education OT LTG    Patient Education OT LTG, Date Goal Reviewed  11/06/17  -KD 11/04/17  -RC    Patient Education OT LTG Outcome  goal not met  -KD     Safety Awareness OT LTG    Safety Awareness OT LTG, Date Goal Reviewed 11/06/17  -KD  11/04/17  -RC    Safety Awareness OT LTG, Outcome goal not met  -KD      ADL OT LTG    ADL OT LTG, Date Goal Reviewed  11/06/17  -KD 11/04/17  -RC    ADL OT LTG, Outcome  goal not met  -KD     Endurance OT LTG    Endurance Goal OT LTG, Date Goal Reviewed  11/06/17  -KD 11/04/17  -RC    Endurance Goal OT LTG, Outcome  goal not met  -KD goal ongoing  -RC      11/03/17 1359 11/02/17 0715 11/01/17 1635    Transfer Training OT LTG    Transfer Training OT LTG, Date Goal Reviewed 11/03/17  -LM 11/02/17  -KD 11/01/17  -RW    Transfer Training OT LTG, Outcome goal ongoing  -LM goal not met  -KD goal ongoing  -RW    Patient Education OT LTG    Patient Education OT LTG, Date Goal Reviewed 11/03/17  -LM 11/02/17  -KD 11/01/17  -RW    Patient Education OT LTG Outcome goal not met  -LM goal not met  -KD goal ongoing  -RW    Safety Awareness OT LTG    Safety Awareness OT LTG, Date Goal Reviewed 11/03/17  -LM 11/02/17  -KD 11/01/17  -RW    Safety Awareness OT LTG, Outcome goal not met  -LM goal not met  -KD goal ongoing  -RW    ADL OT LTG    ADL OT LTG, Date Goal Reviewed 11/03/17  -LM  11/01/17  -RW    ADL OT LTG, Outcome goal not met  -LM goal not met  -KD goal ongoing  -RW    Endurance OT LTG    Endurance Goal OT LTG, Date Goal Reviewed 11/03/17  -LM 11/02/17  -KD 11/01/17  -RW    Endurance Goal OT LTG, Outcome goal not met  -LM goal not met  -KD goal ongoing  -RW      10/31/17 0925          Transfer Training OT LTG    Transfer Training OT LTG, Date Established 10/31/17  -BH (r) SP (t) BH (c)      Transfer  Training OT LTG, Time to Achieve by discharge  -BH (r) SP (t) BH (c)      Transfer Training OT LTG, Activity Type all transfers  -BH (r) SP (t) BH (c)      Transfer Training OT LTG, [place] Level contact guard assist  -BH (r) SP (t) BH (c)      Patient Education OT LTG    Patient Education OT LTG, Date Established 10/31/17  -BH (r) SP (t) BH (c)      Patient Education OT LTG, Time to Achieve by discharge  -BH (r) SP (t) BH (c)      Patient Education OT LTG, Education Type HEP;posture/body mechanics;1 hand/anni technique;home safety;adaptive equipment mgmt;energy conservation  -BH (r) SP (t) BH (c)      Patient Education OT LTG, Education Understanding independent;demonstrates adequately;verbalizes understanding   with caregiver assist as necessary  -BH (r) SP (t) BH (c)      Safety Awareness OT LTG    Safety Awareness OT LTG, Date Established 10/31/17  -BH (r) SP (t) BH (c)      Safety Awareness OT LTG, Time to Achieve by discharge  -BH (r) SP (t) BH (c)      Safety Awareness OT LTG, Activity Type good safety awareness;with kitchen mobility;with ADL's  -BH (r) SP (t) BH (c)      Safety Awareness OT LTG, [place] Level min verbal cues  -BH (r) SP (t) BH (c)      ADL OT LTG    ADL OT LTG, Date Established 10/31/17  -BH (r) SP (t) BH (c)      ADL OT LTG, Time to Achieve by discharge  -BH (r) SP (t) BH (c)      ADL OT LTG, Activity Type ADL skills  -BH (r) SP (t) BH (c)      ADL OT LTG, Additional Goal Set-up A with self-feeding and grooming; VC for UB bathing and UB dressing; CGA with LB bathing and LB dressing  -BH (r) SP (t) BH (c)      Endurance OT LTG    Endurance Goal OT LTG, Date Established 10/31/17  -BH (r) SP (t) BH (c)      Endurance Goal OT LTG, Time to Achieve by discharge  -BH (r) SP (t) BH (c)      Endurance Goal OT LTG, Activity Level endurance 2 good-  -BH (r) SP (t) BH (c)      Endurance Goal OT LTG, Additional Goal During 30 minutes of functional tasks, ADL, and therapeutic exercise  -BH (r)  SP (t) BH (c)        User Key  (r) = Recorded By, (t) = Taken By, (c) = Cosigned By    Initials Name Provider Type     Karoline Huang, OTR/L Occupational Therapist    RW Jacinta Pitts, OTR/L Occupational Therapist    RC Cassie Carpio, LARA/L Occupational Therapy Assistant    KD Therese King, LARA/L Occupational Therapy Assistant    MOSHE Boucher, LARA/L Occupational Therapy Assistant    DIONTE Bruce, OT Student OT Student          Occupational Therapy Education     Title: PT OT SLP Therapies (Active)     Topic: Occupational Therapy (Active)     Point: ADL training (Active)    Description: Instruct learner(s) on proper safety adaptation and remediation techniques during self care or transfers.   Instruct in proper use of assistive devices.    Learning Progress Summary    Learner Readiness Method Response Comment Documented by Status   Patient Acceptance TB NR   11/02/17 1108 Active    Acceptance E,D NR   11/01/17 1518 Active    Acceptance E VU Educated about OT and POC. Educated about anni dressing technique. Educated about safety with ADL and t/f. Educated to call for assist and not to stand independently. SP 10/31/17 1354 Done   Family Acceptance E,D NR   11/01/17 1518 Active    Acceptance E VU Educated about OT and POC. Educated about anni dressing technique. Educated about safety with ADL and t/f. Educated to call for assist and not to stand independently. SP 10/31/17 1354 Done               Point: Home exercise program (Active)    Description: Instruct learner(s) on appropriate technique for monitoring, assisting and/or progressing therapeutic exercises/activities.    Learning Progress Summary    Learner Readiness Method Response Comment Documented by Status   Patient Acceptance E,D NR theraputty  11/01/17 1634 Active   Family Acceptance E,D NR theraputty  11/01/17 1634 Active               Point: Precautions (Active)    Description: Instruct learner(s) on prescribed precautions  during self-care and functional transfers.    Learning Progress Summary    Learner Readiness Method Response Comment Documented by Status   Patient Acceptance E NR  RC 11/04/17 0930 Active    Acceptance E,D NR   11/01/17 1518 Active    Acceptance E VU Educated about OT and POC. Educated about anni dressing technique. Educated about safety with ADL and t/f. Educated to call for assist and not to stand independently. SP 10/31/17 1354 Done   Family Acceptance E,D NR  RW 11/01/17 1518 Active    Acceptance E VU Educated about OT and POC. Educated about anni dressing technique. Educated about safety with ADL and t/f. Educated to call for assist and not to stand independently. SP 10/31/17 1354 Done               Point: Body mechanics (Active)    Description: Instruct learner(s) on proper positioning and spine alignment during self-care, functional mobility activities and/or exercises.    Learning Progress Summary    Learner Readiness Method Response Comment Documented by Status   Patient Acceptance E NR   11/04/17 0930 Active    Acceptance E VU Educated about OT and POC. Educated about anni dressing technique. Educated about safety with ADL and t/f. Educated to call for assist and not to stand independently. SP 10/31/17 1354 Done   Family Acceptance E VU Educated about OT and POC. Educated about anni dressing technique. Educated about safety with ADL and t/f. Educated to call for assist and not to stand independently. SP 10/31/17 1354 Done                      User Key     Initials Effective Dates Name Provider Type Discipline     10/17/16 -  Jacinta Pitts OTR/L Occupational Therapist OT     10/17/16 -  MARCO Cannon/L Occupational Therapy Assistant OT    KD 10/17/16 -  MARCO Vidales/L Occupational Therapy Assistant OT    SP 01/31/17 -  Alem Bruce OT Student OT Student OT                  OT Recommendation and Plan  Anticipated Equipment Needs At Discharge: bathing equipment, dressing  equipment, front wheeled walker  Anticipated Discharge Disposition: home with 24/7 care, home with home health  Planned Therapy Interventions: activity intolerance, adaptive equipment training, ADL retraining, IADL retraining, balance training, bed mobility training, energy conservation, fine motor coordination training, home exercise program, motor coordination training, neuromuscular re-education, ROM (Range of Motion), strengthening, transfer training  Therapy Frequency: other (see comments) (3-14x/wk)  Plan of Care Review  Outcome Summary/Follow up Plan: No new goals met this date        Outcome Measures       11/06/17 1000 11/06/17 0724 11/04/17 1120    How much help from another person do you currently need...    Turning from your back to your side while in flat bed without using bedrails? 3  -RW  3  -JW    Moving from lying on back to sitting on the side of a flat bed without bedrails? 3  -RW  3  -JW    Moving to and from a bed to a chair (including a wheelchair)? 3  -RW  3  -JW    Standing up from a chair using your arms (e.g., wheelchair, bedside chair)? 3  -RW  3  -JW    Climbing 3-5 steps with a railing? 3  -RW  3  -JW    To walk in hospital room? 3  -RW  3  -JW    AM-PAC 6 Clicks Score 18  -RW  18  -JW    How much help from another is currently needed...    Putting on and taking off regular lower body clothing?  3  -KD     Bathing (including washing, rinsing, and drying)  3  -KD     Toileting (which includes using toilet bed pan or urinal)  2  -KD     Putting on and taking off regular upper body clothing  3  -KD     Taking care of personal grooming (such as brushing teeth)  3  -KD     Eating meals  3  -KD     Score  17  -KD     Functional Assessment    Outcome Measure Options   AM-PAC 6 Clicks Daily Activity (OT)  -JW      11/04/17 0738          How much help from another is currently needed...    Putting on and taking off regular lower body clothing? 3  -RC      Bathing (including washing, rinsing, and  drying) 3  -RC      Toileting (which includes using toilet bed pan or urinal) 2  -RC      Putting on and taking off regular upper body clothing 3  -RC      Taking care of personal grooming (such as brushing teeth) 3  -RC      Eating meals 3  -RC      Score 17  -RC        User Key  (r) = Recorded By, (t) = Taken By, (c) = Cosigned By    Initials Name Provider Type    JENA Haynes, PTA Physical Therapy Assistant    RW Pipe Gruber, PTA Physical Therapy Assistant    RC MARCO Cannon/L Occupational Therapy Assistant    MARCO Sánchez/L Occupational Therapy Assistant           Time Calculation:         Time Calculation- OT       11/06/17 1427          Time Calculation- OT    OT Start Time 0724  -KD      OT Stop Time 0900  -KD      OT Time Calculation (min) 96 min  -KD      Total Timed Code Minutes- OT 96 minute(s)  -KD      OT Received On 11/06/17  -KD        User Key  (r) = Recorded By, (t) = Taken By, (c) = Cosigned By    Initials Name Provider Type    MARCO Sánchez/L Occupational Therapy Assistant           Therapy Charges for Today     Code Description Service Date Service Provider Modifiers Qty    35924201679 HC OT THER PROC EA 15 MIN 11/6/2017 MARCO Vidales/L GO 2    72450294223 HC OT SELF CARE/MGMT/TRAIN EA 15 MIN 11/6/2017 MARCO Vidales/L GO 2    12221145340 HC OT THERAPEUTIC ACT EA 15 MIN 11/6/2017 MARCO Vidales/L GO 2          OT G-codes  OT Professional Judgement Used?: Yes  OT Functional Scales Options: AM-PAC 6 Clicks Daily Activity (OT)  Score: 15  Functional Limitation: Self care  Self Care Current Status (): At least 40 percent but less than 60 percent impaired, limited or restricted  Self Care Goal Status (): At least 1 percent but less than 20 percent impaired, limited or restricted    DAYDAY Vidales  11/6/2017

## 2017-11-06 NOTE — PAYOR COMM NOTE
"Faith Harper Jr. (82 y.o. Male)     Date of Birth Social Security Number Address Home Phone MRN    [id number]  [id number] [id number] [phone] [id number]    [employer]dan[employer] Marital Status          None        Admission Date Admission Type Admitting Provider Attending Provider Department, Room/Bed    10/30/17 Elective Vishnu Mckinnon MD Hargrove, Kenneth R, MD [facility] ACUTE REHAB, 529/1    Discharge Date Discharge Disposition Discharge Destination                      Attending Provider: Vishnu Mckinnon MD     Allergies:  Levaquin [Levofloxacin]    Isolation:  None   Infection:  None   Code Status:  FULL    Ht:  70\" (177.8 cm)   Wt:  202 lb 8 oz (91.9 kg)    Admission Cmt:  None   Principal Problem:  Right-sided lacunar stroke [I63.9]                 Active Insurance as of 10/30/2017     Primary Coverage     Payor Plan Insurance Group Employer/Plan Group    HUMANA MEDICARE REPLACEMENT HUMANA MEDICARE REPL [id number]     Payor Plan Address Payor Plan Phone Number Effective From Effective To    [address] [address] [phone] 1/1/2015     [place] [address]       Subscriber Name Subscriber Birth Date Member ID       FAITH HARPER JR. 1934 [id number]                 Emergency Contacts      (Rel.) Home Phone Work Phone Mobile Phone    Maxine Harper (Spouse) -- -- [phone]    Nika Chow (Daughter) [phone] -- [phone]        [facility]  [phone]    Phone  [phone]    Fax  Ref. # [id number]    Hospital Medications (all)       Dose Frequency Start End    acetaminophen (TYLENOL) tablet 650 mg 650 mg Every 4 Hours PRN 10/30/2017     Sig - Route: Take 2 tablets by mouth Every 4 (Four) Hours As Needed for Mild Pain . - Oral    ALPRAZolam (XANAX) tablet 0.25 mg 0.25 mg Nightly PRN 10/31/2017     Sig - Route: Take 1 tablet by mouth At Night As Needed for Anxiety (may repeat " in 30 minutes if needed). - Oral    Notes to Pharmacy: May repeat in 30 min if needed    amLODIPine (NORVASC) tablet 10 mg 10 mg Nightly 10/30/2017     Sig - Route: Take 1 tablet by mouth Every Night. - Oral    aspirin chewable tablet 81 mg 81 mg Daily 10/31/2017     Sig - Route: Chew 1 tablet Daily. - Oral    atorvastatin (LIPITOR) tablet 10 mg 10 mg Nightly 10/30/2017     Sig - Route: Take 1 tablet by mouth Every Night. - Oral    calcium carbonate (TUMS) chewable tablet 500 mg (200 mg elemental) 2 tablet 2 Times Daily PRN 10/30/2017     Sig - Route: Chew 1,000 mg 2 (Two) Times a Day As Needed for Heartburn. - Oral    clopidogrel (PLAVIX) tablet 75 mg 75 mg Daily 10/31/2017     Sig - Route: Take 1 tablet by mouth Daily. - Oral    dextrose (GLUTOSE) oral gel 15 g 15 g Every 15 Minutes PRN 10/30/2017     Sig - Route: Take 15 g by mouth Every 15 (Fifteen) Minutes As Needed for Low Blood Sugar (Blood Sugar Less Than 70, Patient Alert, Is Not NPO & Can Safely Swallow). - Oral    enoxaparin (LOVENOX) syringe 40 mg 40 mg Daily 10/30/2017     Sig - Route: Inject 0.4 mL under the skin Daily. - Subcutaneous    finasteride (PROSCAR) tablet 5 mg 5 mg Daily 11/1/2017     Sig - Route: Take 1 tablet by mouth Daily. - Oral    fluticasone (FLONASE) 50 MCG/ACT nasal spray 2 spray 2 spray Daily 11/1/2017     Sig - Route: 2 sprays by Each Nare route Daily. - Each Nare    glipiZIDE (GLUCOTROL) tablet 10 mg 10 mg Every Morning Before Breakfast 10/31/2017     Sig - Route: Take 1 tablet by mouth Every Morning Before Breakfast. - Oral    insulin aspart (novoLOG) injection 0-7 Units 0-7 Units 4 Times Daily Before Meals & Nightly 10/30/2017     Sig - Route: Inject 0-7 Units under the skin 4 (Four) Times a Day Before Meals & at Bedtime. - Subcutaneous    lidocaine (LIDODERM) 5 % 1 patch 1 patch Every 24 Hours Scheduled 11/3/2017     Sig - Route: Place 1 patch on the skin Daily. - Transdermal    Notes to Pharmacy: Pt to use home supply.     Non-formulary Exception Code: Patient supplied medication    lisinopril (PRINIVIL,ZESTRIL) tablet 10 mg 10 mg Daily With Breakfast 10/31/2017     Sig - Route: Take 1 tablet by mouth Daily With Breakfast. - Oral    magic butt ointment  2 Times Daily 10/30/2017     Sig - Route: Apply  topically 2 (Two) Times a Day. - Topical    magnesium oxide (MAGOX) tablet 400 mg 400 mg Daily 10/31/2017     Sig - Route: Take 1 tablet by mouth Daily. - Oral    metFORMIN (GLUCOPHAGE) tablet 1,000 mg 1,000 mg 2 Times Daily With Meals 10/30/2017     Sig - Route: Take 2 tablets by mouth 2 (Two) Times a Day With Meals. - Oral    metoprolol succinate XL (TOPROL-XL) 24 hr tablet 50 mg 50 mg Nightly 10/30/2017     Sig - Route: Take 1 tablet by mouth Every Night. - Oral    multivitamin with beta carotene tablet 1 tablet 1 tablet Daily 10/31/2017     Sig - Route: Take 1 tablet by mouth Daily. - Oral    pantoprazole (PROTONIX) EC tablet 40 mg 40 mg Every Early Morning 10/31/2017     Sig - Route: Take 1 tablet by mouth Every Morning. - Oral    potassium chloride (MICRO-K) CR capsule 10 mEq 10 mEq Daily 11/3/2017     Sig - Route: Take 1 capsule by mouth Daily. - Oral    terazosin (HYTRIN) capsule 5 mg 5 mg Nightly 10/30/2017     Sig - Route: Take 1 capsule by mouth Every Night. - Oral    ALPRAZolam (XANAX) tablet 0.25 mg (Discontinued) 0.25 mg Nightly PRN 10/31/2017 10/31/2017    Sig - Route: Take 1 tablet by mouth At Night As Needed for Anxiety. - Oral    ALPRAZolam (XANAX) tablet 0.5 mg (Discontinued) 0.5 mg Nightly PRN 10/30/2017 10/31/2017    Sig - Route: Take 2 tablets by mouth At Night As Needed for Anxiety. - Oral    HYDROcodone-acetaminophen (NORCO) 5-325 MG per tablet 1 tablet (Discontinued) 1 tablet Every 6 Hours PRN 10/31/2017 10/31/2017    Sig - Route: Take 1 tablet by mouth Every 6 (Six) Hours As Needed for Moderate Pain . - Oral    HYDROcodone-acetaminophen (NORCO) 7.5-325 MG per tablet 1 tablet (Discontinued) 1 tablet Every 8 Hours  PRN 10/30/2017 10/31/2017    Sig - Route: Take 1 tablet by mouth Every 8 (Eight) Hours As Needed for Moderate Pain . - Oral          Lab Results (last 24 hours)     Procedure Component Value Units Date/Time    POC Glucose Fingerstick [[id number]]  (Normal) Collected:  11/05/17 1142    Specimen:  Blood Updated:  11/05/17 1154     Glucose 74 mg/dL       Meter: [id number]: [id number] MONTIEL YOLANDE       POC Glucose Fingerstick [[id number]]  (Normal) Collected:  11/05/17 1649    Specimen:  Blood Updated:  11/05/17 1703     Glucose 98 mg/dL       Meter: [id number]: [id number] MONTIEL YOLANDE       POC Glucose Fingerstick [[id number]]  (Normal) Collected:  11/05/17 2008    Specimen:  Blood Updated:  11/05/17 2024     Glucose 114 mg/dL       Meter: [id number]: [id number] [facility]        POC Glucose Fingerstick [[id number]]  (Normal) Collected:  11/06/17 0543    Specimen:  Blood Updated:  11/06/17 0707     Glucose 110 mg/dL       Meter: [id number]: [id number] [facility]            Imaging Results (last 24 hours)     ** No results found for the last 24 hours. **           Physician Progress Notes (last 72 hours) (Notes from 11/3/2017  8:01 AM through 11/6/2017  8:01 AM)      Vishnu Mckinnon MD at 11/3/2017 10:36 AM  Version 1 of 1              LOS: 4 days   Patient Care Team:  Evan Marrero MD as PCP - General (Family Medicine)    Chief Complaint:   Right-sided lacunar stroke          Interval History:     SUBJECTIVE:  Says he feels sleepy this morning but he has been up in therapy all morning working well with therapy making progress.  No shortness of breath no chest pain.  He says the more he works in therapy to more he realized that his processes are not as sharp and clear as they have been before his stroke.  He has to pay more attention to ambulate and transfers.  History taken from: patient chart family    Objective     Vital Signs  Temp:  [96.9 °F (36.1  °C)-97.2 °F (36.2 °C)] 96.9 °F (36.1 °C)  Heart Rate:  [65-73] 73  Resp:  [18] 18  BP: (108-134)/(56-68) 131/68  Last 3 weights    10/30/17  2300 11/01/17  0100 11/03/17  0500   Weight: 208 lb (94.3 kg) 211 lb (95.7 kg) 207 lb 14.4 oz (94.3 kg)         Physical Exam:     General Appearance:    Alert, cooperative, in no acute distress   Head:    Normocephalic, without obvious abnormality, atraumatic   Eyes:            Lids and lashes normal, conjunctivae and sclerae normal, no   icterus, no pallor, corneas clear, PERRLA   Throat:   No oral lesions, no thrush, oral mucosa moist   Neck:   No adenopathy, supple, trachea midline, no thyromegaly, no   carotid bruit, no JVD       Lungs:     Clear to auscultation,respirations regular, even and                  Unlabored Good bilateral air movement     Heart:    Regular rhythm and normal rate, normal S1 and S2, no            murmur, no gallop, no rub, no click Regular rhythm and no ectopy    Chest Wall:    No abnormalities observed   Abdomen:     Normal bowel sounds, no masses, no organomegaly, soft        non-tender, non-distended, no guarding, no rebound                Tenderness    Extremities:   Moves all extremities well, no edema, no cyanosis, no             Redness        Skin:   No bleeding, bruising or rash   Lymph nodes:   No palpable adenopathy   Neurologic:   Cranial nerves 2 - 12 grossly intact, sensation intact, DTR       present and equal bilaterally he has left-sided weakness and ataxia but does seem to be improving       RESULTS REVIEW:     Lab Results (last 24 hours)     Procedure Component Value Units Date/Time    POC Glucose Fingerstick [[id number]]  (Abnormal) Collected:  11/02/17 1031    Specimen:  Blood Updated:  11/02/17 1044     Glucose 141 (H) mg/dL       RN NotifiedMeter: [id number]: [id number] HUSK NAOMI       POC Glucose Fingerstick [[id number]]  (Abnormal) Collected:  11/02/17 1605    Specimen:  Blood Updated:  11/02/17 5198      Glucose 137 (H) mg/dL       RN NotifiedMeter: [id number]: [id number] LAVELLE GALDAMEZ       POC Glucose Fingerstick [[id number]]  (Normal) Collected:  11/02/17 1932    Specimen:  Blood Updated:  11/02/17 2019     Glucose 84 mg/dL       Meter: [id number]: [id number] LAVELLE GALDAMEZ       CBC (No Diff) [[id number]]  (Abnormal) Collected:  11/03/17 0512    Specimen:  Blood Updated:  11/03/17 0610     WBC 6.91 10*3/mm3      RBC 4.38 10*6/mm3      Hemoglobin 13.1 (L) g/dL      Hematocrit 38.6 (L) %      MCV 88.1 fL      MCH 29.9 pg      MCHC 33.9 g/dL      RDW 13.1 %      RDW-SD 42.3 fl      MPV 9.6 fL      Platelets 262 10*3/mm3     Comprehensive Metabolic Panel [[id number]]  (Abnormal) Collected:  11/03/17 0512    Specimen:  Blood Updated:  11/03/17 0636     Glucose 101 (H) mg/dL      BUN 23 (H) mg/dL      Creatinine 0.95 mg/dL      Sodium 142 mmol/L      Potassium 3.4 (L) mmol/L      Chloride 104 mmol/L      CO2 25.0 mmol/L      Calcium 9.1 mg/dL      Total Protein 6.5 g/dL      Albumin 3.60 g/dL      ALT (SGPT) 28 U/L      AST (SGOT) 26 U/L      Alkaline Phosphatase 44 U/L      Total Bilirubin 1.4 (H) mg/dL      eGFR Non African Amer 76 mL/min/1.73      Globulin 2.9 gm/dL      A/G Ratio 1.2 g/dL      BUN/Creatinine Ratio 24.2     Anion Gap 13.0 mmol/L     Narrative:       The MDRD GFR formula is only valid for adults with stable renal function between ages 18 and 70.        Imaging Results (last 72 hours)     ** No results found for the last 72 hours. **          Assessment/Plan     Principal Problem:    Right-sided lacunar stroke  Active Problems:    Left-sided weakness    Atrial fibrillation, chronic    Essential hypertension    Diabetes mellitus    Chronic anxiety    Chronic back pain greater than 3 months duration    Degenerative joint disease involving multiple joints    Encounter for rehabilitation    Obstructive sleep apnea syndrome    Former smoker        He is participating well with therapy  making good progress  Diabetes is well-controlled  Noted that his potassium is decreasing and I'm going to start him on low-dose potassium replacement and recheck lab work in 48 hours.  He is not on a diuretic at this time  Otherwise seems to be doing well and make progress    He is noted to be a little bit confused in the morning or after he awakens from sleep during the day      Vishnu Mckinnon MD  11/03/17  10:36 AM               Electronically signed by Vishnu Mckinnon MD at 11/3/2017 10:40 AM      Josafat Dee DO at 11/4/2017 12:31 PM  Version 1 of 1             [facility] Medicine Services  INPATIENT PROGRESS NOTE    Length of Stay: 5  Date of Admission: 10/30/2017  Primary Care Physician: Evan Marrero MD    Subjective   Chief Complaint:  Stroke  HPI: 83 yo with recent CVA and weakness to the left leg/foot. Doing well. Ambulating    Review of Systems   Constitutional: Negative for fever.   Respiratory: Negative for wheezing and stridor.    Cardiovascular: Negative for chest pain.   Gastrointestinal: Negative for nausea and vomiting.   Genitourinary: Negative for flank pain.   Musculoskeletal: Negative for neck stiffness.   Neurological: Negative for dizziness and weakness.        All pertinent negatives and positives are as above. All other systems have been reviewed and are negative unless otherwise stated.     Objective    Temp:  [96.5 °F (35.8 °C)-97.8 °F (36.6 °C)] 97.8 °F (36.6 °C)  Heart Rate:  [70-75] 75  Resp:  [18] 18  BP: (126-141)/(61-73) 126/73  Physical Exam   Constitutional: He appears well-developed and well-nourished.   Eyes: EOM are normal. Pupils are equal, round, and reactive to light.   Neck: Normal range of motion.   Cardiovascular: Normal rate and regular rhythm.    Pulmonary/Chest: Effort normal and breath sounds normal.   Abdominal: Soft.   Musculoskeletal: Normal range of motion.   Neurological: He is alert.   Skin: Skin is  warm and dry.             Results Review:  I have reviewed the labs, radiology results, and diagnostic studies.    Laboratory Data:   Lab Results (last 24 hours)     Procedure Component Value Units Date/Time    POC Glucose Fingerstick [[id number]]  (Normal) Collected:  11/03/17 0515    Specimen:  Blood Updated:  11/03/17 1625     Glucose 104 mg/dL       Meter: [id number]: [id number] KEYLA JONES       POC Glucose Fingerstick [[id number]]  (Normal) Collected:  11/03/17 1612    Specimen:  Blood Updated:  11/03/17 1626     Glucose 82 mg/dL       RN NotifiedMeter: [id number]: [id number] RIGOBERTO CYR       POC Glucose Fingerstick [[id number]]  (Abnormal) Collected:  11/03/17 2004    Specimen:  Blood Updated:  11/03/17 2046     Glucose 142 (H) mg/dL       Meter: [id number]: [id number] [facility] PRAVIN        POC Glucose Fingerstick [[id number]]  (Normal) Collected:  11/04/17 0545    Specimen:  Blood Updated:  11/04/17 0606     Glucose 107 mg/dL       Meter: [id number]: [id number] [facility] PRAVIN        POC Glucose Fingerstick [[id number]]  (Abnormal) Collected:  11/04/17 1042    Specimen:  Blood Updated:  11/04/17 1055     Glucose 139 (H) mg/dL       RN NotifiedMeter: [id number]: [id number] TIANA LANIER             Culture Data:   No results found for: BLOODCX  No results found for: URINECX  No results found for: RESPCX  No results found for: WOUNDCX  No results found for: STOOLCX  No components found for: BODYFLD    Radiology Data:   Imaging Results (last 24 hours)     ** No results found for the last 24 hours. **          I have reviewed the patient current medications.     Assessment/Plan     Hospital Problem List     * (Principal)Right-sided lacunar stroke    Former smoker    Left-sided weakness    Essential hypertension    Diabetes mellitus    Chronic anxiety    Chronic back pain greater than 3 months duration    Degenerative joint disease involving multiple joints      Atrial fibrillation, chronic    Encounter for rehabilitation    Obstructive sleep apnea syndrome          Plan: CVA            Doing well. Continue strength training. Weakness in the left has improved.          Josafat Dee DO   11/04/17   12:31 PM       Electronically signed by Josafat Dee DO at 11/4/2017 12:35 PM      Josafat Dee DO at 11/5/2017 11:32 AM  Version 1 of 1             [facility] Medicine Services  INPATIENT PROGRESS NOTE    Length of Stay: 6  Date of Admission: 10/30/2017  Primary Care Physician: Evan Marrero MD    Subjective   Chief Complaint:  Confusion  HPI: 81 yo with recent CVA and weakness to the left leg/foot. Doing well despite an episode of confusion last night. He thought it was afternoon instead of 2 am. Pt is alert and oriented now.    Review of Systems   Constitutional: Negative for fever.   Respiratory: Negative for wheezing and stridor.    Cardiovascular: Negative for chest pain.   Gastrointestinal: Negative for nausea and vomiting.   Genitourinary: Negative for flank pain.   Musculoskeletal: Negative for neck stiffness.   Neurological: Negative for dizziness and weakness.   All pertinent negatives and positives are as above. All other systems have been reviewed and are negative unless otherwise stated.     Objective    Temp:  [96.9 °F (36.1 °C)-97 °F (36.1 °C)] 96.9 °F (36.1 °C)  Heart Rate:  [82-88] 82  Resp:  [18] 18  BP: ()/(54-71) 109/56  Physical Exam    Constitutional: He appears well-developed and well-nourished.   Eyes: EOM are normal. Pupils are equal, round, and reactive to light.   Neck: Normal range of motion.   Cardiovascular: Normal rate and regular rhythm.    Pulmonary/Chest: Effort normal and breath sounds normal.   Abdominal: Soft.   Musculoskeletal: Normal range of motion.   Neurological: He is alert.   Skin: Skin is warm and dry.       Results Review:  I have reviewed the labs, radiology  results, and diagnostic studies.    Laboratory Data:   Lab Results (last 24 hours)     Procedure Component Value Units Date/Time    POC Glucose Fingerstick [[id number]]  (Normal) Collected:  11/04/17 2011    Specimen:  Blood Updated:  11/04/17 2034     Glucose 109 mg/dL       Meter: [id number]: [id number] [facility]        POC Glucose Fingerstick [[id number]]  (Normal) Collected:  11/05/17 0456    Specimen:  Blood Updated:  11/05/17 0513     Glucose 113 mg/dL       Meter: [id number]: [id number] [facility]        Renal Function Panel [[id number]]  (Abnormal) Collected:  11/05/17 0456    Specimen:  Blood Updated:  11/05/17 0658     Glucose 116 (H) mg/dL      BUN 22 (H) mg/dL      Creatinine 1.00 mg/dL      Sodium 142 mmol/L      Potassium 4.0 mmol/L      Chloride 104 mmol/L      CO2 26.0 mmol/L      Calcium 9.2 mg/dL      Albumin 3.70 g/dL      Phosphorus 3.3 mg/dL      Anion Gap 12.0 mmol/L      BUN/Creatinine Ratio 22.0     eGFR Non African Amer 72 mL/min/1.73     Narrative:       The MDRD GFR formula is only valid for adults with stable renal function between ages 18 and 70.          Culture Data:   No results found for: BLOODCX  No results found for: URINECX  No results found for: RESPCX  No results found for: WOUNDCX  No results found for: STOOLCX  No components found for: BODYFLD    Radiology Data:   Imaging Results (last 24 hours)     ** No results found for the last 24 hours. **          I have reviewed the patient current medications.     Assessment/Plan     Hospital Problem List     * (Principal)Right-sided lacunar stroke    Former smoker    Left-sided weakness    Essential hypertension    Diabetes mellitus    Chronic anxiety    Chronic back pain greater than 3 months duration    Degenerative joint disease involving multiple joints    Atrial fibrillation, chronic    Encounter for rehabilitation    Obstructive sleep apnea syndrome             Plan: CVA            Doing well.  Continue strength training. Weakness in the left has improved.            Confusion. Pt and family note some occasional confusion episodes after the CVA but pt is now at baseline. Likely a sundowning event, now resolved    Josafat Dee DO   11/05/17   11:33 AM       Electronically signed by Josafat Dee DO at 11/5/2017 11:36 AM        Medical Student Notes (last 72 hours) (Notes from 11/3/2017  8:01 AM through 11/6/2017  8:01 AM)     No notes of this type exist for this encounter.        Consult Notes (last 72 hours) (Notes from 11/3/2017  8:01 AM through 11/6/2017  8:01 AM)     No notes of this type exist for this encounter.

## 2017-11-07 LAB
GLUCOSE BLDC GLUCOMTR-MCNC: 107 MG/DL (ref 70–130)
GLUCOSE BLDC GLUCOMTR-MCNC: 113 MG/DL (ref 70–130)
GLUCOSE BLDC GLUCOMTR-MCNC: 127 MG/DL (ref 70–130)

## 2017-11-07 PROCEDURE — 97110 THERAPEUTIC EXERCISES: CPT

## 2017-11-07 PROCEDURE — 92507 TX SP LANG VOICE COMM INDIV: CPT | Performed by: SPEECH-LANGUAGE PATHOLOGIST

## 2017-11-07 PROCEDURE — 25010000002 ENOXAPARIN PER 10 MG: Performed by: FAMILY MEDICINE

## 2017-11-07 PROCEDURE — 82962 GLUCOSE BLOOD TEST: CPT

## 2017-11-07 PROCEDURE — 99232 SBSQ HOSP IP/OBS MODERATE 35: CPT | Performed by: FAMILY MEDICINE

## 2017-11-07 PROCEDURE — 97530 THERAPEUTIC ACTIVITIES: CPT

## 2017-11-07 PROCEDURE — 97116 GAIT TRAINING THERAPY: CPT

## 2017-11-07 RX ADMIN — AMLODIPINE BESYLATE 10 MG: 10 TABLET ORAL at 20:05

## 2017-11-07 RX ADMIN — PANTOPRAZOLE SODIUM 40 MG: 40 TABLET, DELAYED RELEASE ORAL at 06:10

## 2017-11-07 RX ADMIN — ATORVASTATIN CALCIUM 10 MG: 10 TABLET, FILM COATED ORAL at 20:05

## 2017-11-07 RX ADMIN — TERAZOSIN HYDROCHLORIDE ANHYDROUS 5 MG: 5 CAPSULE ORAL at 20:05

## 2017-11-07 RX ADMIN — MAGNESIUM OXIDE TAB 400 MG (241.3 MG ELEMENTAL MG) 400 MG: 400 (241.3 MG) TAB at 09:10

## 2017-11-07 RX ADMIN — LISINOPRIL 10 MG: 10 TABLET ORAL at 09:10

## 2017-11-07 RX ADMIN — THERA TABS 1 TABLET: TAB at 09:10

## 2017-11-07 RX ADMIN — METOPROLOL SUCCINATE 50 MG: 50 TABLET, EXTENDED RELEASE ORAL at 20:05

## 2017-11-07 RX ADMIN — ACETAMINOPHEN 650 MG: 325 TABLET ORAL at 16:25

## 2017-11-07 RX ADMIN — CLOPIDOGREL BISULFATE 75 MG: 75 TABLET ORAL at 09:10

## 2017-11-07 RX ADMIN — POTASSIUM CHLORIDE 10 MEQ: 750 CAPSULE, EXTENDED RELEASE ORAL at 09:09

## 2017-11-07 RX ADMIN — ENOXAPARIN SODIUM 40 MG: 40 INJECTION SUBCUTANEOUS at 09:09

## 2017-11-07 RX ADMIN — HYDROCORTISONE: 1 CREAM TOPICAL at 09:12

## 2017-11-07 RX ADMIN — FINASTERIDE 5 MG: 5 TABLET, FILM COATED ORAL at 09:09

## 2017-11-07 RX ADMIN — FLUTICASONE PROPIONATE 2 SPRAY: 50 SPRAY, METERED NASAL at 09:12

## 2017-11-07 RX ADMIN — ASPIRIN 81 MG 81 MG: 81 TABLET ORAL at 09:10

## 2017-11-07 RX ADMIN — METFORMIN HYDROCHLORIDE 1000 MG: 500 TABLET ORAL at 16:25

## 2017-11-07 RX ADMIN — METFORMIN HYDROCHLORIDE 1000 MG: 500 TABLET ORAL at 09:09

## 2017-11-07 RX ADMIN — HYDROCORTISONE: 1 CREAM TOPICAL at 18:00

## 2017-11-07 RX ADMIN — GLIPIZIDE 10 MG: 10 TABLET ORAL at 09:09

## 2017-11-07 RX ADMIN — LIDOCAINE 1 PATCH: 50 PATCH TOPICAL at 09:11

## 2017-11-07 NOTE — THERAPY TREATMENT NOTE
[facility] - Speech Language Pathology Treatment Note  [facility]     Patient Name: Kenneth Harper Jr.  : 1934  MRN: [id number]  Today's Date: 2017  Referring Physician: Pratibha      Admit Date: 10/30/2017  Goals:  1. Patient will demonstrate orientation to day, month, year, place, situation with 90% acc with min cues.  Pt was 100% acc w/orientation questions w/independent use of calendar on his phone.  GOAL MET  2. Patient will complete delayed word recall with 80% acc with min cues: 100% acc for IR with no assistance; 75% acc after 10 min delay with no assistance and 100% acc with one cue given; 75% acc after 15 min delay with no assistance and again 100% acc with one cue given.   3. Patient will complete organization/home management task with 90% acc with min cues: 90% acc with mod cues for map reading task; patient required repetition of questions at times but referenced the key and was able to read the map independently    Shante Otero, MS CCC-SLP 2017 1:55 PM       Visit Dx:      ICD-10-CM ICD-9-CM   1. Symbolic dysfunction R48.9 784.60   2. Impaired mobility and ADLs Z74.09 799.89   3. Abnormality of gait and mobility R26.9 781.2   4. Muscle weakness (generalized) M62.81 728.87     Patient Active Problem List   Diagnosis   • Spinal stenosis, lumbar region, with neurogenic claudication   • Former smoker   • Overweight   • Left-sided weakness   • Right-sided lacunar stroke   • Essential hypertension   • Diabetes mellitus   • Chronic anxiety   • Chronic back pain greater than 3 months duration   • Prostatic hypertrophy   • Degenerative joint disease involving multiple joints   • Atrial fibrillation, chronic   • Encounter for rehabilitation   • Obstructive sleep apnea syndrome              Adult Rehabilitation Note       17 1108 17 1010 17 0730    Rehab Assessment/Intervention    Discipline speech language pathologist  -HR physical therapy assistant  -RW occupational  therapy assistant  -RC    Document Type therapy note (daily note)  -HR therapy note (daily note)  -RW therapy note (daily note)  -RC    Subjective Information no complaints;agree to therapy  -HR agree to therapy  -RW agree to therapy  -RC    Patient Effort, Rehab Treatment good  -HR good  -RW good  -RC    Symptoms Noted During/After Treatment none  -HR      Precautions/Limitations fall precautions  -HR  fall precautions  -RC    Precautions/Limitations, Vision WFL with corrective lenses  -HR      Precautions/Limitations, Hearing WFL  -HR      Recorded by [HR] Shante Otero, MS CCC-SLP [RW] Pipe Gruber, MARTIN [RC] Cassie Carpio, LARA/L    Vital Signs    Pretreatment Heart Rate (beats/min)  81  -RW     Pre SpO2 (%)  96  -RW     O2 Delivery Pre Treatment  room air  -RW     Recorded by  [RW] Pipe Gruber PTA     Pain Assessment    Pain Assessment No/denies pain  -HR No/denies pain  -RW No/denies pain  -RC    Pain Score 0  -HR      Post Pain Score 0  -HR      Recorded by [HR] Shante Otero, MS CCC-SLP [RW] Pipe Gruber PTA [RC] Cassie Carpio, LARA/L    Vision Assessment/Intervention    Visual Impairment WFL with corrective lenses  -HR      Recorded by [HR] Shante Otero, MS CCC-SLP      Cognitive Assessment/Intervention    Current Cognitive/Communication Assessment functional  -HR functional  -RW functional  -RC    Orientation Status oriented x 4  -HR oriented to;oriented x 4  -RW oriented x 4  -RC    Follows Commands/Answers Questions 100% of the time  -% of the time  -RW     Personal Safety Interventions  gait belt;nonskid shoes/slippers when out of bed  -RW     Recorded by [HR] Shante Otero, MS CCC-SLP [RW] Pipe Gruber PTA [RC] Cassie Carpio, LARA/L    Cognitive Assessment Intervention    Behavior/Mood Observations  behavior appropriate to situation, WNL/WFL  -RW     Recorded by  [RW] Pipe Gruber PTA     Communication Treatment Objective and Progress    Cognitive Linguistic Treatment  Objectives Improve orientation;Improve memory skills;Improve functional problem solving  -HR      Recorded by [HR] Shante Otero MS CCC-SLP      Improve orientation    Improve orientation through: demonstrating orientation to day;demonstrating orientation to month;demonstrating orientation to year;demonstrating orientation to place;demonstrating orientation to disease/impairment;90%;with inconsistent cues  -HR      Status: Improve orientation through Achieved  -HR      Recorded by [HR] Shante Otero MS CCC-SLP      Improve memory skills    Improve memory skills through: recalling related word lists with an imposed delay;90%;with inconsistent cues  -HR      Status: Improve memory skills Progressing as expected  -HR      Memory Skills Progress 70%;80%;continue to address  -HR      Recorded by [HR] Shante Otero MS CCC-SLP      Improve functional problem solving    Improve functional problem solving through: complete organization/home management task;tell similarity between items  -HR      Status: Improve functional problem solving Progressing as expected  -HR      Functional Problem Solving Progress 80%;with consistent cues;continue to address  -HR      Comments: Improve functional problem solving mod cues for map reading task this date with high accuracy  -HR      Recorded by [HR] Shante Otero MS CCC-SLP      Transfer Assessment/Treatment    Transfers, Bed-Chair [place]   stand by assist  -RC    Transfers, Chair-Bed [place]   stand by assist  -RC    Transfers, Bed-Chair-Bed, Assist Device   rolling walker  -RC    Transfers, Sit-Stand [place]  stand by assist;verbal cues required  -RW stand by assist  -RC    Transfers, Stand-Sit [place]  stand by assist;verbal cues required  -RW stand by assist  -RC    Transfers, Sit-Stand-Sit, Assist Device  rolling walker  -RW rolling walker  -RC    Recorded by  [RW] Pipe Gruber, PTA [RC] MARCO Cannon/L    Gait Assessment/Treatment    Gait,  [place] Level  verbal cues required;contact guard assist  -RW     Gait, Assistive Device  rolling walker  -RW     Gait, Distance (Feet)  150    x 2  -RW     Gait, Comment  gt improving but will get walker too far away during turns  -RW     Recorded by  [RW] Pipe Gruber PTA     Stairs Assessment/Treatment    Number of Stairs  12  -RW     Stairs, Handrail Location  both sides  -RW     Stairs, [place] Level  supervision required;contact guard assist  -RW     Recorded by  [RW] Pipe Gruber PTA     Functional Mobility    Functional Mobility- Ind. Level   supervision required  -RC    Functional Mobility- Device   rolling walker  -RC    Recorded by   [RC] MARCO Cannon/L    Wheelchair Training/Management    Wheelchair, Distance Propelled  160 mod ind  -RW     Recorded by  [RW] Pipe Gruber PTA     Grooming Assessment/Training    Grooming Assess/Train, Indepen Level   independent  -RC    Recorded by   [RC] LEIGH CannonA/L    Balance Skills Training    Gait Balance-Level of Assistance  Close supervision  -RW     Gait Balance Support  parallel bars  -RW     Gait Balance Activities  tandem;side-stepping  -RW     Recorded by  [RW] Pipe Gruber PTA     Therapy Exercises    Bilateral Lower Extremities  AROM:;20 reps;sitting;hip flexion;knee flexion;LAQ   2 sets  -RW     BLE Resistance  theraband  -RW     Bilateral Upper Extremity  AROM:;15 reps;20 reps;sitting   tband row  -RW --   ue bike 15 min  -RC    BUE Resistance  theraband  -RW     Recorded by  [RW] Pipe Gruber PTA [RC] LEIGH CannonA/L    Fine Motor Coordination Training    Detail (Fine Motor Coordination Training)   --   lrg beads, card turning  -RC    Recorded by   [RC] MARCO Cannon/L    Positioning and Restraints    Pre-Treatment Position  sitting in chair/recliner  -RW sitting in chair/recliner  -RC    Post Treatment Position  wheelchair  -RW wheelchair  -RC    In Wheelchair   sitting;encouraged to call for  assist  -RC    Recorded by  [RW] Pipe Gruber PTA [RC] Cassie Carpio, LARA/L      11/06/17 1332 11/06/17 1002 11/06/17 0901    Rehab Assessment/Intervention    Discipline physical therapy assistant  -RW physical therapy assistant  -RW speech language pathologist  -CK    Document Type therapy note (daily note)  -RW therapy note (daily note)  -RW therapy note (daily note)  -CK    Subjective Information agree to therapy  -RW agree to therapy  -RW agree to therapy  -CK    Patient Effort, Rehab Treatment good  -RW good  -RW good  -CK    Precautions/Limitations fall precautions  -RW fall precautions  -RW     Recorded by [RW] Pipe Gruber PTA [RW] Pipe Gruber PTA [CK] Concetta Springer, MS CCC-SLP    Vital Signs    Pretreatment Heart Rate (beats/min)  74  -RW     Pre SpO2 (%)  96  -RW     O2 Delivery Pre Treatment  room air  -RW     Recorded by  [RW] Pipe Gruber PTA     Pain Assessment    Pain Assessment No/denies pain  -RW No/denies pain  -RW No/denies pain  -CK    Pain Score   0  -CK    Post Pain Score   0  -CK    Recorded by [RW] Pipe Gruber PTA [RW] Pipe Gruber PTA [CK] Concetta Springer, MS CCC-SLP    Cognitive Assessment/Intervention    Current Cognitive/Communication Assessment functional  -RW functional  -RW     Orientation Status oriented to;oriented x 4  -RW oriented to;oriented x 4  -RW     Follows Commands/Answers Questions 100% of the time  -% of the time  -RW     Personal Safety Interventions gait belt;nonskid shoes/slippers when out of bed  -RW gait belt;nonskid shoes/slippers when out of bed  -RW     Recorded by [RW] Pipe Gruber PTA [RW] Pipe Gruber PTA     Cognitive Assessment Intervention    Behavior/Mood Observations behavior appropriate to situation, WNL/WFL  -RW behavior appropriate to situation, WNL/WFL  -RW     Recorded by [RW] Pipe Gruber PTA [RW] Pipe Gruber PTA     Communication Treatment Objective and Progress    Cognitive Linguistic Treatment  Objectives   Improve orientation;Improve memory skills;Improve functional problem solving  -CK    Recorded by   [CK] Concetta Springer MS CCC-SLP    Improve orientation    Improve orientation through:   demonstrating orientation to day;demonstrating orientation to month;demonstrating orientation to year;demonstrating orientation to place;demonstrating orientation to disease/impairment;90%;with inconsistent cues  -CK    Status: Improve orientation through   Achieved  -CK    Orientation Progress   100%   w/independent use of phone for calendar  -CK    Recorded by   [CK] Concetta Springer MS CCC-SLP    Improve memory skills    Improve memory skills through:   recalling related word lists with an imposed delay;90%;with inconsistent cues  -CK    Status: Improve memory skills   Progress slower than expected  -CK    Memory Skills Progress   50%  -CK    Comments: Improve memory skills   pt given new word set this date  -CK    Recorded by   [CK] Concetta Springer MS CCC-SLP    Improve functional problem solving    Improve functional problem solving through:   complete organization/home management task;tell similarity between items  -CK    Status: Improve functional problem solving   Progress slower than expected  -CK    Functional Problem Solving Progress   60%  -CK    Comments: Improve functional problem solving   max assist for task  -CK    Recorded by   [CK] Concetta Springer MS CCC-SLP    Transfer Assessment/Treatment    Transfers, Sit-Stand [place] stand by assist;verbal cues required  -RW stand by assist;verbal cues required  -RW     Transfers, Stand-Sit [place] stand by assist;verbal cues required  -RW stand by assist;verbal cues required  -RW     Transfers, Sit-Stand-Sit, Assist Device rolling walker  -RW rolling walker  -RW     Transfer, Comment sit to stand 5 x 2  -RW      Recorded by [RW] Pipe Gruber, PTA [RW] Pipe Gruber, PTA     Gait Assessment/Treatment    Gait, [place] Level verbal  cues required;contact guard assist  -RW minimum assist (75% patient effort);verbal cues required;contact guard assist  -RW     Gait, Assistive Device rolling walker  -RW rolling walker  -RW     Gait, Distance (Feet) 150   70 x 3  -    x 2  -RW     Gait, Comment improved gt quality. minimal foot drag on left  -RW      Recorded by [RW] Pipe Gruber PTA [RW] Pipe Gruber PTA     Balance Skills Training    Gait Balance-Level of Assistance --  -RW Contact guard  -RW     Gait Balance Support --  -RW parallel bars  -RW     Gait Balance Activities --  -RW --   gt w/o ad in // bars  -RW     Recorded by [RW] Pipe Gruber PTA [RW] Pipe Gruber PTA     Therapy Exercises    Bilateral Lower Extremities AROM:;20 reps;sitting;hip flexion;knee flexion;LAQ   2 sets  -RW AROM:;20 reps;sitting;hip flexion;knee flexion;LAQ   2 sets  -RW     BLE Resistance theraband  -RW theraband  -RW     Bilateral Upper Extremity AROM:;15 reps;20 reps;sitting   tband rows 2 sets  -RW AROM:;15 reps;20 reps;sitting   tband rows 2 sets  -RW     BUE Resistance theraband  -RW theraband  -RW     Recorded by [RW] Pipe Gruber PTA [RW] Pipe Gruber PTA     Positioning and Restraints    Pre-Treatment Position sitting in chair/recliner  -RW sitting in chair/recliner  -RW     Post Treatment Position wheelchair  -RW wheelchair  -RW     In Wheelchair call light within reach  -RW call light within reach  -RW     Recorded by [RW] Pipe Gruber PTA [RW] Pipe Gruber PTA       11/06/17 0724 11/04/17 1440 11/04/17 1400    Rehab Assessment/Intervention    Discipline occupational therapy assistant  -KD physical therapy assistant  -JW     Document Type therapy note (daily note)  -KD therapy note (daily note)  -JW     Subjective Information agree to therapy  -KD agree to therapy  -JW     Patient Effort, Rehab Treatment  good  -JW     Precautions/Limitations  fall precautions  -JW     Recorded by [KD] MARCO Vidales/DAWN [JW] Felicia HOWARD  [place] PTA     Vital Signs    Pre Treatment Diastolic   -KD      Intra Systolic BP Rehab 65  -KD      Pretreatment Heart Rate (beats/min) 74  -KD      Posttreatment Heart Rate (beats/min) 76  -KD      Pre SpO2 (%) 96  -KD      O2 Delivery Pre Treatment room air  -KD      Post SpO2 (%) 94  -KD      O2 Delivery Post Treatment room air  -KD      Pre Patient Position Sitting  -KD Sitting  -JW     Intra Patient Position Standing  -KD      Post Patient Position Sitting  -KD Supine  -JW     Recorded by [KD] LEIGH VidalesA/L [JW] Felicia Haynes PTA     Pain Assessment    Pain Assessment No/denies pain  -KD 0-10  -JW     Pain Score  0  -JW     Post Pain Score  0  -JW     Recorded by [KD] MARCO Vidales/DAWN [JW] Felicia Haynes PTA     Cognitive Assessment/Intervention    Current Cognitive/Communication Assessment functional  -KD impaired  -JW     Orientation Status oriented x 4  -KD oriented to;oriented x 4  -JW     Follows Commands/Answers Questions 100% of the time  -% of the time  -JW     Personal Safety Interventions  fall prevention program maintained;gait belt;nonskid shoes/slippers when out of bed  -JW     Recorded by [KD] MARCO Vidales/L [JW] Felicia Haynes PTA     Bed Mobility, Assessment/Treatment    Bed Mobility, Assistive Device  --   HOB down, no bedrails  -JW     Bed Mob, Sit to Supine, [place]  supervision required  -JW     Recorded by  [JW] Felicia Haynes PTA     Transfer Assessment/Treatment    Transfers, Bed-Chair [place] contact guard assist  -KD      Transfers, Sit-Stand [place] contact guard assist  -KD stand by assist;verbal cues required  -JW --  -JW    Transfers, Stand-Sit [place] contact guard assist  -KD stand by assist;verbal cues required  - --  -JW    Transfers, Sit-Stand-Sit, Assist Device rolling walker  -KD rolling walker  -JW --  -JW    Transfer, Comment 4 sit-stands  -KD      Recorded by [KD] Therese King,  MARCO/DAWN [JW] Felicia Haynes, PTA [JW] Felicia Haynes, PTA    Gait Assessment/Treatment    Gait, [place] Level  minimum assist (75% patient effort);verbal cues required  -JW --  -JW    Gait, Assistive Device  rolling walker  -JW --  -JW    Gait, Distance (Feet)  58  -JW --  -JW    Gait, Comment  pt very fatigued this afternoon, requesting to lie down  -JW --  -JW    Recorded by  [JW] Felicia Haynes, MARTIN [JW] Felicia Haynes, PTA    Stairs Assessment/Treatment    Number of Stairs  '  -JW     Recorded by  [JW] Felicia Haynes, PTA     Functional Mobility    Functional Mobility- Ind. Level contact guard assist  -KD      Functional Mobility- Device rolling walker  -KD      Functional Mobility-Distance (Feet) 150   x 2  -KD      Recorded by [KD] MARCO Viadles/L      Grooming Assessment/Training    Grooming Assess/Train, Assistive Device electric razor   comb hair  -KD      Grooming Assess/Train, Position sitting;sink side  -KD      Grooming Assess/Train, Indepen Level conditional independence  -KD      Recorded by [KD] DAYDAY Vidales      Self-Feeding Assessment/Training    Self-Feeding Assess/Train, Position sitting  -KD      Self-Feeding Assess/Train, [place] conditional independence  -KD      Self-Feeding Assess/Train, Spillage Amount minimal  -KD      Recorded by [KD] MARCO Vidales/L      Therapy Exercises    Bilateral Lower Extremities  AROM:;20 reps;supine;ankle pumps/circles;glut sets;quad sets;heel slides;hip abduction/adduction  -JW --  -JW    Bilateral Upper Extremity AROM:;20 reps;sitting;elbow flexion/extension;pronation/supination;shoulder abduction/adduction;shoulder circles;shoulder extension/flexion;shoulder horizontal abd/add   UEE x 15 mins with 1 RB  -KD      BUE Resistance manual resistance- moderate   [place] system x 15 mins with RB's PRN  -KD      Trunk Exercises  supine;bridging;15 reps   hooklying trunk rotation  -JW     Recorded by [KD]  Therese King, LARA/L [JW] Felicia Haynes, PTA [JW] Felicia Haynes, MARTIN    Positioning and Restraints    Pre-Treatment Position sitting in chair/recliner  -KD in bed  -JW     Post Treatment Position wheelchair  -KD bed  -JW     In Bed sitting;sitting EOB;call light within reach;encouraged to call for assist;exit alarm on  -KD supine;call light within reach;encouraged to call for assist  -JW     Recorded by [KD] Therese King, LARA/L [JW] Felicia Haynes PTA       User Key  (r) = Recorded By, (t) = Taken By, (c) = Cosigned By    Initials Name Effective Dates    JW Felicia Haynes, PTA 08/11/15 -     HR Shante Otero, MS CCC-SLP 12/15/16 -     CK Concetta Springer, MS CCC-SLP 10/17/16 -     RW Pipe Gruber, PTA 10/17/16 -     RC Cassie Carpio, LARA/L 10/17/16 -     KD Therese King, LARA/L 10/17/16 -               IP SLP Goals       11/07/17 1145 11/06/17 1314 11/04/17 1411    Cognitive Linguistic- Improve Safety and Awareness    Cognitive Linguistic Improve Safety and Awareness- SLP, Date Goal Reviewed 11/07/17  -HR 11/06/17  -CK 11/04/17  -CK    Cognitive Linguistic Improve Safety and Awareness- SLP, Outcome goal ongoing  -HR goal ongoing  -CK goal ongoing  -CK      11/03/17 1346 11/02/17 1614 11/01/17 1159    Cognitive Linguistic- Improve Safety and Awareness    Cognitive Linguistic Improve Safety and Awareness- SLP, Date Goal Reviewed 11/03/17  -EC 11/02/17  -EC 11/01/17  -EC    Cognitive Linguistic Improve Safety and Awareness- SLP, Outcome goal not met  -EC goal not met  -EC goal not met  -EC      10/31/17 1244          Cognitive Linguistic- Improve Safety and Awareness    Cognitive Linguistic Improve Safety and Awareness- SLP, Date Established 10/31/17  -HR      Cognitive Linguistic Improve Safety and Awareness- SLP, Time to Achieve by discharge  -HR      Cognitive Linguistic Improve Safety and Awareness- SLP, Activity Level Patient will improve orientation for increased safety in  environment;Patient will improve memory skills for increased safety in environment;Patient will improve functional problem solving skills for increased safety in environment  -HR      Cognitive Linguistic Improve Safety and Awareness- SLP, Date Goal Reviewed 10/31/17  -HR      Cognitive Linguistic Improve Safety and Awareness- SLP, Outcome goal ongoing  -HR        User Key  (r) = Recorded By, (t) = Taken By, (c) = Cosigned By    Initials Name Provider Type     Clementina Murrell, CCC-SLP Speech and Language Pathologist    HR Shante Otero, MS CCC-SLP Speech and Language Pathologist    CK Concetta Springer, MS CCC-SLP Speech and Language Pathologist          EDUCATION  The patient has been educated in the following areas:   Cognitive Impairment.    SLP Recommendation and Plan  SLP Diagnosis: symbolic dysfunction     Rehab Potential: good, to achieve stated therapy goals  Criteria for Skilled Therapeutic Interventions Met: skilled criteria for cognitive linguistic intervention met  Anticipated Discharge Disposition: home with assist     Therapy Frequency: 3-5 times/wk  Predicted Duration of Therapy Intervention (days/wks): until discharge  Expected Duration of Therapy Session (min): 30-45 minutes    Plan of Care Review  Plan Of Care Reviewed With: patient  Progress: progress toward functional goals as expected  Outcome Summary/Follow up Plan: Improvement with short term recall and functional problem solving task this date.           Time Calculation:         Time Calculation- SLP       11/07/17 1349          Time Calculation- SLP    SLP Start Time 1108  -HR      SLP Stop Time 1147  -HR      SLP Time Calculation (min) 39 min  -HR      Total Timed Code Minutes- SLP 39 minute(s)  -HR      SLP Received On 11/07/17  -HR        User Key  (r) = Recorded By, (t) = Taken By, (c) = Cosigned By    Initials Name Provider Type    HR Shante Otero, MS CCC-SLP Speech and Language Pathologist          Therapy Charges for Today      Code Description Service Date Service Provider Modifiers Qty    [id number]  ST TREATMENT SPEECH 3 11/7/2017 Shante Otero MS CCC-SLP GN 1               Shante Otero MS CCC-SLP  11/7/2017

## 2017-11-07 NOTE — THERAPY TREATMENT NOTE
Inpatient Rehabilitation - Occupational Therapy Treatment Note  [facility]     Patient Name: Kenneth Harper Jr.  : 1934  MRN: [id number]  Today's Date: 2017  Onset of Illness/Injury or Date of Surgery Date: 10/30/17  Date of Referral to OT: 10/30/17  Referring Physician: TERRENCE Mckinnon MD      Admit Date: 10/30/2017    Visit Dx:     ICD-10-CM ICD-9-CM   1. Symbolic dysfunction R48.9 784.60   2. Impaired mobility and ADLs Z74.09 799.89   3. Abnormality of gait and mobility R26.9 781.2   4. Muscle weakness (generalized) M62.81 728.87     Patient Active Problem List   Diagnosis   • Spinal stenosis, lumbar region, with neurogenic claudication   • Former smoker   • Overweight   • Left-sided weakness   • Right-sided lacunar stroke   • Essential hypertension   • Diabetes mellitus   • Chronic anxiety   • Chronic back pain greater than 3 months duration   • Prostatic hypertrophy   • Degenerative joint disease involving multiple joints   • Atrial fibrillation, chronic   • Encounter for rehabilitation   • Obstructive sleep apnea syndrome             Adult Rehabilitation Note       17 1108 17 1010 17 0730    Rehab Assessment/Intervention    Discipline speech language pathologist  -HR physical therapy assistant  -RW occupational therapy assistant  -RC    Document Type therapy note (daily note)  -HR therapy note (daily note)  -RW therapy note (daily note)  -RC    Subjective Information no complaints;agree to therapy  -HR agree to therapy  -RW agree to therapy  -RC    Patient Effort, Rehab Treatment good  -HR good  -RW good  -RC    Symptoms Noted During/After Treatment none  -HR      Precautions/Limitations fall precautions  -HR  fall precautions  -RC    Precautions/Limitations, Vision WFL with corrective lenses  -HR      Precautions/Limitations, Hearing WFL  -HR      Recorded by [HR] Shante Otero MS CCC-SLP [RW] Pipe Gruber, PTA [RC] MARCO Cannon/DAWN    Vital Signs     Pretreatment Heart Rate (beats/min)  81  -RW     Pre SpO2 (%)  96  -RW     O2 Delivery Pre Treatment  room air  -RW     Recorded by  [RW] Pipe Gruber PTA     Pain Assessment    Pain Assessment No/denies pain  -HR No/denies pain  -RW No/denies pain  -RC    Pain Score 0  -HR      Post Pain Score 0  -HR      Recorded by [HR] Shante Otero, MS CCC-SLP [RW] Pipe Gruber PTA [RC] Cassie Carpio, LARA/L    Vision Assessment/Intervention    Visual Impairment WFL with corrective lenses  -HR      Recorded by [HR] Shante Otero MS CCC-SLP      Cognitive Assessment/Intervention    Current Cognitive/Communication Assessment functional  -HR functional  -RW functional  -RC    Orientation Status oriented x 4  -HR oriented to;oriented x 4  -RW oriented x 4  -RC    Follows Commands/Answers Questions 100% of the time  -% of the time  -RW     Personal Safety Interventions  gait belt;nonskid shoes/slippers when out of bed  -RW     Recorded by [HR] Shante Otero, MS CCC-SLP [RW] Pipe Gruber PTA [RC] Cassie Carpio, LARA/L    Cognitive Assessment Intervention    Behavior/Mood Observations  behavior appropriate to situation, WNL/WFL  -RW     Recorded by  [RW] Pipe Gruber PTA     Communication Treatment Objective and Progress    Cognitive Linguistic Treatment Objectives Improve orientation;Improve memory skills;Improve functional problem solving  -HR      Recorded by [HR] Shante Otero, MS CCC-SLP      Improve orientation    Improve orientation through: demonstrating orientation to day;demonstrating orientation to month;demonstrating orientation to year;demonstrating orientation to place;demonstrating orientation to disease/impairment;90%;with inconsistent cues  -HR      Status: Improve orientation through Achieved  -HR      Recorded by [HR] Shante Otero MS CCC-SLP      Improve memory skills    Improve memory skills through: recalling related word lists with an imposed delay;90%;with inconsistent cues  -HR       Status: Improve memory skills Progressing as expected  -HR      Memory Skills Progress 70%;80%;continue to address  -HR      Recorded by [HR] Shante Otero MS CCC-SLP      Improve functional problem solving    Improve functional problem solving through: complete organization/home management task;tell similarity between items  -HR      Status: Improve functional problem solving Progressing as expected  -HR      Functional Problem Solving Progress 80%;with consistent cues;continue to address  -HR      Comments: Improve functional problem solving mod cues for map reading task this date with high accuracy  -HR      Recorded by [HR] Shante Otero MS CCC-SLP      Transfer Assessment/Treatment    Transfers, Bed-Chair [place]   stand by assist  -RC    Transfers, Chair-Bed [place]   stand by assist  -RC    Transfers, Bed-Chair-Bed, Assist Device   rolling walker  -RC    Transfers, Sit-Stand [place]  stand by assist;verbal cues required  -RW stand by assist  -RC    Transfers, Stand-Sit [place]  stand by assist;verbal cues required  -RW stand by assist  -RC    Transfers, Sit-Stand-Sit, Assist Device  rolling walker  -RW rolling walker  -RC    Recorded by  [RW] Pipe Gruber PTA [RC] Cassie Carpio, LARA/L    Gait Assessment/Treatment    Gait, [place] Level  verbal cues required;contact guard assist  -RW     Gait, Assistive Device  rolling walker  -RW     Gait, Distance (Feet)  150    x 2  -RW     Gait, Comment  gt improving but will get walker too far away during turns  -RW     Recorded by  [RW] Pipe Gruber PTA     Stairs Assessment/Treatment    Number of Stairs  12  -RW     Stairs, Handrail Location  both sides  -RW     Stairs, [place] Level  supervision required;contact guard assist  -RW     Recorded by  [RW] Pipe Gruber PTA     Functional Mobility    Functional Mobility- Ind. Level   supervision required  -RC    Functional Mobility- Device   rolling walker  -RC    Recorded by    [RC] LEIGH CannonA/L    Wheelchair Training/Management    Wheelchair, Distance Propelled  160 mod ind  -RW     Recorded by  [RW] Pipe Gruber PTA     Grooming Assessment/Training    Grooming Assess/Train, Indepen Level   independent  -RC    Recorded by   [RC] LEIGH CannonA/L    Balance Skills Training    Gait Balance-Level of Assistance  Close supervision  -RW     Gait Balance Support  parallel bars  -RW     Gait Balance Activities  tandem;side-stepping  -RW     Recorded by  [RW] Pipe Gruber PTA     Therapy Exercises    Bilateral Lower Extremities  AROM:;20 reps;sitting;hip flexion;knee flexion;LAQ   2 sets  -RW     BLE Resistance  theraband  -RW     Bilateral Upper Extremity  AROM:;15 reps;20 reps;sitting   tband row  -RW --   ue bike 15 min  -RC    BUE Resistance  theraband  -RW     Recorded by  [RW] Pipe Gruber PTA [RC] LEIGH CannonA/L    Fine Motor Coordination Training    Detail (Fine Motor Coordination Training)   --   lrg beads, card turning  -RC    Recorded by   [RC] MARCO Cannon/L    Positioning and Restraints    Pre-Treatment Position  sitting in chair/recliner  -RW sitting in chair/recliner  -RC    Post Treatment Position  wheelchair  -RW wheelchair  -RC    In Wheelchair   sitting;encouraged to call for assist  -RC    Recorded by  [RW] Pipe Gruber PTA [RC] LEIGH CannonA/DAWN      11/06/17 1332 11/06/17 1002 11/06/17 0901    Rehab Assessment/Intervention    Discipline physical therapy assistant  -RW physical therapy assistant  -RW speech language pathologist  -CK    Document Type therapy note (daily note)  -RW therapy note (daily note)  -RW therapy note (daily note)  -CK    Subjective Information agree to therapy  -RW agree to therapy  -RW agree to therapy  -CK    Patient Effort, Rehab Treatment good  -RW good  -RW good  -CK    Precautions/Limitations fall precautions  -RW fall precautions  -RW     Recorded by [RW] Pipe Gruber PTA [RW] Pipe HOWARD  MARTIN Gruber [CK] Concetta Springer, MS CCC-SLP    Vital Signs    Pretreatment Heart Rate (beats/min)  74  -RW     Pre SpO2 (%)  96  -RW     O2 Delivery Pre Treatment  room air  -RW     Recorded by  [RW] Pipe Gruber PTA     Pain Assessment    Pain Assessment No/denies pain  -RW No/denies pain  -RW No/denies pain  -CK    Pain Score   0  -CK    Post Pain Score   0  -CK    Recorded by [RW] Pipe Gruber PTA [RW] Pipe Gruber PTA [CK] Concetta Springer MS CCC-SLP    Cognitive Assessment/Intervention    Current Cognitive/Communication Assessment functional  -RW functional  -RW     Orientation Status oriented to;oriented x 4  -RW oriented to;oriented x 4  -RW     Follows Commands/Answers Questions 100% of the time  -% of the time  -RW     Personal Safety Interventions gait belt;nonskid shoes/slippers when out of bed  -RW gait belt;nonskid shoes/slippers when out of bed  -RW     Recorded by [RW] Pipe Gruber PTA [RW] Pipe Gruber PTA     Cognitive Assessment Intervention    Behavior/Mood Observations behavior appropriate to situation, WNL/WFL  -RW behavior appropriate to situation, WNL/WFL  -RW     Recorded by [RW] Pipe Gruber PTA [RW] Pipe Gruber PTA     Communication Treatment Objective and Progress    Cognitive Linguistic Treatment Objectives   Improve orientation;Improve memory skills;Improve functional problem solving  -CK    Recorded by   [CK] Concetta Springer, MS CCC-SLP    Improve orientation    Improve orientation through:   demonstrating orientation to day;demonstrating orientation to month;demonstrating orientation to year;demonstrating orientation to place;demonstrating orientation to disease/impairment;90%;with inconsistent cues  -CK    Status: Improve orientation through   Achieved  -CK    Orientation Progress   100%   w/independent use of phone for calendar  -CK    Recorded by   [CK] Concetta Springer, MS CCC-SLP    Improve memory skills    Improve memory skills through:    recalling related word lists with an imposed delay;90%;with inconsistent cues  -CK    Status: Improve memory skills   Progress slower than expected  -CK    Memory Skills Progress   50%  -CK    Comments: Improve memory skills   pt given new word set this date  -CK    Recorded by   [CK] Concetta Springer MS CCC-SLP    Improve functional problem solving    Improve functional problem solving through:   complete organization/home management task;tell similarity between items  -CK    Status: Improve functional problem solving   Progress slower than expected  -CK    Functional Problem Solving Progress   60%  -CK    Comments: Improve functional problem solving   max assist for task  -CK    Recorded by   [CK] Concetta Springer MS CCC-SLP    Transfer Assessment/Treatment    Transfers, Sit-Stand [place] stand by assist;verbal cues required  -RW stand by assist;verbal cues required  -RW     Transfers, Stand-Sit [place] stand by assist;verbal cues required  -RW stand by assist;verbal cues required  -RW     Transfers, Sit-Stand-Sit, Assist Device rolling walker  -RW rolling walker  -RW     Transfer, Comment sit to stand 5 x 2  -RW      Recorded by [RW] Pipe Gruber PTA [RW] Pipe Gruber PTA     Gait Assessment/Treatment    Gait, [place] Level verbal cues required;contact guard assist  -RW minimum assist (75% patient effort);verbal cues required;contact guard assist  -RW     Gait, Assistive Device rolling walker  -RW rolling walker  -RW     Gait, Distance (Feet) 150   70 x 3  -    x 2  -RW     Gait, Comment improved gt quality. minimal foot drag on left  -RW      Recorded by [RW] Pipe Gruber PTA [RW] Pipe Gruber PTA     Balance Skills Training    Gait Balance-Level of Assistance --  -RW Contact guard  -RW     Gait Balance Support --  -RW parallel bars  -RW     Gait Balance Activities --  -RW --   gt w/o ad in // bars  -RW     Recorded by [RW] Pipe Gruber PTA [RW] Pipe Gruber PTA      Therapy Exercises    Bilateral Lower Extremities AROM:;20 reps;sitting;hip flexion;knee flexion;LAQ   2 sets  -RW AROM:;20 reps;sitting;hip flexion;knee flexion;LAQ   2 sets  -RW     BLE Resistance theraband  -RW theraband  -RW     Bilateral Upper Extremity AROM:;15 reps;20 reps;sitting   tband rows 2 sets  -RW AROM:;15 reps;20 reps;sitting   tband rows 2 sets  -RW     BUE Resistance theraband  -RW theraband  -RW     Recorded by [RW] Pipe Gruber PTA [RW] Pipe Gruber PTA     Positioning and Restraints    Pre-Treatment Position sitting in chair/recliner  -RW sitting in chair/recliner  -RW     Post Treatment Position wheelchair  -RW wheelchair  -RW     In Wheelchair call light within reach  -RW call light within reach  -RW     Recorded by [RW] Pipe Gruber PTA [RW] Pipe Gruber PTA       11/06/17 0724 11/04/17 1440 11/04/17 1400    Rehab Assessment/Intervention    Discipline occupational therapy assistant  -KD physical therapy assistant  -     Document Type therapy note (daily note)  -KD therapy note (daily note)  -JW     Subjective Information agree to therapy  -KD agree to therapy  -JW     Patient Effort, Rehab Treatment  good  -JW     Precautions/Limitations  fall precautions  -JW     Recorded by [KD] MARCO Vidales/DAWN [JW] Felicia Haynes PTA     Vital Signs    Pre Treatment Diastolic   -KD      Intra Systolic BP Rehab 65  -KD      Pretreatment Heart Rate (beats/min) 74  -KD      Posttreatment Heart Rate (beats/min) 76  -KD      Pre SpO2 (%) 96  -KD      O2 Delivery Pre Treatment room air  -KD      Post SpO2 (%) 94  -KD      O2 Delivery Post Treatment room air  -KD      Pre Patient Position Sitting  -KD Sitting  -JW     Intra Patient Position Standing  -KD      Post Patient Position Sitting  -KD Supine  -JW     Recorded by [KD] MARCO Vidales/DAWN [JW] Felicia Haynes PTA     Pain Assessment    Pain Assessment No/denies pain  -KD 0-10  -JW     Pain Score  0  -JW     Post  Pain Score  0  -JW     Recorded by [KD] LEIGH VidalesA/L [JW] Felicia Haynes PTA     Cognitive Assessment/Intervention    Current Cognitive/Communication Assessment functional  -KD impaired  -JW     Orientation Status oriented x 4  -KD oriented to;oriented x 4  -JW     Follows Commands/Answers Questions 100% of the time  -% of the time  -JW     Personal Safety Interventions  fall prevention program maintained;gait belt;nonskid shoes/slippers when out of bed  -JW     Recorded by [KD] LEIGH VidalesA/L [JW] Felicia Haynes PTA     Bed Mobility, Assessment/Treatment    Bed Mobility, Assistive Device  --   HOB down, no bedrails  -JW     Bed Mob, Sit to Supine, [place]  supervision required  -JW     Recorded by  [JW] Felicia Haynes PTA     Transfer Assessment/Treatment    Transfers, Bed-Chair [place] contact guard assist  -KD      Transfers, Sit-Stand [place] contact guard assist  -KD stand by assist;verbal cues required  -JW --  -JW    Transfers, Stand-Sit [place] contact guard assist  -KD stand by assist;verbal cues required  -JW --  -JW    Transfers, Sit-Stand-Sit, Assist Device rolling walker  -KD rolling walker  -JW --  -JW    Transfer, Comment 4 sit-stands  -KD      Recorded by [KD] MARCO Vidales/L [JW] Felicia Haynes PTA [JW] Felicia Haynes PTA    Gait Assessment/Treatment    Gait, [place] Level  minimum assist (75% patient effort);verbal cues required  -JW --  -JW    Gait, Assistive Device  rolling walker  -JW --  -JW    Gait, Distance (Feet)  58  -JW --  -JW    Gait, Comment  pt very fatigued this afternoon, requesting to lie down  -JW --  -JW    Recorded by  [JW] Felicia Haynes PTA [JW] Felicia Haynes PTA    Stairs Assessment/Treatment    Number of Stairs  '  -JW     Recorded by  [JW] Felicia Haynes PTA     Functional Mobility    Functional Mobility- Ind. Level contact guard assist  -KD      Functional  Mobility- Device rolling walker  -KD      Functional Mobility-Distance (Feet) 150   x 2  -KD      Recorded by [KD] LEIGH VidalesA/L      Grooming Assessment/Training    Grooming Assess/Train, Assistive Device electric razor   comb hair  -KD      Grooming Assess/Train, Position sitting;sink side  -KD      Grooming Assess/Train, Indepen Level conditional independence  -KD      Recorded by [KD] LEIGH VidalesA/L      Self-Feeding Assessment/Training    Self-Feeding Assess/Train, Position sitting  -KD      Self-Feeding Assess/Train, [place] conditional independence  -KD      Self-Feeding Assess/Train, Spillage Amount minimal  -KD      Recorded by [KD] LEIGH VidalesA/L      Therapy Exercises    Bilateral Lower Extremities  AROM:;20 reps;supine;ankle pumps/circles;glut sets;quad sets;heel slides;hip abduction/adduction  -JW --  -JW    Bilateral Upper Extremity AROM:;20 reps;sitting;elbow flexion/extension;pronation/supination;shoulder abduction/adduction;shoulder circles;shoulder extension/flexion;shoulder horizontal abd/add   UEE x 15 mins with 1 RB  -KD      BUE Resistance manual resistance- moderate   [place] system x 15 mins with RB's PRN  -KD      Trunk Exercises  supine;bridging;15 reps   hooklying trunk rotation  -JW     Recorded by [KD] MARCO Vidales/DAWN [JW] Felicia Haynes PTA [JW] Felicia Haynes PTA    Positioning and Restraints    Pre-Treatment Position sitting in chair/recliner  -KD in bed  -JW     Post Treatment Position wheelchair  -KD bed  -JW     In Bed sitting;sitting EOB;call light within reach;encouraged to call for assist;exit alarm on  -KD supine;call light within reach;encouraged to call for assist  -JW     Recorded by [KD] MARCO Vidales/DAWN [JW] Felicia Haynes PTA       User Key  (r) = Recorded By, (t) = Taken By, (c) = Cosigned By    Initials Name Effective Dates    JENA Haynes PTA 08/11/15 -     HR Shante Otero MS CCC-SLP 12/15/16  -     CK Concetta Springer, MS CCC-SLP 10/17/16 -     RW Pipe Gruber, PTA 10/17/16 -     RC Cassie Carpio, LARA/L 10/17/16 -     KD Therese King, LARA/L 10/17/16 -                 OT Goals       11/07/17 1351 11/06/17 1425 11/06/17 0724    Transfer Training OT LTG    Transfer Training OT LTG, Assist Device   walker, rolling platform  -KD    Transfer Training OT LTG, Date Goal Reviewed 11/07/17  -RC  11/06/17  -KD    Transfer Training OT LTG, Outcome goal ongoing  -RC  goal not met  -KD    Patient Education OT LTG    Patient Education OT LTG, Date Goal Reviewed 11/07/17  -RC  11/06/17  -KD    Patient Education OT LTG Outcome goal ongoing  -RC  goal not met  -KD    Safety Awareness OT LTG    Safety Awareness OT LTG, Date Goal Reviewed 11/07/17  -RC 11/06/17  -KD     Safety Awareness OT LTG, Outcome goal ongoing  -RC goal not met  -KD     ADL OT LTG    ADL OT LTG, Date Goal Reviewed 11/07/17  -RC  11/06/17  -KD    ADL OT LTG, Outcome goal ongoing  -RC  goal not met  -KD    Endurance OT LTG    Endurance Goal OT LTG, Date Goal Reviewed 11/07/17  -RC  11/06/17  -KD    Endurance Goal OT LTG, Outcome goal partially met  -RC  goal not met  -KD      11/04/17 0738 11/03/17 1359 11/02/17 0715    Transfer Training OT LTG    Transfer Training OT LTG, Date Goal Reviewed 11/04/17  -RC 11/03/17  -LM 11/02/17  -KD    Transfer Training OT LTG, Outcome  goal ongoing  -LM goal not met  -KD    Patient Education OT LTG    Patient Education OT LTG, Date Goal Reviewed 11/04/17  -RC 11/03/17  -LM 11/02/17  -KD    Patient Education OT LTG Outcome  goal not met  -LM goal not met  -KD    Safety Awareness OT LTG    Safety Awareness OT LTG, Date Goal Reviewed 11/04/17  -RC 11/03/17  -LM 11/02/17  -KD    Safety Awareness OT LTG, Outcome  goal not met  -LM goal not met  -KD    ADL OT LTG    ADL OT LTG, Date Goal Reviewed 11/04/17  -RC 11/03/17  -LM     ADL OT LTG, Outcome  goal not met  -LM goal not met  -KD    Endurance OT LTG    Endurance  Goal OT LTG, Date Goal Reviewed 11/04/17  -RC 11/03/17  -LM 11/02/17  -KD    Endurance Goal OT LTG, Outcome goal ongoing  -RC goal not met  -LM goal not met  -KD      11/01/17 1635 10/31/17 0925       Transfer Training OT LTG    Transfer Training OT LTG, Date Established  10/31/17  -BH (r) SP (t) BH (c)     Transfer Training OT LTG, Time to Achieve  by discharge  -BH (r) SP (t) BH (c)     Transfer Training OT LTG, Activity Type  all transfers  -BH (r) SP (t) BH (c)     Transfer Training OT LTG, [place] Level  contact guard assist  -BH (r) SP (t) BH (c)     Transfer Training OT LTG, Date Goal Reviewed 11/01/17  -RW      Transfer Training OT LTG, Outcome goal ongoing  -RW      Patient Education OT LTG    Patient Education OT LTG, Date Established  10/31/17  -BH (r) SP (t) BH (c)     Patient Education OT LTG, Time to Achieve  by discharge  -BH (r) SP (t) BH (c)     Patient Education OT LTG, Education Type  HEP;posture/body mechanics;1 hand/anni technique;home safety;adaptive equipment mgmt;energy conservation  -BH (r) SP (t) BH (c)     Patient Education OT LTG, Education Understanding  independent;demonstrates adequately;verbalizes understanding   with caregiver assist as necessary  -BH (r) SP (t) BH (c)     Patient Education OT LTG, Date Goal Reviewed 11/01/17  -RW      Patient Education OT LTG Outcome goal ongoing  -RW      Safety Awareness OT LTG    Safety Awareness OT LTG, Date Established  10/31/17  -BH (r) SP (t) BH (c)     Safety Awareness OT LTG, Time to Achieve  by discharge  -BH (r) SP (t) BH (c)     Safety Awareness OT LTG, Activity Type  good safety awareness;with kitchen mobility;with ADL's  -BH (r) SP (t) BH (c)     Safety Awareness OT LTG, [place] Level  min verbal cues  -BH (r) SP (t) BH (c)     Safety Awareness OT LTG, Date Goal Reviewed 11/01/17  -RW      Safety Awareness OT LTG, Outcome goal ongoing  -RW      ADL OT LTG    ADL OT LTG, Date Established  10/31/17  -BH (r) SP (t) BH (c)      ADL OT LTG, Time to Achieve  by discharge  -BH (r) SP (t) BH (c)     ADL OT LTG, Activity Type  ADL skills  -BH (r) SP (t) BH (c)     ADL OT LTG, Additional Goal  Set-up A with self-feeding and grooming; VC for UB bathing and UB dressing; CGA with LB bathing and LB dressing  -BH (r) SP (t) BH (c)     ADL OT LTG, Date Goal Reviewed 11/01/17  -RW      ADL OT LTG, Outcome goal ongoing  -RW      Endurance OT LTG    Endurance Goal OT LTG, Date Established  10/31/17  -BH (r) SP (t) BH (c)     Endurance Goal OT LTG, Time to Achieve  by discharge  -BH (r) SP (t) BH (c)     Endurance Goal OT LTG, Activity Level  endurance 2 good-  -BH (r) SP (t) BH (c)     Endurance Goal OT LTG, Additional Goal  During 30 minutes of functional tasks, ADL, and therapeutic exercise  -BH (r) SP (t) BH (c)     Endurance Goal OT LTG, Date Goal Reviewed 11/01/17  -RW      Endurance Goal OT LTG, Outcome goal ongoing  -RW        User Key  (r) = Recorded By, (t) = Taken By, (c) = Cosigned By    Initials Name Provider Type     Karoline Huang, OTR/L Occupational Therapist    RW Jacinta Pitts, OTR/L Occupational Therapist    RC Cassie Carpio, LARA/L Occupational Therapy Assistant    KD Therese King, LARA/L Occupational Therapy Assistant    MOSHE Boucher, LARA/L Occupational Therapy Assistant    DIONTE Bruce, OT Student OT Student          Occupational Therapy Education     Title: PT OT SLP Therapies (Active)     Topic: Occupational Therapy (Active)     Point: ADL training (Active)    Description: Instruct learner(s) on proper safety adaptation and remediation techniques during self care or transfers.   Instruct in proper use of assistive devices.    Learning Progress Summary    Learner Readiness Method Response Comment Documented by Status   Patient Acceptance TB NR  KD 11/02/17 1108 Active    Acceptance E,D NR  RW 11/01/17 1518 Active    Acceptance E VU Educated about OT and POC. Educated about anni dressing technique. Educated  about safety with ADL and t/f. Educated to call for assist and not to stand independently. SP 10/31/17 1354 Done   Family Acceptance E,D NR   11/01/17 1518 Active    Acceptance E VU Educated about OT and POC. Educated about anni dressing technique. Educated about safety with ADL and t/f. Educated to call for assist and not to stand independently. SP 10/31/17 1354 Done               Point: Home exercise program (Active)    Description: Instruct learner(s) on appropriate technique for monitoring, assisting and/or progressing therapeutic exercises/activities.    Learning Progress Summary    Learner Readiness Method Response Comment Documented by Status   Patient Acceptance E,D NR theraputty  11/01/17 1634 Active   Family Acceptance E,D NR theraputty RW 11/01/17 1634 Active               Point: Precautions (Active)    Description: Instruct learner(s) on prescribed precautions during self-care and functional transfers.    Learning Progress Summary    Learner Readiness Method Response Comment Documented by Status   Patient Acceptance E NR   11/07/17 1350 Active    Acceptance E NR   11/04/17 0930 Active    Acceptance E,D NR   11/01/17 1518 Active    Acceptance E VU Educated about OT and POC. Educated about anni dressing technique. Educated about safety with ADL and t/f. Educated to call for assist and not to stand independently. SP 10/31/17 1354 Done   Family Acceptance E,D NR   11/01/17 1518 Active    Acceptance E VU Educated about OT and POC. Educated about anni dressing technique. Educated about safety with ADL and t/f. Educated to call for assist and not to stand independently. SP 10/31/17 1354 Done               Point: Body mechanics (Active)    Description: Instruct learner(s) on proper positioning and spine alignment during self-care, functional mobility activities and/or exercises.    Learning Progress Summary    Learner Readiness Method Response Comment Documented by Status   Patient Acceptance E NR    11/07/17 1350 Active    Acceptance E NR   11/04/17 0930 Active    Acceptance E VU Educated about OT and POC. Educated about anni dressing technique. Educated about safety with ADL and t/f. Educated to call for assist and not to stand independently.  10/31/17 1354 Done   Family Acceptance E VU Educated about OT and POC. Educated about anni dressing technique. Educated about safety with ADL and t/f. Educated to call for assist and not to stand independently.  10/31/17 1354 Done                      User Key     Initials Effective Dates Name Provider Type Discipline    RW 10/17/16 -  Jacinta Pitts OTR/L Occupational Therapist OT    RC 10/17/16 -  Cassie Carpio LARA/L Occupational Therapy Assistant OT    KD 10/17/16 -  MARCO Vidales/L Occupational Therapy Assistant OT    SP 01/31/17 -  Alem Bruce OT Student OT Student OT                  OT Recommendation and Plan  Anticipated Equipment Needs At Discharge: bathing equipment, dressing equipment, front wheeled walker  Anticipated Discharge Disposition: home with / care, home with home health  Planned Therapy Interventions: activity intolerance, adaptive equipment training, ADL retraining, IADL retraining, balance training, bed mobility training, energy conservation, fine motor coordination training, home exercise program, motor coordination training, neuromuscular re-education, ROM (Range of Motion), strengthening, transfer training  Therapy Frequency: other (see comments) (3-14x/wk)  Plan of Care Review  Plan Of Care Reviewed With: patient  Progress: improving  Outcome Summary/Follow up Plan: working well w/ tx   cont poc        Outcome Measures       11/07/17 0730 11/06/17 1000 11/06/17 0724    How much help from another person do you currently need...    Turning from your back to your side while in flat bed without using bedrails?  3  -RW     Moving from lying on back to sitting on the side of a flat bed without bedrails?  3  -RW      Moving to and from a bed to a chair (including a wheelchair)?  3  -RW     Standing up from a chair using your arms (e.g., wheelchair, bedside chair)?  3  -RW     Climbing 3-5 steps with a railing?  3  -RW     To walk in hospital room?  3  -RW     AM-PAC 6 Clicks Score  18  -RW     How much help from another is currently needed...    Putting on and taking off regular lower body clothing? 3  -RC  3  -KD    Bathing (including washing, rinsing, and drying) 3  -RC  3  -KD    Toileting (which includes using toilet bed pan or urinal) 2  -RC  2  -KD    Putting on and taking off regular upper body clothing 3  -RC  3  -KD    Taking care of personal grooming (such as brushing teeth) 3  -RC  3  -KD    Eating meals 3  -RC  3  -KD    Score 17  -RC  17  -KD      User Key  (r) = Recorded By, (t) = Taken By, (c) = Cosigned By    Initials Name Provider Type    RW Pipe Gruber, PTA Physical Therapy Assistant     MARCO Cannon/L Occupational Therapy Assistant    MARCO Sánchez/L Occupational Therapy Assistant           Time Calculation:         Time Calculation- OT       11/07/17 1353          Time Calculation- OT    OT Start Time 0730  -RC      OT Stop Time 0900  -RC      OT Time Calculation (min) 90 min  -      OT Received On 11/07/17  -        User Key  (r) = Recorded By, (t) = Taken By, (c) = Cosigned By    Initials Name Provider Type     MARCO Cannon/L Occupational Therapy Assistant           Therapy Charges for Today     Code Description Service Date Service Provider Modifiers Qty    10692544613 HC OT THER PROC EA 15 MIN 11/7/2017 Cassie Carpio, LARA/L GO 1    30905771540 HC OT THERAPEUTIC ACT EA 15 MIN 11/7/2017 Cassie Carpio LARA/L GO 5          OT G-codes  OT Professional Judgement Used?: Yes  OT Functional Scales Options: AM-PAC 6 Clicks Daily Activity (OT)  Score: 15  Functional Limitation: Self care  Self Care Current Status (): At least 40 percent but less than 60 percent  impaired, limited or restricted  Self Care Goal Status (): At least 1 percent but less than 20 percent impaired, limited or restricted    MARCO Cannon/DAWN  11/7/2017

## 2017-11-07 NOTE — PROGRESS NOTES
LOS: 8 days   Patient Care Team:  Evan Marrero MD as PCP - General (Family Medicine)    Chief Complaint:   Right-sided lacunar stroke          Interval History:     SUBJECTIVE:  No pain no shortness of breath eating well sleeping well  History taken from: patient chart RN    Objective     Vital Signs  Temp:  [96.6 °F (35.9 °C)-96.7 °F (35.9 °C)] 96.7 °F (35.9 °C)  Heart Rate:  [66-84] 84  Resp:  [18] 18  BP: (103-140)/(55-74) 140/74  Last 3 weights    11/04/17  0431 11/06/17  0400 11/07/17  0500   Weight: 207 lb 9.6 oz (94.2 kg) 202 lb 8 oz (91.9 kg) 204 lb 3.2 oz (92.6 kg)         Physical Exam:     General Appearance:    Alert, cooperative, in no acute distress   Head:    Normocephalic, without obvious abnormality, atraumatic   Eyes:            Lids and lashes normal, conjunctivae and sclerae normal, no   icterus, no pallor, corneas clear, PERRLA   Throat:   No oral lesions, no thrush, oral mucosa moist   Neck:   No adenopathy, supple, trachea midline, no thyromegaly, no   carotid bruit, no JVD       Lungs:     Clear to auscultation,respirations regular, even and                  Unlabored     Heart:    Regular rhythm and normal rate, normal S1 and S2, no            murmur, no gallop, no rub, no click    Chest Wall:    No abnormalities observed   Abdomen:     Normal bowel sounds, no masses, no organomegaly, soft        non-tender, non-distended, no guarding, no rebound                Tenderness    Extremities:   Moves all extremities well, no edema, no cyanosis, no             Redness        Skin:   No bleeding, bruising or rash   Lymph nodes:   No palpable adenopathy   Neurologic:   Cranial nerves 2 - 12 grossly intact, sensation intact, DTR       present and equal bilaterally Strength ataxia improving       RESULTS REVIEW:     Lab Results (last 24 hours)     Procedure Component Value Units Date/Time    POC Glucose Fingerstick [[id number]]  (Normal) Collected:  11/06/17 1635    Specimen:  Blood  Updated:  11/06/17 1648     Glucose 85 mg/dL       RN NotifiedMeter: [id number]: [id number] RIGOBERTO KLARISSA       POC Glucose Fingerstick [[id number]]  (Normal) Collected:  11/06/17 1054    Specimen:  Blood Updated:  11/06/17 2034     Glucose 114 mg/dL       RN NotifiedMeter: [id number]: [id number] RIGOBERTO KLARISSA       POC Glucose Fingerstick [[id number]]  (Normal) Collected:  11/06/17 2017    Specimen:  Blood Updated:  11/06/17 2035     Glucose 113 mg/dL       Meter: [id number]: [id number] KEYLA JONES       POC Glucose Fingerstick [[id number]]  (Normal) Collected:  11/07/17 0608    Specimen:  Blood Updated:  11/07/17 0700     Glucose 107 mg/dL       Meter: [id number]: [id number] KEYLA JONES           Imaging Results (last 72 hours)     ** No results found for the last 72 hours. **          Assessment/Plan     Principal Problem:    Right-sided lacunar stroke  Active Problems:    Left-sided weakness    Atrial fibrillation, chronic    Essential hypertension    Diabetes mellitus    Chronic anxiety    Chronic back pain greater than 3 months duration    Degenerative joint disease involving multiple joints    Encounter for rehabilitation    Obstructive sleep apnea syndrome    Former smoker        Participating well with therapy making good progress towards discharge goals.  He is starting to think about going home with his family  Diabetes hypertension well controlled      Vishnu Mckinnon MD  11/07/17  10:34 AM

## 2017-11-07 NOTE — PLAN OF CARE
Problem: Patient Care Overview (Adult)  Goal: Plan of Care Review  Outcome: Ongoing (interventions implemented as appropriate)    11/07/17 1145   Coping/Psychosocial Response Interventions   Plan Of Care Reviewed With patient   Patient Care Overview   Progress progress toward functional goals as expected   Outcome Evaluation   Outcome Summary/Follow up Plan Improvement with short term recall and functional problem solving task this date.          Problem: Inpatient SLP  Goal: Cognitive-linguistic-Patient will improve Cognitive-linguistic skills to improve safety and safety awareness in environment  Outcome: Ongoing (interventions implemented as appropriate)    10/31/17 1244 11/07/17 1145   Cognitive Linguistic- Improve Safety and Awareness   Cognitive Linguistic Improve Safety and Awareness- SLP, Date Established 10/31/17 --    Cognitive Linguistic Improve Safety and Awareness- SLP, Time to Achieve by discharge --    Cognitive Linguistic Improve Safety and Awareness- SLP, Activity Level Patient will improve orientation for increased safety in environment;Patient will improve memory skills for increased safety in environment;Patient will improve functional problem solving skills for increased safety in environment --    Cognitive Linguistic Improve Safety and Awareness- SLP, Date Goal Reviewed --  11/07/17   Cognitive Linguistic Improve Safety and Awareness- SLP, Outcome --  goal ongoing

## 2017-11-07 NOTE — THERAPY TREATMENT NOTE
Inpatient Rehabilitation - Physical Therapy Treatment Note  [facility]     Patient Name: Kenneth Harper Jr.  : 1934  MRN: [id number]  Today's Date: 2017  Onset of Illness/Injury or Date of Surgery Date: 10/30/17  Date of Referral to PT: 10/30/17  Referring Physician: TERRENCE Mckinnon MD    Admit Date: 10/30/2017    Visit Dx:    ICD-10-CM ICD-9-CM   1. Symbolic dysfunction R48.9 784.60   2. Impaired mobility and ADLs Z74.09 799.89   3. Abnormality of gait and mobility R26.9 781.2   4. Muscle weakness (generalized) M62.81 728.87     Patient Active Problem List   Diagnosis   • Spinal stenosis, lumbar region, with neurogenic claudication   • Former smoker   • Overweight   • Left-sided weakness   • Right-sided lacunar stroke   • Essential hypertension   • Diabetes mellitus   • Chronic anxiety   • Chronic back pain greater than 3 months duration   • Prostatic hypertrophy   • Degenerative joint disease involving multiple joints   • Atrial fibrillation, chronic   • Encounter for rehabilitation   • Obstructive sleep apnea syndrome               Adult Rehabilitation Note       17 1320 17 1108 17 1010    Rehab Assessment/Intervention    Discipline physical therapy assistant  -RW speech language pathologist  -HR physical therapy assistant  -RW    Document Type therapy note (daily note)  -RW therapy note (daily note)  -HR therapy note (daily note)  -RW    Subjective Information agree to therapy  -RW no complaints;agree to therapy  -HR agree to therapy  -RW    Patient Effort, Rehab Treatment good  -RW good  -HR good  -RW    Symptoms Noted During/After Treatment  none  -HR     Precautions/Limitations  fall precautions  -HR     Precautions/Limitations, Vision  WFL with corrective lenses  -HR     Precautions/Limitations, Hearing  WFL  -HR     Recorded by [RW] Pipe Gruber, PTA [HR] Shante Otero MS CCC-SLP [RW] Pipe Gruber PTA    Vital Signs    Pretreatment Heart Rate (beats/min)   81   -RW    Pre SpO2 (%)   96  -RW    O2 Delivery Pre Treatment   room air  -RW    Recorded by   [RW] Pipe Gruber PTA    Pain Assessment    Pain Assessment No/denies pain  -RW No/denies pain  -HR No/denies pain  -RW    Pain Score  0  -HR     Post Pain Score  0  -HR     Recorded by [RW] Pipe Gruber PTA [HR] Shante Otero, MS CCC-SLP [RW] Pipe Gruber PTA    Vision Assessment/Intervention    Visual Impairment  WFL with corrective lenses  -HR     Recorded by  [HR] Shante Otero, MS CCC-SLP     Cognitive Assessment/Intervention    Current Cognitive/Communication Assessment functional  -RW functional  -HR functional  -RW    Orientation Status oriented to;oriented x 4  -RW oriented x 4  -HR oriented to;oriented x 4  -RW    Follows Commands/Answers Questions  100% of the time  -% of the time  -RW    Personal Safety Interventions   gait belt;nonskid shoes/slippers when out of bed  -RW    Recorded by [RW] Pipe Gruber PTA [HR] Shante Otero, MS CCC-SLP [RW] Pipe Gruber PTA    Cognitive Assessment Intervention    Behavior/Mood Observations behavior appropriate to situation, WNL/WFL  -RW  behavior appropriate to situation, WNL/WFL  -RW    Recorded by [RW] Pipe Gruber PTA  [RW] Pipe Gruber PTA    Communication Treatment Objective and Progress    Cognitive Linguistic Treatment Objectives  Improve orientation;Improve memory skills;Improve functional problem solving  -HR     Recorded by  [HR] Shante Otero, MS CCC-SLP     Improve orientation    Improve orientation through:  demonstrating orientation to day;demonstrating orientation to month;demonstrating orientation to year;demonstrating orientation to place;demonstrating orientation to disease/impairment;90%;with inconsistent cues  -HR     Status: Improve orientation through  Achieved  -HR     Recorded by  [HR] Shante Otero, MS CCC-SLP     Improve memory skills    Improve memory skills through:  recalling related word lists with an imposed  delay;90%;with inconsistent cues  -HR     Status: Improve memory skills  Progressing as expected  -HR     Memory Skills Progress  70%;80%;continue to address  -HR     Recorded by  [HR] Shante Otero MS CCC-SLP     Improve functional problem solving    Improve functional problem solving through:  complete organization/home management task;tell similarity between items  -HR     Status: Improve functional problem solving  Progressing as expected  -HR     Functional Problem Solving Progress  80%;with consistent cues;continue to address  -HR     Comments: Improve functional problem solving  mod cues for map reading task this date with high accuracy  -HR     Recorded by  [HR] Shante Otero MS CCC-SLP     Bed Mobility, Assessment/Treatment    Bed Mobility, Scoot/Bridge, [place] independent  -RW      Bed Mob, Supine to Sit, [place] independent  -RW      Bed Mob, Sit to Supine, [place] independent  -RW      Recorded by [RW] Pipe Gruber PTA      Transfer Assessment/Treatment    Transfers, Bed-Chair [place] supervision required  -RW      Transfers, Chair-Bed [place] supervision required  -RW      Transfers, Sit-Stand [place] stand by assist;verbal cues required  -RW  stand by assist;verbal cues required  -RW    Transfers, Stand-Sit [place] stand by assist;verbal cues required  -RW  stand by assist;verbal cues required  -RW    Transfers, Sit-Stand-Sit, Assist Device rolling walker  -RW  rolling walker  -RW    Recorded by [RW] Pipe Gruber PTA  [RW] Pipe Gruber PTA    Gait Assessment/Treatment    Gait, [place] Level verbal cues required;contact guard assist  -RW  verbal cues required;contact guard assist  -RW    Gait, Assistive Device rolling walker  -RW  rolling walker  -RW    Gait, Distance (Feet)   150    x 2  -RW    Gait, Comment   gt improving but will get walker too far away during turns  -RW    Recorded by [RW] Pipe Gruber PTA  [RW] Pipe Gruber PTA    Stairs  Assessment/Treatment    Number of Stairs   12  -RW    Stairs, Handrail Location   both sides  -RW    Stairs, [place] Level   supervision required;contact guard assist  -RW    Recorded by   [RW] Pipe Gruber PTA    Wheelchair Training/Management    Wheelchair, Distance Propelled 160 mod ind  -RW  160 mod ind  -RW    Recorded by [RW] Pipe Gruber PTA  [RW] Pipe Gruber PTA    Balance Skills Training    Gait Balance-Level of Assistance   Close supervision  -RW    Gait Balance Support   parallel bars  -RW    Gait Balance Activities   tandem;side-stepping  -RW    Recorded by   [RW] Pipe Gruber PTA    Therapy Exercises    Bilateral Lower Extremities AROM:;20 reps;supine;ankle pumps/circles;heel slides;hip abduction/adduction;SAQ   pro 2 level 3 x 10 min. 2 sets of hs  -RW  AROM:;20 reps;sitting;hip flexion;knee flexion;LAQ   2 sets  -RW    BLE Resistance   theraband  -RW    Bilateral Upper Extremity   AROM:;15 reps;20 reps;sitting   tband row  -RW    BUE Resistance   theraband  -RW    Recorded by [RW] Pipe Gruber PTA  [RW] Pipe Gruber PTA    Positioning and Restraints    Pre-Treatment Position sitting in chair/recliner  -RW  sitting in chair/recliner  -RW    Post Treatment Position wheelchair  -RW  wheelchair  -RW    Recorded by [RW] Pipe Gruber PTA  [RW] Pipe Gruber PTA      11/07/17 0730 11/06/17 1332 11/06/17 1002    Rehab Assessment/Intervention    Discipline occupational therapy assistant  -RC physical therapy assistant  -RW physical therapy assistant  -RW    Document Type therapy note (daily note)  -RC therapy note (daily note)  -RW therapy note (daily note)  -RW    Subjective Information agree to therapy  -RC agree to therapy  -RW agree to therapy  -RW    Patient Effort, Rehab Treatment good  -RC good  -RW good  -RW    Precautions/Limitations fall precautions  -RC fall precautions  -RW fall precautions  -RW    Recorded by [RC] MARCO Cannon/L [RW] Pipe Gruber PTA [RW]  Pipe Gruber PTA    Vital Signs    Pretreatment Heart Rate (beats/min)   74  -RW    Pre SpO2 (%)   96  -RW    O2 Delivery Pre Treatment   room air  -RW    Recorded by   [RW] Pipe Gruber PTA    Pain Assessment    Pain Assessment No/denies pain  -RC No/denies pain  -RW No/denies pain  -RW    Recorded by [RC] Cassie Carpio, LARA/L [RW] Pipe Gruber PTA [RW] Pipe Gruber PTA    Cognitive Assessment/Intervention    Current Cognitive/Communication Assessment functional  -RC functional  -RW functional  -RW    Orientation Status oriented x 4  -RC oriented to;oriented x 4  -RW oriented to;oriented x 4  -RW    Follows Commands/Answers Questions  100% of the time  -% of the time  -RW    Personal Safety Interventions  gait belt;nonskid shoes/slippers when out of bed  -RW gait belt;nonskid shoes/slippers when out of bed  -RW    Recorded by [RC] Cassie Carpio, LARA/L [RW] Pipe Gruber, MARTIN [RW] Pipe Gruber PTA    Cognitive Assessment Intervention    Behavior/Mood Observations  behavior appropriate to situation, WNL/WFL  -RW behavior appropriate to situation, WNL/WFL  -RW    Recorded by  [RW] Pipe Gruber PTA [RW] Pipe Gruber PTA    Transfer Assessment/Treatment    Transfers, Bed-Chair [place] stand by assist  -RC      Transfers, Chair-Bed [place] stand by assist  -RC      Transfers, Bed-Chair-Bed, Assist Device rolling walker  -RC      Transfers, Sit-Stand [place] stand by assist  -RC stand by assist;verbal cues required  -RW stand by assist;verbal cues required  -RW    Transfers, Stand-Sit [place] stand by assist  -RC stand by assist;verbal cues required  -RW stand by assist;verbal cues required  -RW    Transfers, Sit-Stand-Sit, Assist Device rolling walker  -RC rolling walker  -RW rolling walker  -RW    Transfer, Comment  sit to stand 5 x 2  -RW     Recorded by [RC] Cassie Carpio, LARA/L [RW] Pipe Gruber PTA [RW] Pipe Gruber PTA    Gait Assessment/Treatment     Gait, [place] Level  verbal cues required;contact guard assist  -RW minimum assist (75% patient effort);verbal cues required;contact guard assist  -RW    Gait, Assistive Device  rolling walker  -RW rolling walker  -RW    Gait, Distance (Feet)  150   70 x 3  -    x 2  -RW    Gait, Comment  improved gt quality. minimal foot drag on left  -RW     Recorded by  [RW] Pipe Gruber PTA [RW] Pipe Gruber PTA    Functional Mobility    Functional Mobility- Ind. Level supervision required  -RC      Functional Mobility- Device rolling walker  -RC      Recorded by [RC] MARCO Cannon/L      Grooming Assessment/Training    Grooming Assess/Train, [place]ep[place] Level independent  -RC      Recorded by [RC] MARCO Cannon/L      Balance Skills Training    Gait Balance-Level of Assistance  --  -RW Contact guard  -RW    Gait Balance Support  --  -RW parallel bars  -RW    Gait Balance Activities  --  -RW --   gt w/o ad in // bars  -RW    Recorded by  [RW] Pipe Gruber PTA [RW] Pipe Gruber PTA    Therapy Exercises    Bilateral Lower Extremities  AROM:;20 reps;sitting;hip flexion;knee flexion;LAQ   2 sets  -RW AROM:;20 reps;sitting;hip flexion;knee flexion;LAQ   2 sets  -RW    BLE Resistance  theraband  -RW theraband  -RW    Bilateral Upper Extremity --   ue bike 15 min  -RC AROM:;15 reps;20 reps;sitting   tband rows 2 sets  -RW AROM:;15 reps;20 reps;sitting   tband rows 2 sets  -RW    BUE Resistance  theraband  -RW theraband  -RW    Recorded by [RC] MARCO Cannon/L [RW] Pipe Gruber PTA [RW] Pipe Gruber PTA    Fine Motor Coordination Training    Detail (Fine Motor Coordination Training) --   lrg beads, card turning  -RC      Recorded by [RC] MARCO Cannon/L      Positioning and Restraints    Pre-Treatment Position sitting in chair/recliner  -RC sitting in chair/recliner  -RW sitting in chair/recliner  -RW    Post Treatment Position wheelchair  -RC wheelchair  -RW wheelchair  -RW    In  Wheelchair sitting;encouraged to call for assist  -RC call light within reach  -RW call light within reach  -RW    Recorded by [RC] MARCO Cannon/L [RW] Pipe Gruber PTA [RW] Pipe Gruber, PTA      11/06/17 0901 11/06/17 0724 11/04/17 1440    Rehab Assessment/Intervention    Discipline speech language pathologist  -CK occupational therapy assistant  -KD physical therapy assistant  -JW    Document Type therapy note (daily note)  -CK therapy note (daily note)  -KD therapy note (daily note)  -JW    Subjective Information agree to therapy  -CK agree to therapy  -KD agree to therapy  -JW    Patient Effort, Rehab Treatment good  -CK  good  -JW    Precautions/Limitations   fall precautions  -JW    Recorded by [CK] Concetta Springer, MS CCC-SLP [KD] MARCO Vidales/DAWN [JW] Felicia Haynes, PTA    Vital Signs    Pre Treatment Diastolic BP  124  -KD     Intra Systolic BP Rehab  65  -KD     Pretreatment Heart Rate (beats/min)  74  -KD     Posttreatment Heart Rate (beats/min)  76  -KD     Pre SpO2 (%)  96  -KD     O2 Delivery Pre Treatment  room air  -KD     Post SpO2 (%)  94  -KD     O2 Delivery Post Treatment  room air  -KD     Pre Patient Position  Sitting  -KD Sitting  -JW    Intra Patient Position  Standing  -KD     Post Patient Position  Sitting  -KD Supine  -JW    Recorded by  [KD] MARCO Vidales/DAWN [JW] Felicia Haynes, PTA    Pain Assessment    Pain Assessment No/denies pain  -CK No/denies pain  -KD 0-10  -JW    Pain Score 0  -CK  0  -JW    Post Pain Score 0  -CK  0  -JW    Recorded by [CK] Concetta Springer, MS CCC-SLP [KD] MARCO Vidales/DAWN [JW] Felicia Haynes, PTA    Cognitive Assessment/Intervention    Current Cognitive/Communication Assessment  functional  -KD impaired  -JW    Orientation Status  oriented x 4  -KD oriented to;oriented x 4  -JW    Follows Commands/Answers Questions  100% of the time  -% of the time  -JW    Personal Safety Interventions   fall  prevention program maintained;gait belt;nonskid shoes/slippers when out of bed  -JW    Recorded by  [KD] MARCO Vidales/DAWN [JW] Felicia Haynes PTA    Communication Treatment Objective and Progress    Cognitive Linguistic Treatment Objectives Improve orientation;Improve memory skills;Improve functional problem solving  -CK      Recorded by [CK] Concetta Springer MS CCC-SLP      Improve orientation    Improve orientation through: demonstrating orientation to day;demonstrating orientation to month;demonstrating orientation to year;demonstrating orientation to place;demonstrating orientation to disease/impairment;90%;with inconsistent cues  -CK      Status: Improve orientation through Achieved  -CK      Orientation Progress 100%   w/independent use of phone for calendar  -CK      Recorded by [CK] Concetta Springer MS CCC-SLP      Improve memory skills    Improve memory skills through: recalling related word lists with an imposed delay;90%;with inconsistent cues  -CK      Status: Improve memory skills Progress slower than expected  -CK      Memory Skills Progress 50%  -CK      Comments: Improve memory skills pt given new word set this date  -CK      Recorded by [CK] Concetta Springer MS CCC-SLP      Improve functional problem solving    Improve functional problem solving through: complete organization/home management task;tell similarity between items  -CK      Status: Improve functional problem solving Progress slower than expected  -CK      Functional Problem Solving Progress 60%  -CK      Comments: Improve functional problem solving max assist for task  -CK      Recorded by [CK] Concetta Springer MS CCC-SLP      Bed Mobility, Assessment/Treatment    Bed Mobility, Assistive Device   --   HOB down, no bedrails  -JW    Bed Mob, Sit to Supine, [place]   supervision required  -JW    Recorded by   [JW] Felicia Haynes, PTA    Transfer Assessment/Treatment    Transfers, Bed-Chair [place]   contact guard assist  -KD     Transfers, Sit-Stand [place]  contact guard assist  -KD stand by assist;verbal cues required  -JW    Transfers, Stand-Sit [place]  contact guard assist  -KD stand by assist;verbal cues required  -JW    Transfers, Sit-Stand-Sit, Assist Device  rolling walker  -KD rolling walker  -JW    Transfer, Comment  4 sit-stands  -KD     Recorded by  [KD] MARCO Vidales/DAWN [JW] Felicia Haynes, PTA    Gait Assessment/Treatment    Gait, [place] Level   minimum assist (75% patient effort);verbal cues required  -JW    Gait, Assistive Device   rolling walker  -JW    Gait, Distance (Feet)   58  -JW    Gait, Comment   pt very fatigued this afternoon, requesting to lie down  -JW    Recorded by   [JW] Felicia Haynes, PTA    Stairs Assessment/Treatment    Number of Stairs   '  -JW    Recorded by   [JW] Felicia Haynes, PTA    Functional Mobility    Functional Mobility- Ind. Level  contact guard assist  -KD     Functional Mobility- Device  rolling walker  -KD     Functional Mobility-Distance (Feet)  150   x 2  -KD     Recorded by  [KD] MARCO Vidales/L     Grooming Assessment/Training    Grooming Assess/Train, Assistive Device  electric razor   comb hair  -KD     Grooming Assess/Train, Position  sitting;sink side  -KD     Grooming Assess/Train, Indepen Level  conditional independence  -KD     Recorded by  [KD] MARCO Vidales/L     Self-Feeding Assessment/Training    Self-Feeding Assess/Train, Position  sitting  -KD     Self-Feeding Assess/Train, [place]  conditional independence  -KD     Self-Feeding Assess/Train, Spillage Amount  minimal  -KD     Recorded by  [KD] MARCO Vidales/L     Therapy Exercises    Bilateral Lower Extremities   AROM:;20 reps;supine;ankle pumps/circles;glut sets;quad sets;heel slides;hip abduction/adduction  -JW    Bilateral Upper Extremity  AROM:;20 reps;sitting;elbow flexion/extension;pronation/supination;shoulder  abduction/adduction;shoulder circles;shoulder extension/flexion;shoulder horizontal abd/add   UEE x 15 mins with 1 RB  -KD     BUE Resistance  manual resistance- moderate   [place] system x 15 mins with RB's PRN  -KD     Trunk Exercises   supine;bridging;15 reps   hooklying trunk rotation  -JW    Recorded by  [KD] MARCO Vidales/DAWN [JW] Felicia Haynes, MARTIN    Positioning and Restraints    Pre-Treatment Position  sitting in chair/recliner  -KD in bed  -JW    Post Treatment Position  wheelchair  -KD bed  -JW    In Bed  sitting;sitting EOB;call light within reach;encouraged to call for assist;exit alarm on  -KD supine;call light within reach;encouraged to call for assist  -JW    Recorded by  [KD] MARCO Vidales/DAWN [JW] Felicia Haynes PTA      User Key  (r) = Recorded By, (t) = Taken By, (c) = Cosigned By    Initials Name Effective Dates    JW Felicia Haynes, PTA 08/11/15 -     HR Shante Otero, MS CCC-SLP 12/15/16 -     CK Concetta Springer, MS CCC-SLP 10/17/16 -     RW Pipe Gruber, PTA 10/17/16 -     RC Cassie Carpio LARA/L 10/17/16 -     KD Therese King LARA/L 10/17/16 -                 IP PT Goals       11/07/17 1357 11/06/17 1415 11/04/17 1440    Transfer Training PT LTG    Transfer Training PT  LTG, Date Goal Reviewed 11/07/17  -RW 11/06/17  -RW 11/04/17  -    Transfer Training PT LTG, Outcome goal met  -RW goal ongoing  -RW goal ongoing  -    Gait Training PT LTG    Gait Training Goal PT LTG, Date Goal Reviewed 11/07/17  -RW 11/06/17  -RW 11/04/17  -    Gait Training Goal PT LTG, Outcome  goal ongoing  -RW goal ongoing  -    Stair Training PT STG    Stair Training Goal PT STG, Date Goal Reviewed 11/07/17  -RW 11/06/17  -RW 11/04/17  -    Stair Training Goal PT STG, Outcome goal partially met   needed 2 hr  -RW goal ongoing  -RW goal ongoing  -    Stair Training PT LTG    Stair Training Goal PT LTG, Date Established   11/04/17  -    Stair Training Goal PT LTG,  Date Goal Reviewed 11/07/17  -RW 11/06/17  -RW 11/04/17  -JW    Stair Training Goal PT LTG, Outcome goal partially met   needed cga  -RW goal ongoing  -RW goal ongoing  -JW      11/03/17 1540 11/02/17 1631 11/01/17 1543    Bed Mobility PT LTG    Bed Mobility PT LTG, Date Goal Reviewed  11/02/17  -RW 11/01/17  -RW    Bed Mobility PT LTG, Outcome  goal met  -RW goal ongoing  -RW    Transfer Training PT LTG    Transfer Training PT  LTG, Date Goal Reviewed 11/03/17  -RW 11/02/17  -RW 11/01/17  -RW    Transfer Training PT LTG, Outcome goal ongoing  -RW goal ongoing  -RW goal ongoing  -RW    Gait Training PT LTG    Gait Training Goal PT LTG, Date Goal Reviewed 11/03/17  -RW 11/02/17  -RW 11/01/17  -RW    Gait Training Goal PT LTG, Outcome goal ongoing  -RW goal ongoing  -RW goal ongoing  -RW    Stair Training PT STG    Stair Training Goal PT STG, Date Goal Reviewed 11/03/17  -RW 11/02/17  -RW 11/01/17  -RW    Stair Training Goal PT STG, Outcome goal ongoing  -RW goal ongoing  -RW goal ongoing  -RW    Stair Training PT LTG    Stair Training Goal PT LTG, Date Goal Reviewed 11/03/17  -RW 11/02/17  -RW 11/01/17  -RW    Stair Training Goal PT LTG, Outcome goal ongoing  -RW goal ongoing  -RW goal ongoing  -RW      10/31/17 0825          Bed Mobility PT LTG    Bed Mobility PT LTG, Date Established 10/31/17  -LM      Bed Mobility PT LTG, Time to Achieve by discharge  -LM      Bed Mobility PT LTG, Activity Type supine to sit/sit to supine  -LM      Bed Mobility PT LTG, [place] Level supervision required  -LM      Bed Mobility PT LTG, Additional Goal HOB flat; No BR's  -LM      Bed Mobility PT LTG, Outcome goal ongoing  -LM      Transfer Training PT LTG    Transfer Training PT LTG, Date Established 10/31/17  -LM      Transfer Training PT LTG, Time to Achieve by discharge  -LM      Transfer Training PT LTG, Activity Type bed to chair /chair to bed  -LM      Transfer Training PT LTG, [place] Level supervision required  -LM       Transfer Training PT LTG, Assist Device --   AAD  -LM      Transfer Training PT LTG, Outcome goal ongoing  -LM      Gait Training PT LTG    Gait Training Goal PT LTG, Date Established 10/31/17  -LM      Gait Training Goal PT LTG, Time to Achieve by discharge  -LM      Gait Training Goal PT LTG, [place] Level supervision required  -LM      Gait Training Goal PT LTG, Assist Device --   AAD  -LM      Gait Training Goal PT LTG, Distance to Achieve 150 feet  -LM      Gait Training Goal PT LTG, Outcome goal ongoing  -LM      Stair Training PT STG    Stair Training Goal PT STG, Date Established 10/31/17  -LM      Stair Training Goal PT STG, Time to Achieve 4 days  -LM      Stair Training Goal PT STG, Number of Steps 4 steps  -LM      Stair Training Goal PT STG, [place] Level contact guard assist  -LM      Stair Training Goal PT STG, Assist Device 1 handrail  -LM      Stair Training Goal PT STG, Outcome goal ongoing  -LM      Stair Training PT LTG    Stair Training Goal PT LTG, Date Established 10/31/17  -LM      Stair Training Goal PT LTG, Time to Achieve by discharge  -LM      Stair Training Goal PT LTG, Number of Steps 1 flight  -LM      Stair Training Goal PT LTG, [place] Level supervision required  -LM      Stair Training Goal PT LTG, Assist Device 2 handrails  -LM      Stair Training Goal PT LTG, Outcome goal ongoing  -LM        User Key  (r) = Recorded By, (t) = Taken By, (c) = Cosigned By    Initials Name Provider Type    JENA Haynes, PTA Physical Therapy Assistant    MOSHE Mckeon, PT Physical Therapist    RW Pipe Gruber, PTA Physical Therapy Assistant          Physical Therapy Education     Title: PT OT SLP Therapies (Active)     Topic: Physical Therapy (Active)     Point: Mobility training (Done)    Learning Progress Summary    Learner Readiness Method Response Comment Documented by Status   Patient Acceptance E VU again reviewed correct gt and transfer safey. RW 11/03/17 0937  Done    Acceptance E VU reviewed safe transfers and risks of falls RW 11/01/17 1052 Done    Acceptance E NR Reviewed safety with transfers and ambulation.  10/31/17 1506 Active               Point: Precautions (Done)    Learning Progress Summary    Learner Readiness Method Response Comment Documented by Status   Patient Acceptance E VU again reviewed correct gt and transfer safey.  11/03/17 0910 Done    Acceptance E VU reviewed safe transfers and risks of falls RW 11/01/17 1052 Done    Acceptance E NR Reviewed safety with transfers and ambulation.  10/31/17 1506 Active                      User Key     Initials Effective Dates Name Provider Type Discipline     06/15/16 -  Landy Mckeon, PT Physical Therapist PT     10/17/16 -  Pipe Gruber, PTA Physical Therapy Assistant PT                    PT Recommendation and Plan  Anticipated Equipment Needs At Discharge: front wheeled walker  Anticipated Discharge Disposition: home with 24/7 care, home with home health  Planned Therapy Interventions: balance training, bed mobility training, gait training, home exercise program, motor coordination training, neuromuscular re-education, patient/family education, postural re-education, ROM (Range of Motion), stair training, strengthening, stretching, transfer training, wheelchair management/propulsion training  PT Frequency: other (see comments) (5-14 times/wk)  Plan of Care Review  Plan Of Care Reviewed With: patient  Outcome Summary/Follow up Plan: pt performed steps but needed 2 hr and cga so no goal met. did meet transfer goal of sba.          Outcome Measures       11/07/17 0730 11/06/17 1000 11/06/17 0724    How much help from another person do you currently need...    Turning from your back to your side while in flat bed without using bedrails?  3  -RW     Moving from lying on back to sitting on the side of a flat bed without bedrails?  3  -RW     Moving to and from a bed to a chair (including a wheelchair)?  3   -RW     Standing up from a chair using your arms (e.g., wheelchair, bedside chair)?  3  -RW     Climbing 3-5 steps with a railing?  3  -RW     To walk in hospital room?  3  -RW     AM-PAC 6 Clicks Score  18  -RW     How much help from another is currently needed...    Putting on and taking off regular lower body clothing? 3  -RC  3  -KD    Bathing (including washing, rinsing, and drying) 3  -RC  3  -KD    Toileting (which includes using toilet bed pan or urinal) 2  -RC  2  -KD    Putting on and taking off regular upper body clothing 3  -RC  3  -KD    Taking care of personal grooming (such as brushing teeth) 3  -RC  3  -KD    Eating meals 3  -RC  3  -KD    Score 17  -RC  17  -KD      User Key  (r) = Recorded By, (t) = Taken By, (c) = Cosigned By    Initials Name Provider Type    KATHIE Gruber PTA Physical Therapy Assistant    MARCO Young/L Occupational Therapy Assistant    MARCO Sánchez/L Occupational Therapy Assistant           Time Calculation:         PT Charges       11/07/17 1401 11/07/17 1148       Time Calculation    Start Time 1320  -RW 1010  -RW     Stop Time 1400  -RW 1104  -RW     Time Calculation (min) 40 min  -RW 54 min  -RW     Time Calculation- PT    Total Timed Code Minutes- PT 40 minute(s)  -RW 54 minute(s)  -RW       User Key  (r) = Recorded By, (t) = Taken By, (c) = Cosigned By    Initials Name Provider Type    KATHIE Gruber PTA Physical Therapy Assistant          Therapy Charges for Today     Code Description Service Date Service Provider Modifiers Qty    69774704353 HC GAIT TRAINING EA 15 MIN 11/6/2017 Pipe Gruber PTA GP 1    42535365624 HC PT THER PROC EA 15 MIN 11/6/2017 Pipe Gruber PTA GP 1    42633422930 HC PT THERAPEUTIC ACT EA 15 MIN 11/6/2017 Pipe Gruber PTA GP 1    47287896776 HC GAIT TRAINING EA 15 MIN 11/6/2017 Pipe Gruber PTA GP 1    79857856423 HC PT THER PROC EA 15 MIN 11/6/2017 Pipe Gruber PTA GP 1    38920601970 HC PT THERAPEUTIC  ACT EA 15 MIN 11/6/2017 Pipe HOWARD Yasmani, PTA GP 1    57269910517 HC GAIT TRAINING EA 15 MIN 11/7/2017 Pipe HOWARD Yasmani, PTA GP 1    97570118619 HC PT THERAPEUTIC ACT EA 15 MIN 11/7/2017 Pipe HOWARD Yasmani, PTA GP 1    93156966438 HC PT THER PROC EA 15 MIN 11/7/2017 Pipe HOWARD Yasmani, PTA GP 2    36430429031 HC PT THER PROC EA 15 MIN 11/7/2017 Pipe HOWARD Yasmani, PTA GP 2    05513499202 HC PT THERAPEUTIC ACT EA 15 MIN 11/7/2017 Pipe HOWARD Yasmani, PTA GP 1          PT G-Codes  PT Professional Judgement Used?: Yes  Outcome Measure Options: AM-PAC 6 Clicks Daily Activity (OT)  Score: 15  Functional Limitation: Mobility: Walking and moving around  Mobility: Walking and Moving Around Current Status (): At least 40 percent but less than 60 percent impaired, limited or restricted  Mobility: Walking and Moving Around Goal Status (): At least 1 percent but less than 20 percent impaired, limited or restricted    Pipe LEE Gruber PTA  11/7/2017

## 2017-11-07 NOTE — PLAN OF CARE
Problem: Patient Care Overview (Adult)  Goal: Plan of Care Review  Outcome: Ongoing (interventions implemented as appropriate)    11/07/17 1403   Coping/Psychosocial Response Interventions   Plan Of Care Reviewed With patient   Outcome Evaluation   Outcome Summary/Follow up Plan pt performed steps but needed 2 hr and cga so no goal met. did meet transfer goal of sba.

## 2017-11-07 NOTE — PLAN OF CARE
Problem: Patient Care Overview (Adult)  Goal: Plan of Care Review  Outcome: Ongoing (interventions implemented as appropriate)    11/07/17 1351   Coping/Psychosocial Response Interventions   Plan Of Care Reviewed With patient   Patient Care Overview   Progress improving   Outcome Evaluation   Outcome Summary/Follow up Plan working well w/ tx cont poc         Problem: Inpatient Occupational Therapy  Goal: Transfer Training Goal 1 LTG- OT  Outcome: Ongoing (interventions implemented as appropriate)    10/31/17 0925 11/06/17 0724 11/07/17 1351   Transfer Training OT LTG   Transfer Training OT LTG, Date Established 10/31/17 --  --    Transfer Training OT LTG, Time to Achieve by discharge --  --    Transfer Training OT LTG, Activity Type all transfers --  --    Transfer Training OT LTG, [place] Level contact guard assist --  --    Transfer Training OT LTG, Assist Device --  walker, rolling platform --    Transfer Training OT LTG, Date Goal Reviewed --  --  11/07/17   Transfer Training OT LTG, Outcome --  --  goal ongoing       Goal: Patient Education Goal LTG- OT  Outcome: Ongoing (interventions implemented as appropriate)    10/31/17 0925 11/07/17 1351   Patient Education OT LTG   Patient Education OT LTG, Date Established 10/31/17 --    Patient Education OT LTG, Time to Achieve by discharge --    Patient Education OT LTG, Education Type HEP;posture/body mechanics;1 hand/anni technique;home safety;adaptive equipment mgmt;energy conservation --    Patient Education OT LTG, Education Understanding independent;demonstrates adequately;verbalizes understanding  (with caregiver assist as necessary) --    Patient Education OT LTG, Date Goal Reviewed --  11/07/17   Patient Education OT LTG Outcome --  goal ongoing       Goal: Safety Awareness Goal LTG- OT  Outcome: Ongoing (interventions implemented as appropriate)    10/31/17 0925 11/07/17 1351   Safety Awareness OT LTG   Safety Awareness OT LTG, Date Established 10/31/17  --    Safety Awareness OT LTG, Time to Achieve by discharge --    Safety Awareness OT LTG, Activity Type good safety awareness;with kitchen mobility;with ADL's --    Safety Awareness OT LTG, [place] Level min verbal cues --    Safety Awareness OT LTG, Date Goal Reviewed --  11/07/17   Safety Awareness OT LTG, Outcome --  goal ongoing       Goal: ADL Goal LTG- OT  Outcome: Ongoing (interventions implemented as appropriate)    10/31/17 0925 11/07/17 1351   ADL OT LTG   ADL OT LTG, Date Established 10/31/17 --    ADL OT LTG, Time to Achieve by discharge --    ADL OT LTG, Activity Type ADL skills --    ADL OT LTG, Additional Goal Set-up A with self-feeding and grooming; VC for UB bathing and UB dressing; CGA with LB bathing and LB dressing --    ADL OT LTG, Date Goal Reviewed --  11/07/17   ADL OT LTG, Outcome --  goal ongoing       Goal: Endurance Goal LTG- OT  Outcome: Ongoing (interventions implemented as appropriate)    10/31/17 0925 11/07/17 1351   Endurance OT LTG   Endurance Goal OT LTG, Date Established 10/31/17 --    Endurance Goal OT LTG, Time to Achieve by discharge --    Endurance Goal OT LTG, Activity Level endurance 2 good- --    Endurance Goal OT LTG, Additional Goal During 30 minutes of functional tasks, ADL, and therapeutic exercise --    Endurance Goal OT LTG, Date Goal Reviewed --  11/07/17   Endurance Goal OT LTG, Outcome --  goal partially met

## 2017-11-07 NOTE — PLAN OF CARE
Problem: Inpatient Physical Therapy  Goal: Transfer Training Goal 1 LTG- PT  Outcome: Outcome(s) achieved Date Met:  11/07/17    10/31/17 0825 11/07/17 1357   Transfer Training PT LTG   Transfer Training PT LTG, Date Established 10/31/17 --    Transfer Training PT LTG, Time to Achieve by discharge --    Transfer Training PT LTG, Activity Type bed to chair /chair to bed --    Transfer Training PT LTG, [place] Level supervision required --    Transfer Training PT LTG, Assist Device (AAD) --    Transfer Training PT LTG, Date Goal Reviewed --  11/07/17   Transfer Training PT LTG, Outcome --  goal met       Goal: Gait Training Goal LTG- PT  Outcome: Ongoing (interventions implemented as appropriate)    10/31/17 0825 11/06/17 1415 11/07/17 1357   Gait Training PT LTG   Gait Training Goal PT LTG, Date Established 10/31/17 --  --    Gait Training Goal PT LTG, Time to Achieve by discharge --  --    Gait Training Goal PT LTG, [place] Level supervision required --  --    Gait Training Goal PT LTG, Assist Device (AAD) --  --    Gait Training Goal PT LTG, Distance to Achieve 150 feet --  --    Gait Training Goal PT LTG, Date Goal Reviewed --  --  11/07/17   Gait Training Goal PT LTG, Outcome --  goal ongoing --        Goal: Stair Training Goal STG- PT  Outcome: Ongoing (interventions implemented as appropriate)    10/31/17 0825 11/07/17 1357   Stair Training PT STG   Stair Training Goal PT STG, Date Established 10/31/17 --    Stair Training Goal PT STG, Time to Achieve 4 days --    Stair Training Goal PT STG, Number of Steps 4 steps --    Stair Training Goal PT STG, [place] Level contact guard assist --    Stair Training Goal PT STG, Assist Device 1 handrail --    Stair Training Goal PT STG, Date Goal Reviewed --  11/07/17   Stair Training Goal PT STG, Outcome --  goal partially met  (needed 2 hr)       Goal: Stair Training Goal LTG- PT  Outcome: Ongoing (interventions implemented as appropriate)    10/31/17 0825  11/04/17 1440 11/07/17 1357   Stair Training PT LTG   Stair Training Goal PT LTG, Date Established --  11/04/17 --    Stair Training Goal PT LTG, Time to Achieve by discharge --  --    Stair Training Goal PT LTG, Number of Steps 1 flight --  --    Stair Training Goal PT LTG, [place] Level supervision required --  --    Stair Training Goal PT LTG, Assist Device 2 handrails --  --    Stair Training Goal PT LTG, Date Goal Reviewed --  --  11/07/17   Stair Training Goal PT LTG, Outcome --  --  goal partially met  (needed cga)

## 2017-11-08 LAB
GLUCOSE BLDC GLUCOMTR-MCNC: 114 MG/DL (ref 70–130)
GLUCOSE BLDC GLUCOMTR-MCNC: 83 MG/DL (ref 70–130)
GLUCOSE BLDC GLUCOMTR-MCNC: 85 MG/DL (ref 70–130)
GLUCOSE BLDC GLUCOMTR-MCNC: 90 MG/DL (ref 70–130)
GLUCOSE BLDC GLUCOMTR-MCNC: 98 MG/DL (ref 70–130)

## 2017-11-08 PROCEDURE — 82962 GLUCOSE BLOOD TEST: CPT

## 2017-11-08 PROCEDURE — 25010000002 ENOXAPARIN PER 10 MG: Performed by: FAMILY MEDICINE

## 2017-11-08 PROCEDURE — 97110 THERAPEUTIC EXERCISES: CPT

## 2017-11-08 PROCEDURE — 92507 TX SP LANG VOICE COMM INDIV: CPT | Performed by: SPEECH-LANGUAGE PATHOLOGIST

## 2017-11-08 PROCEDURE — 97535 SELF CARE MNGMENT TRAINING: CPT

## 2017-11-08 PROCEDURE — 97530 THERAPEUTIC ACTIVITIES: CPT

## 2017-11-08 PROCEDURE — 99232 SBSQ HOSP IP/OBS MODERATE 35: CPT | Performed by: FAMILY MEDICINE

## 2017-11-08 PROCEDURE — 97116 GAIT TRAINING THERAPY: CPT

## 2017-11-08 RX ADMIN — HYDROCORTISONE: 1 CREAM TOPICAL at 08:20

## 2017-11-08 RX ADMIN — HYDROCORTISONE: 1 CREAM TOPICAL at 17:09

## 2017-11-08 RX ADMIN — METOPROLOL SUCCINATE 50 MG: 50 TABLET, EXTENDED RELEASE ORAL at 20:00

## 2017-11-08 RX ADMIN — POTASSIUM CHLORIDE 10 MEQ: 750 CAPSULE, EXTENDED RELEASE ORAL at 08:18

## 2017-11-08 RX ADMIN — ENOXAPARIN SODIUM 40 MG: 40 INJECTION SUBCUTANEOUS at 08:18

## 2017-11-08 RX ADMIN — TERAZOSIN HYDROCHLORIDE ANHYDROUS 5 MG: 5 CAPSULE ORAL at 20:00

## 2017-11-08 RX ADMIN — ATORVASTATIN CALCIUM 10 MG: 10 TABLET, FILM COATED ORAL at 20:00

## 2017-11-08 RX ADMIN — FLUTICASONE PROPIONATE 2 SPRAY: 50 SPRAY, METERED NASAL at 08:19

## 2017-11-08 RX ADMIN — LISINOPRIL 10 MG: 10 TABLET ORAL at 08:18

## 2017-11-08 RX ADMIN — GLIPIZIDE 10 MG: 10 TABLET ORAL at 08:19

## 2017-11-08 RX ADMIN — CLOPIDOGREL BISULFATE 75 MG: 75 TABLET ORAL at 08:19

## 2017-11-08 RX ADMIN — PANTOPRAZOLE SODIUM 40 MG: 40 TABLET, DELAYED RELEASE ORAL at 06:15

## 2017-11-08 RX ADMIN — METFORMIN HYDROCHLORIDE 1000 MG: 500 TABLET ORAL at 17:09

## 2017-11-08 RX ADMIN — ASPIRIN 81 MG 81 MG: 81 TABLET ORAL at 08:19

## 2017-11-08 RX ADMIN — AMLODIPINE BESYLATE 10 MG: 10 TABLET ORAL at 20:00

## 2017-11-08 RX ADMIN — MAGNESIUM OXIDE TAB 400 MG (241.3 MG ELEMENTAL MG) 400 MG: 400 (241.3 MG) TAB at 08:19

## 2017-11-08 RX ADMIN — LIDOCAINE 1 PATCH: 50 PATCH TOPICAL at 08:21

## 2017-11-08 RX ADMIN — FINASTERIDE 5 MG: 5 TABLET, FILM COATED ORAL at 08:19

## 2017-11-08 RX ADMIN — THERA TABS 1 TABLET: TAB at 08:19

## 2017-11-08 RX ADMIN — METFORMIN HYDROCHLORIDE 1000 MG: 500 TABLET ORAL at 08:18

## 2017-11-08 NOTE — THERAPY TREATMENT NOTE
Inpatient Rehabilitation - Physical Therapy Treatment Note  [facility]     Patient Name: Kenneth Harper Jr.  : 1934  MRN: [id number]  Today's Date: 2017  Onset of Illness/Injury or Date of Surgery Date: 10/30/17  Date of Referral to PT: 10/30/17  Referring Physician: TERRENCE Mckinnon MD    Admit Date: 10/30/2017    Visit Dx:    ICD-10-CM ICD-9-CM   1. Symbolic dysfunction R48.9 784.60   2. Impaired mobility and ADLs Z74.09 799.89   3. Abnormality of gait and mobility R26.9 781.2   4. Muscle weakness (generalized) M62.81 728.87     Patient Active Problem List   Diagnosis   • Spinal stenosis, lumbar region, with neurogenic claudication   • Former smoker   • Overweight   • Left-sided weakness   • Right-sided lacunar stroke   • Essential hypertension   • Diabetes mellitus   • Chronic anxiety   • Chronic back pain greater than 3 months duration   • Prostatic hypertrophy   • Degenerative joint disease involving multiple joints   • Atrial fibrillation, chronic   • Encounter for rehabilitation   • Obstructive sleep apnea syndrome               Adult Rehabilitation Note       17 0936 17 0737 17 1320    Rehab Assessment/Intervention    Discipline physical therapy assistant  -RW occupational therapy assistant  -RC physical therapy assistant  -RW    Document Type therapy note (daily note)  -RW therapy note (daily note)  -RC therapy note (daily note)  -RW    Subjective Information agree to therapy  -RW agree to therapy  -RC agree to therapy  -RW    Patient Effort, Rehab Treatment good  -RW good  -RC good  -RW    Precautions/Limitations fall precautions  -RW fall precautions  -RC     Recorded by [RW] Pipe Gruber PTA [RC] MARCO Cannon/L [RW] Pipe Gruber PTA    Vital Signs    Intra Systolic BP Rehab 112  -RW      Intra Treatment Diastolic BP 63  -RW      Intratreatment Heart Rate (beats/min) 59  -RW      Recorded by [RW] Pipe Gruber PTA      Pain Assessment    Pain  Assessment No/denies pain  -RW No/denies pain  -RC No/denies pain  -RW    Recorded by [RW] Pipe Gruber PTA [RC] MARCO Cannon/L [RW] Pipe Gruber PTA    Cognitive Assessment/Intervention    Current Cognitive/Communication Assessment functional  -RW functional  -RC functional  -RW    Orientation Status oriented to;oriented x 4  -RW oriented x 4  -RC oriented to;oriented x 4  -RW    Follows Commands/Answers Questions 100% of the time  -RW      Personal Safety Interventions gait belt;nonskid shoes/slippers when out of bed  -RW      Recorded by [RW] Pipe Gruber PTA [RC] MARCO Cannon/L [RW] Pipe Gruber PTA    Cognitive Assessment Intervention    Behavior/Mood Observations behavior appropriate to situation, WNL/WFL  -RW  behavior appropriate to situation, WNL/WFL  -RW    Recorded by [RW] Pipe Gruber PTA  [RW] Pipe Gruber PTA    Bed Mobility, Assessment/Treatment    Bed Mobility, Scoot/Bridge, [place]   independent  -RW    Bed Mob, Supine to Sit, [place]   independent  -RW    Bed Mob, Sit to Supine, [place]   independent  -RW    Recorded by   [RW] Pipe Gruber PTA    Transfer Assessment/Treatment    Transfers, Bed-Chair [place] supervision required  -RW --   multiple transfers multiple surfaces  -RC supervision required  -RW    Transfers, Chair-Bed [place] supervision required  -RW supervision required;verbal cues required  -RC supervision required  -RW    Transfers, Bed-Chair-Bed, Assist Device  rolling walker  -RC     Transfers, Sit-Stand [place] stand by assist;verbal cues required  -RW stand by assist  -RC stand by assist;verbal cues required  -RW    Transfers, Stand-Sit [place] stand by assist;verbal cues required  -RW stand by assist   practiced tech  -RC stand by assist;verbal cues required  -RW    Transfers, Sit-Stand-Sit, Assist Device rolling walker  -RW  rolling walker  -RW    Recorded by [RW] Pipe Gruber PTA [RC] Cassie Carpio,  MARCO/L [RW] Pipe Gruber, PTA    Gait Assessment/Treatment    Gait, [place] Level verbal cues required;contact guard assist;supervision required  -RW  verbal cues required;contact guard assist  -RW    Gait, Assistive Device rolling walker  -RW  rolling walker  -RW    Gait, Distance (Feet) 150   150 x 2 plus 70 x 2  -RW      Gait, Comment improved gt quality  -RW      Recorded by [RW] Pipe Gruber PTA  [RW] Pipe Gruber PTA    Functional Mobility    Functional Mobility- Ind. Level  supervision required  -RC     Functional Mobility- Device  rolling walker  -RC     Functional Mobility-Distance (Feet)  150  -RC     Recorded by  [RC] Cassie Carpio LARA/L     Wheelchair Training/Management    Wheelchair, Distance Propelled 150 mod ind  -  - mod ind  -RW    Recorded by [RW] Pipe Gruber PTA [RC] LEIGH CannonA/L [RW] Pipe Gruber PTA    Lower Body Dressing Assessment/Training    LB Dressing Assess/Train, Clothing Type  donning:;shoes  -RC     LB Dressing Assess/Train, Position  sitting  -RC     LB Dressing Assess/Train, [place]  conditional independence  -RC     Recorded by  [RC] Cassie Carpio LARA/L     Toileting Assessment/Training    Toileting Assess/Train, [place]ep[place] Level  supervision required  -RC     Recorded by  [RC] LEIGH CannonA/L     Grooming Assessment/Training    Grooming Assess/Train, Position  sitting  -RC     Grooming Assess/Train, [place]ep[place] Level  independent  -RC     Recorded by  [RC] Cassie Carpio LARA/L     Self-Feeding Assessment/Training    Self-Feeding Assess/Train, Position  sitting  -RC     Self-Feeding Assess/Train, [place]  independent  -RC     Recorded by  [RC] Cassie Carpio LARA/L     Balance Skills Training    Sitting-Balance Activities Ball toss;Reaching for objects;Reaching across midline;Lateral lean;Forward lean  -RW      Standing-Level of Assistance  Close supervision  -RC     Static Standing Balance Support  assistive device   -RC     Standing-Balance Activities Reaching for objects  -RW      Standing Balance # of Minutes  8  -RC     Gait Balance Support assistive device   side stepping with rail in hallway  -RW      Gait Balance Activities side-stepping;stepping over object;[place]  -RW      Recorded by [RW] Pipe Gruber PTA [RC] MARCO Cannon/L     Therapy Exercises    Bilateral Lower Extremities AROM:;20 reps;sitting;knee flexion;LAQ   pro 2 level 3 x 12 min  -RW  AROM:;20 reps;supine;ankle pumps/circles;heel slides;hip abduction/adduction;SAQ   pro 2 level 3 x 10 min. 2 sets of hs  -RW    Bilateral Upper Extremity  --   pulley ex 5 min, rickshaw 15# 20x, ue bike 15 min+  -RC     Recorded by [RW] Pipe Gruber PTA [RC] MARCO Cannon/L [RW] Pipe Gruber PTA    Fine Motor Coordination Training    Detail (Fine Motor Coordination Training)  --   velcro board,   -RC     Recorded by  [RC] MARCO Cannon/L     Positioning and Restraints    Pre-Treatment Position sitting in chair/recliner  -RW sitting in chair/recliner  -RC sitting in chair/recliner  -RW    Post Treatment Position wheelchair  -RW wheelchair  -RC wheelchair  -RW    In Bed  with SLP;encouraged to call for assist  -RC     In Wheelchair with nsg  -RW      Recorded by [RW] Pipe Gruber PTA [RC] MARCO Cannon/L [RW] Pipe Gruber PTA      11/07/17 1108 11/07/17 1010 11/07/17 0730    Rehab Assessment/Intervention    Discipline speech language pathologist  -HR physical therapy assistant  -RW occupational therapy assistant  -RC    Document Type therapy note (daily note)  -HR therapy note (daily note)  -RW therapy note (daily note)  -RC    Subjective Information no complaints;agree to therapy  -HR agree to therapy  -RW agree to therapy  -RC    Patient Effort, Rehab Treatment good  -HR good  -RW good  -RC    Symptoms Noted During/After Treatment none  -HR      Precautions/Limitations fall precautions  -HR  fall precautions  -RC     Precautions/Limitations, Vision WFL with corrective lenses  -HR      Precautions/Limitations, Hearing WFL  -HR      Recorded by [HR] Shante Otero, MS CCC-SLP [RW] Pipe Gruber PTA [RC] Cassie Carpoi, LARA/L    Vital Signs    Pretreatment Heart Rate (beats/min)  81  -RW     Pre SpO2 (%)  96  -RW     O2 Delivery Pre Treatment  room air  -RW     Recorded by  [RW] Pipe Gruber PTA     Pain Assessment    Pain Assessment No/denies pain  -HR No/denies pain  -RW No/denies pain  -RC    Pain Score 0  -HR      Post Pain Score 0  -HR      Recorded by [HR] Shante Otero, MS CCC-SLP [RW] Pipe Gruber PTA [RC] Cassie Carpio, LARA/L    Vision Assessment/Intervention    Visual Impairment WFL with corrective lenses  -HR      Recorded by [HR] Shante Otero, MS CCC-SLP      Cognitive Assessment/Intervention    Current Cognitive/Communication Assessment functional  -HR functional  -RW functional  -RC    Orientation Status oriented x 4  -HR oriented to;oriented x 4  -RW oriented x 4  -RC    Follows Commands/Answers Questions 100% of the time  -% of the time  -RW     Personal Safety Interventions  gait belt;nonskid shoes/slippers when out of bed  -RW     Recorded by [HR] Shante Otero, MS CCC-SLP [RW] Pipe Gruber PTA [RC] Cassie Carpio, LARA/L    Cognitive Assessment Intervention    Behavior/Mood Observations  behavior appropriate to situation, WNL/WFL  -RW     Recorded by  [RW] Pipe Gruber PTA     Communication Treatment Objective and Progress    Cognitive Linguistic Treatment Objectives Improve orientation;Improve memory skills;Improve functional problem solving  -HR      Recorded by [HR] Shante Otero MS CCC-SLP      Improve orientation    Improve orientation through: demonstrating orientation to day;demonstrating orientation to month;demonstrating orientation to year;demonstrating orientation to place;demonstrating orientation to disease/impairment;90%;with inconsistent cues  -HR      Status:  Improve orientation through Achieved  -HR      Recorded by [HR] Shante Otero MS CCC-SLP      Improve memory skills    Improve memory skills through: recalling related word lists with an imposed delay;90%;with inconsistent cues  -HR      Status: Improve memory skills Progressing as expected  -HR      Memory Skills Progress 70%;80%;continue to address  -HR      Recorded by [HR] Shante Otero MS CCC-SLP      Improve functional problem solving    Improve functional problem solving through: complete organization/home management task;tell similarity between items  -HR      Status: Improve functional problem solving Progressing as expected  -HR      Functional Problem Solving Progress 80%;with consistent cues;continue to address  -HR      Comments: Improve functional problem solving mod cues for map reading task this date with high accuracy  -HR      Recorded by [HR] Shante Otero MS CCC-SLP      Transfer Assessment/Treatment    Transfers, Bed-Chair [place]   stand by assist  -RC    Transfers, Chair-Bed [place]   stand by assist  -RC    Transfers, Bed-Chair-Bed, Assist Device   rolling walker  -RC    Transfers, Sit-Stand [place]  stand by assist;verbal cues required  -RW stand by assist  -RC    Transfers, Stand-Sit [place]  stand by assist;verbal cues required  -RW stand by assist  -RC    Transfers, Sit-Stand-Sit, Assist Device  rolling walker  -RW rolling walker  -RC    Recorded by  [RW] Pipe Gruber PTA [RC] Cassie Carpio, LARA/L    Gait Assessment/Treatment    Gait, [place] Level  verbal cues required;contact guard assist  -RW     Gait, Assistive Device  rolling walker  -RW     Gait, Distance (Feet)  150    x 2  -RW     Gait, Comment  gt improving but will get walker too far away during turns  -RW     Recorded by  [RW] Pipe Gruber PTA     Stairs Assessment/Treatment    Number of Stairs  12  -RW     Stairs, Handrail Location  both sides  -RW     Stairs, [place] Level   supervision required;contact guard assist  -RW     Recorded by  [RW] Pipe Gruber PTA     Functional Mobility    Functional Mobility- Ind. Level   supervision required  -RC    Functional Mobility- Device   rolling walker  -RC    Recorded by   [RC] MARCO Cannon/L    Wheelchair Training/Management    Wheelchair, Distance Propelled  160 mod ind  -RW     Recorded by  [RW] Pipe Gruber PTA     Grooming Assessment/Training    Grooming Assess/Train, [place] Level   independent  -RC    Recorded by   [RC] MARCO Cannon/L    Balance Skills Training    Gait Balance-Level of Assistance  Close supervision  -RW     Gait Balance Support  parallel bars  -RW     Gait Balance Activities  tandem;side-stepping  -RW     Recorded by  [RW] Pipe Gruber PTA     Therapy Exercises    Bilateral Lower Extremities  AROM:;20 reps;sitting;hip flexion;knee flexion;LAQ   2 sets  -RW     BLE Resistance  theraband  -RW     Bilateral Upper Extremity  AROM:;15 reps;20 reps;sitting   tband row  -RW --   ue bike 15 min  -RC    BUE Resistance  theraband  -RW     Recorded by  [RW] Pipe Gruber PTA [RC] LEIGH CannonA/L    Fine Motor Coordination Training    Detail (Fine Motor Coordination Training)   --   lrg beads, card turning  -RC    Recorded by   [RC] MARCO Cannon/L    Positioning and Restraints    Pre-Treatment Position  sitting in chair/recliner  -RW sitting in chair/recliner  -RC    Post Treatment Position  wheelchair  -RW wheelchair  -RC    In Wheelchair   sitting;encouraged to call for assist  -RC    Recorded by  [RW] Pipe Gruber PTA [RC] MARCO Cannon/DAWN      11/06/17 1332 11/06/17 1002 11/06/17 0901    Rehab Assessment/Intervention    Discipline physical therapy assistant  -RW physical therapy assistant  -RW speech language pathologist  -CK    Document Type therapy note (daily note)  -RW therapy note (daily note)  -RW therapy note (daily note)  -CK    Subjective Information agree to therapy   -RW agree to therapy  -RW agree to therapy  -CK    Patient Effort, Rehab Treatment good  -RW good  -RW good  -CK    Precautions/Limitations fall precautions  -RW fall precautions  -RW     Recorded by [RW] Pipe Gruber PTA [RW] Pipe Gruber PTA [CK] Concetta Springer, MS CCC-SLP    Vital Signs    Pretreatment Heart Rate (beats/min)  74  -RW     Pre SpO2 (%)  96  -RW     O2 Delivery Pre Treatment  room air  -RW     Recorded by  [RW] Pipe Gruber PTA     Pain Assessment    Pain Assessment No/denies pain  -RW No/denies pain  -RW No/denies pain  -CK    Pain Score   0  -CK    Post Pain Score   0  -CK    Recorded by [RW] Pipe Gruber PTA [RW] Pipe Gruber PTA [CK] Concetta Springer, MS CCC-SLP    Cognitive Assessment/Intervention    Current Cognitive/Communication Assessment functional  -RW functional  -RW     Orientation Status oriented to;oriented x 4  -RW oriented to;oriented x 4  -RW     Follows Commands/Answers Questions 100% of the time  -% of the time  -RW     Personal Safety Interventions gait belt;nonskid shoes/slippers when out of bed  -RW gait belt;nonskid shoes/slippers when out of bed  -RW     Recorded by [RW] Pipe Gruber PTA [RW] Pipe Gruber PTA     Cognitive Assessment Intervention    Behavior/Mood Observations behavior appropriate to situation, WNL/WFL  -RW behavior appropriate to situation, WNL/WFL  -RW     Recorded by [RW] Pipe Gruber PTA [RW] Pipe Gruber PTA     Communication Treatment Objective and Progress    Cognitive Linguistic Treatment Objectives   Improve orientation;Improve memory skills;Improve functional problem solving  -CK    Recorded by   [CK] Concetta Springer, MS CCC-SLP    Improve orientation    Improve orientation through:   demonstrating orientation to day;demonstrating orientation to month;demonstrating orientation to year;demonstrating orientation to place;demonstrating orientation to disease/impairment;90%;with inconsistent cues  -CK    Status:  Improve orientation through   Achieved  -CK    Orientation Progress   100%   w/independent use of phone for calendar  -CK    Recorded by   [CK] Concetta Springer MS CCC-SLP    Improve memory skills    Improve memory skills through:   recalling related word lists with an imposed delay;90%;with inconsistent cues  -CK    Status: Improve memory skills   Progress slower than expected  -CK    Memory Skills Progress   50%  -CK    Comments: Improve memory skills   pt given new word set this date  -CK    Recorded by   [CK] Concetta Springer MS CCC-SLP    Improve functional problem solving    Improve functional problem solving through:   complete organization/home management task;tell similarity between items  -CK    Status: Improve functional problem solving   Progress slower than expected  -CK    Functional Problem Solving Progress   60%  -CK    Comments: Improve functional problem solving   max assist for task  -CK    Recorded by   [CK] Concetta Springer MS CCC-SLP    Transfer Assessment/Treatment    Transfers, Sit-Stand [place] stand by assist;verbal cues required  -RW stand by assist;verbal cues required  -RW     Transfers, Stand-Sit [place] stand by assist;verbal cues required  -RW stand by assist;verbal cues required  -RW     Transfers, Sit-Stand-Sit, Assist Device rolling walker  -RW rolling walker  -RW     Transfer, Comment sit to stand 5 x 2  -RW      Recorded by [RW] Pipe Gruber PTA [RW] Pipe Gruber PTA     Gait Assessment/Treatment    Gait, [place] Level verbal cues required;contact guard assist  -RW minimum assist (75% patient effort);verbal cues required;contact guard assist  -RW     Gait, Assistive Device rolling walker  -RW rolling walker  -RW     Gait, Distance (Feet) 150   70 x 3  -    x 2  -RW     Gait, Comment improved gt quality. minimal foot drag on left  -RW      Recorded by [RW] Pipe Gruber PTA [RW] Pipe Gruber PTA     Balance Skills Training    Gait  Balance-Level of Assistance --  -RW Contact guard  -RW     Gait Balance Support --  -RW parallel bars  -RW     Gait Balance Activities --  -RW --   gt w/o ad in // bars  -RW     Recorded by [RW] Pipe Gruber PTA [RW] Pipe Gruber PTA     Therapy Exercises    Bilateral Lower Extremities AROM:;20 reps;sitting;hip flexion;knee flexion;LAQ   2 sets  -RW AROM:;20 reps;sitting;hip flexion;knee flexion;LAQ   2 sets  -RW     BLE Resistance theraband  -RW theraband  -RW     Bilateral Upper Extremity AROM:;15 reps;20 reps;sitting   tband rows 2 sets  -RW AROM:;15 reps;20 reps;sitting   tband rows 2 sets  -RW     BUE Resistance theraband  -RW theraband  -RW     Recorded by [RW] Pipe Gruber PTA [RW] Pipe Gruber PTA     Positioning and Restraints    Pre-Treatment Position sitting in chair/recliner  -RW sitting in chair/recliner  -RW     Post Treatment Position wheelchair  -RW wheelchair  -RW     In Wheelchair call light within reach  -RW call light within reach  -RW     Recorded by [RW] Pipe Gruber PTA [RW] Pipe Gruber PTA       11/06/17 0724          Rehab Assessment/Intervention    Discipline occupational therapy assistant  -KD      Document Type therapy note (daily note)  -KD      Subjective Information agree to therapy  -KD      Recorded by [KD] MARCO Vidales/L      Vital Signs    Pre Treatment Diastolic   -KD      Intra Systolic BP Rehab 65  -KD      Pretreatment Heart Rate (beats/min) 74  -KD      Posttreatment Heart Rate (beats/min) 76  -KD      Pre SpO2 (%) 96  -KD      O2 Delivery Pre Treatment room air  -KD      Post SpO2 (%) 94  -KD      O2 Delivery Post Treatment room air  -KD      Pre Patient Position Sitting  -KD      Intra Patient Position Standing  -KD      Post Patient Position Sitting  -KD      Recorded by [KD] MARCO Vidales/L      Pain Assessment    Pain Assessment No/denies pain  -KD      Recorded by [KD] LEIGH VidalesA/L      Cognitive Assessment/Intervention     Current Cognitive/Communication Assessment functional  -KD      Orientation Status oriented x 4  -KD      Follows Commands/Answers Questions 100% of the time  -KD      Recorded by [KD] DAYDAY Vidales      Transfer Assessment/Treatment    Transfers, Bed-Chair [place] contact guard assist  -KD      Transfers, Sit-Stand [place] contact guard assist  -KD      Transfers, Stand-Sit [place] contact guard assist  -KD      Transfers, Sit-Stand-Sit, Assist Device rolling walker  -KD      Transfer, Comment 4 sit-stands  -KD      Recorded by [KD] MARCO Vidales/L      Functional Mobility    Functional Mobility- Ind. Level contact guard assist  -KD      Functional Mobility- Device rolling walker  -KD      Functional Mobility-Distance (Feet) 150   x 2  -KD      Recorded by [KD] MARCO Vidales/L      Grooming Assessment/Training    Grooming Assess/Train, Assistive Device electric razor   comb hair  -KD      Grooming Assess/Train, Position sitting;sink side  -KD      Grooming Assess/Train, [place]ep[place] Level conditional independence  -KD      Recorded by [KD] DAYDAY Vidales      Self-Feeding Assessment/Training    Self-Feeding Assess/Train, Position sitting  -KD      Self-Feeding Assess/Train, [place] conditional independence  -KD      Self-Feeding Assess/Train, Spillage Amount minimal  -KD      Recorded by [KD] MARCO Vidales/L      Therapy Exercises    Bilateral Upper Extremity AROM:;20 reps;sitting;elbow flexion/extension;pronation/supination;shoulder abduction/adduction;shoulder circles;shoulder extension/flexion;shoulder horizontal abd/add   UEE x 15 mins with 1 RB  -KD      BUE Resistance manual resistance- moderate   [place] system x 15 mins with RB's PRN  -KD      Recorded by [KD] MARCO Vidales/L      Positioning and Restraints    Pre-Treatment Position sitting in chair/recliner  -KD      Post Treatment Position wheelchair  -KD      In Bed sitting;sitting EOB;call  light within reach;encouraged to call for assist;exit alarm on  -KD      Recorded by [KD] Therese King, LARA/L        User Key  (r) = Recorded By, (t) = Taken By, (c) = Cosigned By    Initials Name Effective Dates    HR Shante ROBERT Otero, MS CCC-SLP 12/15/16 -     CK Concetta L Racheal, MS CCC-SLP 10/17/16 -     RW Pipe Gruber, PTA 10/17/16 -     RC Cassie Carpio, LARA/L 10/17/16 -     KD Therese King, LARA/L 10/17/16 -                 IP PT Goals       11/08/17 1119 11/07/17 1357 11/06/17 1415    Transfer Training PT LTG    Transfer Training PT  LTG, Date Goal Reviewed  11/07/17  -RW 11/06/17  -RW    Transfer Training PT LTG, Outcome  goal met  -RW goal ongoing  -RW    Gait Training PT LTG    Gait Training Goal PT LTG, Date Goal Reviewed 11/08/17  -RW 11/07/17  -RW 11/06/17  -RW    Gait Training Goal PT LTG, Outcome goal ongoing  -RW  goal ongoing  -RW    Stair Training PT STG    Stair Training Goal PT STG, Date Goal Reviewed 11/08/17  -RW 11/07/17  -RW 11/06/17  -RW    Stair Training Goal PT STG, Outcome goal ongoing  -RW goal partially met   needed 2 hr  -RW goal ongoing  -RW    Stair Training PT LTG    Stair Training Goal PT LTG, Date Goal Reviewed 11/08/17  -RW 11/07/17  -RW 11/06/17  -RW    Stair Training Goal PT LTG, Outcome goal ongoing  -RW goal partially met   needed cga  -RW goal ongoing  -RW      11/04/17 1440 11/03/17 1540 11/02/17 1631    Bed Mobility PT LTG    Bed Mobility PT LTG, Date Goal Reviewed   11/02/17  -RW    Bed Mobility PT LTG, Outcome   goal met  -RW    Transfer Training PT LTG    Transfer Training PT  LTG, Date Goal Reviewed 11/04/17  -JW 11/03/17  -RW 11/02/17  -RW    Transfer Training PT LTG, Outcome goal ongoing  -JW goal ongoing  -RW goal ongoing  -RW    Gait Training PT LTG    Gait Training Goal PT LTG, Date Goal Reviewed 11/04/17  -JW 11/03/17  -RW 11/02/17  -RW    Gait Training Goal PT LTG, Outcome goal ongoing  -JW goal ongoing  -RW goal ongoing  -RW    Stair Training PT STG     Stair Training Goal PT STG, Date Goal Reviewed 11/04/17  -JW 11/03/17  -RW 11/02/17  -RW    Stair Training Goal PT STG, Outcome goal ongoing  -JW goal ongoing  -RW goal ongoing  -RW    Stair Training PT LTG    Stair Training Goal PT LTG, Date Established 11/04/17  -JW      Stair Training Goal PT LTG, Date Goal Reviewed 11/04/17  -JW 11/03/17  -RW 11/02/17  -RW    Stair Training Goal PT LTG, Outcome goal ongoing  -JW goal ongoing  -RW goal ongoing  -RW      11/01/17 1543 10/31/17 0825       Bed Mobility PT LTG    Bed Mobility PT LTG, Date Established  10/31/17  -LM     Bed Mobility PT LTG, Time to Achieve  by discharge  -LM     Bed Mobility PT LTG, Activity Type  supine to sit/sit to supine  -LM     Bed Mobility PT LTG, [place] Level  supervision required  -LM     Bed Mobility PT LTG, Additional Goal  HOB flat; No BR's  -LM     Bed Mobility PT LTG, Date Goal Reviewed 11/01/17  -RW      Bed Mobility PT LTG, Outcome goal ongoing  -RW goal ongoing  -LM     Transfer Training PT LTG    Transfer Training PT LTG, Date Established  10/31/17  -LM     Transfer Training PT LTG, Time to Achieve  by discharge  -LM     Transfer Training PT LTG, Activity Type  bed to chair /chair to bed  -LM     Transfer Training PT LTG, [place] Level  supervision required  -LM     Transfer Training PT LTG, Assist Device  --   AAD  -LM     Transfer Training PT  LTG, Date Goal Reviewed 11/01/17  -RW      Transfer Training PT LTG, Outcome goal ongoing  -RW goal ongoing  -LM     Gait Training PT LTG    Gait Training Goal PT LTG, Date Established  10/31/17  -LM     Gait Training Goal PT LTG, Time to Achieve  by discharge  -LM     Gait Training Goal PT LTG, [place] Level  supervision required  -LM     Gait Training Goal PT LTG, Assist Device  --   AAD  -LM     Gait Training Goal PT LTG, Distance to Achieve  150 feet  -LM     Gait Training Goal PT LTG, Date Goal Reviewed 11/01/17  -RW      Gait Training Goal PT LTG, Outcome goal ongoing   -RW goal ongoing  -LM     Stair Training PT STG    Stair Training Goal PT STG, Date Established  10/31/17  -LM     Stair Training Goal PT STG, Time to Achieve  4 days  -LM     Stair Training Goal PT STG, Number of Steps  4 steps  -LM     Stair Training Goal PT STG, [place] Level  contact guard assist  -LM     Stair Training Goal PT STG, Assist Device  1 handrail  -LM     Stair Training Goal PT STG, Date Goal Reviewed 11/01/17  -RW      Stair Training Goal PT STG, Outcome goal ongoing  -RW goal ongoing  -LM     Stair Training PT LTG    Stair Training Goal PT LTG, Date Established  10/31/17  -LM     Stair Training Goal PT LTG, Time to Achieve  by discharge  -LM     Stair Training Goal PT LTG, Number of Steps  1 flight  -LM     Stair Training Goal PT LTG, [place] Level  supervision required  -LM     Stair Training Goal PT LTG, Assist Device  2 handrails  -LM     Stair Training Goal PT LTG, Date Goal Reviewed 11/01/17  -RW      Stair Training Goal PT LTG, Outcome goal ongoing  -RW goal ongoing  -LM       User Key  (r) = Recorded By, (t) = Taken By, (c) = Cosigned By    Initials Name Provider Type    JW Felicia Haynes, PTA Physical Therapy Assistant    LM Landy Mckeon, PT Physical Therapist    RW Pipe Gruber, PTA Physical Therapy Assistant          Physical Therapy Education     Title: PT OT SLP Therapies (Active)     Topic: Physical Therapy (Active)     Point: Mobility training (Done)    Learning Progress Summary    Learner Readiness Method Response Comment Documented by Status   Patient Acceptance E VU again reviewed correct gt and transfer safey. RW 11/03/17 0910 Done    Acceptance E VU reviewed safe transfers and risks of falls RW 11/01/17 1052 Done    Acceptance E NR Reviewed safety with transfers and ambulation.  10/31/17 1506 Active               Point: Precautions (Done)    Learning Progress Summary    Learner Readiness Method Response Comment Documented by Status   Patient Acceptance E VU  again reviewed correct gt and transfer safey. RW 11/03/17 0910 Done    Acceptance E VU reviewed safe transfers and risks of falls RW 11/01/17 1052 Done    Acceptance E NR Reviewed safety with transfers and ambulation.  10/31/17 1506 Active                      User Key     Initials Effective Dates Name Provider Type Discipline    LM 06/15/16 -  Landy Mckeon, PT Physical Therapist PT    RW 10/17/16 -  Pipe Gruber, PTA Physical Therapy Assistant PT                    PT Recommendation and Plan  Anticipated Equipment Needs At Discharge: front wheeled walker  Anticipated Discharge Disposition: home with 24/7 care, home with home health  Planned Therapy Interventions: balance training, bed mobility training, gait training, home exercise program, motor coordination training, neuromuscular re-education, patient/family education, postural re-education, ROM (Range of Motion), stair training, strengthening, stretching, transfer training, wheelchair management/propulsion training  PT Frequency: other (see comments) (5-14 times/wk)  Plan of Care Review  Plan Of Care Reviewed With: patient  Outcome Summary/Follow up Plan: pt continues to improve gt quality tho stepovers was challenging. making good progress overall.          Outcome Measures       11/08/17 0737 11/07/17 0730 11/06/17 1000    How much help from another person do you currently need...    Turning from your back to your side while in flat bed without using bedrails?   3  -RW    Moving from lying on back to sitting on the side of a flat bed without bedrails?   3  -RW    Moving to and from a bed to a chair (including a wheelchair)?   3  -RW    Standing up from a chair using your arms (e.g., wheelchair, bedside chair)?   3  -RW    Climbing 3-5 steps with a railing?   3  -RW    To walk in hospital room?   3  -RW    AM-PAC 6 Clicks Score   18  -RW    How much help from another is currently needed...    Putting on and taking off regular lower body clothing? 3  -RC 3   -RC     Bathing (including washing, rinsing, and drying) 3  -RC 3  -RC     Toileting (which includes using toilet bed pan or urinal) 3  -RC 2  -RC     Putting on and taking off regular upper body clothing 3  -RC 3  -RC     Taking care of personal grooming (such as brushing teeth) 3  -RC 3  -RC     Eating meals 4  -RC 3  -RC     Score 19  -RC 17  -RC       11/06/17 0724          How much help from another is currently needed...    Putting on and taking off regular lower body clothing? 3  -KD      Bathing (including washing, rinsing, and drying) 3  -KD      Toileting (which includes using toilet bed pan or urinal) 2  -KD      Putting on and taking off regular upper body clothing 3  -KD      Taking care of personal grooming (such as brushing teeth) 3  -KD      Eating meals 3  -KD      Score 17  -KD        User Key  (r) = Recorded By, (t) = Taken By, (c) = Cosigned By    Initials Name Provider Type    KATHIE Gruber PTA Physical Therapy Assistant    RC LEIGH CannonA/L Occupational Therapy Assistant    JOSS King LARA/L Occupational Therapy Assistant           Time Calculation:         PT Charges       11/08/17 1121          Time Calculation    Start Time 0936  -RW      Stop Time 1107  -RW      Time Calculation (min) 91 min  -RW      Time Calculation- PT    Total Timed Code Minutes- PT 91 minute(s)  -RW        User Key  (r) = Recorded By, (t) = Taken By, (c) = Cosigned By    Initials Name Provider Type    KATHIE Gruber PTA Physical Therapy Assistant          Therapy Charges for Today     Code Description Service Date Service Provider Modifiers Qty    83155464664 HC GAIT TRAINING EA 15 MIN 11/7/2017 Pipe Gruber PTA GP 1    08227486509 HC PT THERAPEUTIC ACT EA 15 MIN 11/7/2017 Pipe Gruber PTA GP 1    16453670205 HC PT THER PROC EA 15 MIN 11/7/2017 Pipe Gruber PTA GP 2    51445655672 HC PT THER PROC EA 15 MIN 11/7/2017 Pipe Gruber PTA GP 2    78833163003 HC PT THERAPEUTIC ACT EA 15  MIN 11/7/2017 Pipe Gruber PTA GP 1    44334628945 HC GAIT TRAINING EA 15 MIN 11/8/2017 Pipe Gruber PTA GP 2    52039174972 HC PT THER PROC EA 15 MIN 11/8/2017 Pipe Gruber PTA GP 2    08646268607 HC PT THERAPEUTIC ACT EA 15 MIN 11/8/2017 Pipe Gruber PTA GP 2          PT G-Codes  PT Professional Judgement Used?: Yes  Outcome Measure Options: AM-PAC 6 Clicks Daily Activity (OT)  Score: 15  Functional Limitation: Mobility: Walking and moving around  Mobility: Walking and Moving Around Current Status (): At least 40 percent but less than 60 percent impaired, limited or restricted  Mobility: Walking and Moving Around Goal Status (): At least 1 percent but less than 20 percent impaired, limited or restricted    Pipe Gruber PTA  11/8/2017

## 2017-11-08 NOTE — PLAN OF CARE
Problem: Patient Care Overview (Adult)  Goal: Plan of Care Review  Outcome: Ongoing (interventions implemented as appropriate)    11/08/17 1329   Coping/Psychosocial Response Interventions   Plan Of Care Reviewed With patient   Patient Care Overview   Progress progress toward functional goals as expected   Outcome Evaluation   Outcome Summary/Follow up Plan St therapy: met one goal this date.continues to make progress on cognitivie tasks         Problem: Inpatient SLP  Goal: Cognitive-linguistic-Patient will improve Cognitive-linguistic skills to improve safety and safety awareness in environment  Outcome: Ongoing (interventions implemented as appropriate)    10/31/17 1244 11/08/17 1329   Cognitive Linguistic- Improve Safety and Awareness   Cognitive Linguistic Improve Safety and Awareness- SLP, Date Established 10/31/17 --    Cognitive Linguistic Improve Safety and Awareness- SLP, Time to Achieve by discharge --    Cognitive Linguistic Improve Safety and Awareness- SLP, Activity Level Patient will improve orientation for increased safety in environment;Patient will improve memory skills for increased safety in environment;Patient will improve functional problem solving skills for increased safety in environment --    Cognitive Linguistic Improve Safety and Awareness- SLP, Date Goal Reviewed --  11/08/17   Cognitive Linguistic Improve Safety and Awareness- SLP, Outcome --  goal partially met         10/31/17 1244 11/08/17 1329   Cognitive Linguistic- Improve Safety and Awareness   Cognitive Linguistic Improve Safety and Awareness- SLP, Date Established 10/31/17 --    Cognitive Linguistic Improve Safety and Awareness- SLP, Time to Achieve by discharge --    Cognitive Linguistic Improve Safety and Awareness- SLP, Activity Level Patient will improve orientation for increased safety in environment;Patient will improve memory skills for increased safety in environment;Patient will improve functional problem solving  skills for increased safety in environment --    Cognitive Linguistic Improve Safety and Awareness- SLP, Date Goal Reviewed --  11/08/17   Cognitive Linguistic Improve Safety and Awareness- SLP, Outcome --  goal partially met

## 2017-11-08 NOTE — PROGRESS NOTES
LOS: 9 days   Patient Care Team:  Evan Marrero MD as PCP - General (Family Medicine)    Chief Complaint:   Right-sided lacunar stroke          Interval History:     SUBJECTIVE:  He feels good today he is not having any pain no shortness of breath no nausea no vomiting.  Says he slept well  History taken from: patient chart RN    Objective     Vital Signs  Temp:  [96.5 °F (35.8 °C)-96.8 °F (36 °C)] 96.8 °F (36 °C)  Heart Rate:  [64-75] 64  Resp:  [20] 20  BP: (124-139)/(67-68) 124/68  Last 3 weights    11/06/17  0400 11/07/17  0500 11/08/17  0600   Weight: 202 lb 8 oz (91.9 kg) 204 lb 3.2 oz (92.6 kg) 203 lb 1.6 oz (92.1 kg)         Physical Exam:     General Appearance:    Alert, cooperative, in no acute distress   Head:    Normocephalic, without obvious abnormality, atraumatic   Eyes:            Lids and lashes normal, conjunctivae and sclerae normal, no   icterus, no pallor, corneas clear, PERRLA   Throat:   No oral lesions, no thrush, oral mucosa moist   Neck:   No adenopathy, supple, trachea midline, no thyromegaly, no   carotid bruit, no JVD       Lungs:     Clear to auscultation,respirations regular, even and                  Unlabored     Heart:    Regular rhythm and normal rate, normal S1 and S2, no            murmur, no gallop, no rub, no click    Chest Wall:    No abnormalities observed   Abdomen:     Normal bowel sounds, no masses, no organomegaly, soft        non-tender, non-distended, no guarding, no rebound                Tenderness    Extremities:   Moves all extremities well, no edema, no cyanosis, no             Redness Strength and ataxia on the left are improving        Skin:   No bleeding, bruising or rash   Lymph nodes:   No palpable adenopathy   Neurologic:   Cranial nerves 2 - 12 grossly intact, sensation intact, DTR       present and equal bilaterally        RESULTS REVIEW:     Lab Results (last 24 hours)     Procedure Component Value Units Date/Time    POC Glucose Fingerstick  [[id number]]  (Normal) Collected:  11/07/17 1134    Specimen:  Blood Updated:  11/07/17 1146     Glucose 113 mg/dL       Meter: [id number]: [id number] STEPHANY JUÁREZ       POC Glucose Fingerstick [[id number]]  (Normal) Collected:  11/07/17 2003    Specimen:  Blood Updated:  11/07/17 2256     Glucose 127 mg/dL       Meter: [id number]: [id number] KSENIA MOE       POC Glucose Fingerstick [[id number]]  (Normal) Collected:  11/08/17 0614    Specimen:  Blood Updated:  11/08/17 0631     Glucose 114 mg/dL       Meter: [id number]: [id number] KSENIA MOE           Imaging Results (last 72 hours)     ** No results found for the last 72 hours. **          Assessment/Plan     Principal Problem:    Right-sided lacunar stroke  Active Problems:    Left-sided weakness    Atrial fibrillation, chronic    Essential hypertension    Diabetes mellitus    Chronic anxiety    Chronic back pain greater than 3 months duration    Degenerative joint disease involving multiple joints    Encounter for rehabilitation    Obstructive sleep apnea syndrome    Former smoker        He is participating well with therapy and is making progress towards goals.  We will continue intensive therapy.  He is receiving speech physical therapy and occupational therapy.  I will recheck his lab work tomorrow.  He is not complaining of back pain that seems to be well controlled.  Diabetes is well controlled also.  Discussed discharge date the 14th with him and he is looking forward to it.  He is pleased with his progress so far      Vishnu Mckinnon MD  11/08/17  8:36 AM

## 2017-11-08 NOTE — PLAN OF CARE
Problem: Patient Care Overview (Adult)  Goal: Plan of Care Review  Outcome: Ongoing (interventions implemented as appropriate)    11/08/17 0411   Coping/Psychosocial Response Interventions   Plan Of Care Reviewed With patient   Patient Care Overview   Progress no change   Outcome Evaluation   Outcome Summary/Follow up Plan No changes         Problem: Fall Risk (Adult)  Goal: Absence of Falls  Outcome: Ongoing (interventions implemented as appropriate)    Problem: Stroke (Ischemic) (Adult)  Goal: Signs and Symptoms of Listed Potential Problems Will be Absent or Manageable (Stroke)  Outcome: Ongoing (interventions implemented as appropriate)    Problem: Diabetes, Type 2 (Adult)  Goal: Signs and Symptoms of Listed Potential Problems Will be Absent or Manageable (Diabetes, Type 2)  Outcome: Ongoing (interventions implemented as appropriate)

## 2017-11-08 NOTE — THERAPY TREATMENT NOTE
Inpatient Rehabilitation - Occupational Therapy Treatment Note  [facility]     Patient Name: Kenneth Harper Jr.  : 1934  MRN: [id number]  Today's Date: 2017  Onset of Illness/Injury or Date of Surgery Date: 10/30/17  Date of Referral to OT: 10/30/17  Referring Physician: TERRENCE Mckinnon MD      Admit Date: 10/30/2017    Visit Dx:     ICD-10-CM ICD-9-CM   1. Symbolic dysfunction R48.9 784.60   2. Impaired mobility and ADLs Z74.09 799.89   3. Abnormality of gait and mobility R26.9 781.2   4. Muscle weakness (generalized) M62.81 728.87     Patient Active Problem List   Diagnosis   • Spinal stenosis, lumbar region, with neurogenic claudication   • Former smoker   • Overweight   • Left-sided weakness   • Right-sided lacunar stroke   • Essential hypertension   • Diabetes mellitus   • Chronic anxiety   • Chronic back pain greater than 3 months duration   • Prostatic hypertrophy   • Degenerative joint disease involving multiple joints   • Atrial fibrillation, chronic   • Encounter for rehabilitation   • Obstructive sleep apnea syndrome             Adult Rehabilitation Note       17 0737 17 1320 17 1108    Rehab Assessment/Intervention    Discipline occupational therapy assistant  -RC physical therapy assistant  -RW speech language pathologist  -HR    Document Type therapy note (daily note)  -RC therapy note (daily note)  -RW therapy note (daily note)  -HR    Subjective Information agree to therapy  -RC agree to therapy  -RW no complaints;agree to therapy  -HR    Patient Effort, Rehab Treatment good  -RC good  -RW good  -HR    Symptoms Noted During/After Treatment   none  -HR    Precautions/Limitations fall precautions  -RC  fall precautions  -HR    Precautions/Limitations, Vision   WFL with corrective lenses  -HR    Precautions/Limitations, Hearing   WFL  -HR    Recorded by [RC] MARCO Cannon/L [RW] Pipe Gruber, PTA [HR] Shante Otero, MS CCC-SLP    Pain Assessment    Pain  Assessment No/denies pain  -RC No/denies pain  -RW No/denies pain  -HR    Pain Score   0  -HR    Post Pain Score   0  -HR    Recorded by [RC] Cassie Carpio, LARA/L [RW] Pipe Gruber PTA [HR] Shante Otero MS CCC-SLP    Vision Assessment/Intervention    Visual Impairment   WFL with corrective lenses  -HR    Recorded by   [HR] Shante Otero MS CCC-SLP    Cognitive Assessment/Intervention    Current Cognitive/Communication Assessment functional  -RC functional  -RW functional  -HR    Orientation Status oriented x 4  -RC oriented to;oriented x 4  -RW oriented x 4  -HR    Follows Commands/Answers Questions   100% of the time  -HR    Recorded by [RC] Cassie Carpio, LARA/L [RW] Pipe Gruber PTA [HR] Shante Otero MS CCC-SLP    Cognitive Assessment Intervention    Behavior/Mood Observations  behavior appropriate to situation, WNL/WFL  -RW     Recorded by  [RW] Pipe Gruber PTA     Communication Treatment Objective and Progress    Cognitive Linguistic Treatment Objectives   Improve orientation;Improve memory skills;Improve functional problem solving  -HR    Recorded by   [HR] Shante Otero MS CCC-SLP    Improve orientation    Improve orientation through:   demonstrating orientation to day;demonstrating orientation to month;demonstrating orientation to year;demonstrating orientation to place;demonstrating orientation to disease/impairment;90%;with inconsistent cues  -HR    Status: Improve orientation through   Achieved  -HR    Recorded by   [HR] Shante Otero MS CCC-SLP    Improve memory skills    Improve memory skills through:   recalling related word lists with an imposed delay;90%;with inconsistent cues  -HR    Status: Improve memory skills   Progressing as expected  -HR    Memory Skills Progress   70%;80%;continue to address  -HR    Recorded by   [HR] Shante Otero MS CCC-SLP    Improve functional problem solving    Improve functional problem solving through:   complete organization/home  management task;tell similarity between items  -HR    Status: Improve functional problem solving   Progressing as expected  -HR    Functional Problem Solving Progress   80%;with consistent cues;continue to address  -HR    Comments: Improve functional problem solving   mod cues for map reading task this date with high accuracy  -HR    Recorded by   [HR] Shante Otero MS CCC-SLP    Bed Mobility, Assessment/Treatment    Bed Mobility, Scoot/Bridge, [place]  independent  -RW     Bed Mob, Supine to Sit, [place]  independent  -RW     Bed Mob, Sit to Supine, [place]  independent  -RW     Recorded by  [RW] Pipe Gruber PTA     Transfer Assessment/Treatment    Transfers, Bed-Chair [place] --   multiple transfers multiple surfaces  -RC supervision required  -RW     Transfers, Chair-Bed [place] supervision required;verbal cues required  -RC supervision required  -RW     Transfers, Bed-Chair-Bed, Assist Device rolling walker  -RC      Transfers, Sit-Stand [place] stand by assist  -RC stand by assist;verbal cues required  -RW     Transfers, Stand-Sit [place] stand by assist   practiced tech  -RC stand by assist;verbal cues required  -RW     Transfers, Sit-Stand-Sit, Assist Device  rolling walker  -RW     Recorded by [RC] MARCO Cannon/L [RW] Pipe Gruber PTA     Gait Assessment/Treatment    Gait, [place] Level  verbal cues required;contact guard assist  -RW     Gait, Assistive Device  rolling walker  -RW     Recorded by  [RW] Pipe Gruber PTA     Functional Mobility    Functional Mobility- Ind. Level supervision required  -RC      Functional Mobility- Device rolling walker  -RC      Functional Mobility-Distance (Feet) 150  -RC      Recorded by [RC] LEIGH CannonA/L      Wheelchair Training/Management    Wheelchair, Distance Propelled 150  - mod ind  -RW     Recorded by [RC] MARCO Cannon/L [RW] Pipe Gruber PTA     Lower Body Dressing  Assessment/Training    LB Dressing Assess/Train, Clothing Type donning:;shoes  -RC      LB Dressing Assess/Train, Position sitting  -RC      LB Dressing Assess/Train, [place] conditional independence  -RC      Recorded by [RC] DAYDAY Cannon      Toileting Assessment/Training    Toileting Assess/Train, Indepen Level supervision required  -RC      Recorded by [RC] MARCO Cannon/L      Grooming Assessment/Training    Grooming Assess/Train, Position sitting  -RC      Grooming Assess/Train, [place]ep[place] Level independent  -RC      Recorded by [RC] DAYDAY Cannon      Self-Feeding Assessment/Training    Self-Feeding Assess/Train, Position sitting  -RC      Self-Feeding Assess/Train, [place] independent  -RC      Recorded by [RC] MARCO Cannon/L      Balance Skills Training    Standing-Level of Assistance Close supervision  -RC      Static Standing Balance Support assistive device  -RC      Standing Balance # of Minutes 8  -RC      Recorded by [RC] MARCO Cannon/L      Therapy Exercises    Bilateral Lower Extremities  AROM:;20 reps;supine;ankle pumps/circles;heel slides;hip abduction/adduction;SAQ   pro 2 level 3 x 10 min. 2 sets of hs  -RW     Bilateral Upper Extremity --   pulley ex 5 min, rickshaw 15# 20x, ue bike 15 min+  -RC      Recorded by [RC] MARCO Cannon/L [RW] Pipe Gruber, MARTIN     Fine Motor Coordination Training    Detail (Fine Motor Coordination Training) --   velcro board,   -RC      Recorded by [RC] MARCO Cannon/L      Positioning and Restraints    Pre-Treatment Position sitting in chair/recliner  -RC sitting in chair/recliner  -RW     Post Treatment Position wheelchair  -RC wheelchair  -RW     In Bed with SLP;encouraged to call for assist  -RC      Recorded by [RC] MARCO Cannon/L [RW] Pipe Gruber, PTA       11/07/17 1010 11/07/17 0730 11/06/17 1332    Rehab Assessment/Intervention    Discipline physical therapy assistant  -RW  occupational therapy assistant  -RC physical therapy assistant  -RW    Document Type therapy note (daily note)  -RW therapy note (daily note)  -RC therapy note (daily note)  -RW    Subjective Information agree to therapy  -RW agree to therapy  -RC agree to therapy  -RW    Patient Effort, Rehab Treatment good  -RW good  -RC good  -RW    Precautions/Limitations  fall precautions  -RC fall precautions  -RW    Recorded by [RW] Pipe Gruber PTA [RC] Cassie Carpio, LARA/L [RW] Pipe Gruber PTA    Vital Signs    Pretreatment Heart Rate (beats/min) 81  -RW      Pre SpO2 (%) 96  -RW      O2 Delivery Pre Treatment room air  -RW      Recorded by [RW] Pipe Gruber PTA      Pain Assessment    Pain Assessment No/denies pain  -RW No/denies pain  -RC No/denies pain  -RW    Recorded by [RW] Pipe Gruber PTA [RC] Cassie Carpio, LARA/L [RW] Pipe Gruber PTA    Cognitive Assessment/Intervention    Current Cognitive/Communication Assessment functional  -RW functional  -RC functional  -RW    Orientation Status oriented to;oriented x 4  -RW oriented x 4  -RC oriented to;oriented x 4  -RW    Follows Commands/Answers Questions 100% of the time  -RW  100% of the time  -RW    Personal Safety Interventions gait belt;nonskid shoes/slippers when out of bed  -RW  gait belt;nonskid shoes/slippers when out of bed  -RW    Recorded by [RW] Pipe Gruber PTA [RC] Cassie Carpio, LARA/L [RW] Pipe Gruber PTA    Cognitive Assessment Intervention    Behavior/Mood Observations behavior appropriate to situation, WNL/WFL  -RW  behavior appropriate to situation, WNL/WFL  -RW    Recorded by [RW] Pipe Gruber PTA  [RW] Pipe Gruber PTA    Transfer Assessment/Treatment    Transfers, Bed-Chair [place]  stand by assist  -RC     Transfers, Chair-Bed [place]  stand by assist  -RC     Transfers, Bed-Chair-Bed, Assist Device  rolling walker  -RC     Transfers, Sit-Stand [place] stand by assist;verbal cues required  -RW  stand by assist  -RC stand by assist;verbal cues required  -RW    Transfers, Stand-Sit [place] stand by assist;verbal cues required  -RW stand by assist  -RC stand by assist;verbal cues required  -RW    Transfers, Sit-Stand-Sit, Assist Device rolling walker  -RW rolling walker  -RC rolling walker  -RW    Transfer, Comment   sit to stand 5 x 2  -RW    Recorded by [RW] Pipe Gruber PTA [RC] LEIGH CannonA/L [RW] Pipe Gruber PTA    Gait Assessment/Treatment    Gait, [place] Level verbal cues required;contact guard assist  -RW  verbal cues required;contact guard assist  -RW    Gait, Assistive Device rolling walker  -RW  rolling walker  -RW    Gait, Distance (Feet) 150    x 2  -RW  150   70 x 3  -RW    Gait, Comment gt improving but will get walker too far away during turns  -RW  improved gt quality. minimal foot drag on left  -RW    Recorded by [RW] Pipe Gruber PTA  [RW] Pipe Gruber PTA    Stairs Assessment/Treatment    Number of Stairs 12  -RW      Stairs, Handrail Location both sides  -RW      Stairs, [place] Level supervision required;contact guard assist  -RW      Recorded by [RW] Pipe Gruber PTA      Functional Mobility    Functional Mobility- Ind. Level  supervision required  -RC     Functional Mobility- Device  rolling walker  -RC     Recorded by  [RC] LEIGH CannonA/L     Wheelchair Training/Management    Wheelchair, Distance Propelled 160 mod ind  -RW      Recorded by [RW] Pipe Gruber PTA      Grooming Assessment/Training    Grooming Assess/Train, [place] Level  independent  -RC     Recorded by  [RC] LEIGH CannonA/L     Balance Skills Training    Gait Balance-Level of Assistance Close supervision  -RW  --  -RW    Gait Balance Support parallel bars  -RW  --  -RW    Gait Balance Activities tandem;side-stepping  -RW  --  -RW    Recorded by [RW] Pipe Gruber PTA  [RW] Pipe Gruber PTA    Therapy Exercises    Bilateral Lower Extremities AROM:;20  reps;sitting;hip flexion;knee flexion;LAQ   2 sets  -RW  AROM:;20 reps;sitting;hip flexion;knee flexion;LAQ   2 sets  -RW    BLE Resistance theraband  -RW  theraband  -RW    Bilateral Upper Extremity AROM:;15 reps;20 reps;sitting   tband row  -RW --   ue bike 15 min  -RC AROM:;15 reps;20 reps;sitting   tband rows 2 sets  -RW    BUE Resistance theraband  -RW  theraband  -RW    Recorded by [RW] Pipe Gruber PTA [RC] DAYDAY Cannon [RW] Pipe Gruber PTA    Fine Motor Coordination Training    Detail (Fine Motor Coordination Training)  --   lrg beads, card turning  -RC     Recorded by  [RC] MARCO Cannon/L     Positioning and Restraints    Pre-Treatment Position sitting in chair/recliner  -RW sitting in chair/recliner  -RC sitting in chair/recliner  -RW    Post Treatment Position wheelchair  -RW wheelchair  -RC wheelchair  -RW    In Wheelchair  sitting;encouraged to call for assist  -RC call light within reach  -RW    Recorded by [RW] Pipe Gruber PTA [RC] MARCO Cannon/L [RW] Pipe Gruber PTA      11/06/17 1002 11/06/17 0901 11/06/17 0724    Rehab Assessment/Intervention    Discipline physical therapy assistant  -RW speech language pathologist  -CK occupational therapy assistant  -KD    Document Type therapy note (daily note)  -RW therapy note (daily note)  -CK therapy note (daily note)  -KD    Subjective Information agree to therapy  -RW agree to therapy  -CK agree to therapy  -KD    Patient Effort, Rehab Treatment good  -RW good  -CK     Precautions/Limitations fall precautions  -RW      Recorded by [RW] Pipe Gruber PTA [CK] Concetta Springer, MS CCC-SLP [KD] MARCO Vidales/L    Vital Signs    Pre Treatment Diastolic BP   124  -KD    Intra Systolic BP Rehab   65  -KD    Pretreatment Heart Rate (beats/min) 74  -RW  74  -KD    Posttreatment Heart Rate (beats/min)   76  -KD    Pre SpO2 (%) 96  -RW  96  -KD    O2 Delivery Pre Treatment room air  -RW  room air  -KD    Post  SpO2 (%)   94  -KD    O2 Delivery Post Treatment   room air  -KD    Pre Patient Position   Sitting  -KD    Intra Patient Position   Standing  -KD    Post Patient Position   Sitting  -KD    Recorded by [RW] Pipe Gruber PTA  [KD] MARCO Vidales/L    Pain Assessment    Pain Assessment No/denies pain  -RW No/denies pain  -CK No/denies pain  -KD    Pain Score  0  -CK     Post Pain Score  0  -CK     Recorded by [RW] Pipe Gruber PTA [CK] Concetta Springer MS CCC-SLP [KD] LEIGH VidalesA/L    Cognitive Assessment/Intervention    Current Cognitive/Communication Assessment functional  -RW  functional  -KD    Orientation Status oriented to;oriented x 4  -RW  oriented x 4  -KD    Follows Commands/Answers Questions 100% of the time  -RW  100% of the time  -KD    Personal Safety Interventions gait belt;nonskid shoes/slippers when out of bed  -RW      Recorded by [RW] Pipe Gruber PTA  [KD] LEIGH VidalesA/L    Cognitive Assessment Intervention    Behavior/Mood Observations behavior appropriate to situation, WNL/WFL  -RW      Recorded by [RW] Pipe Gruber PTA      Communication Treatment Objective and Progress    Cognitive Linguistic Treatment Objectives  Improve orientation;Improve memory skills;Improve functional problem solving  -CK     Recorded by  [CK] Concetta Springer MS CCC-SLP     Improve orientation    Improve orientation through:  demonstrating orientation to day;demonstrating orientation to month;demonstrating orientation to year;demonstrating orientation to place;demonstrating orientation to disease/impairment;90%;with inconsistent cues  -CK     Status: Improve orientation through  Achieved  -CK     Orientation Progress  100%   w/independent use of phone for calendar  -CK     Recorded by  [CK] Concetta Springer MS CCC-SLP     Improve memory skills    Improve memory skills through:  recalling related word lists with an imposed delay;90%;with inconsistent cues  -CK     Status: Improve  memory skills  Progress slower than expected  -CK     Memory Skills Progress  50%  -CK     Comments: Improve memory skills  pt given new word set this date  -CK     Recorded by  [CK] Concetta Springer MS CCC-SLP     Improve functional problem solving    Improve functional problem solving through:  complete organization/home management task;tell similarity between items  -CK     Status: Improve functional problem solving  Progress slower than expected  -CK     Functional Problem Solving Progress  60%  -CK     Comments: Improve functional problem solving  max assist for task  -CK     Recorded by  [CK] Concetta Springer MS CCC-SLP     Transfer Assessment/Treatment    Transfers, Bed-Chair [place]   contact guard assist  -KD    Transfers, Sit-Stand [place] stand by assist;verbal cues required  -RW  contact guard assist  -KD    Transfers, Stand-Sit [place] stand by assist;verbal cues required  -RW  contact guard assist  -KD    Transfers, Sit-Stand-Sit, Assist Device rolling walker  -RW  rolling walker  -KD    Transfer, Comment   4 sit-stands  -KD    Recorded by [RW] Pipe Gruber PTA  [KD] LEIGH VidalesA/L    Gait Assessment/Treatment    Gait, [place] Level minimum assist (75% patient effort);verbal cues required;contact guard assist  -RW      Gait, Assistive Device rolling walker  -RW      Gait, Distance (Feet) 150    x 2  -RW      Recorded by [RW] Pipe Gruber PTA      Functional Mobility    Functional Mobility- Ind. Level   contact guard assist  -KD    Functional Mobility- Device   rolling walker  -KD    Functional Mobility-Distance (Feet)   150   x 2  -KD    Recorded by   [KD] LEIGH VidalesA/L    Grooming Assessment/Training    Grooming Assess/Train, Assistive Device   electric razor   comb hair  -KD    Grooming Assess/Train, Position   sitting;sink side  -KD    Grooming Assess/Train, [place]ep[place] Level   conditional independence  -KD    Recorded by   [KD] LEIGH VidalesA/L     Self-Feeding Assessment/Training    Self-Feeding Assess/Train, Position   sitting  -KD    Self-Feeding Assess/Train, [place]   conditional independence  -KD    Self-Feeding Assess/Train, Spill[place] Amount   minimal  -KD    Recorded by   [KD] LEIGH VidalesA/L    Balance Skills Training    Gait Balance-Level of Assistance Contact guard  -RW      Gait Balance Support parallel bars  -RW      Gait Balance Activities --   gt w/o ad in // bars  -RW      Recorded by [RW] Pipe Gruber PTA      Therapy Exercises    Bilateral Lower Extremities AROM:;20 reps;sitting;hip flexion;knee flexion;LAQ   2 sets  -RW      BLE Resistance theraband  -RW      Bilateral Upper Extremity AROM:;15 reps;20 reps;sitting   tband rows 2 sets  -RW  AROM:;20 reps;sitting;elbow flexion/extension;pronation/supination;shoulder abduction/adduction;shoulder circles;shoulder extension/flexion;shoulder horizontal abd/add   UEE x 15 mins with 1 RB  -KD    BUE Resistance theraband  -RW  manual resistance- moderate   Pulley system x 15 mins with RB's PRN  -KD    Recorded by [RW] Pipe Gruber PTA  [KD] MARCO Vidales/L    Positioning and Restraints    Pre-Treatment Position sitting in chair/recliner  -RW  sitting in chair/recliner  -KD    Post Treatment Position wheelchair  -RW  wheelchair  -KD    In Bed   sitting;sitting EOB;call light within reach;encouraged to call for assist;exit alarm on  -KD    In Wheelchair call light within reach  -RW      Recorded by [RW] Pipe Gruber PTA  [KD] MARCO Vidales/L      User Key  (r) = Recorded By, (t) = Taken By, (c) = Cosigned By    Initials Name Effective Dates    HR Shante Otero, MS CCC-SLP 12/15/16 -     CK Concetta Springer, MS CCC-SLP 10/17/16 -     RW Pipe Gruber PTA 10/17/16 -     LEIGH YoungA/L 10/17/16 -     LEIGH SánchezA/L 10/17/16 -                 OT Goals       11/08/17 0929 11/07/17 1351 11/06/17 1425    Transfer Training OT LTG    Transfer Training  OT LTG, Date Goal Reviewed 11/08/17  -RC 11/07/17  -RC     Transfer Training OT LTG, Outcome  goal ongoing  -RC     Patient Education OT LTG    Patient Education OT LTG, Date Goal Reviewed 11/08/17  -RC 11/07/17  -RC     Patient Education OT LTG Outcome  goal ongoing  -RC     Safety Awareness OT LTG    Safety Awareness OT LTG, Date Goal Reviewed 11/08/17  -RC 11/07/17  -RC 11/06/17  -KD    Safety Awareness OT LTG, Outcome  goal ongoing  -RC goal not met  -KD    ADL OT LTG    ADL OT LTG, Date Goal Reviewed 11/08/17  -RC 11/07/17  -RC     ADL OT LTG, Outcome goal ongoing  -RC goal ongoing  -RC     Endurance OT LTG    Endurance Goal OT LTG, Date Goal Reviewed 11/08/17  -RC 11/07/17  -RC     Endurance Goal OT LTG, Outcome goal met  -RC goal partially met  -RC       11/06/17 0724 11/04/17 0738 11/03/17 1359    Transfer Training OT LTG    Transfer Training OT LTG, Assist Device walker, rolling platform  -KD      Transfer Training OT LTG, Date Goal Reviewed 11/06/17  -KD 11/04/17  -RC 11/03/17  -LM    Transfer Training OT LTG, Outcome goal not met  -KD  goal ongoing  -LM    Patient Education OT LTG    Patient Education OT LTG, Date Goal Reviewed 11/06/17  -KD 11/04/17  -RC 11/03/17  -LM    Patient Education OT LTG Outcome goal not met  -KD  goal not met  -LM    Safety Awareness OT LTG    Safety Awareness OT LTG, Date Goal Reviewed  11/04/17  -RC 11/03/17  -LM    Safety Awareness OT LTG, Outcome   goal not met  -LM    ADL OT LTG    ADL OT LTG, Date Goal Reviewed 11/06/17  -KD 11/04/17  -RC 11/03/17  -LM    ADL OT LTG, Outcome goal not met  -KD  goal not met  -LM    Endurance OT LTG    Endurance Goal OT LTG, Date Goal Reviewed 11/06/17  -KD 11/04/17  -RC 11/03/17  -LM    Endurance Goal OT LTG, Outcome goal not met  -KD goal ongoing  -RC goal not met  -LM      11/02/17 0715 11/01/17 1635 10/31/17 0925    Transfer Training OT LTG    Transfer Training OT LTG, Date Established   10/31/17  -BH (r) SP (t) BH (c)    Transfer  Training OT LTG, Time to Achieve   by discharge  -BH (r) SP (t) BH (c)    Transfer Training OT LTG, Activity Type   all transfers  -BH (r) SP (t) BH (c)    Transfer Training OT LTG, [place] Level   contact guard assist  -BH (r) SP (t) BH (c)    Transfer Training OT LTG, Date Goal Reviewed 11/02/17  -KD 11/01/17  -RW     Transfer Training OT LTG, Outcome goal not met  -KD goal ongoing  -RW     Patient Education OT LTG    Patient Education OT LTG, Date Established   10/31/17  -BH (r) SP (t) BH (c)    Patient Education OT LTG, Time to Achieve   by discharge  -BH (r) SP (t) BH (c)    Patient Education OT LTG, Education Type   HEP;posture/body mechanics;1 hand/anni technique;home safety;adaptive equipment mgmt;energy conservation  -BH (r) SP (t) BH (c)    Patient Education OT LTG, Education Understanding   independent;demonstrates adequately;verbalizes understanding   with caregiver assist as necessary  -BH (r) SP (t) BH (c)    Patient Education OT LTG, Date Goal Reviewed 11/02/17  -KD 11/01/17  -RW     Patient Education OT LTG Outcome goal not met  -KD goal ongoing  -RW     Safety Awareness OT LTG    Safety Awareness OT LTG, Date Established   10/31/17  -BH (r) SP (t) BH (c)    Safety Awareness OT LTG, Time to Achieve   by discharge  -BH (r) SP (t) BH (c)    Safety Awareness OT LTG, Activity Type   good safety awareness;with kitchen mobility;with ADL's  -BH (r) SP (t) BH (c)    Safety Awareness OT LTG, [place] Level   min verbal cues  -BH (r) SP (t) BH (c)    Safety Awareness OT LTG, Date Goal Reviewed 11/02/17  -KD 11/01/17  -RW     Safety Awareness OT LTG, Outcome goal not met  -KD goal ongoing  -RW     ADL OT LTG    ADL OT LTG, Date Established   10/31/17  -BH (r) SP (t) BH (c)    ADL OT LTG, Time to Achieve   by discharge  -BH (r) SP (t) BH (c)    ADL OT LTG, Activity Type   ADL skills  -BH (r) SP (t) BH (c)    ADL OT LTG, Additional Goal   Set-up A with self-feeding and grooming; VC for UB bathing and UB  dressing; CGA with LB bathing and LB dressing  -BH (r) SP (t) BH (c)    ADL OT LTG, Date Goal Reviewed  11/01/17  -RW     ADL OT LTG, Outcome goal not met  -KD goal ongoing  -RW     Endurance OT LTG    Endurance Goal OT LTG, Date Established   10/31/17  -BH (r) SP (t) BH (c)    Endurance Goal OT LTG, Time to Achieve   by discharge  -BH (r) SP (t) BH (c)    Endurance Goal OT LTG, Activity Level   endurance 2 good-  -BH (r) SP (t) BH (c)    Endurance Goal OT LTG, Additional Goal   During 30 minutes of functional tasks, ADL, and therapeutic exercise  -BH (r) SP (t) BH (c)    Endurance Goal OT LTG, Date Goal Reviewed 11/02/17  -KD 11/01/17  -RW     Endurance Goal OT LTG, Outcome goal not met  -KD goal ongoing  -RW       User Key  (r) = Recorded By, (t) = Taken By, (c) = Cosigned By    Initials Name Provider Type     Karoline Huang, OTR/L Occupational Therapist    RW Jacinta Pitts, OTR/L Occupational Therapist    RC Cassie Carpio, LARA/L Occupational Therapy Assistant    KD Therese King LARA/L Occupational Therapy Assistant    MOSHE Boucher LARA/L Occupational Therapy Assistant    SP Alem Bruce, OT Student OT Student          Occupational Therapy Education     Title: PT OT SLP Therapies (Active)     Topic: Occupational Therapy (Active)     Point: ADL training (Active)    Description: Instruct learner(s) on proper safety adaptation and remediation techniques during self care or transfers.   Instruct in proper use of assistive devices.    Learning Progress Summary    Learner Readiness Method Response Comment Documented by Status   Patient Acceptance TB NR  KD 11/02/17 1108 Active    Acceptance E,D NR   11/01/17 1518 Active    Acceptance E VU Educated about OT and POC. Educated about anni dressing technique. Educated about safety with ADL and t/f. Educated to call for assist and not to stand independently. SP 10/31/17 1354 Done   Family Acceptance E,D NR   11/01/17 1518 Active    Acceptance E VU  Educated about OT and POC. Educated about anni dressing technique. Educated about safety with ADL and t/f. Educated to call for assist and not to stand independently.  10/31/17 1354 Done               Point: Home exercise program (Active)    Description: Instruct learner(s) on appropriate technique for monitoring, assisting and/or progressing therapeutic exercises/activities.    Learning Progress Summary    Learner Readiness Method Response Comment Documented by Status   Patient Acceptance E,D NR theraputty  11/01/17 1634 Active   Family Acceptance E,D NR theraputty  11/01/17 1634 Active               Point: Precautions (Active)    Description: Instruct learner(s) on prescribed precautions during self-care and functional transfers.    Learning Progress Summary    Learner Readiness Method Response Comment Documented by Status   Patient Acceptance E NR,VU recommned no climbing on ladders or step stools.  11/08/17 0928 Done    Acceptance E NR   11/07/17 1350 Active    Acceptance E NR   11/04/17 0930 Active    Acceptance E,D NR   11/01/17 1518 Active    Acceptance E VU Educated about OT and POC. Educated about anni dressing technique. Educated about safety with ADL and t/f. Educated to call for assist and not to stand independently. SP 10/31/17 1354 Done   Family Acceptance E,D NR   11/01/17 1518 Active    Acceptance E VU Educated about OT and POC. Educated about anni dressing technique. Educated about safety with ADL and t/f. Educated to call for assist and not to stand independently.  10/31/17 1354 Done               Point: Body mechanics (Done)    Description: Instruct learner(s) on proper positioning and spine alignment during self-care, functional mobility activities and/or exercises.    Learning Progress Summary    Learner Readiness Method Response Comment Documented by Status   Patient Acceptance E NR,VU recommned no climbing on ladders or step stools.  11/08/17 0928 Done    Acceptance E NR    11/07/17 1350 Active    Acceptance E NR  RC 11/04/17 0930 Active    Acceptance E VU Educated about OT and POC. Educated about anni dressing technique. Educated about safety with ADL and t/f. Educated to call for assist and not to stand independently. SP 10/31/17 1354 Done   Family Acceptance E VU Educated about OT and POC. Educated about anni dressing technique. Educated about safety with ADL and t/f. Educated to call for assist and not to stand independently.  10/31/17 1354 Done                      User Key     Initials Effective Dates Name Provider Type Discipline    RW 10/17/16 -  Jacinta Pitts OTR/L Occupational Therapist OT    RC 10/17/16 -  Cassie Carpio LARA/L Occupational Therapy Assistant OT    KD 10/17/16 -  MARCO Vidales/L Occupational Therapy Assistant OT    SP 01/31/17 -  Alem Bruce OT Student OT Student OT                  OT Recommendation and Plan  Anticipated Equipment Needs At Discharge: bathing equipment, dressing equipment, front wheeled walker  Anticipated Discharge Disposition: home with / care, home with home health  Planned Therapy Interventions: activity intolerance, adaptive equipment training, ADL retraining, IADL retraining, balance training, bed mobility training, energy conservation, fine motor coordination training, home exercise program, motor coordination training, neuromuscular re-education, ROM (Range of Motion), strengthening, transfer training  Therapy Frequency: other (see comments) (3-14x/wk)  Plan of Care Review  Plan Of Care Reviewed With: patient  Progress: improving  Outcome Summary/Follow up Plan: 1/5 goals  progressing toward other goals,   cont poc        Outcome Measures       11/08/17 0737 11/07/17 0730 11/06/17 1000    How much help from another person do you currently need...    Turning from your back to your side while in flat bed without using bedrails?   3  -RW    Moving from lying on back to sitting on the side of a flat bed without  bedrails?   3  -RW    Moving to and from a bed to a chair (including a wheelchair)?   3  -RW    Standing up from a chair using your arms (e.g., wheelchair, bedside chair)?   3  -RW    Climbing 3-5 steps with a railing?   3  -RW    To walk in hospital room?   3  -RW    AM-PAC 6 Clicks Score   18  -RW    How much help from another is currently needed...    Putting on and taking off regular lower body clothing? 3  -RC 3  -RC     Bathing (including washing, rinsing, and drying) 3  -RC 3  -RC     Toileting (which includes using toilet bed pan or urinal) 3  -RC 2  -RC     Putting on and taking off regular upper body clothing 3  -RC 3  -RC     Taking care of personal grooming (such as brushing teeth) 3  -RC 3  -RC     Eating meals 4  -RC 3  -RC     Score 19  -RC 17  -RC       11/06/17 0724          How much help from another is currently needed...    Putting on and taking off regular lower body clothing? 3  -KD      Bathing (including washing, rinsing, and drying) 3  -KD      Toileting (which includes using toilet bed pan or urinal) 2  -KD      Putting on and taking off regular upper body clothing 3  -KD      Taking care of personal grooming (such as brushing teeth) 3  -KD      Eating meals 3  -KD      Score 17  -KD        User Key  (r) = Recorded By, (t) = Taken By, (c) = Cosigned By    Initials Name Provider Type    RW Pipe Gruber, MARTIN Physical Therapy Assistant    MARCO Young/L Occupational Therapy Assistant    MARCO Sánchez/L Occupational Therapy Assistant           Time Calculation:         Time Calculation- OT       11/08/17 0931          Time Calculation- OT    OT Start Time 0737  -      OT Stop Time 0904  -      OT Time Calculation (min) 87 min  -      Total Timed Code Minutes- OT 87 minute(s)  -      OT Received On 11/08/17  -        User Key  (r) = Recorded By, (t) = Taken By, (c) = Cosigned By    Initials Name Provider Type     MARCO Cannon/L Occupational Therapy  Assistant           Therapy Charges for Today     Code Description Service Date Service Provider Modifiers Qty    38875456784 HC OT THER PROC EA 15 MIN 11/7/2017 Cassie TAMAYO Balaji, LARA/L GO 1    12255690884 HC OT THERAPEUTIC ACT EA 15 MIN 11/7/2017 Cassie G Balaji, LARA/L GO 5    14854531660 HC OT SELF CARE/MGMT/TRAIN EA 15 MIN 11/8/2017 Cassie G Balaji, LARA/L GO 1    17044167767 HC OT THER PROC EA 15 MIN 11/8/2017 Cassie G Balaji, LARA/L GO 3    17895289401 HC OT THERAPEUTIC ACT EA 15 MIN 11/8/2017 Cassie G Balaji, LARA/L GO 2          OT G-codes  OT Professional Judgement Used?: Yes  OT Functional Scales Options: AM-PAC 6 Clicks Daily Activity (OT)  Score: 15  Functional Limitation: Self care  Self Care Current Status (): At least 40 percent but less than 60 percent impaired, limited or restricted  Self Care Goal Status (): At least 1 percent but less than 20 percent impaired, limited or restricted    Cassie RONI Balaji, LARA/L  11/8/2017

## 2017-11-08 NOTE — PLAN OF CARE
Problem: Patient Care Overview (Adult)  Goal: Plan of Care Review  Outcome: Ongoing (interventions implemented as appropriate)    11/08/17 0929   Coping/Psychosocial Response Interventions   Plan Of Care Reviewed With patient   Patient Care Overview   Progress improving   Outcome Evaluation   Outcome Summary/Follow up Plan 1/5 goals progressing toward other goals, cont poc         Problem: Inpatient Occupational Therapy  Goal: Transfer Training Goal 1 LTG- OT  Outcome: Ongoing (interventions implemented as appropriate)    10/31/17 0925 11/06/17 0724 11/07/17 1351   Transfer Training OT LTG   Transfer Training OT LTG, Date Established 10/31/17 --  --    Transfer Training OT LTG, Time to Achieve by discharge --  --    Transfer Training OT LTG, Activity Type all transfers --  --    Transfer Training OT LTG, [place] Level contact guard assist --  --    Transfer Training OT LTG, Assist Device --  walker, rolling platform --    Transfer Training OT LTG, Date Goal Reviewed --  --  --    Transfer Training OT LTG, Outcome --  --  goal ongoing     11/08/17 0929   Transfer Training OT LTG   Transfer Training OT LTG, Date Established --    Transfer Training OT LTG, Time to Achieve --    Transfer Training OT LTG, Activity Type --    Transfer Training OT LTG, [place] Level --    Transfer Training OT LTG, Assist Device --    Transfer Training OT LTG, Date Goal Reviewed 11/08/17   Transfer Training OT LTG, Outcome --        Goal: Patient Education Goal LTG- OT  Outcome: Ongoing (interventions implemented as appropriate)    10/31/17 0925 11/07/17 1351 11/08/17 0929   Patient Education OT LTG   Patient Education OT LTG, Date Established 10/31/17 --  --    Patient Education OT LTG, Time to Achieve by discharge --  --    Patient Education OT LTG, Education Type HEP;posture/body mechanics;1 hand/anni technique;home safety;adaptive equipment mgmt;energy conservation --  --    Patient Education OT LTG, Education Understanding  independent;demonstrates adequately;verbalizes understanding  (with caregiver assist as necessary) --  --    Patient Education OT LTG, Date Goal Reviewed --  --  11/08/17   Patient Education OT LTG Outcome --  goal ongoing --        Goal: Safety Awareness Goal LTG- OT  Outcome: Ongoing (interventions implemented as appropriate)    10/31/17 0925 11/07/17 1351 11/08/17 0929   Safety Awareness OT LTG   Safety Awareness OT LTG, Date Established 10/31/17 --  --    Safety Awareness OT LTG, Time to Achieve by discharge --  --    Safety Awareness OT LTG, Activity Type good safety awareness;with kitchen mobility;with ADL's --  --    Safety Awareness OT LTG, [place] Level min verbal cues --  --    Safety Awareness OT LTG, Date Goal Reviewed --  --  11/08/17   Safety Awareness OT LTG, Outcome --  goal ongoing --        Goal: ADL Goal LTG- OT  Outcome: Ongoing (interventions implemented as appropriate)    10/31/17 0925 11/08/17 0929   ADL OT LTG   ADL OT LTG, Date Established 10/31/17 --    ADL OT LTG, Time to Achieve by discharge --    ADL OT LTG, Activity Type ADL skills --    ADL OT LTG, Additional Goal Set-up A with self-feeding and grooming; VC for UB bathing and UB dressing; CGA with LB bathing and LB dressing --    ADL OT LTG, Date Goal Reviewed --  11/08/17   ADL OT LTG, Outcome --  goal ongoing       Goal: Endurance Goal LTG- OT  Outcome: Outcome(s) achieved Date Met:  11/08/17    10/31/17 0925 11/08/17 0929   Endurance OT LTG   Endurance Goal OT LTG, Date Established 10/31/17 --    Endurance Goal OT LTG, Time to Achieve by discharge --    Endurance Goal OT LTG, Activity Level endurance 2 good- --    Endurance Goal OT LTG, Additional Goal During 30 minutes of functional tasks, ADL, and therapeutic exercise --    Endurance Goal OT LTG, Date Goal Reviewed --  11/08/17   Endurance Goal OT LTG, Outcome --  goal met

## 2017-11-08 NOTE — PLAN OF CARE
Problem: Patient Care Overview (Adult)  Goal: Plan of Care Review  Outcome: Ongoing (interventions implemented as appropriate)    11/08/17 1119   Coping/Psychosocial Response Interventions   Plan Of Care Reviewed With patient   Outcome Evaluation   Outcome Summary/Follow up Plan pt continues to improve gt quality tho stepovers was challenging. making good progress overall.         Problem: Inpatient Physical Therapy  Goal: Gait Training Goal LTG- PT  Outcome: Ongoing (interventions implemented as appropriate)    10/31/17 0825 11/08/17 1119   Gait Training PT LTG   Gait Training Goal PT LTG, Date Established 10/31/17 --    Gait Training Goal PT LTG, Time to Achieve by discharge --    Gait Training Goal PT LTG, [place] Level supervision required --    Gait Training Goal PT LTG, Assist Device (AAD) --    Gait Training Goal PT LTG, Distance to Achieve 150 feet --    Gait Training Goal PT LTG, Date Goal Reviewed --  11/08/17   Gait Training Goal PT LTG, Outcome --  goal ongoing       Goal: Stair Training Goal STG- PT  Outcome: Ongoing (interventions implemented as appropriate)    10/31/17 0825 11/08/17 1119   Stair Training PT STG   Stair Training Goal PT STG, Date Established 10/31/17 --    Stair Training Goal PT STG, Time to Achieve 4 days --    Stair Training Goal PT STG, Number of Steps 4 steps --    Stair Training Goal PT STG, [place] Level contact guard assist --    Stair Training Goal PT STG, Assist Device 1 handrail --    Stair Training Goal PT STG, Date Goal Reviewed --  11/08/17   Stair Training Goal PT STG, Outcome --  goal ongoing       Goal: Stair Training Goal LTG- PT  Outcome: Ongoing (interventions implemented as appropriate)    10/31/17 0825 11/04/17 1440 11/08/17 1119   Stair Training PT LTG   Stair Training Goal PT LTG, Date Established --  11/04/17 --    Stair Training Goal PT LTG, Time to Achieve by discharge --  --    Stair Training Goal PT LTG, Number of Steps 1 flight --  --    Stair  Training Goal PT LTG, [place] Level supervision required --  --    Stair Training Goal PT LTG, Assist Device 2 handrails --  --    Stair Training Goal PT LTG, Date Goal Reviewed --  --  11/08/17   Stair Training Goal PT LTG, Outcome --  --  goal ongoing

## 2017-11-09 LAB
ALBUMIN SERPL-MCNC: 3.8 G/DL (ref 3.4–4.8)
ALBUMIN/GLOB SERPL: 1.3 G/DL (ref 1.1–1.8)
ALP SERPL-CCNC: 46 U/L (ref 38–126)
ALT SERPL W P-5'-P-CCNC: 37 U/L (ref 21–72)
ANION GAP SERPL CALCULATED.3IONS-SCNC: 11 MMOL/L (ref 5–15)
AST SERPL-CCNC: 33 U/L (ref 17–59)
BASOPHILS # BLD MANUAL: 0.08 10*3/MM3 (ref 0–0.2)
BASOPHILS NFR BLD AUTO: 1 % (ref 0–2)
BILIRUB SERPL-MCNC: 1.1 MG/DL (ref 0.2–1.3)
BUN BLD-MCNC: 19 MG/DL (ref 7–21)
BUN/CREAT SERPL: 21.1 (ref 7–25)
CALCIUM SPEC-SCNC: 9.2 MG/DL (ref 8.4–10.2)
CHLORIDE SERPL-SCNC: 105 MMOL/L (ref 95–110)
CO2 SERPL-SCNC: 24 MMOL/L (ref 22–31)
CREAT BLD-MCNC: 0.9 MG/DL (ref 0.7–1.3)
DEPRECATED RDW RBC AUTO: 43.7 FL (ref 35.1–43.9)
EOSINOPHIL # BLD MANUAL: 0.08 10*3/MM3 (ref 0–0.7)
EOSINOPHIL NFR BLD MANUAL: 1 % (ref 0–7)
ERYTHROCYTE [DISTWIDTH] IN BLOOD BY AUTOMATED COUNT: 13.3 % (ref 11.5–14.5)
GFR SERPL CREATININE-BSD FRML MDRD: 81 ML/MIN/1.73 (ref 42–98)
GLOBULIN UR ELPH-MCNC: 2.9 GM/DL (ref 2.3–3.5)
GLUCOSE BLD-MCNC: 135 MG/DL (ref 60–100)
GLUCOSE BLDC GLUCOMTR-MCNC: 141 MG/DL (ref 70–130)
GLUCOSE BLDC GLUCOMTR-MCNC: 72 MG/DL (ref 70–130)
GLUCOSE BLDC GLUCOMTR-MCNC: 80 MG/DL (ref 70–130)
GLUCOSE BLDC GLUCOMTR-MCNC: 95 MG/DL (ref 70–130)
HCT VFR BLD AUTO: 38.9 % (ref 39–49)
HGB BLD-MCNC: 13 G/DL (ref 13.7–17.3)
LYMPHOCYTES # BLD MANUAL: 1.37 10*3/MM3 (ref 0.6–4.2)
LYMPHOCYTES NFR BLD MANUAL: 18 % (ref 10–50)
LYMPHOCYTES NFR BLD MANUAL: 5 % (ref 0–12)
MCH RBC QN AUTO: 30 PG (ref 26.5–34)
MCHC RBC AUTO-ENTMCNC: 33.4 G/DL (ref 31.5–36.3)
MCV RBC AUTO: 89.6 FL (ref 80–98)
MONOCYTES # BLD AUTO: 0.38 10*3/MM3 (ref 0–0.9)
NEUTROPHILS # BLD AUTO: 5.33 10*3/MM3 (ref 2–8.6)
NEUTROPHILS NFR BLD MANUAL: 70 % (ref 37–80)
PLATELET # BLD AUTO: 258 10*3/MM3 (ref 150–450)
PMV BLD AUTO: 9.9 FL (ref 8–12)
POTASSIUM BLD-SCNC: 4.2 MMOL/L (ref 3.5–5.1)
PROT SERPL-MCNC: 6.7 G/DL (ref 6.3–8.6)
RBC # BLD AUTO: 4.34 10*6/MM3 (ref 4.37–5.74)
RBC MORPH BLD: NORMAL
SMALL PLATELETS BLD QL SMEAR: ADEQUATE
SODIUM BLD-SCNC: 140 MMOL/L (ref 137–145)
VARIANT LYMPHS NFR BLD MANUAL: 5 % (ref 0–5)
WBC MORPH BLD: NORMAL
WBC NRBC COR # BLD: 7.62 10*3/MM3 (ref 3.2–9.8)

## 2017-11-09 PROCEDURE — 82962 GLUCOSE BLOOD TEST: CPT

## 2017-11-09 PROCEDURE — 97110 THERAPEUTIC EXERCISES: CPT

## 2017-11-09 PROCEDURE — 25010000002 ENOXAPARIN PER 10 MG: Performed by: FAMILY MEDICINE

## 2017-11-09 PROCEDURE — 97535 SELF CARE MNGMENT TRAINING: CPT

## 2017-11-09 PROCEDURE — 85025 COMPLETE CBC W/AUTO DIFF WBC: CPT | Performed by: FAMILY MEDICINE

## 2017-11-09 PROCEDURE — 97116 GAIT TRAINING THERAPY: CPT

## 2017-11-09 PROCEDURE — 80053 COMPREHEN METABOLIC PANEL: CPT | Performed by: FAMILY MEDICINE

## 2017-11-09 PROCEDURE — 99232 SBSQ HOSP IP/OBS MODERATE 35: CPT | Performed by: FAMILY MEDICINE

## 2017-11-09 PROCEDURE — 97530 THERAPEUTIC ACTIVITIES: CPT

## 2017-11-09 PROCEDURE — 85007 BL SMEAR W/DIFF WBC COUNT: CPT | Performed by: FAMILY MEDICINE

## 2017-11-09 PROCEDURE — 92507 TX SP LANG VOICE COMM INDIV: CPT | Performed by: SPEECH-LANGUAGE PATHOLOGIST

## 2017-11-09 RX ADMIN — ASPIRIN 81 MG 81 MG: 81 TABLET ORAL at 09:52

## 2017-11-09 RX ADMIN — METFORMIN HYDROCHLORIDE 1000 MG: 500 TABLET ORAL at 09:50

## 2017-11-09 RX ADMIN — THERA TABS 1 TABLET: TAB at 09:52

## 2017-11-09 RX ADMIN — AMLODIPINE BESYLATE 10 MG: 10 TABLET ORAL at 20:17

## 2017-11-09 RX ADMIN — HYDROCORTISONE: 1 CREAM TOPICAL at 17:25

## 2017-11-09 RX ADMIN — GLIPIZIDE 10 MG: 10 TABLET ORAL at 09:50

## 2017-11-09 RX ADMIN — METOPROLOL SUCCINATE 50 MG: 50 TABLET, EXTENDED RELEASE ORAL at 20:18

## 2017-11-09 RX ADMIN — TERAZOSIN HYDROCHLORIDE ANHYDROUS 5 MG: 5 CAPSULE ORAL at 20:18

## 2017-11-09 RX ADMIN — FLUTICASONE PROPIONATE 2 SPRAY: 50 SPRAY, METERED NASAL at 09:51

## 2017-11-09 RX ADMIN — MAGNESIUM OXIDE TAB 400 MG (241.3 MG ELEMENTAL MG) 400 MG: 400 (241.3 MG) TAB at 09:52

## 2017-11-09 RX ADMIN — ENOXAPARIN SODIUM 40 MG: 40 INJECTION SUBCUTANEOUS at 09:47

## 2017-11-09 RX ADMIN — PANTOPRAZOLE SODIUM 40 MG: 40 TABLET, DELAYED RELEASE ORAL at 06:09

## 2017-11-09 RX ADMIN — METFORMIN HYDROCHLORIDE 1000 MG: 500 TABLET ORAL at 17:25

## 2017-11-09 RX ADMIN — FINASTERIDE 5 MG: 5 TABLET, FILM COATED ORAL at 09:51

## 2017-11-09 RX ADMIN — LIDOCAINE 1 PATCH: 50 PATCH TOPICAL at 09:49

## 2017-11-09 RX ADMIN — POTASSIUM CHLORIDE 10 MEQ: 750 CAPSULE, EXTENDED RELEASE ORAL at 09:52

## 2017-11-09 RX ADMIN — CLOPIDOGREL BISULFATE 75 MG: 75 TABLET ORAL at 09:52

## 2017-11-09 RX ADMIN — ATORVASTATIN CALCIUM 10 MG: 10 TABLET, FILM COATED ORAL at 20:17

## 2017-11-09 RX ADMIN — LISINOPRIL 10 MG: 10 TABLET ORAL at 09:50

## 2017-11-09 RX ADMIN — ACETAMINOPHEN 650 MG: 325 TABLET ORAL at 18:24

## 2017-11-09 RX ADMIN — HYDROCORTISONE: 1 CREAM TOPICAL at 09:53

## 2017-11-09 NOTE — THERAPY TREATMENT NOTE
Inpatient Rehabilitation - Speech Language Pathology Treatment Note  [facility]     Patient Name: Kenneth Harper Jr.  : 1934  MRN: [id number]  Today's Date: 2017  Referring Physician: Pratibha      Admit Date: 10/30/2017   Plans to d/c home with wife and daughter at home to assist on .    Goals:  1. Patient will demonstrate orientation to day, month, year, place, situation with 90% acc with min cues.  GOAL MET PREVIOUSLY  2. Patient will complete delayed word recall with 80% acc with min cues: GOAL MET previously   3. Patient will complete organization/home management task with 90% acc with min cues: recall info from a picture with mod cues and 90% accuracy.  90% accuracy on making word deductions: convergent naming.  Sequencing home repairs worksheet with mod cues and 70% accuracy.   Clementina Murrell, CCC-SLP 2017 11:43 AM    Visit Dx:      ICD-10-CM ICD-9-CM   1. Symbolic dysfunction R48.9 784.60   2. Impaired mobility and ADLs Z74.09 799.89   3. Abnormality of gait and mobility R26.9 781.2   4. Muscle weakness (generalized) M62.81 728.87     Patient Active Problem List   Diagnosis   • Spinal stenosis, lumbar region, with neurogenic claudication   • Former smoker   • Overweight   • Left-sided weakness   • Right-sided lacunar stroke   • Essential hypertension   • Diabetes mellitus   • Chronic anxiety   • Chronic back pain greater than 3 months duration   • Prostatic hypertrophy   • Degenerative joint disease involving multiple joints   • Atrial fibrillation, chronic   • Encounter for rehabilitation   • Obstructive sleep apnea syndrome              Adult Rehabilitation Note       17 0902 17 0936 17 0905    Rehab Assessment/Intervention    Discipline speech language pathologist  -EC physical therapy assistant  -RW speech language pathologist  -EC    Document Type therapy note (daily note)  -EC therapy note (daily note)  -RW therapy note (daily note)   -EC    Subjective Information agree to therapy  -EC agree to therapy  -RW no complaints;agree to therapy  -EC    Patient Effort, Rehab Treatment  good  -RW good  -EC    Precautions/Limitations  fall precautions  -RW     Recorded by [EC] MERI Schultz [RW] Pipe Gruber PTA [EC] MERI Schultz    Vital Signs    Intra Systolic BP Rehab  112  -RW     Intra Treatment Diastolic BP  63  -RW     Intratreatment Heart Rate (beats/min)  59  -RW     Recorded by  [RW] Pipe Gruber PTA     Pain Assessment    Pain Assessment No/denies pain  -EC No/denies pain  -RW No/denies pain  -EC    Recorded by [EC] MERI Schultz [RW] Pipe Gruber PTA [EC] MERI Schultz    Cognitive Assessment/Intervention    Current Cognitive/Communication Assessment  functional  -RW     Orientation Status  oriented to;oriented x 4  -RW     Follows Commands/Answers Questions  100% of the time  -RW     Personal Safety Interventions  gait belt;nonskid shoes/slippers when out of bed  -RW     Recorded by  [RW] Pipe Gruber PTA     Cognitive Assessment Intervention    Behavior/Mood Observations  behavior appropriate to situation, WNL/WFL  -RW     Recorded by  [RW] Pipe Gruber PTA     Communication Treatment Objective and Progress    Cognitive Linguistic Treatment Objectives Improve memory skills;Improve functional problem solving  -EC  Improve memory skills;Improve functional problem solving  -EC    Recorded by [EC] MERI Schultz  [EC] MERI Schultz    Improve memory skills    Improve memory skills through:   recalling related word lists with an imposed delay;90%;with inconsistent cues  -EC    Status: Improve memory skills   Achieved  -EC    Memory Skills Progress   90%  -EC    Comments: Improve memory skills   pt recalled word list from last friday and from yesterday with no cues.    -EC    Recorded by   [EC] MERI Schultz    Improve functional problem  solving    Improve functional problem solving through: complete organization/home management task;tell similarity between items  -EC  complete organization/home management task;tell similarity between items  -EC    Status: Improve functional problem solving Progressing as expected  -EC  Progressing as expected  -EC    Functional Problem Solving Progress with consistent cues;continue to address;70%;80%  -EC  80%;with consistent cues;continue to address  -EC    Comments: Improve functional problem solving   moderate cue  -EC    Recorded by [EC] Clementina Murrell CCC-SLP  [EC] Clementina Murrell CCC-SLP    Transfer Assessment/Treatment    Transfers, Bed-Chair [place]  supervision required  -RW     Transfers, Chair-Bed [place]  supervision required  -RW     Transfers, Sit-Stand [place]  stand by assist;verbal cues required  -RW     Transfers, Stand-Sit [place]  stand by assist;verbal cues required  -RW     Transfers, Sit-Stand-Sit, Assist Device  rolling walker  -RW     Recorded by  [RW] Pipe Gruber PTA     Gait Assessment/Treatment    Gait, [place] Level  verbal cues required;contact guard assist;supervision required  -RW     Gait, Assistive Device  rolling walker  -RW     Gait, Distance (Feet)  150   150 x 2 plus 70 x 2  -RW     Gait, Comment  improved gt quality  -RW     Recorded by  [RW] Pipe Gruber PTA     Wheelchair Training/Management    Wheelchair, Distance Propelled  150 mod ind  -RW     Recorded by  [RW] Pipe Gruber PTA     Balance Skills Training    Sitting-Balance Activities  Ball toss;Reaching for objects;Reaching across midline;Lateral lean;Forward lean  -RW     Standing-Balance Activities  Reaching for objects  -RW     Gait Balance Support  assistive device   side stepping with rail in hallway  -RW     Gait Balance Activities  side-stepping;stepping over object;[place]  -RW     Recorded by  [RW] Pipe Gruber PTA     Therapy Exercises    Bilateral Lower  Extremities  AROM:;20 reps;sitting;knee flexion;LAQ   pro 2 level 3 x 12 min  -RW     Recorded by  [RW] Pipe Gruber PTA     Positioning and Restraints    Pre-Treatment Position  sitting in chair/recliner  -RW     Post Treatment Position  wheelchair  -RW     In Wheelchair  with nsg  -RW     Recorded by  [RW] Pipe Gruber PTA       11/08/17 0737 11/07/17 1320 11/07/17 1108    Rehab Assessment/Intervention    Discipline occupational therapy assistant  -RC physical therapy assistant  -RW speech language pathologist  -HR    Document Type therapy note (daily note)  -RC therapy note (daily note)  -RW therapy note (daily note)  -HR    Subjective Information agree to therapy  -RC agree to therapy  -RW no complaints;agree to therapy  -HR    Patient Effort, Rehab Treatment good  -RC good  -RW good  -HR    Symptoms Noted During/After Treatment   none  -HR    Precautions/Limitations fall precautions  -RC  fall precautions  -HR    Precautions/Limitations, Vision   WFL with corrective lenses  -HR    Precautions/Limitations, Hearing   WFL  -HR    Recorded by [RC] MARCO Cannon/L [RW] Pipe Gruber PTA [HR] Shante Otero, MS CCC-SLP    Pain Assessment    Pain Assessment No/denies pain  -RC No/denies pain  -RW No/denies pain  -HR    Pain Score   0  -HR    Post Pain Score   0  -HR    Recorded by [RC] MARCO Cannon/L [RW] Pipe Gruber PTA [HR] Shante Otero, MS CCC-SLP    Vision Assessment/Intervention    Visual Impairment   WFL with corrective lenses  -HR    Recorded by   [HR] Shante Otero, MS CCC-SLP    Cognitive Assessment/Intervention    Current Cognitive/Communication Assessment functional  -RC functional  -RW functional  -HR    Orientation Status oriented x 4  -RC oriented to;oriented x 4  -RW oriented x 4  -HR    Follows Commands/Answers Questions   100% of the time  -HR    Recorded by [RC] MARCO Cannon/L [RW] Pipe Gruber PTA [HR] Shante Otero, MS CCC-SLP    Cognitive Assessment  Intervention    Behavior/Mood Observations  behavior appropriate to situation, WNL/WFL  -RW     Recorded by  [RW] Pipe Gruber PTA     Communication Treatment Objective and Progress    Cognitive Linguistic Treatment Objectives   Improve orientation;Improve memory skills;Improve functional problem solving  -HR    Recorded by   [HR] Shante Otero MS CCC-SLP    Improve orientation    Improve orientation through:   demonstrating orientation to day;demonstrating orientation to month;demonstrating orientation to year;demonstrating orientation to place;demonstrating orientation to disease/impairment;90%;with inconsistent cues  -HR    Status: Improve orientation through   Achieved  -HR    Recorded by   [HR] Shante Otero MS CCC-SLP    Improve memory skills    Improve memory skills through:   recalling related word lists with an imposed delay;90%;with inconsistent cues  -HR    Status: Improve memory skills   Progressing as expected  -HR    Memory Skills Progress   70%;80%;continue to address  -HR    Recorded by   [HR] Shante Otero MS CCC-SLP    Improve functional problem solving    Improve functional problem solving through:   complete organization/home management task;tell similarity between items  -HR    Status: Improve functional problem solving   Progressing as expected  -HR    Functional Problem Solving Progress   80%;with consistent cues;continue to address  -HR    Comments: Improve functional problem solving   mod cues for map reading task this date with high accuracy  -HR    Recorded by   [HR] Shante Otero MS CCC-SLP    Bed Mobility, Assessment/Treatment    Bed Mobility, Scoot/Bridge, [place]  independent  -RW     Bed Mob, Supine to Sit, [place]  independent  -RW     Bed Mob, Sit to Supine, [place]  independent  -RW     Recorded by  [RW] Pipe Gruber PTA     Transfer Assessment/Treatment    Transfers, Bed-Chair [place] --   multiple transfers multiple surfaces  - supervision  required  -RW     Transfers, Chair-Bed [place] supervision required;verbal cues required  -RC supervision required  -RW     Transfers, Bed-Chair-Bed, Assist Device rolling walker  -RC      Transfers, Sit-Stand [place] stand by assist  -RC stand by assist;verbal cues required  -RW     Transfers, Stand-Sit [place] stand by assist   practiced tech  -RC stand by assist;verbal cues required  -RW     Transfers, Sit-Stand-Sit, Assist Device  rolling walker  -RW     Recorded by [RC] LEIGH CannonA/L [RW] Pipe Gruber, PTA     Gait Assessment/Treatment    Gait, [place] Level  verbal cues required;contact guard assist  -RW     Gait, Assistive Device  rolling walker  -RW     Recorded by  [RW] Pipe Gruber, PTA     Functional Mobility    Functional Mobility- Ind. Level supervision required  -RC      Functional Mobility- Device rolling walker  -RC      Functional Mobility-Distance (Feet) 150  -RC      Recorded by [RC] LEIGH CannonA/L      Wheelchair Training/Management    Wheelchair, Distance Propelled 150  - mod ind  -RW     Recorded by [RC] LEIGH CannonA/L [RW] Pipe Gruber, PTA     Lower Body Dressing Assessment/Training    LB Dressing Assess/Train, Clothing Type donning:;shoes  -RC      LB Dressing Assess/Train, Position sitting  -RC      LB Dressing Assess/Train, [place] conditional independence  -RC      Recorded by [RC] Cassie Carpio LARA/L      Toileting Assessment/Training    Toileting Assess/Train, [place]ep[place] Level supervision required  -RC      Recorded by [RC] Cassie Carpio LARA/L      Grooming Assessment/Training    Grooming Assess/Train, Position sitting  -RC      Grooming Assess/Train, [place]ep[place] Level independent  -RC      Recorded by [RC] Cassie Carpio LARA/L      Self-Feeding Assessment/Training    Self-Feeding Assess/Train, Position sitting  -RC      Self-Feeding Assess/Train, [place] independent  -RC      Recorded by [RC] Cassie Carpio,  MARCO/L      Balance Skills Training    Standing-Level of Assistance Close supervision  -RC      Static Standing Balance Support assistive device  -RC      Standing Balance # of Minutes 8  -RC      Recorded by [RC] DAYDAY Cannon      Therapy Exercises    Bilateral Lower Extremities  AROM:;20 reps;supine;ankle pumps/circles;heel slides;hip abduction/adduction;SAQ   pro 2 level 3 x 10 min. 2 sets of hs  -RW     Bilateral Upper Extremity --   pulley ex 5 min, rickshaw 15# 20x, ue bike 15 min+  -RC      Recorded by [RC] DAYDAY Cannon [RW] Pipe Gruber PTA     Fine Motor Coordination Training    Detail (Fine Motor Coordination Training) --   velcro board,   -RC      Recorded by [RC] DAYDAY Cannon      Positioning and Restraints    Pre-Treatment Position sitting in chair/recliner  -RC sitting in chair/recliner  -RW     Post Treatment Position wheelchair  -RC wheelchair  -RW     In Bed with SLP;encouraged to call for assist  -RC      Recorded by [RC] MARCO Cannon/L [RW] Pipe Gruber PTA       11/07/17 1010 11/07/17 0730 11/06/17 1332    Rehab Assessment/Intervention    Discipline physical therapy assistant  -KATHIE occupational therapy assistant  -RC physical therapy assistant  -KATHIE    Document Type therapy note (daily note)  -RW therapy note (daily note)  -RC therapy note (daily note)  -RW    Subjective Information agree to therapy  -RW agree to therapy  -RC agree to therapy  -RW    Patient Effort, Rehab Treatment good  -RW good  -RC good  -RW    Precautions/Limitations  fall precautions  -RC fall precautions  -RW    Recorded by [RW] Pipe Gruber PTA [RC] MARCO Cannon/L [RW] Pipe Gruber PTA    Vital Signs    Pretreatment Heart Rate (beats/min) 81  -RW      Pre SpO2 (%) 96  -RW      O2 Delivery Pre Treatment room air  -RW      Recorded by [RW] Pipe Gruber PTA      Pain Assessment    Pain Assessment No/denies pain  -RW No/denies pain  -RC No/denies pain  -RW     Recorded by [RW] Pipe Gruber PTA [RC] LEIGH CannonA/L [RW] Pipe Gruber PTA    Cognitive Assessment/Intervention    Current Cognitive/Communication Assessment functional  -RW functional  -RC functional  -RW    Orientation Status oriented to;oriented x 4  -RW oriented x 4  -RC oriented to;oriented x 4  -RW    Follows Commands/Answers Questions 100% of the time  -RW  100% of the time  -RW    Personal Safety Interventions gait belt;nonskid shoes/slippers when out of bed  -RW  gait belt;nonskid shoes/slippers when out of bed  -RW    Recorded by [RW] Pipe Gruber PTA [RC] LEIGH CannonA/L [RW] Pipe Gruber PTA    Cognitive Assessment Intervention    Behavior/Mood Observations behavior appropriate to situation, WNL/WFL  -RW  behavior appropriate to situation, WNL/WFL  -RW    Recorded by [RW] Pipe Gruber PTA  [RW] Pipe Gruber PTA    Transfer Assessment/Treatment    Transfers, Bed-Chair [place]  stand by assist  -RC     Transfers, Chair-Bed [place]  stand by assist  -RC     Transfers, Bed-Chair-Bed, Assist Device  rolling walker  -RC     Transfers, Sit-Stand [place] stand by assist;verbal cues required  -RW stand by assist  -RC stand by assist;verbal cues required  -RW    Transfers, Stand-Sit [place] stand by assist;verbal cues required  -RW stand by assist  -RC stand by assist;verbal cues required  -RW    Transfers, Sit-Stand-Sit, Assist Device rolling walker  -RW rolling walker  -RC rolling walker  -RW    Transfer, Comment   sit to stand 5 x 2  -RW    Recorded by [RW] Pipe Gruber PTA [RC] LEIGH CannonA/L [RW] Pipe Gruber PTA    Gait Assessment/Treatment    Gait, [place] Level verbal cues required;contact guard assist  -RW  verbal cues required;contact guard assist  -RW    Gait, Assistive Device rolling walker  -RW  rolling walker  -RW    Gait, Distance (Feet) 150    x 2  -RW  150   70 x 3  -RW    Gait, Comment gt improving but will get walker too  far away during turns  -RW  improved gt quality. minimal foot drag on left  -RW    Recorded by [RW] Pipe Gruber PTA  [RW] Pipe Gruber PTA    Stairs Assessment/Treatment    Number of Stairs 12  -RW      Stairs, Handrail Location both sides  -RW      Stairs, [place] Level supervision required;contact guard assist  -RW      Recorded by [RW] Pipe Gruber PTA      Functional Mobility    Functional Mobility- Ind. Level  supervision required  -RC     Functional Mobility- Device  rolling walker  -RC     Recorded by  [RC] MARCO Cannon/L     Wheelchair Training/Management    Wheelchair, Distance Propelled 160 mod ind  -RW      Recorded by [RW] Pipe Gruber PTA      Grooming Assessment/Training    Grooming Assess/Train, [place] Level  independent  -RC     Recorded by  [RC] MARCO Cannon/L     Balance Skills Training    Gait Balance-Level of Assistance Close supervision  -RW  --  -RW    Gait Balance Support parallel bars  -RW  --  -RW    Gait Balance Activities tandem;side-stepping  -RW  --  -RW    Recorded by [RW] Pipe Gruber PTA  [RW] Pipe Gruber PTA    Therapy Exercises    Bilateral Lower Extremities AROM:;20 reps;sitting;hip flexion;knee flexion;LAQ   2 sets  -RW  AROM:;20 reps;sitting;hip flexion;knee flexion;LAQ   2 sets  -RW    BLE Resistance theraband  -RW  theraband  -RW    Bilateral Upper Extremity AROM:;15 reps;20 reps;sitting   tband row  -RW --   ue bike 15 min  -RC AROM:;15 reps;20 reps;sitting   tband rows 2 sets  -RW    BUE Resistance theraband  -RW  theraband  -RW    Recorded by [RW] Pipe Gruber PTA [RC] MARCO Cannon/L [RW] Pipe Gruber PTA    Fine Motor Coordination Training    Detail (Fine Motor Coordination Training)  --   lrg beads, card turning  -RC     Recorded by  [RC] MARCO Cannon/L     Positioning and Restraints    Pre-Treatment Position sitting in chair/recliner  -RW sitting in chair/recliner  -RC sitting in chair/recliner  -RW     Post Treatment Position wheelchair  -RW wheelchair  -RC wheelchair  -RW    In Wheelchair  sitting;encouraged to call for assist  -RC call light within reach  -RW    Recorded by [RW] Pipe Gruber PTA [RC] Cassie Carpio, LARA/L [RW] Pipe Gruber PTA      User Key  (r) = Recorded By, (t) = Taken By, (c) = Cosigned By    Initials Name Effective Dates    EC Clementina Murrell, CCC-SLP 12/30/16 -     HR Shante Otero, MS CCC-SLP 12/15/16 -     RW Pipe Gruber PTA 10/17/16 -     RC Cassie Carpio, LARA/L 10/17/16 -               IP SLP Goals       11/09/17 1138 11/08/17 1329 11/07/17 1145    Cognitive Linguistic- Improve Safety and Awareness    Cognitive Linguistic Improve Safety and Awareness- SLP, Date Goal Reviewed 11/09/17  -EC 11/08/17  -EC 11/07/17  -HR    Cognitive Linguistic Improve Safety and Awareness- SLP, Outcome goal partially met  -EC goal partially met  -EC goal ongoing  -HR      11/06/17 1314 11/04/17 1411 11/03/17 1346    Cognitive Linguistic- Improve Safety and Awareness    Cognitive Linguistic Improve Safety and Awareness- SLP, Date Goal Reviewed 11/06/17  -CK 11/04/17  -CK 11/03/17  -EC    Cognitive Linguistic Improve Safety and Awareness- SLP, Outcome goal ongoing  -CK goal ongoing  -CK goal not met  -EC      11/02/17 1614 11/01/17 1159 10/31/17 1244    Cognitive Linguistic- Improve Safety and Awareness    Cognitive Linguistic Improve Safety and Awareness- SLP, Date Established   10/31/17  -HR    Cognitive Linguistic Improve Safety and Awareness- SLP, Time to Achieve   by discharge  -HR    Cognitive Linguistic Improve Safety and Awareness- SLP, Activity Level   Patient will improve orientation for increased safety in environment;Patient will improve memory skills for increased safety in environment;Patient will improve functional problem solving skills for increased safety in environment  -HR    Cognitive Linguistic Improve Safety and Awareness- SLP, Date Goal Reviewed 11/02/17  -EC  11/01/17  -EC 10/31/17  -HR    Cognitive Linguistic Improve Safety and Awareness- SLP, Outcome goal not met  -EC goal not met  -EC goal ongoing  -HR      User Key  (r) = Recorded By, (t) = Taken By, (c) = Cosigned By    Initials Name Provider Type    DANELLE Murrell CCC-SLP Speech and Language Pathologist    HR Shante Otero, MS CCC-SLP Speech and Language Pathologist    CK Concetta Springer, MS CCC-SLP Speech and Language Pathologist          EDUCATION  The patient has been educated in the following areas:   Cognitive Impairment.    SLP Recommendation and Plan                               Plan of Care Review  Plan Of Care Reviewed With: patient  Progress: progress toward functional goals as expected  Outcome Summary/Follow up Plan: ST: minimal progress on remaining goal.  pt will require assistance upon d/c home for activities of daily living susch as bill paying, med managemnt, and reasoning skills.            Time Calculation:         Time Calculation- SLP       11/09/17 1139          Time Calculation- SLP    SLP Start Time 0902  -EC      SLP Stop Time 0945  -EC      SLP Time Calculation (min) 43 min  -EC      Total Timed Code Minutes- SLP 43 minute(s)  -EC      SLP Received On 11/09/17  -EC        User Key  (r) = Recorded By, (t) = Taken By, (c) = Cosigned By    Initials Name Provider Type    DANELLE Murrell CCC-SLP Speech and Language Pathologist          Therapy Charges for Today     Code Description Service Date Service Provider Modifiers Qty    46377564125 HC ST TREATMENT SPEECH 2 11/8/2017 MERI Schultz GN 1    [id number] HC ST TREATMENT SPEECH 3 11/9/2017 MERI Schultz GN 1               MERI Casey  11/9/2017

## 2017-11-09 NOTE — PLAN OF CARE
Problem: Patient Care Overview (Adult)  Goal: Plan of Care Review  Outcome: Ongoing (interventions implemented as appropriate)    11/09/17 1138   Coping/Psychosocial Response Interventions   Plan Of Care Reviewed With patient   Patient Care Overview   Progress progress toward functional goals as expected   Outcome Evaluation   Outcome Summary/Follow up Plan ST: minimal progress on remaining goal. pt will require assistance upon d/c home for activities of daily living susch as bill paying, med managemnt, and reasoning skills.          Problem: Inpatient SLP  Goal: Cognitive-linguistic-Patient will improve Cognitive-linguistic skills to improve safety and safety awareness in environment  Outcome: Ongoing (interventions implemented as appropriate)    10/31/17 1244 11/09/17 1138   Cognitive Linguistic- Improve Safety and Awareness   Cognitive Linguistic Improve Safety and Awareness- SLP, Date Established 10/31/17 --    Cognitive Linguistic Improve Safety and Awareness- SLP, Time to Achieve by discharge --    Cognitive Linguistic Improve Safety and Awareness- SLP, Activity Level Patient will improve orientation for increased safety in environment;Patient will improve memory skills for increased safety in environment;Patient will improve functional problem solving skills for increased safety in environment --    Cognitive Linguistic Improve Safety and Awareness- SLP, Date Goal Reviewed --  11/09/17   Cognitive Linguistic Improve Safety and Awareness- SLP, Outcome --  goal partially met

## 2017-11-09 NOTE — NURSING NOTE
"Patient applied CPAP tonight. After having it on 5 minutes, patient calls nurse and says, \"turn this machine off.\" I asked him why and he said, \"i'm not wearing it tonight.\" I instructed him on the importance of using his mask at night, but he continued to refuse to wear it. I also told him his daughter wanted us to stress that he needs to wear it and patient stated, \"I will deal with her tomorrow.\" He also stated the machine is drying his mouth out and that he just got this new mask.   "

## 2017-11-09 NOTE — PLAN OF CARE
Problem: Patient Care Overview (Adult)  Goal: Plan of Care Review  Outcome: Ongoing (interventions implemented as appropriate)    11/09/17 1535   Coping/Psychosocial Response Interventions   Plan Of Care Reviewed With patient   Outcome Evaluation   Outcome Summary/Follow up Plan pt met 4 step stair goal. improving gt quality but needs cues to slow down at times.         Problem: Inpatient Physical Therapy  Goal: Gait Training Goal LTG- PT  Outcome: Ongoing (interventions implemented as appropriate)    10/31/17 0825 11/09/17 1535   Gait Training PT LTG   Gait Training Goal PT LTG, Date Established 10/31/17 --    Gait Training Goal PT LTG, Time to Achieve by discharge --    Gait Training Goal PT LTG, [place] Level supervision required --    Gait Training Goal PT LTG, Assist Device (AAD) --    Gait Training Goal PT LTG, Distance to Achieve 150 feet --    Gait Training Goal PT LTG, Date Goal Reviewed --  11/09/17   Gait Training Goal PT LTG, Outcome --  goal ongoing       Goal: Stair Training Goal STG- PT  Outcome: Outcome(s) achieved Date Met:  11/09/17    10/31/17 0825 11/09/17 1535   Stair Training PT STG   Stair Training Goal PT STG, Date Established 10/31/17 --    Stair Training Goal PT STG, Time to Achieve 4 days --    Stair Training Goal PT STG, Number of Steps 4 steps --    Stair Training Goal PT STG, [place] Level contact guard assist --    Stair Training Goal PT STG, Assist Device 1 handrail --    Stair Training Goal PT STG, Date Goal Reviewed --  11/09/17   Stair Training Goal PT STG, Outcome --  goal met       Goal: Stair Training Goal LTG- PT  Outcome: Ongoing (interventions implemented as appropriate)    10/31/17 0825 11/04/17 1440 11/09/17 1535   Stair Training PT LTG   Stair Training Goal PT LTG, Date Established --  11/04/17 --    Stair Training Goal PT LTG, Time to Achieve by discharge --  --    Stair Training Goal PT LTG, Number of Steps 1 flight --  --    Stair Training Goal PT LTG,  [place] Level supervision required --  --    Stair Training Goal PT LTG, Assist Device 2 handrails --  --    Stair Training Goal PT LTG, Date Goal Reviewed --  --  11/09/17   Stair Training Goal PT LTG, Outcome --  --  goal ongoing

## 2017-11-09 NOTE — PROGRESS NOTES
LOS: 10 days   Patient Care Team:  Evan Marrero MD as PCP - General (Family Medicine)    Chief Complaint:   Right-sided lacunar stroke          Interval History:     SUBJECTIVE:  Good spirits today working well with therapy.  No shortness of breath no nausea episodes of confusion have much improved.  History taken from: patient chart RN    Objective     Vital Signs  Temp:  [96.4 °F (35.8 °C)-97.4 °F (36.3 °C)] 97.4 °F (36.3 °C)  Heart Rate:  [70-82] 70  Resp:  [18] 18  BP: ()/(51-60) 96/51  Last 3 weights    11/07/17  0500 11/08/17  0600 11/09/17  0431   Weight: 204 lb 3.2 oz (92.6 kg) 203 lb 1.6 oz (92.1 kg) 202 lb 14.4 oz (92 kg)         Physical Exam:     General Appearance:    Alert, cooperative, in no acute distress   Head:    Normocephalic, without obvious abnormality, atraumatic   Eyes:            Lids and lashes normal, conjunctivae and sclerae normal, no   icterus, no pallor, corneas clear, PERRLA   Throat:   No oral lesions, no thrush, oral mucosa moist   Neck:   No adenopathy, supple, trachea midline, no thyromegaly, no   carotid bruit, no JVD       Lungs:     Clear to auscultation,respirations regular, even and                  Unlabored     Heart:    Regular rhythm and normal rate, normal S1 and S2, no            murmur, no gallop, no rub, no click    Chest Wall:    No abnormalities observed   Abdomen:     Normal bowel sounds, no masses, no organomegaly, soft        non-tender, non-distended, no guarding, no rebound                Tenderness    Extremities:   Moves all extremities well, no edema, no cyanosis, no             Redness        Skin:   No bleeding, bruising or rash   Lymph nodes:   No palpable adenopathy   Neurologic:   Cranial nerves 2 - 12 grossly intact, sensation intact, DTR       present and equal bilaterally Strength on the left and ataxia continues to improve       RESULTS REVIEW:     Lab Results (last 24 hours)     Procedure Component Value Units Date/Time    POC  Glucose Fingerstick [[id number]]  (Normal) Collected:  11/08/17 1104    Specimen:  Blood Updated:  11/08/17 1134     Glucose 85 mg/dL       Meter: [id number]: [id number] [facility]       POC Glucose Fingerstick [[id number]]  (Normal) Collected:  11/07/17 1530    Specimen:  Blood Updated:  11/08/17 1400     Glucose 98 mg/dL       Meter: [id number]: [id number] SAAD CANO       POC Glucose Fingerstick [[id number]]  (Normal) Collected:  11/08/17 1518    Specimen:  Blood Updated:  11/08/17 1531     Glucose 83 mg/dL       Meter: [id number]: [id number] [facility]QU[facility]       POC Glucose Fingerstick [[id number]]  (Normal) Collected:  11/08/17 1958    Specimen:  Blood Updated:  11/08/17 2106     Glucose 90 mg/dL       Meter: [id number]: [id number] [facility]        CBC Auto Differential [[id number]]  (Abnormal) Collected:  11/09/17 0545    Specimen:  Blood Updated:  11/09/17 0620     WBC 7.62 10*3/mm3      RBC 4.34 (L) 10*6/mm3      Hemoglobin 13.0 (L) g/dL      Hematocrit 38.9 (L) %      MCV 89.6 fL      MCH 30.0 pg      MCHC 33.4 g/dL      RDW 13.3 %      RDW-SD 43.7 fl      MPV 9.9 fL      Platelets 258 10*3/mm3     POC Glucose Fingerstick [[id number]]  (Normal) Collected:  11/09/17 0511    Specimen:  Blood Updated:  11/09/17 0642     Glucose 95 mg/dL       Meter: [id number]: [id number] DRAGAN DYER       CBC & Differential [[id number]] Collected:  11/09/17 0545    Specimen:  Blood Updated:  11/09/17 0728    Narrative:       The following orders were created for panel order CBC & Differential.  Procedure                               Abnormality         Status                     ---------                               -----------         ------                     Manual Differential[[id number]]                              Final result               Scan Slide[[id number]]                                                                  CBC Auto  Differential[[id number]]        Abnormal            Final result                 Please view results for these tests on the individual orders.    Manual Differential [[id number]] Collected:  11/09/17 0545    Specimen:  Blood Updated:  11/09/17 0728     Neutrophil % 70.0 %      Lymphocyte % 18.0 %      Monocyte % 5.0 %      Eosinophil % 1.0 %      Basophil % 1.0 %      Atypical Lymphocyte % 5.0 %      Neutrophils Absolute 5.33 10*3/mm3      Lymphocytes Absolute 1.37 10*3/mm3      Monocytes Absolute 0.38 10*3/mm3      Eosinophils Absolute 0.08 10*3/mm3      Basophils Absolute 0.08 10*3/mm3      RBC Morphology Normal     WBC Morphology Normal     Platelet Estimate Adequate    Comprehensive Metabolic Panel [[id number]]  (Abnormal) Collected:  11/09/17 0651    Specimen:  Blood Updated:  11/09/17 0745     Glucose 135 (H) mg/dL      BUN 19 mg/dL      Creatinine 0.90 mg/dL      Sodium 140 mmol/L      Potassium 4.2 mmol/L      Chloride 105 mmol/L      CO2 24.0 mmol/L      Calcium 9.2 mg/dL      Total Protein 6.7 g/dL      Albumin 3.80 g/dL      ALT (SGPT) 37 U/L      AST (SGOT) 33 U/L      Alkaline Phosphatase 46 U/L      Total Bilirubin 1.1 mg/dL      eGFR Non African Amer 81 mL/min/1.73      Globulin 2.9 gm/dL      A/G Ratio 1.3 g/dL      BUN/Creatinine Ratio 21.1     Anion Gap 11.0 mmol/L     Narrative:       The MDRD GFR formula is only valid for adults with stable renal function between ages 18 and 70.        Imaging Results (last 72 hours)     ** No results found for the last 72 hours. **          Assessment/Plan     Principal Problem:    Right-sided lacunar stroke  Active Problems:    Left-sided weakness    Atrial fibrillation, chronic    Essential hypertension    Diabetes mellitus    Chronic anxiety    Chronic back pain greater than 3 months duration    Degenerative joint disease involving multiple joints    Encounter for rehabilitation    Obstructive sleep apnea syndrome    Former smoker        Diabetes is  well-controlled blood pressure well controlled.  He is participating with therapy and he is making good progress towards discharge goals.  We'll continue current therapies and current medications  Home on the 14th with family      Vishnu Mckinnon MD  11/09/17  9:33 AM

## 2017-11-09 NOTE — THERAPY TREATMENT NOTE
Inpatient Rehabilitation - Occupational Therapy Treatment Note  [facility]     Patient Name: Kenneth Harper Jr.  : 1934  MRN: [id number]  Today's Date: 2017  Onset of Illness/Injury or Date of Surgery Date: 10/30/17  Date of Referral to OT: 10/30/17  Referring Physician: TERRENCE Mckinnon MD      Admit Date: 10/30/2017    Visit Dx:     ICD-10-CM ICD-9-CM   1. Symbolic dysfunction R48.9 784.60   2. Impaired mobility and ADLs Z74.09 799.89   3. Abnormality of gait and mobility R26.9 781.2   4. Muscle weakness (generalized) M62.81 728.87     Patient Active Problem List   Diagnosis   • Spinal stenosis, lumbar region, with neurogenic claudication   • Former smoker   • Overweight   • Left-sided weakness   • Right-sided lacunar stroke   • Essential hypertension   • Diabetes mellitus   • Chronic anxiety   • Chronic back pain greater than 3 months duration   • Prostatic hypertrophy   • Degenerative joint disease involving multiple joints   • Atrial fibrillation, chronic   • Encounter for rehabilitation   • Obstructive sleep apnea syndrome             Adult Rehabilitation Note       17 1040 17 0902 17 0730    Rehab Assessment/Intervention    Discipline physical therapy assistant  -RW speech language pathologist  -EC occupational therapy assistant  -RC    Document Type therapy note (daily note)  -RW therapy note (daily note)  -EC therapy note (daily note)  -RC    Subjective Information agree to therapy  -RW agree to therapy  -EC agree to therapy  -RC    Patient Effort, Rehab Treatment good  -RW  good  -RC    Symptoms Noted During/After Treatment   none  -RC    Recorded by [RW] Pipe Gruber PTA [EC] Clementina Murrell CCC-SLP [RC] LEIGH CannonA/L    Pain Assessment    Pain Assessment No/denies pain  -RW No/denies pain  -EC No/denies pain  -RC    Recorded by [RW] Pipe Gruber PTA [EC] Clementina Murrell CCC-SLP [RC] LEIGH CannonA/L    Cognitive Assessment/Intervention     Current Cognitive/Communication Assessment functional  -RW  functional  -RC    Orientation Status oriented to;oriented x 4  -RW      Follows Commands/Answers Questions 100% of the time  -RW      Personal Safety Interventions gait belt;nonskid shoes/slippers when out of bed  -RW      Recorded by [RW] Pipe Gruber PTA  [RC] Cassie aCrpio, LARA/L    Cognitive Assessment Intervention    Behavior/Mood Observations behavior appropriate to situation, WNL/WFL  -RW      Recorded by [RW] Pipe Gruber PTA      Communication Treatment Objective and Progress    Cognitive Linguistic Treatment Objectives  Improve memory skills;Improve functional problem solving  -EC     Recorded by  [EC] Clementina Murrell CCC-SLP     Improve functional problem solving    Improve functional problem solving through:  complete organization/home management task;tell similarity between items  -EC     Status: Improve functional problem solving  Progressing as expected  -EC     Functional Problem Solving Progress  with consistent cues;continue to address;70%;80%  -EC     Recorded by  [EC] Clementina Murrell CCC-SLP     Transfer Assessment/Treatment    Transfers, Sit-Stand [place] stand by assist;verbal cues required  -RW      Transfers, Stand-Sit [place] stand by assist;verbal cues required  -RW      Transfers, Sit-Stand-Sit, Assist Device rolling walker  -RW      Toilet Transfer, [place]   supervision required  -RC    Toilet Transfer, Assistive Device   rolling walker  -RC    Transfer, Comment sit to stand 2/5  -RW      Recorded by [RW] Pipe Gruber PTA  [RC] Cassie Carpoi, LARA/L    Gait Assessment/Treatment    Gait, [place] Level verbal cues required;contact guard assist;supervision required  -RW      Gait, Assistive Device rolling walker  -RW      Gait, Distance (Feet) 150   150 x 2,  70 x 3  -RW      Recorded by [RW] Pipe Gurber PTA      Stairs Assessment/Treatment    Number of Stairs 4 x 2  -RW       Stairs, Handrail Location left side (ascending)  -RW      Stairs, [place] Level contact guard assist  -RW      Stairs, Comment pt needed consitant cues to ascend and decend correctily  -RW      Recorded by [RW] Pipe Gruber PTA      Wheelchair Training/Management    Wheelchair, Distance Propelled 150    x 2 mod ind  -RW  150  -RC    Recorded by [RW] Pipe Gruber PTA  [RC] Cassie Carpio, LARA/L    Upper Body Bathing Assessment/Training    UB Bathing Assess/Train Assistive Device   --   sponge bath  -RC    UB Bathing Assess/Train, Position   sitting;sink side  -RC    UB Bathing Assess/Train, [place] Level   conditional independence  -RC    Recorded by   [RC] Cassie Carpio LARA/L    Lower Body Bathing Assessment/Training    LB Bathing Assess/Train Assistive Device   --   sponge bath  -RC    LB Bathing Assess/Train, Position   sitting;sink side  -RC    LB Bathing Assess/Train, [place] Level   set up required;conditional independence  -RC    Recorded by   [RC] Cassie Carpio LARA/L    Upper Body Dressing Assessment/Training    UB Dressing Assess/Train, Clothing Type   pull over  -RC    UB Dressing Assess/Train, Position   sitting  -RC    UB Dressing Assess/Train, [place]   conditional independence  -RC    Recorded by   [RC] Cassie Carpio LARA/L    Lower Body Dressing Assessment/Training    LB Dressing Assess/Train, Clothing Type   doffing:;donning:;pants;shoes;socks  -RC    LB Dressing Assess/Train, Position   sitting;standing  -RC    LB Dressing Assess/Train, [place]   minimum assist (75% patient effort)  -RC    Recorded by   [RC] Cassie Carpio LARA/L    Toileting Assessment/Training    Toileting Assess/Train, Position   sitting;standing  -RC    Toileting Assess/Train, [place] Level   supervision required  -RC    Recorded by   [RC] Cassie Carpio LARA/L    Grooming Assessment/Training    Grooming Assess/Train, Position   sitting  -RC    Grooming Assess/Train, [place] Level    independent  -RC    Recorded by   [RC] LEIGH CannonA/L    Self-Feeding Assessment/Training    Self-Feeding Assess/Train, [place]   independent  -RC    Recorded by   [RC] LEIGH CannonA/L    Therapy Exercises    Bilateral Lower Extremities AROM:;20 reps;sitting;knee flexion;LAQ;hip flexion;ankle pumps/circles;standing;heel raises   2 sets  -RW      Bilateral Upper Extremity   --   ue bike 15 min, pulley ex 5 min, t-putty 5 min  -RC    Recorded by [RW] Pipe Gruber PTA  [RC] LEIGH CannonA/L    Positioning and Restraints    Pre-Treatment Position sitting in chair/recliner  -RW  sitting in chair/recliner  -RC    Post Treatment Position wheelchair  -RW  wheelchair  -RC    In Wheelchair   with SLP  -RC    Recorded by [RW] Pipe Gruber PTA  [RC] LEIGH CannonA/DAWN      11/08/17 0936 11/08/17 0905 11/08/17 0737    Rehab Assessment/Intervention    Discipline physical therapy assistant  -RW speech language pathologist  -EC occupational therapy assistant  -RC    Document Type therapy note (daily note)  -RW therapy note (daily note)  -EC therapy note (daily note)  -RC    Subjective Information agree to therapy  -RW no complaints;agree to therapy  -EC agree to therapy  -RC    Patient Effort, Rehab Treatment good  -RW good  -EC good  -RC    Precautions/Limitations fall precautions  -RW  fall precautions  -RC    Recorded by [RW] Pipe Gruber PTA [EC] Clementina Murrell CCC-SLP [RC] Cassie Carpio LARA/L    Vital Signs    Intra Systolic BP Rehab 112  -RW      Intra Treatment Diastolic BP 63  -RW      Intratreatment Heart Rate (beats/min) 59  -RW      Recorded by [RW] Pipe Gruber PTA      Pain Assessment    Pain Assessment No/denies pain  -RW No/denies pain  -EC No/denies pain  -RC    Recorded by [RW] Pipe Gruber PTA [EC] Clementina Murrell CCC-SLP [RC] Cassie Carpio LARA/L    Cognitive Assessment/Intervention    Current Cognitive/Communication Assessment functional  -RW   functional  -RC    Orientation Status oriented to;oriented x 4  -RW  oriented x 4  -RC    Follows Commands/Answers Questions 100% of the time  -RW      Personal Safety Interventions gait belt;nonskid shoes/slippers when out of bed  -RW      Recorded by [RW] Pipe Gruber PTA  [RC] Cassie Carpio, LARA/L    Cognitive Assessment Intervention    Behavior/Mood Observations behavior appropriate to situation, WNL/WFL  -RW      Recorded by [RW] Pipe Gruber PTA      Communication Treatment Objective and Progress    Cognitive Linguistic Treatment Objectives  Improve memory skills;Improve functional problem solving  -EC     Recorded by  [EC] MERI Schultz     Improve memory skills    Improve memory skills through:  recalling related word lists with an imposed delay;90%;with inconsistent cues  -EC     Status: Improve memory skills  Achieved  -EC     Memory Skills Progress  90%  -EC     Comments: Improve memory skills  pt recalled word list from last friday and from yesterday with no cues.    -EC     Recorded by  [EC] MERI Schultz     Improve functional problem solving    Improve functional problem solving through:  complete organization/home management task;tell similarity between items  -EC     Status: Improve functional problem solving  Progressing as expected  -EC     Functional Problem Solving Progress  80%;with consistent cues;continue to address  -EC     Comments: Improve functional problem solving  moderate cue  -EC     Recorded by  [EC] MERI Schultz     Transfer Assessment/Treatment    Transfers, Bed-Chair [place] supervision required  -RW  --   multiple transfers multiple surfaces  -RC    Transfers, Chair-Bed [place] supervision required  -RW  supervision required;verbal cues required  -RC    Transfers, Bed-Chair-Bed, Assist Device   rolling walker  -RC    Transfers, Sit-Stand [place] stand by assist;verbal cues required  -RW  stand by assist  -RC     Transfers, Stand-Sit [place] stand by assist;verbal cues required  -RW  stand by assist   practiced tech  -RC    Transfers, Sit-Stand-Sit, Assist Device rolling walker  -RW      Recorded by [RW] Pipe Gruber PTA  [RC] LEIGH CannonA/L    Gait Assessment/Treatment    Gait, [place] Level verbal cues required;contact guard assist;supervision required  -RW      Gait, Assistive Device rolling walker  -RW      Gait, Distance (Feet) 150   150 x 2 plus 70 x 2  -RW      Gait, Comment improved gt quality  -RW      Recorded by [RW] Pipe Gruber PTA      Functional Mobility    Functional Mobility- Ind. Level   supervision required  -RC    Functional Mobility- Device   rolling walker  -RC    Functional Mobility-Distance (Feet)   150  -RC    Recorded by   [RC] MARCO Cannon/L    Wheelchair Training/Management    Wheelchair, Distance Propelled 150 mod ind  -RW  150  -RC    Recorded by [RW] Pipe Gruber PTA  [RC] Cassie Carpio LARA/L    Lower Body Dressing Assessment/Training    LB Dressing Assess/Train, Clothing Type   donning:;shoes  -RC    LB Dressing Assess/Train, Position   sitting  -RC    LB Dressing Assess/Train, [place]   conditional independence  -RC    Recorded by   [RC] Cassie Carpio LARA/L    Toileting Assessment/Training    Toileting Assess/Train, [place] Level   supervision required  -RC    Recorded by   [RC] LEIGH CannonA/L    Grooming Assessment/Training    Grooming Assess/Train, Position   sitting  -RC    Grooming Assess/Train, [place] Level   independent  -RC    Recorded by   [RC] LEIGH CannonA/L    Self-Feeding Assessment/Training    Self-Feeding Assess/Train, Position   sitting  -RC    Self-Feeding Assess/Train, [place]   independent  -RC    Recorded by   [RC] Cassie Carpio LARA/L    Balance Skills Training    Sitting-Balance Activities Ball toss;Reaching for objects;Reaching across midline;Lateral lean;Forward lean  -RW      Standing-Level of  Assistance   Close supervision  -RC    Static Standing Balance Support   assistive device  -RC    Standing-Balance Activities Reaching for objects  -RW      Standing Balance # of Minutes   8  -RC    Gait Balance Support assistive device   side stepping with rail in hallway  -RW      Gait Balance Activities side-stepping;stepping over object;[place]  -RW      Recorded by [RW] Pipe Gruber PTA  [RC] Cassie Carpio LARA/L    Therapy Exercises    Bilateral Lower Extremities AROM:;20 reps;sitting;knee flexion;LAQ   pro 2 level 3 x 12 min  -RW      Bilateral Upper Extremity   --   pulley ex 5 min, rickshaw 15# 20x, ue bike 15 min+  -RC    Recorded by [RW] Pipe Gruber PTA  [RC] LEIGH CannonA/L    Fine Motor Coordination Training    Detail (Fine Motor Coordination Training)   --   velcro board,   -RC    Recorded by   [RC] MARCO Cannon/L    Positioning and Restraints    Pre-Treatment Position sitting in chair/recliner  -RW  sitting in chair/recliner  -RC    Post Treatment Position wheelchair  -RW  wheelchair  -RC    In Bed   with SLP;encouraged to call for assist  -RC    In Wheelchair with nsg  -RW      Recorded by [RW] Pipe Gruber PTA  [RC] LEIGH CannonA/L      11/07/17 1320 11/07/17 1108 11/07/17 1010    Rehab Assessment/Intervention    Discipline physical therapy assistant  -RW speech language pathologist  -HR physical therapy assistant  -RW    Document Type therapy note (daily note)  -RW therapy note (daily note)  -HR therapy note (daily note)  -RW    Subjective Information agree to therapy  -RW no complaints;agree to therapy  -HR agree to therapy  -RW    Patient Effort, Rehab Treatment good  -RW good  -HR good  -RW    Symptoms Noted During/After Treatment  none  -HR     Precautions/Limitations  fall precautions  -HR     Precautions/Limitations, Vision  WFL with corrective lenses  -HR     Precautions/Limitations, Hearing  WFL  -HR     Recorded by [RW] Pipe Gruber PTA [HR] Shante  ROBERT Otero MS CCC-SLP [RW] Pipe Gruber PTA    Vital Signs    Pretreatment Heart Rate (beats/min)   81  -RW    Pre SpO2 (%)   96  -RW    O2 Delivery Pre Treatment   room air  -RW    Recorded by   [RW] Pipe Gruber PTA    Pain Assessment    Pain Assessment No/denies pain  -RW No/denies pain  -HR No/denies pain  -RW    Pain Score  0  -HR     Post Pain Score  0  -HR     Recorded by [RW] Pipe Gruber PTA [HR] Shante Otero MS CCC-SLP [RW] Pipe Gruber PTA    Vision Assessment/Intervention    Visual Impairment  WFL with corrective lenses  -HR     Recorded by  [HR] Shante Otero MS CCC-SLP     Cognitive Assessment/Intervention    Current Cognitive/Communication Assessment functional  -RW functional  -HR functional  -RW    Orientation Status oriented to;oriented x 4  -RW oriented x 4  -HR oriented to;oriented x 4  -RW    Follows Commands/Answers Questions  100% of the time  -% of the time  -RW    Personal Safety Interventions   gait belt;nonskid shoes/slippers when out of bed  -RW    Recorded by [RW] Pipe Gruber PTA [HR] Shante Otero MS JULITA-SLP [RW] Pipe Gruber PTA    Cognitive Assessment Intervention    Behavior/Mood Observations behavior appropriate to situation, WNL/WFL  -RW  behavior appropriate to situation, WNL/WFL  -RW    Recorded by [RW] Pipe Gruber PTA  [RW] Pipe Gruber PTA    Communication Treatment Objective and Progress    Cognitive Linguistic Treatment Objectives  Improve orientation;Improve memory skills;Improve functional problem solving  -HR     Recorded by  [HR] Shante Otero, MS JULITA-SLP     Improve orientation    Improve orientation through:  demonstrating orientation to day;demonstrating orientation to month;demonstrating orientation to year;demonstrating orientation to place;demonstrating orientation to disease/impairment;90%;with inconsistent cues  -HR     Status: Improve orientation through  Achieved  -HR     Recorded by  [HR] Shante Otero, MS JULITA-SLP      Improve memory skills    Improve memory skills through:  recalling related word lists with an imposed delay;90%;with inconsistent cues  -HR     Status: Improve memory skills  Progressing as expected  -HR     Memory Skills Progress  70%;80%;continue to address  -HR     Recorded by  [HR] Shante Otero MS CCC-SLP     Improve functional problem solving    Improve functional problem solving through:  complete organization/home management task;tell similarity between items  -HR     Status: Improve functional problem solving  Progressing as expected  -HR     Functional Problem Solving Progress  80%;with consistent cues;continue to address  -HR     Comments: Improve functional problem solving  mod cues for map reading task this date with high accuracy  -HR     Recorded by  [HR] Shante Otero MS CCC-SLP     Bed Mobility, Assessment/Treatment    Bed Mobility, [place]/Bridge, [place] independent  -RW      Bed Mob, Supine to Sit, [place] independent  -RW      Bed Mob, Sit to Supine, [place] independent  -RW      Recorded by [RW] Pipe Gruber PTA      Transfer Assessment/Treatment    Transfers, Bed-Chair [place] supervision required  -RW      Transfers, Chair-Bed [place] supervision required  -RW      Transfers, Sit-Stand [place] stand by assist;verbal cues required  -RW  stand by assist;verbal cues required  -RW    Transfers, Stand-Sit [place] stand by assist;verbal cues required  -RW  stand by assist;verbal cues required  -RW    Transfers, Sit-Stand-Sit, Assist Device rolling walker  -RW  rolling walker  -RW    Recorded by [RW] Pipe Gruber PTA  [RW] Pipe Gruber, PTA    Gait Assessment/Treatment    Gait, [place] Level verbal cues required;contact guard assist  -RW  verbal cues required;contact guard assist  -RW    Gait, Assistive Device rolling walker  -RW  rolling walker  -RW    Gait, Distance (Feet)   150    x 2  -RW    Gait, Comment   gt improving but will get walker too  far away during turns  -RW    Recorded by [RW] Pipe Gruber PTA  [RW] Pipe Gruber PTA    Stairs Assessment/Treatment    Number of Stairs   12  -RW    Stairs, Handrail Location   both sides  -RW    Stairs, [place] Level   supervision required;contact guard assist  -RW    Recorded by   [RW] Pipe Gruber PTA    Wheelchair Training/Management    Wheelchair, Distance Propelled 160 mod ind  -RW  160 mod ind  -RW    Recorded by [RW] Pipe Gruber PTA  [RW] Pipe Gruber PTA    Balance Skills Training    Gait Balance-Level of Assistance   Close supervision  -RW    Gait Balance Support   parallel bars  -RW    Gait Balance Activities   tandem;side-stepping  -RW    Recorded by   [RW] Pipe Gruber PTA    Therapy Exercises    Bilateral Lower Extremities AROM:;20 reps;supine;ankle pumps/circles;heel slides;hip abduction/adduction;SAQ   pro 2 level 3 x 10 min. 2 sets of hs  -RW  AROM:;20 reps;sitting;hip flexion;knee flexion;LAQ   2 sets  -RW    BLE Resistance   theraband  -RW    Bilateral Upper Extremity   AROM:;15 reps;20 reps;sitting   tband row  -RW    BUE Resistance   theraband  -RW    Recorded by [RW] Pipe Gruber PTA  [RW] Piep Gruber PTA    Positioning and Restraints    Pre-Treatment Position sitting in chair/recliner  -RW  sitting in chair/recliner  -RW    Post Treatment Position wheelchair  -RW  wheelchair  -RW    Recorded by [RW] Pipe Gruber PTA  [RW] Pipe Gruber PTA      11/07/17 0730 11/06/17 1332       Rehab Assessment/Intervention    Discipline occupational therapy assistant  -RC physical therapy assistant  -RW     Document Type therapy note (daily note)  -RC therapy note (daily note)  -RW     Subjective Information agree to therapy  -RC agree to therapy  -RW     Patient Effort, Rehab Treatment good  -RC good  -RW     Precautions/Limitations fall precautions  -RC fall precautions  -RW     Recorded by [RC] MARCO Cannon/L [RW] Pipe Gruber PTA     Pain Assessment     Pain Assessment No/denies pain  -RC No/denies pain  -RW     Recorded by [RC] LEIGH CannonA/L [RW] Pipe Gruber PTA     Cognitive Assessment/Intervention    Current Cognitive/Communication Assessment functional  -RC functional  -RW     Orientation Status oriented x 4  -RC oriented to;oriented x 4  -RW     Follows Commands/Answers Questions  100% of the time  -RW     Personal Safety Interventions  gait belt;nonskid shoes/slippers when out of bed  -RW     Recorded by [RC] LEIGH CannonA/L [RW] Pipe Gruber PTA     Cognitive Assessment Intervention    Behavior/Mood Observations  behavior appropriate to situation, WNL/WFL  -RW     Recorded by  [RW] Pipe Gruber PTA     Transfer Assessment/Treatment    Transfers, Bed-Chair [place] stand by assist  -RC      Transfers, Chair-Bed [place] stand by assist  -RC      Transfers, Bed-Chair-Bed, Assist Device rolling walker  -RC      Transfers, Sit-Stand [place] stand by assist  -RC stand by assist;verbal cues required  -RW     Transfers, Stand-Sit [place] stand by assist  -RC stand by assist;verbal cues required  -RW     Transfers, Sit-Stand-Sit, Assist Device rolling walker  -RC rolling walker  -RW     Transfer, Comment  sit to stand 5 x 2  -RW     Recorded by [RC] LEIGH CannonA/L [RW] Pipe Gruber PTA     Gait Assessment/Treatment    Gait, [place] Level  verbal cues required;contact guard assist  -RW     Gait, Assistive Device  rolling walker  -RW     Gait, Distance (Feet)  150   70 x 3  -RW     Gait, Comment  improved gt quality. minimal foot drag on left  -RW     Recorded by  [RW] Pipe Gruber PTA     Functional Mobility    Functional Mobility- Ind. Level supervision required  -RC      Functional Mobility- Device rolling walker  -RC      Recorded by [RC] Cassie Carpio LARA/L      Grooming Assessment/Training    Grooming Assess/Train, [place] Level independent  -RC      Recorded by [RC] LEIGH CannonA/L       Balance Skills Training    Gait Balance-Level of Assistance  --  -RW     Gait Balance Support  --  -RW     Gait Balance Activities  --  -RW     Recorded by  [RW] Pipe Gruber PTA     Therapy Exercises    Bilateral Lower Extremities  AROM:;20 reps;sitting;hip flexion;knee flexion;LAQ   2 sets  -RW     BLE Resistance  theraband  -RW     Bilateral Upper Extremity --   ue bike 15 min  -RC AROM:;15 reps;20 reps;sitting   tband rows 2 sets  -RW     BUE Resistance  theraband  -RW     Recorded by [RC] MARCO Cannon/L [RW] Pipe Gruber PTA     Fine Motor Coordination Training    Detail (Fine Motor Coordination Training) --   lrg beads, card turning  -RC      Recorded by [RC] MARCO Cannon/L      Positioning and Restraints    Pre-Treatment Position sitting in chair/recliner  -RC sitting in chair/recliner  -RW     Post Treatment Position wheelchair  -RC wheelchair  -RW     In Wheelchair sitting;encouraged to call for assist  -RC call light within reach  -RW     Recorded by [RC] MARCO Cannon/L [RW] Pipe Gruber PTA       User Key  (r) = Recorded By, (t) = Taken By, (c) = Cosigned By    Initials Name Effective Dates    EC Clementina Murrell, CCC-SLP 12/30/16 -     HR Shante Otero, MS CCC-SLP 12/15/16 -     RW Pipe Gruber PTA 10/17/16 -     RC LEIGH CannonA/DAWN 10/17/16 -                 OT Goals       11/09/17 1155 11/08/17 0929 11/07/17 1351    Transfer Training OT LTG    Transfer Training OT LTG, Date Goal Reviewed 11/09/17  -RC 11/08/17  -RC 11/07/17  -    Transfer Training OT LTG, Outcome   goal ongoing  -    Patient Education OT LTG    Patient Education OT LTG, Date Goal Reviewed 11/09/17  -RC 11/08/17  -RC 11/07/17  -    Patient Education OT LTG Outcome   goal ongoing  -    Safety Awareness OT LTG    Safety Awareness OT LTG, Date Goal Reviewed 11/09/17  -RC 11/08/17  -RC 11/07/17  -    Safety Awareness OT LTG, Outcome goal ongoing  -  goal ongoing  -    ADL OT  LTG    ADL OT LTG, Date Goal Reviewed 11/09/17  -RC 11/08/17  -RC 11/07/17  -RC    ADL OT LTG, Outcome goal partially met  -RC goal ongoing  -RC goal ongoing  -RC    Endurance OT LTG    Endurance Goal OT LTG, Date Goal Reviewed  11/08/17  -RC 11/07/17  -RC    Endurance Goal OT LTG, Outcome  goal met  -RC goal partially met  -RC      11/06/17 1425 11/06/17 0724 11/04/17 0738    Transfer Training OT LTG    Transfer Training OT LTG, Assist Device  walker, rolling platform  -KD     Transfer Training OT LTG, Date Goal Reviewed  11/06/17  -KD 11/04/17  -RC    Transfer Training OT LTG, Outcome  goal not met  -KD     Patient Education OT LTG    Patient Education OT LTG, Date Goal Reviewed  11/06/17  -KD 11/04/17  -RC    Patient Education OT LTG Outcome  goal not met  -KD     Safety Awareness OT LTG    Safety Awareness OT LTG, Date Goal Reviewed 11/06/17  -KD  11/04/17  -RC    Safety Awareness OT LTG, Outcome goal not met  -KD      ADL OT LTG    ADL OT LTG, Date Goal Reviewed  11/06/17  -KD 11/04/17  -RC    ADL OT LTG, Outcome  goal not met  -KD     Endurance OT LTG    Endurance Goal OT LTG, Date Goal Reviewed  11/06/17  -KD 11/04/17  -RC    Endurance Goal OT LTG, Outcome  goal not met  -KD goal ongoing  -RC      11/03/17 1359 11/02/17 0715 11/01/17 1635    Transfer Training OT LTG    Transfer Training OT LTG, Date Goal Reviewed 11/03/17  -LM 11/02/17  -KD 11/01/17  -RW    Transfer Training OT LTG, Outcome goal ongoing  -LM goal not met  -KD goal ongoing  -RW    Patient Education OT LTG    Patient Education OT LTG, Date Goal Reviewed 11/03/17  -LM 11/02/17  -KD 11/01/17  -RW    Patient Education OT LTG Outcome goal not met  -LM goal not met  -KD goal ongoing  -RW    Safety Awareness OT LTG    Safety Awareness OT LTG, Date Goal Reviewed 11/03/17  -LM 11/02/17  -KD 11/01/17  -RW    Safety Awareness OT LTG, Outcome goal not met  -LM goal not met  -KD goal ongoing  -RW    ADL OT LTG    ADL OT LTG, Date Goal Reviewed 11/03/17   -LM  11/01/17  -RW    ADL OT LTG, Outcome goal not met  -LM goal not met  -KD goal ongoing  -RW    Endurance OT LTG    Endurance Goal OT LTG, Date Goal Reviewed 11/03/17  -LM 11/02/17  -KD 11/01/17  -RW    Endurance Goal OT LTG, Outcome goal not met  -LM goal not met  -KD goal ongoing  -RW      10/31/17 0925          Transfer Training OT LTG    Transfer Training OT LTG, Date Established 10/31/17  -BH (r) SP (t) BH (c)      Transfer Training OT LTG, Time to Achieve by discharge  -BH (r) SP (t) BH (c)      Transfer Training OT LTG, Activity Type all transfers  -BH (r) SP (t) BH (c)      Transfer Training OT LTG, [place] Level contact guard assist  -BH (r) SP (t) BH (c)      Patient Education OT LTG    Patient Education OT LTG, Date Established 10/31/17  -BH (r) SP (t) BH (c)      Patient Education OT LTG, Time to Achieve by discharge  -BH (r) SP (t) BH (c)      Patient Education OT LTG, Education Type HEP;posture/body mechanics;1 hand/anni technique;home safety;adaptive equipment mgmt;energy conservation  -BH (r) SP (t) BH (c)      Patient Education OT LTG, Education Understanding independent;demonstrates adequately;verbalizes understanding   with caregiver assist as necessary  -BH (r) SP (t) BH (c)      Safety Awareness OT LTG    Safety Awareness OT LTG, Date Established 10/31/17  -BH (r) SP (t) BH (c)      Safety Awareness OT LTG, Time to Achieve by discharge  -BH (r) SP (t) BH (c)      Safety Awareness OT LTG, Activity Type good safety awareness;with kitchen mobility;with ADL's  -BH (r) SP (t) BH (c)      Safety Awareness OT LTG, [place] Level min verbal cues  -BH (r) SP (t) BH (c)      ADL OT LTG    ADL OT LTG, Date Established 10/31/17  -BH (r) SP (t) BH (c)      ADL OT LTG, Time to Achieve by discharge  -BH (r) SP (t) BH (c)      ADL OT LTG, Activity Type ADL skills  -BH (r) SP (t) BH (c)      ADL OT LTG, Additional Goal Set-up A with self-feeding and grooming; VC for UB bathing and UB dressing; CGA  with LB bathing and LB dressing  -BH (r) SP (t) BH (c)      Endurance OT LTG    Endurance Goal OT LTG, Date Established 10/31/17  -BH (r) SP (t) BH (c)      Endurance Goal OT LTG, Time to Achieve by discharge  -BH (r) SP (t) BH (c)      Endurance Goal OT LTG, Activity Level endurance 2 good-  -BH (r) SP (t) BH (c)      Endurance Goal OT LTG, Additional Goal During 30 minutes of functional tasks, ADL, and therapeutic exercise  -BH (r) SP (t) BH (c)        User Key  (r) = Recorded By, (t) = Taken By, (c) = Cosigned By    Initials Name Provider Type     Karoline Huang, OTR/L Occupational Therapist    RW Jacinta Pitts, OTR/L Occupational Therapist    RC Cassie Caprio, LARA/L Occupational Therapy Assistant    JOSS King, LARA/L Occupational Therapy Assistant    MOSHE Boucher, LARA/L Occupational Therapy Assistant    DIONTE Bruce, OT Student OT Student          Occupational Therapy Education     Title: PT OT SLP Therapies (Active)     Topic: Occupational Therapy (Active)     Point: ADL training (Active)    Description: Instruct learner(s) on proper safety adaptation and remediation techniques during self care or transfers.   Instruct in proper use of assistive devices.    Learning Progress Summary    Learner Readiness Method Response Comment Documented by Status   Patient Acceptance E NR  RC 11/09/17 1155 Active    Acceptance TB NR  KD 11/02/17 1108 Active    Acceptance E,D NR  RW 11/01/17 1518 Active    Acceptance E VU Educated about OT and POC. Educated about anni dressing technique. Educated about safety with ADL and t/f. Educated to call for assist and not to stand independently. SP 10/31/17 1354 Done   Family Acceptance E,D NR  RW 11/01/17 1518 Active    Acceptance E VU Educated about OT and POC. Educated about anni dressing technique. Educated about safety with ADL and t/f. Educated to call for assist and not to stand independently. SP 10/31/17 1354 Done               Point: Home  exercise program (Active)    Description: Instruct learner(s) on appropriate technique for monitoring, assisting and/or progressing therapeutic exercises/activities.    Learning Progress Summary    Learner Readiness Method Response Comment Documented by Status   Patient Acceptance E,D NR theraputty  11/01/17 1634 Active   Family Acceptance E,D NR theraputty  11/01/17 1634 Active               Point: Precautions (Active)    Description: Instruct learner(s) on prescribed precautions during self-care and functional transfers.    Learning Progress Summary    Learner Readiness Method Response Comment Documented by Status   Patient Acceptance E NR   11/09/17 1155 Active    Acceptance E NR,VU recommned no climbing on ladders or step stools.  11/08/17 0928 Done    Acceptance E NR   11/07/17 1350 Active    Acceptance E NR   11/04/17 0930 Active    Acceptance E,D NR   11/01/17 1518 Active    Acceptance E VU Educated about OT and POC. Educated about anni dressing technique. Educated about safety with ADL and t/f. Educated to call for assist and not to stand independently.  10/31/17 1354 Done   Family Acceptance E,D NR   11/01/17 1518 Active    Acceptance E VU Educated about OT and POC. Educated about anni dressing technique. Educated about safety with ADL and t/f. Educated to call for assist and not to stand independently.  10/31/17 1354 Done               Point: Body mechanics (Active)    Description: Instruct learner(s) on proper positioning and spine alignment during self-care, functional mobility activities and/or exercises.    Learning Progress Summary    Learner Readiness Method Response Comment Documented by Status   Patient Acceptance E NR   11/09/17 1155 Active    Acceptance E NR,VU recommned no climbing on ladders or step stools.  11/08/17 0928 Done    Acceptance E NR   11/07/17 1350 Active    Acceptance E NR   11/04/17 0930 Active    Acceptance E VU Educated about OT and POC. Educated  about anni dressing technique. Educated about safety with ADL and t/f. Educated to call for assist and not to stand independently. SP 10/31/17 1354 Done   Family Acceptance E VU Educated about OT and POC. Educated about anni dressing technique. Educated about safety with ADL and t/f. Educated to call for assist and not to stand independently. SP 10/31/17 1354 Done                      User Key     Initials Effective Dates Name Provider Type Discipline    RW 10/17/16 -  Jacinta Pitts, OTR/L Occupational Therapist OT    RC 10/17/16 -  Cassie Carpio LARA/L Occupational Therapy Assistant OT    KD 10/17/16 -  Therese King LARA/L Occupational Therapy Assistant OT    SP 01/31/17 -  Alem Bruce OT Student OT Student OT                  OT Recommendation and Plan  Anticipated Equipment Needs At Discharge: bathing equipment, dressing equipment, front wheeled walker  Anticipated Discharge Disposition: home with /7 care, home with home health  Planned Therapy Interventions: activity intolerance, adaptive equipment training, ADL retraining, IADL retraining, balance training, bed mobility training, energy conservation, fine motor coordination training, home exercise program, motor coordination training, neuromuscular re-education, ROM (Range of Motion), strengthening, transfer training  Therapy Frequency: other (see comments) (3-14x/wk)  Plan of Care Review  Plan Of Care Reviewed With: patient  Progress: improving  Outcome Summary/Follow up Plan: making good progress,   cont poc        Outcome Measures       11/09/17 0730 11/08/17 0737 11/07/17 0730    How much help from another is currently needed...    Putting on and taking off regular lower body clothing? 3  -RC 3  -RC 3  -RC    Bathing (including washing, rinsing, and drying) 4  -RC 3  -RC 3  -RC    Toileting (which includes using toilet bed pan or urinal) 3  -RC 3  -RC 2  -RC    Putting on and taking off regular upper body clothing 4  -RC 3  -RC 3  -RC     Taking care of personal grooming (such as brushing teeth) 4  -RC 3  -RC 3  -RC    Eating meals 4  -RC 4  -RC 3  -RC    Score 22  -RC 19  -RC 17  -RC      User Key  (r) = Recorded By, (t) = Taken By, (c) = Cosigned By    Initials Name Provider Type     Cassie TAMAYO Balaji LARA/L Occupational Therapy Assistant           Time Calculation:         Time Calculation- OT       11/09/17 1157          Time Calculation- OT    OT Start Time 0730  -RC      OT Stop Time 0900  -RC      OT Time Calculation (min) 90 min  -      Total Timed Code Minutes- OT 90 minute(s)  -      OT Received On 11/09/17  -        User Key  (r) = Recorded By, (t) = Taken By, (c) = Cosigned By    Initials Name Provider Type     Cassie TAMAYO Balaji LARA/L Occupational Therapy Assistant           Therapy Charges for Today     Code Description Service Date Service Provider Modifiers Qty    50569341750 HC OT SELF CARE/MGMT/TRAIN EA 15 MIN 11/8/2017 Cassie G Balaji, LARA/L GO 1    39537615096 HC OT THER PROC EA 15 MIN 11/8/2017 Cassie G Balaji, LARA/L GO 3    14083052370 HC OT THERAPEUTIC ACT EA 15 MIN 11/8/2017 Cassie G Balaji, LARA/L GO 2    21253904325 HC OT SELF CARE/MGMT/TRAIN EA 15 MIN 11/9/2017 Cassie G Balaji, LARA/L GO 3    34354340214 HC OT THER PROC EA 15 MIN 11/9/2017 Cassie G Balaji, LARA/L GO 2    30794652708 HC OT THERAPEUTIC ACT EA 15 MIN 11/9/2017 Cassie G Balaji, LARA/L GO 1          OT G-codes  OT Professional Judgement Used?: Yes  OT Functional Scales Options: AM-PAC 6 Clicks Daily Activity (OT)  Score: 15  Functional Limitation: Self care  Self Care Current Status (): At least 40 percent but less than 60 percent impaired, limited or restricted  Self Care Goal Status (): At least 1 percent but less than 20 percent impaired, limited or restricted    Cassie RONI Balaji, LARA/L  11/9/2017

## 2017-11-09 NOTE — PLAN OF CARE
Problem: Patient Care Overview (Adult)  Goal: Plan of Care Review  Outcome: Ongoing (interventions implemented as appropriate)    11/09/17 1155   Coping/Psychosocial Response Interventions   Plan Of Care Reviewed With patient   Patient Care Overview   Progress improving   Outcome Evaluation   Outcome Summary/Follow up Plan making good progress, cont poc         Problem: Inpatient Occupational Therapy  Goal: Transfer Training Goal 1 LTG- OT  Outcome: Ongoing (interventions implemented as appropriate)    10/31/17 0925 11/06/17 0724 11/07/17 1351   Transfer Training OT LTG   Transfer Training OT LTG, Date Established 10/31/17 --  --    Transfer Training OT LTG, Time to Achieve by discharge --  --    Transfer Training OT LTG, Activity Type all transfers --  --    Transfer Training OT LTG, [place] Level contact guard assist --  --    Transfer Training OT LTG, Assist Device --  walker, rolling platform --    Transfer Training OT LTG, Date Goal Reviewed --  --  --    Transfer Training OT LTG, Outcome --  --  goal ongoing     11/09/17 1155   Transfer Training OT LTG   Transfer Training OT LTG, Date Established --    Transfer Training OT LTG, Time to Achieve --    Transfer Training OT LTG, Activity Type --    Transfer Training OT LTG, [place] Level --    Transfer Training OT LTG, Assist Device --    Transfer Training OT LTG, Date Goal Reviewed 11/09/17   Transfer Training OT LTG, Outcome --        Goal: Patient Education Goal LTG- OT  Outcome: Ongoing (interventions implemented as appropriate)    10/31/17 0925 11/07/17 1351 11/09/17 1155   Patient Education OT LTG   Patient Education OT LTG, Date Established 10/31/17 --  --    Patient Education OT LTG, Time to Achieve by discharge --  --    Patient Education OT LTG, Education Type HEP;posture/body mechanics;1 hand/anni technique;home safety;adaptive equipment mgmt;energy conservation --  --    Patient Education OT LTG, Education Understanding  independent;demonstrates adequately;verbalizes understanding  (with caregiver assist as necessary) --  --    Patient Education OT LTG, Date Goal Reviewed --  --  11/09/17   Patient Education OT LTG Outcome --  goal ongoing --        Goal: Safety Awareness Goal LTG- OT  Outcome: Ongoing (interventions implemented as appropriate)    10/31/17 0925 11/09/17 1155   Safety Awareness OT LTG   Safety Awareness OT LTG, Date Established 10/31/17 --    Safety Awareness OT LTG, Time to Achieve by discharge --    Safety Awareness OT LTG, Activity Type good safety awareness;with kitchen mobility;with ADL's --    Safety Awareness OT LTG, [place] Level min verbal cues --    Safety Awareness OT LTG, Date Goal Reviewed --  11/09/17   Safety Awareness OT LTG, Outcome --  goal ongoing       Goal: ADL Goal LTG- OT  Outcome: Ongoing (interventions implemented as appropriate)    10/31/17 0925 11/09/17 1155   ADL OT LTG   ADL OT LTG, Date Established 10/31/17 --    ADL OT LTG, Time to Achieve by discharge --    ADL OT LTG, Activity Type ADL skills --    ADL OT LTG, Additional Goal Set-up A with self-feeding and grooming; VC for UB bathing and UB dressing; CGA with LB bathing and LB dressing --    ADL OT LTG, Date Goal Reviewed --  11/09/17   ADL OT LTG, Outcome --  goal partially met

## 2017-11-09 NOTE — THERAPY TREATMENT NOTE
Inpatient Rehabilitation - Physical Therapy Treatment Note  [facility]     Patient Name: Kenneth Harper Jr.  : 1934  MRN: [id number]  Today's Date: 2017  Onset of Illness/Injury or Date of Surgery Date: 10/30/17  Date of Referral to PT: 10/30/17  Referring Physician: TERRENCE Mckinnon MD    Admit Date: 10/30/2017    Visit Dx:    ICD-10-CM ICD-9-CM   1. Symbolic dysfunction R48.9 784.60   2. Impaired mobility and ADLs Z74.09 799.89   3. Abnormality of gait and mobility R26.9 781.2   4. Muscle weakness (generalized) M62.81 728.87     Patient Active Problem List   Diagnosis   • Spinal stenosis, lumbar region, with neurogenic claudication   • Former smoker   • Overweight   • Left-sided weakness   • Right-sided lacunar stroke   • Essential hypertension   • Diabetes mellitus   • Chronic anxiety   • Chronic back pain greater than 3 months duration   • Prostatic hypertrophy   • Degenerative joint disease involving multiple joints   • Atrial fibrillation, chronic   • Encounter for rehabilitation   • Obstructive sleep apnea syndrome               Adult Rehabilitation Note       17 1417 17 1040 17 0902    Rehab Assessment/Intervention    Discipline physical therapy assistant  -RW physical therapy assistant  -RW speech language pathologist  -EC    Document Type therapy note (daily note)  -RW therapy note (daily note)  -RW therapy note (daily note)  -EC    Subjective Information agree to therapy  -RW agree to therapy  -RW agree to therapy  -EC    Patient Effort, Rehab Treatment good  -RW good  -RW     Recorded by [RW] Pipe Gruber PTA [RW] Pipe Gruber PTA [EC] Clementina Murrell CCC-SLP    Pain Assessment    Pain Assessment No/denies pain  -RW No/denies pain  -RW No/denies pain  -EC    Recorded by [RW] Pipe Gruber PTA [RW] Pipe Gruber PTA [EC] Clementina Murrell CCC-SLP    Cognitive Assessment/Intervention    Current Cognitive/Communication Assessment functional  -RW  functional  -RW     Orientation Status oriented to;oriented x 4  -RW oriented to;oriented x 4  -RW     Follows Commands/Answers Questions 100% of the time  -% of the time  -RW     Personal Safety Interventions gait belt;nonskid shoes/slippers when out of bed  -RW gait belt;nonskid shoes/slippers when out of bed  -RW     Recorded by [RW] Pipe Gruber PTA [RW] Pipe Gruber PTA     Cognitive Assessment Intervention    Behavior/Mood Observations behavior appropriate to situation, WNL/WFL  -RW behavior appropriate to situation, WNL/WFL  -RW     Recorded by [RW] Pipe Gruber PTA [RW] Pipe Gruber PTA     Communication Treatment Objective and Progress    Cognitive Linguistic Treatment Objectives   Improve memory skills;Improve functional problem solving  -EC    Recorded by   [EC] Clementina Murrell CCC-SLP    Improve functional problem solving    Improve functional problem solving through:   complete organization/home management task;tell similarity between items  -EC    Status: Improve functional problem solving   Progressing as expected  -EC    Functional Problem Solving Progress   with consistent cues;continue to address;70%;80%  -EC    Recorded by   [EC] Clementina Murrell CCC-SLP    Transfer Assessment/Treatment    Transfers, Sit-Stand [place] stand by assist;verbal cues required  -RW stand by assist;verbal cues required  -RW     Transfers, Stand-Sit [place] stand by assist;verbal cues required  -RW stand by assist;verbal cues required  -RW     Transfers, Sit-Stand-Sit, Assist Device rolling walker  -RW rolling walker  -RW     Transfer, Comment  sit to stand 2/5  -RW     Recorded by [RW] Pipe Gruber PTA [RW] Pipe Gruber PTA     Gait Assessment/Treatment    Gait, [place] Level verbal cues required;contact guard assist;supervision required  -RW verbal cues required;contact guard assist;supervision required  -RW     Gait, Assistive Device rolling walker  -RW rolling walker  -RW      Gait, Distance (Feet) 120  -   150 x 2,  70 x 3  -RW     Recorded by [RW] Pipe Gruber PTA [RW] Pipe Gruber PTA     Stairs Assessment/Treatment    Number of Stairs  4 x 2  -RW     Stairs, Handrail Location --  -RW left side (ascending)  -RW     Stairs, [place] Level --  -RW contact guard assist  -RW     Stairs, Comment  pt needed consitant cues to ascend and decend correctily  -RW     Recorded by [RW] Pipe Gruber PTA [RW] Pipe Gruber PTA     Wheelchair Training/Management    Wheelchair, Distance Propelled 150 mod ind  -    x 2 mod ind  -RW     Recorded by [RW] Pipe Gruber PTA [RW] Pipe Gruber PTA     Balance Skills Training    Gait Balance Support parallel bars  -RW      Gait Balance Activities backwards;side-stepping;tandem   practiced gt w/o hha in // bars  -RW      Recorded by [RW] Pipe Gruber PTA      Therapy Exercises    Bilateral Lower Extremities knee flexion;LAQ   pro2 level 3 x 10 min  -RW AROM:;20 reps;sitting;knee flexion;LAQ;hip flexion;ankle pumps/circles;standing;heel raises   2 sets  -RW     Recorded by [RW] Pipe Gruber PTA [RW] Pipe Gruber PTA     Positioning and Restraints    Pre-Treatment Position sitting in chair/recliner  -RW sitting in chair/recliner  -RW     Post Treatment Position chair  -RW wheelchair  -RW     In Chair call light within reach  -RW      Recorded by [RW] Pipe Gruber PTA [RW] Pipe Gruber PTA       11/09/17 0730 11/08/17 0936 11/08/17 0905    Rehab Assessment/Intervention    Discipline occupational therapy assistant  -RC physical therapy assistant  -RW speech language pathologist  -EC    Document Type therapy note (daily note)  -RC therapy note (daily note)  -RW therapy note (daily note)  -EC    Subjective Information agree to therapy  -RC agree to therapy  -RW no complaints;agree to therapy  -EC    Patient Effort, Rehab Treatment good  -RC good  -RW good  -EC    Symptoms Noted During/After Treatment none  -RC       Precautions/Limitations  fall precautions  -RW     Recorded by [RC] Cassie Carpio, LARA/L [RW] Pipe Gruber PTA [EC] MERI Schultz    Vital Signs    Intra Systolic BP Rehab  112  -RW     Intra Treatment Diastolic BP  63  -RW     Intratreatment Heart Rate (beats/min)  59  -RW     Recorded by  [RW] Pipe Gruber PTA     Pain Assessment    Pain Assessment No/denies pain  -RC No/denies pain  -RW No/denies pain  -EC    Recorded by [RC] Cassie Carpio, LARA/L [RW] Pipe Gruber PTA [EC] MERI Schultz    Cognitive Assessment/Intervention    Current Cognitive/Communication Assessment functional  -RC functional  -RW     Orientation Status  oriented to;oriented x 4  -RW     Follows Commands/Answers Questions  100% of the time  -RW     Personal Safety Interventions  gait belt;nonskid shoes/slippers when out of bed  -RW     Recorded by [RC] Cassie Carpio, LARA/L [RW] Pipe Gruber PTA     Cognitive Assessment Intervention    Behavior/Mood Observations  behavior appropriate to situation, WNL/WFL  -RW     Recorded by  [RW] Pipe Gruber PTA     Communication Treatment Objective and Progress    Cognitive Linguistic Treatment Objectives   Improve memory skills;Improve functional problem solving  -EC    Recorded by   [EC] MERI Schultz    Improve memory skills    Improve memory skills through:   recalling related word lists with an imposed delay;90%;with inconsistent cues  -EC    Status: Improve memory skills   Achieved  -EC    Memory Skills Progress   90%  -EC    Comments: Improve memory skills   pt recalled word list from last friday and from yesterday with no cues.    -EC    Recorded by   [EC] MERI Schultz    Improve functional problem solving    Improve functional problem solving through:   complete organization/home management task;tell similarity between items  -EC    Status: Improve functional problem solving   Progressing as expected  -EC    Functional  Problem Solving Progress   80%;with consistent cues;continue to address  -EC    Comments: Improve functional problem solving   moderate cue  -EC    Recorded by   [EC] Clementina Murrell, CCC-SLP    Transfer Assessment/Treatment    Transfers, Bed-Chair [place]  supervision required  -RW     Transfers, Chair-Bed [place]  supervision required  -RW     Transfers, Sit-Stand [place]  stand by assist;verbal cues required  -RW     Transfers, Stand-Sit [place]  stand by assist;verbal cues required  -RW     Transfers, Sit-Stand-Sit, Assist Device  rolling walker  -RW     Toilet Transfer, [place] supervision required  -RC      Toilet Transfer, Assistive Device rolling walker  -RC      Recorded by [RC] LEIGH CannonA/L [RW] Pipe Gruber, PTA     Gait Assessment/Treatment    Gait, [place] Level  verbal cues required;contact guard assist;supervision required  -RW     Gait, Assistive Device  rolling walker  -RW     Gait, Distance (Feet)  150   150 x 2 plus 70 x 2  -RW     Gait, Comment  improved gt quality  -RW     Recorded by  [RW] Pipe Gruber PTA     Wheelchair Training/Management    Wheelchair, Distance Propelled 150  - mod ind  -RW     Recorded by [RC] LEIGH CannonA/L [RW] Pipe Gruber, MARTIN     Upper Body Bathing Assessment/Training    UB Bathing Assess/Train Assistive Device --   sponge bath  -RC      UB Bathing Assess/Train, Position sitting;sink side  -RC      UB Bathing Assess/Train, [place] Level conditional independence  -RC      Recorded by [RC] Cassie Carpio LARA/L      Lower Body Bathing Assessment/Training    LB Bathing Assess/Train Assistive Device --   sponge bath  -RC      LB Bathing Assess/Train, Position sitting;sink side  -RC      LB Bathing Assess/Train, [place] Level set up required;conditional independence  -RC      Recorded by [RC] Cassie Carpio LARA/L      Upper Body Dressing Assessment/Training    UB Dressing Assess/Train, Clothing  Type pull over  -RC      UB Dressing Assess/Train, Position sitting  -RC      UB Dressing Assess/Train, [place] conditional independence  -RC      Recorded by [RC] MARCO Cannon/L      Lower Body Dressing Assessment/Training    LB Dressing Assess/Train, Clothing Type doffing:;donning:;pants;shoes;socks  -RC      LB Dressing Assess/Train, Position sitting;standing  -RC      LB Dressing Assess/Train, [place] minimum assist (75% patient effort)  -RC      Recorded by [RC] MARCO Cannon/L      Toileting Assessment/Training    Toileting Assess/Train, Position sitting;standing  -RC      Toileting Assess/Train, [place] Level supervision required  -RC      Recorded by [RC] MARCO Cannon/L      Grooming Assessment/Training    Grooming Assess/Train, Position sitting  -RC      Grooming Assess/Train, [place] Level independent  -RC      Recorded by [RC] MARCO Cannon/L      Self-Feeding Assessment/Training    Self-Feeding Assess/Train, [place] independent  -RC      Recorded by [RC] MARCO Cannon/L      Balance Skills Training    Sitting-Balance Activities  Ball toss;Reaching for objects;Reaching across midline;Lateral lean;Forward lean  -RW     Standing-Balance Activities  Reaching for objects  -RW     Gait Balance Support  assistive device   side stepping with rail in hallway  -RW     Gait Balance Activities  side-stepping;stepping over object;[place]  -RW     Recorded by  [RW] Pipe Gruber PTA     Therapy Exercises    Bilateral Lower Extremities  AROM:;20 reps;sitting;knee flexion;LAQ   pro 2 level 3 x 12 min  -RW     Bilateral Upper Extremity --   ue bike 15 min, pulley ex 5 min, t-putty 5 min  -RC      Recorded by [RC] MARCO Cannon/L [RW] Pipe Gruber, PTA     Positioning and Restraints    Pre-Treatment Position sitting in chair/recliner  -RC sitting in chair/recliner  -RW     Post Treatment Position wheelchair  -RC wheelchair  -RW     In Wheelchair with  SLP  -RC with nsg  -RW     Recorded by [RC] Cassie Carpio LARA/L [RW] Pipe Gruber, PTA       11/08/17 0737 11/07/17 1320 11/07/17 1108    Rehab Assessment/Intervention    Discipline occupational therapy assistant  -RC physical therapy assistant  -RW speech language pathologist  -HR    Document Type therapy note (daily note)  -RC therapy note (daily note)  -RW therapy note (daily note)  -HR    Subjective Information agree to therapy  -RC agree to therapy  -RW no complaints;agree to therapy  -HR    Patient Effort, Rehab Treatment good  -RC good  -RW good  -HR    Symptoms Noted During/After Treatment   none  -HR    Precautions/Limitations fall precautions  -RC  fall precautions  -HR    Precautions/Limitations, Vision   WFL with corrective lenses  -HR    Precautions/Limitations, Hearing   WFL  -HR    Recorded by [RC] Cassie Carpio LARA/L [RW] Pipe Gruber, PTA [HR] Shante Otero, MS CCC-SLP    Pain Assessment    Pain Assessment No/denies pain  -RC No/denies pain  -RW No/denies pain  -HR    Pain Score   0  -HR    Post Pain Score   0  -HR    Recorded by [RC] Cassie Carpio LARA/L [RW] Pipe Gruber, PTA [HR] Shante Otero, MS CCC-SLP    Vision Assessment/Intervention    Visual Impairment   WFL with corrective lenses  -HR    Recorded by   [HR] Shante Otero, MS CCC-SLP    Cognitive Assessment/Intervention    Current Cognitive/Communication Assessment functional  -RC functional  -RW functional  -HR    Orientation Status oriented x 4  -RC oriented to;oriented x 4  -RW oriented x 4  -HR    Follows Commands/Answers Questions   100% of the time  -HR    Recorded by [RC] Cassie Carpio, LARA/L [RW] Pipe Gruber, PTA [HR] Shante Otero, MS CCC-SLP    Cognitive Assessment Intervention    Behavior/Mood Observations  behavior appropriate to situation, WNL/WFL  -RW     Recorded by  [RW] Pipe Gruber, MARTIN     Communication Treatment Objective and Progress    Cognitive Linguistic Treatment Objectives   Improve  orientation;Improve memory skills;Improve functional problem solving  -HR    Recorded by   [HR] Shante Otero MS CCC-SLP    Improve orientation    Improve orientation through:   demonstrating orientation to day;demonstrating orientation to month;demonstrating orientation to year;demonstrating orientation to place;demonstrating orientation to disease/impairment;90%;with inconsistent cues  -HR    Status: Improve orientation through   Achieved  -HR    Recorded by   [HR] Shante Otero MS CCC-SLP    Improve memory skills    Improve memory skills through:   recalling related word lists with an imposed delay;90%;with inconsistent cues  -HR    Status: Improve memory skills   Progressing as expected  -HR    Memory Skills Progress   70%;80%;continue to address  -HR    Recorded by   [HR] Shante Otero MS CCC-SLP    Improve functional problem solving    Improve functional problem solving through:   complete organization/home management task;tell similarity between items  -HR    Status: Improve functional problem solving   Progressing as expected  -HR    Functional Problem Solving Progress   80%;with consistent cues;continue to address  -HR    Comments: Improve functional problem solving   mod cues for map reading task this date with high accuracy  -HR    Recorded by   [HR] Shante Otero MS CCC-SLP    Bed Mobility, Assessment/Treatment    Bed Mobility, Scoot/Bridge, [place]  independent  -RW     Bed Mob, Supine to Sit, [place]  independent  -RW     Bed Mob, Sit to Supine, [place]  independent  -RW     Recorded by  [RW] Pipe Gruber, PTA     Transfer Assessment/Treatment    Transfers, Bed-Chair [place] --   multiple transfers multiple surfaces  -RC supervision required  -RW     Transfers, Chair-Bed [place] supervision required;verbal cues required  -RC supervision required  -RW     Transfers, Bed-Chair-Bed, Assist Device rolling walker  -RC      Transfers, Sit-Stand [place] stand by assist   -RC stand by assist;verbal cues required  -RW     Transfers, Stand-Sit [place] stand by assist   practiced tech  -RC stand by assist;verbal cues required  -RW     Transfers, Sit-Stand-Sit, Assist Device  rolling walker  -RW     Recorded by [RC] MARCO Cannon/L [RW] Pipe Gruber, PTA     Gait Assessment/Treatment    Gait, [place] Level  verbal cues required;contact guard assist  -RW     Gait, Assistive Device  rolling walker  -RW     Recorded by  [RW] Pipe Gruber, PTA     Functional Mobility    Functional Mobility- [place] Level supervision required  -RC      Functional Mobility- Device rolling walker  -RC      Functional Mobility-Distance (Feet) 150  -RC      Recorded by [RC] MARCO Cannon/L      Wheelchair Training/Management    Wheelchair, Distance Propelled 150  - mod ind  -RW     Recorded by [RC] MARCO Cannon/L [RW] Pipe Gruber, PTA     Lower Body Dressing Assessment/Training    LB Dressing Assess/Train, Clothing Type donning:;shoes  -RC      LB Dressing Assess/Train, Position sitting  -RC      LB Dressing Assess/Train, [place] conditional independence  -RC      Recorded by [RC] MARCO Cannon/L      Toileting Assessment/Training    Toileting Assess/Train, [place] Level supervision required  -RC      Recorded by [RC] MARCO Cannon/L      Grooming Assessment/Training    Grooming Assess/Train, Position sitting  -RC      Grooming Assess/Train, [place] Level independent  -RC      Recorded by [RC] MARCO Cannon/L      Self-Feeding Assessment/Training    Self-Feeding Assess/Train, Position sitting  -RC      Self-Feeding Assess/Train, [place] independent  -RC      Recorded by [RC] MARCO Cannon/L      Balance Skills Training    Standing-Level of Assistance Close supervision  -RC      Static Standing Balance Support assistive device  -RC      Standing Balance # of Minutes 8  -RC      Recorded by [RC] MARCO Cannon/DAWN       Therapy Exercises    Bilateral Lower Extremities  AROM:;20 reps;supine;ankle pumps/circles;heel slides;hip abduction/adduction;SAQ   pro 2 level 3 x 10 min. 2 sets of hs  -RW     Bilateral Upper Extremity --   pulley ex 5 min, rickshaw 15# 20x, ue bike 15 min+  -RC      Recorded by [RC] MARCO Cannon/L [RW] Pipe Gruber PTA     Fine Motor Coordination Training    Detail (Fine Motor Coordination Training) --   velcro board,   -RC      Recorded by [RC] MARCO Cannon/L      Positioning and Restraints    Pre-Treatment Position sitting in chair/recliner  -RC sitting in chair/recliner  -RW     Post Treatment Position wheelchair  -RC wheelchair  -RW     In Bed with SLP;encouraged to call for assist  -RC      Recorded by [RC] MARCO Cannon/L [RW] Pipe Gruber PTA       11/07/17 1010 11/07/17 0730       Rehab Assessment/Intervention    Discipline physical therapy assistant  -RW occupational therapy assistant  -RC     Document Type therapy note (daily note)  -RW therapy note (daily note)  -RC     Subjective Information agree to therapy  -RW agree to therapy  -RC     Patient Effort, Rehab Treatment good  -RW good  -RC     Precautions/Limitations  fall precautions  -RC     Recorded by [RW] Pipe Gruber PTA [RC] MARCO Cannon/L     Vital Signs    Pretreatment Heart Rate (beats/min) 81  -RW      Pre SpO2 (%) 96  -RW      O2 Delivery Pre Treatment room air  -RW      Recorded by [RW] Pipe Gruber PTA      Pain Assessment    Pain Assessment No/denies pain  -RW No/denies pain  -RC     Recorded by [RW] Pipe Gruber PTA [RC] LEIGH CannonA/L     Cognitive Assessment/Intervention    Current Cognitive/Communication Assessment functional  -RW functional  -RC     Orientation Status oriented to;oriented x 4  -RW oriented x 4  -RC     Follows Commands/Answers Questions 100% of the time  -RW      Personal Safety Interventions gait belt;nonskid shoes/slippers when out of bed  -RW       Recorded by [RW] Pipe Gruber PTA [RC] LEIGH CannonA/L     Cognitive Assessment Intervention    Behavior/Mood Observations behavior appropriate to situation, WNL/WFL  -RW      Recorded by [RW] Pipe Gruber PTA      Transfer Assessment/Treatment    Transfers, Bed-Chair [place]  stand by assist  -RC     Transfers, Chair-Bed [place]  stand by assist  -RC     Transfers, Bed-Chair-Bed, Assist Device  rolling walker  -RC     Transfers, Sit-Stand [place] stand by assist;verbal cues required  -RW stand by assist  -RC     Transfers, Stand-Sit [place] stand by assist;verbal cues required  -RW stand by assist  -RC     Transfers, Sit-Stand-Sit, Assist Device rolling walker  -RW rolling walker  -RC     Recorded by [RW] Pipe Gruber PTA [RC] LEIGH CannonA/L     Gait Assessment/Treatment    Gait, [place] Level verbal cues required;contact guard assist  -RW      Gait, Assistive Device rolling walker  -RW      Gait, Distance (Feet) 150    x 2  -RW      Gait, Comment gt improving but will get walker too far away during turns  -RW      Recorded by [RW] Pipe Gruber PTA      Stairs Assessment/Treatment    Number of Stairs 12  -RW      Stairs, Handrail Location both sides  -RW      Stairs, [place] Level supervision required;contact guard assist  -RW      Recorded by [RW] Pipe Gruber PTA      Functional Mobility    Functional Mobility- [place] Level  supervision required  -RC     Functional Mobility- Device  rolling walker  -RC     Recorded by  [RC] LEIGH CannonA/L     Wheelchair Training/Management    Wheelchair, Distance Propelled 160 mod ind  -RW      Recorded by [RW] Pipe Gruber PTA      Grooming Assessment/Training    Grooming Assess/Train, [place] Level  independent  -RC     Recorded by  [RC] LEIGH CannonA/L     Balance Skills Training    Gait Balance-Level of Assistance Close supervision  -RW      Gait Balance Support parallel bars  -RW      Gait  Balance Activities tandem;side-stepping  -RW      Recorded by [RW] Pipe Gruber PTA      Therapy Exercises    Bilateral Lower Extremities AROM:;20 reps;sitting;hip flexion;knee flexion;LAQ   2 sets  -RW      BLE Resistance theraband  -RW      Bilateral Upper Extremity AROM:;15 reps;20 reps;sitting   tband row  -RW --   ue bike 15 min  -RC     BUE Resistance theraband  -RW      Recorded by [RW] Pipe Gruber PTA [RC] LEIGH CannonA/L     Fine Motor Coordination Training    Detail (Fine Motor Coordination Training)  --   lrg beads, card turning  -RC     Recorded by  [RC] MARCO Cannon/L     Positioning and Restraints    Pre-Treatment Position sitting in chair/recliner  -RW sitting in chair/recliner  -RC     Post Treatment Position wheelchair  -RW wheelchair  -RC     In Wheelchair  sitting;encouraged to call for assist  -RC     Recorded by [RW] Pipe Gruber PTA [RC] MARCO Cannon/L       User Key  (r) = Recorded By, (t) = Taken By, (c) = Cosigned By    Initials Name Effective Dates    EC Clementina Murrell CCC-SLP 12/30/16 -     HR Shante Otero, MS CCC-SLP 12/15/16 -     RW Pipe Gruber PTA 10/17/16 -     RC MARCO Cannon/DAWN 10/17/16 -                 IP PT Goals       11/09/17 1535 11/08/17 1119 11/07/17 1357    Transfer Training PT LTG    Transfer Training PT  LTG, Date Goal Reviewed   11/07/17  -RW    Transfer Training PT LTG, Outcome   goal met  -RW    Gait Training PT LTG    Gait Training Goal PT LTG, Date Goal Reviewed 11/09/17  -RW 11/08/17  -RW 11/07/17  -RW    Gait Training Goal PT LTG, Outcome goal ongoing  -RW goal ongoing  -RW     Stair Training PT STG    Stair Training Goal PT STG, Date Goal Reviewed 11/09/17  -RW 11/08/17  -RW 11/07/17  -RW    Stair Training Goal PT STG, Outcome goal met  -RW goal ongoing  -RW goal partially met   needed 2 hr  -RW    Stair Training PT LTG    Stair Training Goal PT LTG, Date Goal Reviewed 11/09/17  -RW 11/08/17  -RW 11/07/17  -RW     Stair Training Goal PT LTG, Outcome goal ongoing  -RW goal ongoing  -RW goal partially met   needed cga  -RW      11/06/17 1415 11/04/17 1440 11/03/17 1540    Transfer Training PT LTG    Transfer Training PT  LTG, Date Goal Reviewed 11/06/17  -RW 11/04/17  -JW 11/03/17  -RW    Transfer Training PT LTG, Outcome goal ongoing  -RW goal ongoing  -JW goal ongoing  -RW    Gait Training PT LTG    Gait Training Goal PT LTG, Date Goal Reviewed 11/06/17  -RW 11/04/17  -JW 11/03/17  -RW    Gait Training Goal PT LTG, Outcome goal ongoing  -RW goal ongoing  -JW goal ongoing  -RW    Stair Training PT STG    Stair Training Goal PT STG, Date Goal Reviewed 11/06/17  -RW 11/04/17  -JW 11/03/17  -RW    Stair Training Goal PT STG, Outcome goal ongoing  -RW goal ongoing  -JW goal ongoing  -RW    Stair Training PT LTG    Stair Training Goal PT LTG, Date Established  11/04/17  -JW     Stair Training Goal PT LTG, Date Goal Reviewed 11/06/17  -RW 11/04/17  -JW 11/03/17  -RW    Stair Training Goal PT LTG, Outcome goal ongoing  -RW goal ongoing  -JW goal ongoing  -RW      11/02/17 1631 11/01/17 1543 10/31/17 0825    Bed Mobility PT LTG    Bed Mobility PT LTG, Date Established   10/31/17  -LM    Bed Mobility PT LTG, Time to Achieve   by discharge  -LM    Bed Mobility PT LTG, Activity Type   supine to sit/sit to supine  -LM    Bed Mobility PT LTG, [place] Level   supervision required  -LM    Bed Mobility PT LTG, Additional Goal   HOB flat; No BR's  -LM    Bed Mobility PT LTG, Date Goal Reviewed 11/02/17  -RW 11/01/17  -RW     Bed Mobility PT LTG, Outcome goal met  -RW goal ongoing  -RW goal ongoing  -LM    Transfer Training PT LTG    Transfer Training PT LTG, Date Established   10/31/17  -LM    Transfer Training PT LTG, Time to Achieve   by discharge  -LM    Transfer Training PT LTG, Activity Type   bed to chair /chair to bed  -LM    Transfer Training PT LTG, [place] Level   supervision required  -LM    Transfer Training PT LTG,  Assist Device   --   AAD  -LM    Transfer Training PT  LTG, Date Goal Reviewed 11/02/17  -RW 11/01/17  -RW     Transfer Training PT LTG, Outcome goal ongoing  -RW goal ongoing  -RW goal ongoing  -LM    Gait Training PT LTG    Gait Training Goal PT LTG, Date Established   10/31/17  -LM    Gait Training Goal PT LTG, Time to Achieve   by discharge  -LM    Gait Training Goal PT LTG, [place] Level   supervision required  -LM    Gait Training Goal PT LTG, Assist Device   --   AAD  -LM    Gait Training Goal PT LTG, Distance to Achieve   150 feet  -LM    Gait Training Goal PT LTG, Date Goal Reviewed 11/02/17  -RW 11/01/17  -RW     Gait Training Goal PT LTG, Outcome goal ongoing  -RW goal ongoing  -RW goal ongoing  -LM    Stair Training PT STG    Stair Training Goal PT STG, Date Established   10/31/17  -LM    Stair Training Goal PT STG, Time to Achieve   4 days  -LM    Stair Training Goal PT STG, Number of Steps   4 steps  -LM    Stair Training Goal PT STG, [place] Level   contact guard assist  -LM    Stair Training Goal PT STG, Assist Device   1 handrail  -LM    Stair Training Goal PT STG, Date Goal Reviewed 11/02/17  -RW 11/01/17  -RW     Stair Training Goal PT STG, Outcome goal ongoing  -RW goal ongoing  -RW goal ongoing  -LM    Stair Training PT LTG    Stair Training Goal PT LTG, Date Established   10/31/17  -LM    Stair Training Goal PT LTG, Time to Achieve   by discharge  -LM    Stair Training Goal PT LTG, Number of Steps   1 flight  -LM    Stair Training Goal PT LTG, [place] Level   supervision required  -LM    Stair Training Goal PT LTG, Assist Device   2 handrails  -LM    Stair Training Goal PT LTG, Date Goal Reviewed 11/02/17  -RW 11/01/17  -RW     Stair Training Goal PT LTG, Outcome goal ongoing  -RW goal ongoing  -RW goal ongoing  -LM      User Key  (r) = Recorded By, (t) = Taken By, (c) = Cosigned By    Initials Name Provider Type    JENA Haynes, PTA Physical Therapy Assistant    LM  Landy Mckeon, PT Physical Therapist     Pipe Gruber, MARTIN Physical Therapy Assistant          Physical Therapy Education     Title: PT OT SLP Therapies (Active)     Topic: Physical Therapy (Active)     Point: Mobility training (Done)    Learning Progress Summary    Learner Readiness Method Response Comment Documented by Status   Patient Acceptance E VU again reviewed correct gt and transfer safey.  11/03/17 0910 Done    Acceptance E VU reviewed safe transfers and risks of falls RW 11/01/17 1052 Done    Acceptance E NR Reviewed safety with transfers and ambulation.  10/31/17 1506 Active               Point: Precautions (Done)    Learning Progress Summary    Learner Readiness Method Response Comment Documented by Status   Patient Acceptance E VU again reviewed correct gt and transfer safey. RW 11/03/17 0910 Done    Acceptance E VU reviewed safe transfers and risks of falls RW 11/01/17 1052 Done    Acceptance E NR Reviewed safety with transfers and ambulation.  10/31/17 1506 Active                      User Key     Initials Effective Dates Name Provider Type Discipline     06/15/16 -  Landy Mckeon, PT Physical Therapist PT     10/17/16 -  Pipe Gruber PTA Physical Therapy Assistant PT                    PT Recommendation and Plan  Anticipated Equipment Needs At Discharge: front wheeled walker  Anticipated Discharge Disposition: home with 24/7 care, home with home health  Planned Therapy Interventions: balance training, bed mobility training, gait training, home exercise program, motor coordination training, neuromuscular re-education, patient/family education, postural re-education, ROM (Range of Motion), stair training, strengthening, stretching, transfer training, wheelchair management/propulsion training  PT Frequency: other (see comments) (5-14 times/wk)  Plan of Care Review  Plan Of Care Reviewed With: patient  Outcome Summary/Follow up Plan: pt met 4 step stair goal. improving gt quality but  needs cues to slow down at times.          Outcome Measures       11/09/17 0730 11/08/17 0737 11/07/17 0730    How much help from another is currently needed...    Putting on and taking off regular lower body clothing? 3  -RC 3  -RC 3  -RC    Bathing (including washing, rinsing, and drying) 4  -RC 3  -RC 3  -RC    Toileting (which includes using toilet bed pan or urinal) 3  -RC 3  -RC 2  -RC    Putting on and taking off regular upper body clothing 4  -RC 3  -RC 3  -RC    Taking care of personal grooming (such as brushing teeth) 4  -RC 3  -RC 3  -RC    Eating meals 4  -RC 4  -RC 3  -RC    Score 22  -RC 19  -RC 17  -RC      User Key  (r) = Recorded By, (t) = Taken By, (c) = Cosigned By    Initials Name Provider Type    MARCO Young/L Occupational Therapy Assistant           Time Calculation:         PT Charges       11/09/17 1538 11/09/17 1153       Time Calculation    Start Time 1417  -RW 1040  -RW     Stop Time 1500  -RW 1135  -RW     Time Calculation (min) 43 min  -RW 55 min  -RW     Time Calculation- PT    Total Timed Code Minutes- PT 43 minute(s)  -RW 55 minute(s)  -RW       User Key  (r) = Recorded By, (t) = Taken By, (c) = Cosigned By    Initials Name Provider Type    KATHIE Gruber PTA Physical Therapy Assistant          Therapy Charges for Today     Code Description Service Date Service Provider Modifiers Qty    15431649863 HC GAIT TRAINING EA 15 MIN 11/8/2017 Pipe Gruber PTA GP 2    89037990243 HC PT THER PROC EA 15 MIN 11/8/2017 Pipe Gruber PTA GP 2    59105829634 HC PT THERAPEUTIC ACT EA 15 MIN 11/8/2017 Pipe Gruber PTA GP 2    46262944879 HC GAIT TRAINING EA 15 MIN 11/9/2017 Pipe Gruber PTA GP 2    87014192226 HC PT THER PROC EA 15 MIN 11/9/2017 Pipe Gruber PTA GP 1    20784784124 HC PT THERAPEUTIC ACT EA 15 MIN 11/9/2017 Pipe Gruber, PTA GP 1    26075153231 HC GAIT TRAINING EA 15 MIN 11/9/2017 Pipe Gruber, MARTIN GP 1    92547251239 HC PT THER PROC EA 15 MIN  11/9/2017 Pipe Gruber PTA GP 1    73462420164 HC PT THERAPEUTIC ACT EA 15 MIN 11/9/2017 Pipe Gruber PTA GP 1          PT G-Codes  PT Professional Judgement Used?: Yes  Outcome Measure Options: AM-PAC 6 Clicks Daily Activity (OT)  Score: 15  Functional Limitation: Mobility: Walking and moving around  Mobility: Walking and Moving Around Current Status (): At least 40 percent but less than 60 percent impaired, limited or restricted  Mobility: Walking and Moving Around Goal Status (): At least 1 percent but less than 20 percent impaired, limited or restricted    Pipe Gruber PTA  11/9/2017

## 2017-11-09 NOTE — PLAN OF CARE
Problem: Patient Care Overview (Adult)  Goal: Plan of Care Review  Outcome: Ongoing (interventions implemented as appropriate)    11/09/17 0309   Coping/Psychosocial Response Interventions   Plan Of Care Reviewed With patient   Patient Care Overview   Progress no change   Outcome Evaluation   Outcome Summary/Follow up Plan patient continues to not call while going to the bathroom, education was done again         Problem: Fall Risk (Adult)  Goal: Absence of Falls  Outcome: Ongoing (interventions implemented as appropriate)    Problem: Stroke (Ischemic) (Adult)  Goal: Signs and Symptoms of Listed Potential Problems Will be Absent or Manageable (Stroke)  Outcome: Ongoing (interventions implemented as appropriate)    Problem: Diabetes, Type 2 (Adult)  Goal: Signs and Symptoms of Listed Potential Problems Will be Absent or Manageable (Diabetes, Type 2)  Outcome: Ongoing (interventions implemented as appropriate)

## 2017-11-10 LAB
GLUCOSE BLDC GLUCOMTR-MCNC: 89 MG/DL (ref 70–130)
GLUCOSE BLDC GLUCOMTR-MCNC: 92 MG/DL (ref 70–130)
GLUCOSE BLDC GLUCOMTR-MCNC: 96 MG/DL (ref 70–130)
GLUCOSE BLDC GLUCOMTR-MCNC: 97 MG/DL (ref 70–130)

## 2017-11-10 PROCEDURE — 97535 SELF CARE MNGMENT TRAINING: CPT

## 2017-11-10 PROCEDURE — 82962 GLUCOSE BLOOD TEST: CPT

## 2017-11-10 PROCEDURE — 25010000002 ENOXAPARIN PER 10 MG: Performed by: FAMILY MEDICINE

## 2017-11-10 PROCEDURE — 92507 TX SP LANG VOICE COMM INDIV: CPT | Performed by: SPEECH-LANGUAGE PATHOLOGIST

## 2017-11-10 PROCEDURE — 97112 NEUROMUSCULAR REEDUCATION: CPT

## 2017-11-10 PROCEDURE — 97110 THERAPEUTIC EXERCISES: CPT

## 2017-11-10 PROCEDURE — 97530 THERAPEUTIC ACTIVITIES: CPT

## 2017-11-10 PROCEDURE — 97116 GAIT TRAINING THERAPY: CPT

## 2017-11-10 PROCEDURE — 99232 SBSQ HOSP IP/OBS MODERATE 35: CPT | Performed by: FAMILY MEDICINE

## 2017-11-10 RX ADMIN — POTASSIUM CHLORIDE 10 MEQ: 750 CAPSULE, EXTENDED RELEASE ORAL at 08:18

## 2017-11-10 RX ADMIN — FINASTERIDE 5 MG: 5 TABLET, FILM COATED ORAL at 08:17

## 2017-11-10 RX ADMIN — AMLODIPINE BESYLATE 10 MG: 10 TABLET ORAL at 20:41

## 2017-11-10 RX ADMIN — CLOPIDOGREL BISULFATE 75 MG: 75 TABLET ORAL at 08:17

## 2017-11-10 RX ADMIN — ENOXAPARIN SODIUM 40 MG: 40 INJECTION SUBCUTANEOUS at 08:17

## 2017-11-10 RX ADMIN — ASPIRIN 81 MG 81 MG: 81 TABLET ORAL at 08:18

## 2017-11-10 RX ADMIN — THERA TABS 1 TABLET: TAB at 08:17

## 2017-11-10 RX ADMIN — LISINOPRIL 10 MG: 10 TABLET ORAL at 08:17

## 2017-11-10 RX ADMIN — PANTOPRAZOLE SODIUM 40 MG: 40 TABLET, DELAYED RELEASE ORAL at 05:14

## 2017-11-10 RX ADMIN — GLIPIZIDE 10 MG: 10 TABLET ORAL at 08:18

## 2017-11-10 RX ADMIN — METFORMIN HYDROCHLORIDE 1000 MG: 500 TABLET ORAL at 17:09

## 2017-11-10 RX ADMIN — HYDROCORTISONE: 1 CREAM TOPICAL at 17:10

## 2017-11-10 RX ADMIN — ATORVASTATIN CALCIUM 10 MG: 10 TABLET, FILM COATED ORAL at 20:41

## 2017-11-10 RX ADMIN — HYDROCORTISONE: 1 CREAM TOPICAL at 08:19

## 2017-11-10 RX ADMIN — MAGNESIUM OXIDE TAB 400 MG (241.3 MG ELEMENTAL MG) 400 MG: 400 (241.3 MG) TAB at 08:17

## 2017-11-10 RX ADMIN — FLUTICASONE PROPIONATE 2 SPRAY: 50 SPRAY, METERED NASAL at 08:17

## 2017-11-10 RX ADMIN — LIDOCAINE 1 PATCH: 50 PATCH TOPICAL at 08:18

## 2017-11-10 RX ADMIN — TERAZOSIN HYDROCHLORIDE ANHYDROUS 5 MG: 5 CAPSULE ORAL at 20:41

## 2017-11-10 RX ADMIN — METFORMIN HYDROCHLORIDE 1000 MG: 500 TABLET ORAL at 08:17

## 2017-11-10 NOTE — PLAN OF CARE
Problem: Patient Care Overview (Adult)  Goal: Plan of Care Review  Outcome: Ongoing (interventions implemented as appropriate)    11/10/17 0316   Coping/Psychosocial Response Interventions   Plan Of Care Reviewed With patient   Patient Care Overview   Progress no change         Problem: Fall Risk (Adult)  Goal: Absence of Falls  Outcome: Ongoing (interventions implemented as appropriate)    Problem: Stroke (Ischemic) (Adult)  Goal: Signs and Symptoms of Listed Potential Problems Will be Absent or Manageable (Stroke)  Outcome: Ongoing (interventions implemented as appropriate)    Problem: Diabetes, Type 2 (Adult)  Goal: Signs and Symptoms of Listed Potential Problems Will be Absent or Manageable (Diabetes, Type 2)  Outcome: Ongoing (interventions implemented as appropriate)

## 2017-11-10 NOTE — PLAN OF CARE
Problem: Patient Care Overview (Adult)  Goal: Plan of Care Review  Outcome: Ongoing (interventions implemented as appropriate)    11/10/17 1334   Coping/Psychosocial Response Interventions   Plan Of Care Reviewed With patient   Patient Care Overview   Progress progress toward functional goals as expected   Outcome Evaluation   Outcome Summary/Follow up Plan Pt discharged from  this date d/t all goals achieved. SLP discussed d/c with patient this date.         Problem: Inpatient SLP  Goal: Cognitive-linguistic-Patient will improve Cognitive-linguistic skills to improve safety and safety awareness in environment  Outcome: Outcome(s) achieved Date Met:  11/10/17    10/31/17 1244 11/10/17 1334   Cognitive Linguistic- Improve Safety and Awareness   Cognitive Linguistic Improve Safety and Awareness- SLP, Date Established 10/31/17 --    Cognitive Linguistic Improve Safety and Awareness- SLP, Time to Achieve by discharge --    Cognitive Linguistic Improve Safety and Awareness- SLP, Activity Level Patient will improve orientation for increased safety in environment;Patient will improve memory skills for increased safety in environment;Patient will improve functional problem solving skills for increased safety in environment --    Cognitive Linguistic Improve Safety and Awareness- SLP, Date Goal Reviewed --  11/10/17   Cognitive Linguistic Improve Safety and Awareness- SLP, Outcome --  goal ongoing

## 2017-11-10 NOTE — PLAN OF CARE
Problem: Patient Care Overview (Adult)  Goal: Plan of Care Review  Outcome: Ongoing (interventions implemented as appropriate)    11/10/17 0914   Coping/Psychosocial Response Interventions   Plan Of Care Reviewed With patient   Patient Care Overview   Progress improving   Outcome Evaluation   Outcome Summary/Follow up Plan practiced fx mobility this AM pt cont to improve, cont poc         Problem: Inpatient Occupational Therapy  Goal: Transfer Training Goal 1 LTG- OT  Outcome: Ongoing (interventions implemented as appropriate)    10/31/17 0925 11/06/17 0724 11/07/17 1351   Transfer Training OT LTG   Transfer Training OT LTG, Date Established 10/31/17 --  --    Transfer Training OT LTG, Time to Achieve by discharge --  --    Transfer Training OT LTG, Activity Type all transfers --  --    Transfer Training OT LTG, [place] Level contact guard assist --  --    Transfer Training OT LTG, Assist Device --  walker, rolling platform --    Transfer Training OT LTG, Date Goal Reviewed --  --  --    Transfer Training OT LTG, Outcome --  --  goal ongoing     11/09/17 1155   Transfer Training OT LTG   Transfer Training OT LTG, Date Established --    Transfer Training OT LTG, Time to Achieve --    Transfer Training OT LTG, Activity Type --    Transfer Training OT LTG, [place] Level --    Transfer Training OT LTG, Assist Device --    Transfer Training OT LTG, Date Goal Reviewed 11/09/17   Transfer Training OT LTG, Outcome --        Goal: Patient Education Goal LTG- OT  Outcome: Ongoing (interventions implemented as appropriate)    10/31/17 0925 11/07/17 1351 11/10/17 0914   Patient Education OT LTG   Patient Education OT LTG, Date Established 10/31/17 --  --    Patient Education OT LTG, Time to Achieve by discharge --  --    Patient Education OT LTG, Education Type HEP;posture/body mechanics;1 hand/anni technique;home safety;adaptive equipment mgmt;energy conservation --  --    Patient Education OT LTG, Education  Understanding independent;demonstrates adequately;verbalizes understanding  (with caregiver assist as necessary) --  --    Patient Education OT LTG, Date Goal Reviewed --  --  11/10/17   Patient Education OT LTG Outcome --  goal ongoing --        Goal: Safety Awareness Goal LTG- OT  Outcome: Ongoing (interventions implemented as appropriate)    10/31/17 0925 11/09/17 1155 11/10/17 0914   Safety Awareness OT LTG   Safety Awareness OT LTG, Date Established 10/31/17 --  --    Safety Awareness OT LTG, Time to Achieve by discharge --  --    Safety Awareness OT LTG, Activity Type good safety awareness;with kitchen mobility;with ADL's --  --    Safety Awareness OT LTG, [place] Level min verbal cues --  --    Safety Awareness OT LTG, Date Goal Reviewed --  --  11/10/17   Safety Awareness OT LTG, Outcome --  goal ongoing --        Goal: ADL Goal LTG- OT  Outcome: Ongoing (interventions implemented as appropriate)    10/31/17 0925 11/09/17 1155 11/10/17 0914   ADL OT LTG   ADL OT LTG, Date Established 10/31/17 --  --    ADL OT LTG, Time to Achieve by discharge --  --    ADL OT LTG, Activity Type ADL skills --  --    ADL OT LTG, Additional Goal Set-up A with self-feeding and grooming; VC for UB bathing and UB dressing; CGA with LB bathing and LB dressing --  --    ADL OT LTG, Date Goal Reviewed --  --  11/10/17   ADL OT LTG, Outcome --  goal partially met --

## 2017-11-10 NOTE — THERAPY TREATMENT NOTE
Inpatient Rehabilitation - Physical Therapy Treatment Note  [facility]     Patient Name: Kenneth Harper Jr.  : 1934  MRN: [id number]  Today's Date: 11/10/2017  Onset of Illness/Injury or Date of Surgery Date: 10/30/17  Date of Referral to PT: 10/30/17  Referring Physician: TERRENCE Mckinnon MD    Admit Date: 10/30/2017    Visit Dx:    ICD-10-CM ICD-9-CM   1. Symbolic dysfunction R48.9 784.60   2. Impaired mobility and ADLs Z74.09 799.89   3. Abnormality of gait and mobility R26.9 781.2   4. Muscle weakness (generalized) M62.81 728.87     Patient Active Problem List   Diagnosis   • Spinal stenosis, lumbar region, with neurogenic claudication   • Former smoker   • Overweight   • Left-sided weakness   • Right-sided lacunar stroke   • Essential hypertension   • Diabetes mellitus   • Chronic anxiety   • Chronic back pain greater than 3 months duration   • Prostatic hypertrophy   • Degenerative joint disease involving multiple joints   • Atrial fibrillation, chronic   • Encounter for rehabilitation   • Obstructive sleep apnea syndrome               Adult Rehabilitation Note       11/10/17 1355 11/10/17 1045 11/10/17 1000    Rehab Assessment/Intervention    Discipline physical therapy assistant  -JA speech language pathologist  -HR physical therapy assistant  -RADHA    Document Type therapy note (daily note)  -JA therapy note (daily note)  -HR therapy note (daily note)  -JA    Subjective Information agree to therapy  -JA agree to therapy;no complaints  -HR agree to therapy  -JA    Patient Effort, Rehab Treatment good  -JA good  -HR good  -JA    Symptoms Noted During/After Treatment  none  -HR     Precautions/Limitations fall precautions  -JA  fall precautions  -JA    Precautions/Limitations, Vision  WFL with corrective lenses  -HR     Precautions/Limitations, Hearing  WFL  -HR     Recorded by [JA] Emiliano Flores, PTA [HR] Shante Otero MS CCC-SLP [JA] Emiliano Flores, PTA    Vital Signs    Pretreatment  Heart Rate (beats/min)   64  -JA    Pre SpO2 (%)   98  -JA    O2 Delivery Pre Treatment   room air  -JA    Pre Patient Position Sitting  -JA  Sitting  -JA    Intra Patient Position Standing  -JA  Standing  -JA    Post Patient Position Sitting  -JA  Sitting  -JA    Recorded by [JA] Emiliano Flores PTA  [JA] Emiliano Flores PTA    Pain Assessment    Pain Assessment No/denies pain  -JA No/denies pain  -HR No/denies pain  -JA    Pain Score  0  -HR     Post Pain Score  0  -HR     Recorded by [JA] Emiliano Flores PTA [HR] Shante Otero MS CCC-SLP [JA] Emiliano Flores PTA    Vision Assessment/Intervention    Visual Impairment  WFL with corrective lenses  -HR     Recorded by  [HR] Shante Otero MS CCC-SLP     Cognitive Assessment/Intervention    Current Cognitive/Communication Assessment  functional  -HR     Orientation Status  oriented x 4  -HR     Follows Commands/Answers Questions  100% of the time  -HR     Recorded by  [HR] Shante Otero MS JULITA-SLP     Communication Treatment Objective and Progress    Cognitive Linguistic Treatment Objectives  Improve memory skills;Improve functional problem solving  -HR     Recorded by  [HR] Shante Otero MS CCC-SLP     Improve orientation    Improve orientation through:  demonstrating orientation to day;demonstrating orientation to month;demonstrating orientation to year;demonstrating orientation to place;demonstrating orientation to disease/impairment;90%;with inconsistent cues  -HR     Status: Improve orientation through  Achieved  -HR     Recorded by  [HR] Shante Otero MS CCC-SLP     Improve memory skills    Improve memory skills through:  recalling related word lists with an imposed delay;90%;with inconsistent cues  -HR     Status: Improve memory skills  Achieved  -HR     Recorded by  [HR] Shante Otero MS CCC-SLP     Improve functional problem solving    Improve functional problem solving through:  complete organization/home management task;tell  similarity between items  -HR     Status: Improve functional problem solving  Achieved  -HR     Functional Problem Solving Progress  discontinue achieved  -HR     Comments: Improve functional problem solving  goal met this date  -HR     Recorded by  [HR] Shante Otero MS CCC-SLP     Transfer Assessment/Treatment    Transfers, Sit-Stand [place] stand by assist  -JA  stand by assist  -JA    Transfers, Stand-Sit [place] stand by assist  -JA  stand by assist  -JA    Transfers, Sit-Stand-Sit, Assist Device rolling walker  -JA  rolling walker  -JA    Toilet Transfer, [place]   supervision required   st. pivot w/c-toilet-w/c  -JA    Recorded by [JA] Emiliano Flores PTA  [JA] Emiliano Flores PTA    Gait Assessment/Treatment    Gait, [place] Level stand by assist  -JA  supervision required  -JA    Gait, Assistive Device rolling walker  -JA  rolling walker  -JA    Gait, Distance (Feet) 120  -JA  120   x2  -JA    Gait, Gait Deviations   left:;decreased heel strike;step length decreased  -JA    Gait, Safety Issues   step length decreased;balance decreased during turns  -JA    Gait, Impairments   strength decreased;impaired balance  -JA    Recorded by [JA] Emiliano Flores PTA  [JA] Emiliano Flores PTA    Wheelchair Training/Management    Wheelchair, Distance Propelled 150  -JA  150  -JA    Wheelchair Training Comment conditional independent  -JA  conditional independence  -JA    Recorded by [JA] Emiliano Flores PTA  [JA] Emiliano Flores PTA    Balance Skills Training    Gait Balance Support   parallel bars  -JA    Gait Balance Activities   side-stepping;stepping over object;tandem  -JA    Recorded by   [JA] Emiliano Flores PTA    Therapy Exercises    Bilateral Lower Extremities AROM:;10 reps;standing;hip abduction/adduction;hip flexion;mini squats;heel raises;other reps  -JA      BLE Other Reps --   Pro 2 level 4.0 12mins   -JA      Recorded by [JA] Emiliano Flores PTA       Positioning and Restraints    Pre-Treatment Position sitting in chair/recliner  -JA  sitting in chair/recliner  -JA    Post Treatment Position wheelchair  -JA  wheelchair  -JA    In Wheelchair sitting;with nsg  -JA  with SLP  -JA    Recorded by [RADHA] Emiliano Flores PTA  [RADHA] Emiliano Flores PTA      11/10/17 0725 11/09/17 1417 11/09/17 1040    Rehab Assessment/Intervention    Discipline occupational therapy assistant  -RC physical therapy assistant  -RW physical therapy assistant  -RW    Document Type therapy note (daily note)  -RC therapy note (daily note)  -RW therapy note (daily note)  -RW    Subjective Information agree to therapy  -RC agree to therapy  -RW agree to therapy  -RW    Patient Effort, Rehab Treatment good  -RC good  -RW good  -RW    Recorded by [RC] MARCO Cannon/L [RW] Pipe Gruber PTA [RW] Pipe Gruber PTA    Pain Assessment    Pain Assessment No/denies pain  -RC No/denies pain  -RW No/denies pain  -RW    Recorded by [RC] MARCO Cannon/L [RW] Pipe Gruber PTA [RW] Pipe Gruber PTA    Cognitive Assessment/Intervention    Current Cognitive/Communication Assessment  functional  -RW functional  -RW    Orientation Status  oriented to;oriented x 4  -RW oriented to;oriented x 4  -RW    Follows Commands/Answers Questions  100% of the time  -% of the time  -RW    Personal Safety Interventions  gait belt;nonskid shoes/slippers when out of bed  -RW gait belt;nonskid shoes/slippers when out of bed  -RW    Recorded by  [RW] Pipe Gruber PTA [RW] Pipe Gruber PTA    Cognitive Assessment Intervention    Behavior/Mood Observations  behavior appropriate to situation, WNL/WFL  -RW behavior appropriate to situation, WNL/WFL  -RW    Recorded by  [RW] Pipe Gruber PTA [RW] Pipe Gruber PTA    Transfer Assessment/Treatment    Transfers, Bed-Chair [place] supervision required  -RC      Transfers, Chair-Bed [place] supervision required  -RC      Transfers,  Bed-Chair-Bed, Assist Device rolling walker  -RC      Transfers, Sit-Stand [place] stand by assist  -RC stand by assist;verbal cues required  -RW stand by assist;verbal cues required  -RW    Transfers, Stand-Sit [place] stand by assist  -RC stand by assist;verbal cues required  -RW stand by assist;verbal cues required  -RW    Transfers, Sit-Stand-Sit, Assist Device rolling walker  -RC rolling walker  -RW rolling walker  -RW    Transfer, Comment   sit to stand 2/5  -RW    Recorded by [RC] MARCO Cannon/L [RW] Pipe Gruber PTA [RW] Pipe Gruber PTA    Gait Assessment/Treatment    Gait, [place] Level  verbal cues required;contact guard assist;supervision required  -RW verbal cues required;contact guard assist;supervision required  -RW    Gait, Assistive Device  rolling walker  -RW rolling walker  -RW    Gait, Distance (Feet)  120  -   150 x 2,  70 x 3  -RW    Recorded by  [RW] Pipe Gruber PTA [RW] Pipe Gruber PTA    Stairs Assessment/Treatment    Number of Stairs   4 x 2  -RW    Stairs, Handrail Location  --  -RW left side (ascending)  -RW    Stairs, [place] Level  --  -RW contact guard assist  -RW    Stairs, Comment   pt needed consitant cues to ascend and decend correctily  -RW    Recorded by  [RW] Pipe Gruber PTA [RW] Pipe Gruber PTA    Functional Mobility    Functional Mobility- Ind. Level supervision required  -RC      Functional Mobility- Device rolling walker  -RC      Functional Mobility-Distance (Feet) 200  -RC      Functional Mobility- Comment --   practiced simulated household mobility w/ multi sit-stand  -RC      Recorded by [RC] LEIGH CannonA/L      Wheelchair Training/Management    Wheelchair, Distance Propelled 200  - mod ind  -    x 2 mod ind  -RW    Recorded by [RC] MARCO Cannon/L [RW] Pipe Gruber PTA [RW] Piep Gruber PTA    Lower Body Dressing Assessment/Training    LB Dressing Assess/Train, Clothing Type  donning:;shoes  -RC      LB Dressing Assess/Train, Position sitting  -RC      LB Dressing Assess/Train, [place] minimum assist (75% patient effort)  -RC      Recorded by [RC] LEIGH CannonA/L      Grooming Assessment/Training    Grooming Assess/Train, Position sitting  -RC      Grooming Assess/Train, Indepen Level independent  -RC      Recorded by [RC] LEIGH CannonA/L      Self-Feeding Assessment/Training    Self-Feeding Assess/Train, Position sitting  -RC      Self-Feeding Assess/Train, [place] independent  -RC      Recorded by [RC] LEIGH CannonA/L      Balance Skills Training    Gait Balance Support  parallel bars  -RW     Gait Balance Activities  backwards;side-stepping;tandem   practiced gt w/o hha in // bars  -RW     Recorded by  [RW] Pipe Gruber PTA     Therapy Exercises    Bilateral Lower Extremities  knee flexion;LAQ   pro2 level 3 x 10 min  -RW AROM:;20 reps;sitting;knee flexion;LAQ;hip flexion;ankle pumps/circles;standing;heel raises   2 sets  -RW    Bilateral Upper Extremity --   ue bike 16 min, rickshaw 15# 20 x2, pulley ex 10 min  -RC      Recorded by [RC] MARCO Cannon/L [RW] Pipe Gruber, PTA [RW] Pipe Gruber, MARTIN    Positioning and Restraints    Pre-Treatment Position sitting in chair/recliner  -RC sitting in chair/recliner  -RW sitting in chair/recliner  -RW    Post Treatment Position wheelchair  -RC chair  -RW wheelchair  -RW    In Chair  call light within reach  -RW     In Wheelchair encouraged to call for assist  -RC      Recorded by [RC] MARCO Cannon/L [RW] Pipe Gruber, PTA [RW] Pipe Gruber, MARTIN      11/09/17 0902 11/09/17 0730 11/08/17 0936    Rehab Assessment/Intervention    Discipline speech language pathologist  -EC occupational therapy assistant  -RC physical therapy assistant  -RW    Document Type therapy note (daily note)  -EC therapy note (daily note)  -RC therapy note (daily note)  -RW    Subjective Information agree to  therapy  -EC agree to therapy  -RC agree to therapy  -RW    Patient Effort, Rehab Treatment  good  -RC good  -RW    Symptoms Noted During/After Treatment  none  -RC     Precautions/Limitations   fall precautions  -RW    Recorded by [EC] MERI Schultz [RC] LEIGH CannonA/L [RW] Pipe Gruber PTA    Vital Signs    Intra Systolic BP Rehab   112  -RW    Intra Treatment Diastolic BP   63  -RW    Intratreatment Heart Rate (beats/min)   59  -RW    Recorded by   [RW] Pipe Gruber PTA    Pain Assessment    Pain Assessment No/denies pain  -EC No/denies pain  -RC No/denies pain  -RW    Recorded by [EC] MERI Schultz [RC] LEIGH CannonA/L [RW] Pipe Gruber PTA    Cognitive Assessment/Intervention    Current Cognitive/Communication Assessment  functional  -RC functional  -RW    Orientation Status   oriented to;oriented x 4  -RW    Follows Commands/Answers Questions   100% of the time  -RW    Personal Safety Interventions   gait belt;nonskid shoes/slippers when out of bed  -RW    Recorded by  [RC] LEIGH CannonA/L [RW] Pipe Gruber PTA    Cognitive Assessment Intervention    Behavior/Mood Observations   behavior appropriate to situation, WNL/WFL  -RW    Recorded by   [RW] Pipe Gruber PTA    Communication Treatment Objective and Progress    Cognitive Linguistic Treatment Objectives Improve memory skills;Improve functional problem solving  -EC      Recorded by [EC] MERI Schultz      Improve functional problem solving    Improve functional problem solving through: complete organization/home management task;tell similarity between items  -EC      Status: Improve functional problem solving Progressing as expected  -EC      Functional Problem Solving Progress with consistent cues;continue to address;70%;80%  -EC      Recorded by [EC] MERI Schultz      Transfer Assessment/Treatment    Transfers, Bed-Chair [place]   supervision required   -RW    Transfers, Chair-Bed [place]   supervision required  -RW    Transfers, Sit-Stand [place]   stand by assist;verbal cues required  -RW    Transfers, Stand-Sit [place]   stand by assist;verbal cues required  -RW    Transfers, Sit-Stand-Sit, Assist Device   rolling walker  -RW    Toilet Transfer, [place]  supervision required  -RC     Toilet Transfer, Assistive Device  rolling walker  -RC     Recorded by  [RC] Cassie Carpio LARA/L [RW] Pipe Gruber, PTA    Gait Assessment/Treatment    Gait, [place] Level   verbal cues required;contact guard assist;supervision required  -RW    Gait, Assistive Device   rolling walker  -RW    Gait, Distance (Feet)   150   150 x 2 plus 70 x 2  -RW    Gait, Comment   improved gt quality  -RW    Recorded by   [RW] Pipe Gruber, MARTIN    Wheelchair Training/Management    Wheelchair, Distance Propelled  150  - mod ind  -RW    Recorded by  [RC] LEIGH CannonA/L [RW] Pipe Gruber, PTA    Upper Body Bathing Assessment/Training    UB Bathing Assess/Train Assistive Device  --   sponge bath  -RC     UB Bathing Assess/Train, Position  sitting;sink side  -RC     UB Bathing Assess/Train, [place] Level  conditional independence  -RC     Recorded by  [RC] Cassie Carpio LARA/L     Lower Body Bathing Assessment/Training    LB Bathing Assess/Train Assistive Device  --   sponge bath  -RC     LB Bathing Assess/Train, Position  sitting;sink side  -RC     LB Bathing Assess/Train, [place] Level  set up required;conditional independence  -RC     Recorded by  [RC] Cassie Carpio LARA/L     Upper Body Dressing Assessment/Training    UB Dressing Assess/Train, Clothing Type  pull over  -RC     UB Dressing Assess/Train, Position  sitting  -RC     UB Dressing Assess/Train, [place]  conditional independence  -RC     Recorded by  [RC] Cassie Carpio LARA/L     Lower Body Dressing Assessment/Training    LB Dressing Assess/Train, Clothing Type   doffing:;donning:;pants;shoes;socks  -RC     LB Dressing Assess/Train, Position  sitting;standing  -RC     LB Dressing Assess/Train, [place]  minimum assist (75% patient effort)  -RC     Recorded by  [RC] MARCO Cannon/L     Toileting Assessment/Training    Toileting Assess/Train, Position  sitting;standing  -RC     Toileting Assess/Train, [place]ep[place] Level  supervision required  -RC     Recorded by  [RC] DAYDAY Cannon     Grooming Assessment/Training    Grooming Assess/Train, Position  sitting  -RC     Grooming Assess/Train, Indepen Level  independent  -RC     Recorded by  [RC] DAYDAY Cannon     Self-Feeding Assessment/Training    Self-Feeding Assess/Train, [place]  independent  -RC     Recorded by  [RC] DAYDAY Cannon     Balance Skills Training    Sitting-Balance Activities   Ball toss;Reaching for objects;Reaching across midline;Lateral lean;Forward lean  -RW    Standing-Balance Activities   Reaching for objects  -RW    Gait Balance Support   assistive device   side stepping with rail in hallway  -RW    Gait Balance Activities   side-stepping;stepping over object;[place]  -RW    Recorded by   [RW] Pipe Gruber PTA    Therapy Exercises    Bilateral Lower Extremities   AROM:;20 reps;sitting;knee flexion;LAQ   pro 2 level 3 x 12 min  -RW    Bilateral Upper Extremity  --   ue bike 15 min, pulley ex 5 min, t-putty 5 min  -RC     Recorded by  [RC] MARCO Cannon/L [RW] Pipe Gruber, MARTIN    Positioning and Restraints    Pre-Treatment Position  sitting in chair/recliner  -RC sitting in chair/recliner  -RW    Post Treatment Position  wheelchair  -RC wheelchair  -RW    In Wheelchair  with SLP  -RC with nsg  -RW    Recorded by  [RC] MARCO Cannon/L [RW] Pipe Gruber, PTA      11/08/17 0905 11/08/17 0737       Rehab Assessment/Intervention    Discipline speech language pathologist  -EC occupational therapy assistant  -RC     Document Type therapy note (daily  note)  -EC therapy note (daily note)  -RC     Subjective Information no complaints;agree to therapy  -EC agree to therapy  -RC     Patient Effort, Rehab Treatment good  -EC good  -RC     Precautions/Limitations  fall precautions  -RC     Recorded by [EC] MERI Schultz [RC] LEIGH CannonA/DAWN     Pain Assessment    Pain Assessment No/denies pain  -EC No/denies pain  -RC     Recorded by [EC] MERI Schultz [RC] LEIGH CannonA/L     Cognitive Assessment/Intervention    Current Cognitive/Communication Assessment  functional  -RC     Orientation Status  oriented x 4  -RC     Recorded by  [RC] LEIGH CannonA/DAWN     Communication Treatment Objective and Progress    Cognitive Linguistic Treatment Objectives Improve memory skills;Improve functional problem solving  -EC      Recorded by [EC] MERI Schultz      Improve memory skills    Improve memory skills through: recalling related word lists with an imposed delay;90%;with inconsistent cues  -EC      Status: Improve memory skills Achieved  -EC      Memory Skills Progress 90%  -EC      Comments: Improve memory skills pt recalled word list from last friday and from yesterday with no cues.    -EC      Recorded by [EC] MERI Schultz      Improve functional problem solving    Improve functional problem solving through: complete organization/home management task;tell similarity between items  -EC      Status: Improve functional problem solving Progressing as expected  -EC      Functional Problem Solving Progress 80%;with consistent cues;continue to address  -EC      Comments: Improve functional problem solving moderate cue  -EC      Recorded by [EC] MERI Schultz      Transfer Assessment/Treatment    Transfers, Bed-Chair [place]  --   multiple transfers multiple surfaces  -RC     Transfers, Chair-Bed [place]  supervision required;verbal cues required  -RC     Transfers, Bed-Chair-Bed,  Assist Device  rolling walker  -RC     Transfers, Sit-Stand [place]  stand by assist  -RC     Transfers, Stand-Sit [place]  stand by assist   practiced tech  -RC     Recorded by  [RC] MARCO Cannon/DAWN     Functional Mobility    Functional Mobility- Ind. Level  supervision required  -RC     Functional Mobility- Device  rolling walker  -RC     Functional Mobility-Distance (Feet)  150  -RC     Recorded by  [RC] MARCO Cannon/L     Wheelchair Training/Management    Wheelchair, Distance Propelled  150  -RC     Recorded by  [RC] MARCO Cannon/L     Lower Body Dressing Assessment/Training    LB Dressing Assess/Train, Clothing Type  donning:;shoes  -RC     LB Dressing Assess/Train, Position  sitting  -RC     LB Dressing Assess/Train, [place]  conditional independence  -RC     Recorded by  [RC] MARCO Cannon/L     Toileting Assessment/Training    Toileting Assess/Train, [place] Level  supervision required  -RC     Recorded by  [RC] MARCO Cannon/L     Grooming Assessment/Training    Grooming Assess/Train, Position  sitting  -RC     Grooming Assess/Train, [place] Level  independent  -RC     Recorded by  [RC] MARCO Cannon/L     Self-Feeding Assessment/Training    Self-Feeding Assess/Train, Position  sitting  -RC     Self-Feeding Assess/Train, [place]  independent  -RC     Recorded by  [RC] MARCO Cannon/L     Balance Skills Training    Standing-Level of Assistance  Close supervision  -RC     Static Standing Balance Support  assistive device  -RC     Standing Balance # of Minutes  8  -RC     Recorded by  [RC] MARCO Cannon/L     Therapy Exercises    Bilateral Upper Extremity  --   pulley ex 5 min, mohiniaw 15# 20x, ue bike 15 min+  -RC     Recorded by  [RC] LEIGH CannonA/L     Fine Motor Coordination Training    Detail (Fine Motor Coordination Training)  --   velcro board,   -RC     Recorded by  [RC] MARCO Cannon/DAWN      Positioning and Restraints    Pre-Treatment Position  sitting in chair/recliner  -RC     Post Treatment Position  wheelchair  -RC     In Bed  with SLP;encouraged to call for assist  -RC     Recorded by  [RC] Cassie Carpio, LARA/L       User Key  (r) = Recorded By, (t) = Taken By, (c) = Cosigned By    Initials Name Effective Dates    EC Clementina Murrell, CCC-SLP 12/30/16 -     HR Shante Otero, MS [facility]-SLP 12/15/16 -     JA Emiliano Flores, PTA 10/17/16 -     RW Pipe Gruber, PTA 10/17/16 -     RC Cassie Carpio, LARA/L 10/17/16 -                 IP PT Goals       11/10/17 1355 11/09/17 1535 11/08/17 1119    Gait Training PT LTG    Gait Training Goal PT LTG, Date Goal Reviewed 11/10/17  -JA 11/09/17  -RW 11/08/17  -RW    Gait Training Goal PT LTG, Outcome goal ongoing  -JA goal ongoing  -RW goal ongoing  -RW    Stair Training PT STG    Stair Training Goal PT STG, Date Goal Reviewed  11/09/17  -RW 11/08/17  -RW    Stair Training Goal PT STG, Outcome  goal met  -RW goal ongoing  -RW    Stair Training PT LTG    Stair Training Goal PT LTG, Date Goal Reviewed 11/10/17  -JA 11/09/17  -RW 11/08/17  -RW    Stair Training Goal PT LTG, Outcome goal ongoing  -JA goal ongoing  -RW goal ongoing  -RW      11/07/17 1357 11/06/17 1415 11/04/17 1440    Transfer Training PT LTG    Transfer Training PT  LTG, Date Goal Reviewed 11/07/17  -RW 11/06/17  -RW 11/04/17  -JW    Transfer Training PT LTG, Outcome goal met  -RW goal ongoing  -RW goal ongoing  -JW    Gait Training PT LTG    Gait Training Goal PT LTG, Date Goal Reviewed 11/07/17  -RW 11/06/17  -RW 11/04/17  -JW    Gait Training Goal PT LTG, Outcome  goal ongoing  -RW goal ongoing  -JW    Stair Training PT STG    Stair Training Goal PT STG, Date Goal Reviewed 11/07/17  -RW 11/06/17  -RW 11/04/17  -JW    Stair Training Goal PT STG, Outcome goal partially met   needed 2 hr  -RW goal ongoing  -RW goal ongoing  -    Stair Training PT LTG    Stair Training Goal PT LTG, Date  Established   11/04/17  -JW    Stair Training Goal PT LTG, Date Goal Reviewed 11/07/17  -RW 11/06/17  -RW 11/04/17  -JW    Stair Training Goal PT LTG, Outcome goal partially met   needed cga  -RW goal ongoing  -RW goal ongoing  -JW      11/03/17 1540 11/02/17 1631 11/01/17 1543    Bed Mobility PT LTG    Bed Mobility PT LTG, Date Goal Reviewed  11/02/17  -RW 11/01/17  -RW    Bed Mobility PT LTG, Outcome  goal met  -RW goal ongoing  -RW    Transfer Training PT LTG    Transfer Training PT  LTG, Date Goal Reviewed 11/03/17  -RW 11/02/17  -RW 11/01/17  -RW    Transfer Training PT LTG, Outcome goal ongoing  -RW goal ongoing  -RW goal ongoing  -RW    Gait Training PT LTG    Gait Training Goal PT LTG, Date Goal Reviewed 11/03/17  -RW 11/02/17  -RW 11/01/17  -RW    Gait Training Goal PT LTG, Outcome goal ongoing  -RW goal ongoing  -RW goal ongoing  -RW    Stair Training PT STG    Stair Training Goal PT STG, Date Goal Reviewed 11/03/17  -RW 11/02/17  -RW 11/01/17  -RW    Stair Training Goal PT STG, Outcome goal ongoing  -RW goal ongoing  -RW goal ongoing  -RW    Stair Training PT LTG    Stair Training Goal PT LTG, Date Goal Reviewed 11/03/17  -RW 11/02/17  -RW 11/01/17  -RW    Stair Training Goal PT LTG, Outcome goal ongoing  -RW goal ongoing  -RW goal ongoing  -RW      10/31/17 0825          Bed Mobility PT LTG    Bed Mobility PT LTG, Date Established 10/31/17  -LM      Bed Mobility PT LTG, Time to Achieve by discharge  -LM      Bed Mobility PT LTG, Activity Type supine to sit/sit to supine  -LM      Bed Mobility PT LTG, [place] Level supervision required  -LM      Bed Mobility PT LTG, Additional Goal HOB flat; No BR's  -LM      Bed Mobility PT LTG, Outcome goal ongoing  -LM      Transfer Training PT LTG    Transfer Training PT LTG, Date Established 10/31/17  -LM      Transfer Training PT LTG, Time to Achieve by discharge  -LM      Transfer Training PT LTG, Activity Type bed to chair /chair to bed  -LM      Transfer  Training PT LTG, [place] Level supervision required  -LM      Transfer Training PT LTG, Assist Device --   AAD  -LM      Transfer Training PT LTG, Outcome goal ongoing  -LM      Gait Training PT LTG    Gait Training Goal PT LTG, Date Established 10/31/17  -LM      Gait Training Goal PT LTG, Time to Achieve by discharge  -LM      Gait Training Goal PT LTG, [place] Level supervision required  -LM      Gait Training Goal PT LTG, Assist Device --   AAD  -LM      Gait Training Goal PT LTG, Distance to Achieve 150 feet  -LM      Gait Training Goal PT LTG, Outcome goal ongoing  -LM      Stair Training PT STG    Stair Training Goal PT STG, Date Established 10/31/17  -LM      Stair Training Goal PT STG, Time to Achieve 4 days  -LM      Stair Training Goal PT STG, Number of Steps 4 steps  -LM      Stair Training Goal PT STG, [place] Level contact guard assist  -LM      Stair Training Goal PT STG, Assist Device 1 handrail  -LM      Stair Training Goal PT STG, Outcome goal ongoing  -LM      Stair Training PT LTG    Stair Training Goal PT LTG, Date Established 10/31/17  -LM      Stair Training Goal PT LTG, Time to Achieve by discharge  -LM      Stair Training Goal PT LTG, Number of Steps 1 flight  -LM      Stair Training Goal PT LTG, [place] Level supervision required  -LM      Stair Training Goal PT LTG, Assist Device 2 handrails  -LM      Stair Training Goal PT LTG, Outcome goal ongoing  -LM        User Key  (r) = Recorded By, (t) = Taken By, (c) = Cosigned By    Initials Name Provider Type    JENA Haynes, PTA Physical Therapy Assistant    MOSHE Mckeon, PT Physical Therapist    RADHA Flores, PTA Physical Therapy Assistant    KATHIE Gruber, PTA Physical Therapy Assistant          Physical Therapy Education     Title: PT OT SLP Therapies (Active)     Topic: Physical Therapy (Active)     Point: Mobility training (Done)    Learning Progress Summary    Learner Readiness Method  Response Comment Documented by Status   Patient Acceptance E VU again reviewed correct gt and transfer safey. RW 11/03/17 0910 Done    Acceptance E VU reviewed safe transfers and risks of falls RW 11/01/17 1052 Done    Acceptance E NR Reviewed safety with transfers and ambulation.  10/31/17 1506 Active               Point: Precautions (Done)    Learning Progress Summary    Learner Readiness Method Response Comment Documented by Status   Patient Acceptance E VU again reviewed correct gt and transfer safey. RW 11/03/17 0910 Done    Acceptance E VU reviewed safe transfers and risks of falls RW 11/01/17 1052 Done    Acceptance E NR Reviewed safety with transfers and ambulation.  10/31/17 1506 Active                      User Key     Initials Effective Dates Name Provider Type Discipline     06/15/16 -  Landy Mckeon, PT Physical Therapist PT     10/17/16 -  Pipe Gruber PTA Physical Therapy Assistant PT                    PT Recommendation and Plan  Anticipated Equipment Needs At Discharge: front wheeled walker  Anticipated Discharge Disposition: home with 24/7 care, home with home health  Planned Therapy Interventions: balance training, bed mobility training, gait training, home exercise program, motor coordination training, neuromuscular re-education, patient/family education, postural re-education, ROM (Range of Motion), stair training, strengthening, stretching, transfer training, wheelchair management/propulsion training  PT Frequency: other (see comments) (5-14 times/wk)  Plan of Care Review  Plan Of Care Reviewed With: patient  Progress: progress toward functional goals as expected  Outcome Summary/Follow up Plan: Pt. SBA with gt. today with no LOB & improved step length at L, but cont.'d toe drag with fatigue. Pt. able to perform x10 reps standing therex. Steps           Outcome Measures       11/10/17 1000 11/10/17 0725 11/09/17 0730    How much help from another person do you currently need...     Turning from your back to your side while in flat bed without using bedrails? 3  -JA      Moving from lying on back to sitting on the side of a flat bed without bedrails? 3  -JA      Moving to and from a bed to a chair (including a wheelchair)? 3  -JA      Standing up from a chair using your arms (e.g., wheelchair, bedside chair)? 3  -JA      Climbing 3-5 steps with a railing? 3  -JA      To walk in hospital room? 3  -JA      [facility]  -JA      How much help from another is currently needed...    Putting on and taking off regular lower body clothing?  3  -RC 3  -RC    Bathing (including washing, rinsing, and drying)  4  -RC 4  -RC    Toileting (which includes using toilet bed pan or urinal)  3  -RC 3  -RC    Putting on and taking off regular upper body clothing  4  -RC 4  -RC    Taking care of personal grooming (such as brushing teeth)  4  -RC 4  -RC    Eating meals  4  -RC 4  -RC    Score  22  -RC 22  -RC    Functional Assessment    Outcome Measure Options [facility] 6 Clicks Basic Mobility (PT)  -        11/08/17 0737          How much help from another is currently needed...    Putting on and taking off regular lower body clothing? 3  -RC      Bathing (including washing, rinsing, and drying) 3  -RC      Toileting (which includes using toilet bed pan or urinal) 3  -RC      Putting on and taking off regular upper body clothing 3  -RC      Taking care of personal grooming (such as brushing teeth) 3  -RC      Eating meals 4  -RC      Score 19  -RC        User Key  (r) = Recorded By, (t) = Taken By, (c) = Cosigned By    Initials Name Provider Type    RADHA Flores PTA Physical Therapy Assistant    MARCO Young/L Occupational Therapy Assistant           Time Calculation:         PT Charges       11/10/17 1456 11/10/17 1051       Time Calculation    Start Time 1355  -RADHA 1000  -JA     Stop Time 1440  -JA 1045  -RADHA     Time Calculation (min) 45 min  -RADHA 45 min  -RADHA     Time Calculation-  PT    Total Timed Code Minutes- PT 45 minute(s)  -JA 45 minute(s)  -JA       User Key  (r) = Recorded By, (t) = Taken By, (c) = Cosigned By    Initials Name Provider Type    RADHA Flores PTA Physical Therapy Assistant          Therapy Charges for Today     Code Description Service Date Service Provider Modifiers Qty    13172868983 HC GAIT TRAINING EA 15 MIN 11/10/2017 Emiliano Flores, MARTIN GP 1    76174827571 HC PT THERAPEUTIC ACT EA 15 MIN 11/10/2017 Emiliano Flores, MARTIN GP 1    30049877032 HC PT NEUROMUSC RE EDUCATION EA 15 MIN 11/10/2017 Emiliano Flores, MARTIN GP 1    65982705607 HC GAIT TRAINING EA 15 MIN 11/10/2017 Emiliano Flores, MARTIN GP 1    92284441610 HC PT THER PROC EA 15 MIN 11/10/2017 Emiliano Flores PTA GP 2          PT G-Codes  PT Professional Judgement Used?: Yes  Outcome Measure Options: AM-PAC 6 Clicks Basic Mobility (PT)  Score: 15  Functional Limitation: Mobility: Walking and moving around  Mobility: Walking and Moving Around Current Status (): At least 40 percent but less than 60 percent impaired, limited or restricted  Mobility: Walking and Moving Around Goal Status (): At least 1 percent but less than 20 percent impaired, limited or restricted    Emiliano Flores PTA  11/10/2017

## 2017-11-10 NOTE — PLAN OF CARE
Problem: Patient Care Overview (Adult)  Goal: Plan of Care Review  Outcome: Ongoing (interventions implemented as appropriate)    11/10/17 0914 11/10/17 1307   Coping/Psychosocial Response Interventions   Plan Of Care Reviewed With --  patient   Patient Care Overview   Progress improving --        Goal: Adult Individualization and Mutuality  Outcome: Ongoing (interventions implemented as appropriate)  Goal: Discharge Needs Assessment  Outcome: Ongoing (interventions implemented as appropriate)    Problem: Fall Risk (Adult)  Goal: Absence of Falls  Outcome: Ongoing (interventions implemented as appropriate)    Problem: Stroke (Ischemic) (Adult)  Goal: Signs and Symptoms of Listed Potential Problems Will be Absent or Manageable (Stroke)  Outcome: Ongoing (interventions implemented as appropriate)    Problem: Diabetes, Type 2 (Adult)  Goal: Signs and Symptoms of Listed Potential Problems Will be Absent or Manageable (Diabetes, Type 2)  Outcome: Ongoing (interventions implemented as appropriate)

## 2017-11-10 NOTE — PROGRESS NOTES
LOS: 11 days   Patient Care Team:  Evan Marrero MD as PCP - General (Family Medicine)    Chief Complaint:   Right-sided lacunar stroke          Interval History:     SUBJECTIVE: Good spirits no new complaints.  He denies shortness of breath nausea or vomiting  Participating well with therapy and he enjoys therapy feels that is making an improvement in his activity  History taken from: patient chart    Objective     Vital Signs  Temp:  [96.5 °F (35.8 °C)-97.2 °F (36.2 °C)] 97.2 °F (36.2 °C)  Heart Rate:  [63-88] 66  Resp:  [18] 18  BP: (106-125)/(56-63) 106/59  Last 3 weights    11/08/17  0600 11/09/17  0431 11/10/17  0410   Weight: 203 lb 1.6 oz (92.1 kg) 202 lb 14.4 oz (92 kg) 205 lb 8 oz (93.2 kg)         Physical Exam:     General Appearance:    Alert, cooperative, in no acute distress   Head:    Normocephalic, without obvious abnormality, atraumatic   Eyes:            Lids and lashes normal, conjunctivae and sclerae normal, no   icterus, no pallor, corneas clear, PERRLA   Throat:   No oral lesions, no thrush, oral mucosa moist   Neck:   No adenopathy, supple, trachea midline, no thyromegaly, no   carotid bruit, no JVD       Lungs:     Clear to auscultation,respirations regular, even and                  Unlabored     Heart:    Regular rhythm and normal rate, normal S1 and S2, no            murmur, no gallop, no rub, no click    Chest Wall:    No abnormalities observed   Abdomen:     Normal bowel sounds, no masses, no organomegaly, soft        non-tender, non-distended, no guarding, no rebound                Tenderness    Extremities:   Moves all extremities well, no edema, no cyanosis, no             Redness        Skin:   No bleeding, bruising or rash   Lymph nodes:   No palpable adenopathy   Neurologic:   Cranial nerves 2 - 12 grossly intact, sensation intact, DTR       present and equal bilaterally Ataxia is improving strength improving.  His balance does seem better       RESULTS REVIEW:     Lab  Results (last 24 hours)     Procedure Component Value Units Date/Time    POC Glucose Fingerstick [[id number]]  (Abnormal) Collected:  11/09/17 1002    Specimen:  Blood Updated:  11/09/17 1018     Glucose 141 (H) mg/dL       RN NotifiedMeter: [id number]: [id number] RIGOBERTO CYR       POC Glucose Fingerstick [[id number]]  (Normal) Collected:  11/09/17 1652    Specimen:  Blood Updated:  11/09/17 1704     Glucose 72 mg/dL       Meter: [id number]: [id number] LIZZETH ZAMBRANO       POC Glucose Fingerstick [[id number]]  (Normal) Collected:  11/09/17 2016    Specimen:  Blood Updated:  11/09/17 2036     Glucose 80 mg/dL       Meter: [id number]: [id number] [facility]        POC Glucose Fingerstick [[id number]]  (Normal) Collected:  11/10/17 0514    Specimen:  Blood Updated:  11/10/17 0610     Glucose 89 mg/dL       Meter: [id number]: [id number] [facility] PRAVIN            Imaging Results (last 72 hours)     ** No results found for the last 72 hours. **          Assessment/Plan     Principal Problem:    Right-sided lacunar stroke  Active Problems:    Left-sided weakness    Atrial fibrillation, chronic    Essential hypertension    Diabetes mellitus    Chronic anxiety    Chronic back pain greater than 3 months duration    Degenerative joint disease involving multiple joints    Encounter for rehabilitation    Obstructive sleep apnea syndrome    Former smoker        We will continue intensive therapy lab work was reviewed yesterday and does not need to be repeated for stretching days.  His diabetes is well-controlled blood pressure adequately controlled.  He will be going home with home health on November 14 with family.      Vishnu Mckinnon MD  11/10/17  9:17 AM

## 2017-11-10 NOTE — PLAN OF CARE
Problem: Patient Care Overview (Adult)  Goal: Plan of Care Review  Outcome: Ongoing (interventions implemented as appropriate)    11/10/17 1355   Coping/Psychosocial Response Interventions   Plan Of Care Reviewed With patient   Patient Care Overview   Progress progress toward functional goals as expected   Outcome Evaluation   Outcome Summary/Follow up Plan Pt. SBA with gt. today with no LOB & improved step length at L, but cont.'d toe drag with fatigue. Pt. able to perform x10 reps standing therex. Steps        Goal: Discharge Needs Assessment  Outcome: Ongoing (interventions implemented as appropriate)    10/31/17 0825 10/31/17 0925 11/10/17 1307   Discharge Needs Assessment   Concerns To Be Addressed --  --  no discharge needs identified   Equipment Needed After Discharge walker, rolling --  --    Discharge Facility/Level Of Care Needs home with home health  (Home with 24/7) --  --    Living Environment   Transportation Available --  car;family or friend will provide --    Self-Care   Equipment Currently Used at Home --  cane, straight;shower chair;raised toilet --          Problem: Inpatient Physical Therapy  Goal: Gait Training Goal LTG- PT  Outcome: Ongoing (interventions implemented as appropriate)    10/31/17 0825 11/10/17 1355   Gait Training PT LTG   Gait Training Goal PT LTG, Date Established 10/31/17 --    Gait Training Goal PT LTG, Time to Achieve by discharge --    Gait Training Goal PT LTG, [place] Level supervision required --    Gait Training Goal PT LTG, Assist Device (AAD) --    Gait Training Goal PT LTG, Distance to Achieve 150 feet --    Gait Training Goal PT LTG, Date Goal Reviewed --  11/10/17   Gait Training Goal PT LTG, Outcome --  goal ongoing       Goal: Stair Training Goal LTG- PT  Outcome: Ongoing (interventions implemented as appropriate)    10/31/17 0825 11/04/17 1440 11/10/17 1355   Stair Training PT LTG   Stair Training Goal PT LTG, Date Established --  11/04/17 --    Stair  Training Goal PT LTG, Time to Achieve by discharge --  --    Stair Training Goal PT LTG, Number of Steps 1 flight --  --    Stair Training Goal PT LTG, [place] Level supervision required --  --    Stair Training Goal PT LTG, Assist Device 2 handrails --  --    Stair Training Goal PT LTG, Date Goal Reviewed --  --  11/10/17   Stair Training Goal PT LTG, Outcome --  --  goal ongoing

## 2017-11-10 NOTE — THERAPY DISCHARGE NOTE
Discharge - Speech Language Pathology Treatment Note  [facility]     Patient Name: Kenneth Harper Jr.  : 1934  MRN: [id number]  Today's Date: 11/10/2017  Referring Physician: Pratibha      Admit Date: 10/30/2017  Plans to d/c home with wife and daughter at home to assist on .  SLP discussed discharge from  today with patient.   Goals:  1. Patient will demonstrate orientation to day, month, year, place, situation with 90% acc with min cues.  GOAL MET PREVIOUSLY  2. Patient will complete delayed word recall with 80% acc with min cues: GOAL MET previously   3. Patient will complete organization/home management task with 90% acc with min cues: GOAL MET this date.    Shante Otero MS CCC-SLP 11/10/2017 1:43 PM        Visit Dx:      ICD-10-CM ICD-9-CM   1. Symbolic dysfunction R48.9 784.60   2. Impaired mobility and ADLs Z74.09 799.89   3. Abnormality of gait and mobility R26.9 781.2   4. Muscle weakness (generalized) M62.81 728.87     Patient Active Problem List   Diagnosis   • Spinal stenosis, lumbar region, with neurogenic claudication   • Former smoker   • Overweight   • Left-sided weakness   • Right-sided lacunar stroke   • Essential hypertension   • Diabetes mellitus   • Chronic anxiety   • Chronic back pain greater than 3 months duration   • Prostatic hypertrophy   • Degenerative joint disease involving multiple joints   • Atrial fibrillation, chronic   • Encounter for rehabilitation   • Obstructive sleep apnea syndrome              Adult Rehabilitation Note       11/10/17 1045 11/10/17 1000 11/10/17 0725    Rehab Assessment/Intervention    Discipline speech language pathologist  -HR physical therapy assistant  -JA occupational therapy assistant  -RC    Document Type therapy note (daily note)  -HR therapy note (daily note)  -JA therapy note (daily note)  -RC    Subjective Information agree to therapy;no complaints  -HR agree to therapy  -JA agree to therapy  -RC    Patient  Effort, Rehab Treatment good  -HR good  -JA good  -RC    Symptoms Noted During/After Treatment none  -HR      Precautions/Limitations  fall precautions  -JA     Precautions/Limitations, Vision WFL with corrective lenses  -HR      Precautions/Limitations, Hearing WFL  -HR      Recorded by [HR] Shante Otero, MS CCC-SLP [JA] Emiliano Flores, MARTIN [RC] Cassie TAMAYO Balaji, LARA/L    Vital Signs    Pretreatment Heart Rate (beats/min)  64  -JA     Pre SpO2 (%)  98  -JA     O2 Delivery Pre Treatment  room air  -JA     Pre Patient Position  Sitting  -JA     Intra Patient Position  Standing  -JA     Post Patient Position  Sitting  -JA     Recorded by  [JA] Emiliano Flores PTA     Pain Assessment    Pain Assessment No/denies pain  -HR No/denies pain  -JA No/denies pain  -RC    Pain Score 0  -HR      Post Pain Score 0  -HR      Recorded by [HR] Shante Oetro MS CCC-SLP [JA] Emiliano Flores, MARTIN [RC] Cassie RONI Balaji, LARA/L    Vision Assessment/Intervention    Visual Impairment WFL with corrective lenses  -HR      Recorded by [HR] Shante Otero MS CCC-SLP      Cognitive Assessment/Intervention    Current Cognitive/Communication Assessment functional  -HR      Orientation Status oriented x 4  -HR      Follows Commands/Answers Questions 100% of the time  -HR      Recorded by [HR] Shante Otero MS CCC-SLP      Communication Treatment Objective and Progress    Cognitive Linguistic Treatment Objectives Improve memory skills;Improve functional problem solving  -HR      Recorded by [HR] Shante Otero MS CCC-SLP      Improve orientation    Improve orientation through: demonstrating orientation to day;demonstrating orientation to month;demonstrating orientation to year;demonstrating orientation to place;demonstrating orientation to disease/impairment;90%;with inconsistent cues  -HR      Status: Improve orientation through Achieved  -HR      Recorded by [HR] Shante Otero MS CCC-SLP      Improve memory skills    Improve  memory skills through: recalling related word lists with an imposed delay;90%;with inconsistent cues  -HR      Status: Improve memory skills Achieved  -HR      Recorded by [HR] Shante Otero MS CCC-SLP      Improve functional problem solving    Improve functional problem solving through: complete organization/home management task;tell similarity between items  -HR      Status: Improve functional problem solving Achieved  -HR      Functional Problem Solving Progress discontinue achieved  -HR      Comments: Improve functional problem solving goal met this date  -HR      Recorded by [HR] Shante Otero MS CCC-SLP      Transfer Assessment/Treatment    Transfers, Bed-Chair [place]   supervision required  -RC    Transfers, Chair-Bed [place]   supervision required  -RC    Transfers, Bed-Chair-Bed, Assist Device   rolling walker  -RC    Transfers, Sit-Stand [place]  stand by assist  - stand by assist  -RC    Transfers, Stand-Sit [place]  stand by assist  -JA stand by assist  -RC    Transfers, Sit-Stand-Sit, Assist Device  rolling walker  -JA rolling walker  -    Toilet Transfer, [place]  supervision required   st. pivot w/c-toilet-w/c  -     Recorded by  [JA] Emiliano Flores PTA [RC] Cassie Carpio, LARA/L    Gait Assessment/Treatment    Gait, [place] Level  supervision required  -     Gait, Assistive Device  rolling walker  -     Gait, Distance (Feet)  120   x2  -     Gait, Gait Deviations  left:;decreased heel strike;step length decreased  -     Gait, Safety Issues  step length decreased;balance decreased during turns  -     Gait, Impairments  strength decreased;impaired balance  -     Recorded by  [JA] Emiliano Flores PTA     Functional Mobility    Functional Mobility- Ind. Level   supervision required  -    Functional Mobility- Device   rolling walker  -    Functional Mobility-Distance (Feet)   200  -    Functional Mobility- Comment   --   practiced  simulated household mobility w/ multi sit-stand  -RC    Recorded by   [RC] LEIGH CannonA/L    Wheelchair Training/Management    Wheelchair, Distance Propelled  150  -  -RC    Wheelchair Training Comment  conditional independence  -JA     Recorded by  [JA] Emiliano Flores PTA [RC] Cassie Carpio LARA/L    Lower Body Dressing Assessment/Training    LB Dressing Assess/Train, Clothing Type   donning:;shoes  -RC    LB Dressing Assess/Train, Position   sitting  -RC    LB Dressing Assess/Train, [place]   minimum assist (75% patient effort)  -RC    Recorded by   [RC] LEIGH CannonA/L    Grooming Assessment/Training    Grooming Assess/Train, Position   sitting  -RC    Grooming Assess/Train, Indep[place] Level   independent  -RC    Recorded by   [RC] MARCO Cannon/L    Self-Feeding Assessment/Training    Self-Feeding Assess/Train, Position   sitting  -RC    Self-Feeding Assess/Train, [place]   independent  -RC    Recorded by   [RC] LEIGH CannonA/L    Balance Skills Training    Gait Balance Support  parallel bars  -JA     Gait Balance Activities  side-stepping;stepping over object;tandem  -JA     Recorded by  [JA] Emiliano Flores PTA     Therapy Exercises    Bilateral Upper Extremity   --   ue bike 16 min, rickshaw 15# 20 x2, pulley ex 10 min  -RC    Recorded by   [RC] MARCO Cannon/L    Positioning and Restraints    Pre-Treatment Position  sitting in chair/recliner  -JA sitting in chair/recliner  -RC    Post Treatment Position  wheelchair  -JA wheelchair  -RC    In Wheelchair  with SLP  -JA encouraged to call for assist  -RC    Recorded by  [JA] Emiliano Flores PTA [RC] LEIGH CannonA/DAWN      11/09/17 1417 11/09/17 1040 11/09/17 0902    Rehab Assessment/Intervention    Discipline physical therapy assistant  -RW physical therapy assistant  -RW speech language pathologist  -EC    Document Type therapy note (daily note)  -RW therapy note (daily note)  -KATHIE  therapy note (daily note)  -EC    Subjective Information agree to therapy  -RW agree to therapy  -RW agree to therapy  -EC    Patient Effort, Rehab Treatment good  -RW good  -RW     Recorded by [RW] Pipe Gruber PTA [RW] Pipe Gruber PTA [EC] Clementina Murrell CCC-SLP    Pain Assessment    Pain Assessment No/denies pain  -RW No/denies pain  -RW No/denies pain  -EC    Recorded by [RW] Pipe Gruber PTA [RW] Pipe Gruber PTA [EC] Clementina Murrell CCC-SLP    Cognitive Assessment/Intervention    Current Cognitive/Communication Assessment functional  -RW functional  -RW     Orientation Status oriented to;oriented x 4  -RW oriented to;oriented x 4  -RW     Follows Commands/Answers Questions 100% of the time  -% of the time  -RW     Personal Safety Interventions gait belt;nonskid shoes/slippers when out of bed  -RW gait belt;nonskid shoes/slippers when out of bed  -RW     Recorded by [RW] Pipe Gruber PTA [RW] Pipe Gruber PTA     Cognitive Assessment Intervention    Behavior/Mood Observations behavior appropriate to situation, WNL/WFL  -RW behavior appropriate to situation, WNL/WFL  -RW     Recorded by [RW] Pipe Gruber PTA [RW] Pipe Gruber PTA     Communication Treatment Objective and Progress    Cognitive Linguistic Treatment Objectives   Improve memory skills;Improve functional problem solving  -EC    Recorded by   [EC] MERI Schultz    Improve functional problem solving    Improve functional problem solving through:   complete organization/home management task;tell similarity between items  -EC    Status: Improve functional problem solving   Progressing as expected  -EC    Functional Problem Solving Progress   with consistent cues;continue to address;70%;80%  -EC    Recorded by   [EC] MERI Schultz    Transfer Assessment/Treatment    Transfers, Sit-Stand [place] stand by assist;verbal cues required  -RW stand by assist;verbal cues required  -RW      Transfers, Stand-Sit [place] stand by assist;verbal cues required  -RW stand by assist;verbal cues required  -RW     Transfers, Sit-Stand-Sit, Assist Device rolling walker  -RW rolling walker  -RW     Transfer, Comment  sit to stand 2/5  -RW     Recorded by [RW] Pipe Gruber PTA [RW] Pipe Gruber PTA     Gait Assessment/Treatment    Gait, [place] Level verbal cues required;contact guard assist;supervision required  -RW verbal cues required;contact guard assist;supervision required  -RW     Gait, Assistive Device rolling walker  -RW rolling walker  -RW     Gait, Distance (Feet) 120  -   150 x 2,  70 x 3  -RW     Recorded by [RW] Pipe Gruber PTA [RW] Pipe Gruber PTA     Stairs Assessment/Treatment    Number of Stairs  4 x 2  -RW     Stairs, Handrail Location --  -RW left side (ascending)  -RW     Stairs, [place] Level --  -RW contact guard assist  -RW     Stairs, Comment  pt needed consitant cues to ascend and decend correctily  -RW     Recorded by [RW] Pipe Gruber PTA [RW] Pipe Gruber PTA     Wheelchair Training/Management    Wheelchair, Distance Propelled 150 mod ind  -    x 2 mod ind  -RW     Recorded by [RW] Pipe Gruber PTA [RW] Pipe Gruber PTA     Balance Skills Training    Gait Balance Support parallel bars  -RW      Gait Balance Activities backwards;side-stepping;tandem   practiced gt w/o hha in // bars  -RW      Recorded by [RW] Pipe Gruber PTA      Therapy Exercises    Bilateral Lower Extremities knee flexion;LAQ   pro2 level 3 x 10 min  -RW AROM:;20 reps;sitting;knee flexion;LAQ;hip flexion;ankle pumps/circles;standing;heel raises   2 sets  -RW     Recorded by [RW] Pipe Gruber PTA [RW] Pipe Gruber PTA     Positioning and Restraints    Pre-Treatment Position sitting in chair/recliner  -RW sitting in chair/recliner  -RW     Post Treatment Position chair  -RW wheelchair  -RW     In Chair call light within reach  -RW      Recorded by [KATHIE] Pipe  LEE Gruber PTA [RW] Pipe Gruber PTA       11/09/17 0730 11/08/17 0936 11/08/17 0905    Rehab Assessment/Intervention    Discipline occupational therapy assistant  -RC physical therapy assistant  -RW speech language pathologist  -EC    Document Type therapy note (daily note)  -RC therapy note (daily note)  -RW therapy note (daily note)  -EC    Subjective Information agree to therapy  -RC agree to therapy  -RW no complaints;agree to therapy  -EC    Patient Effort, Rehab Treatment good  -RC good  -RW good  -EC    Symptoms Noted During/After Treatment none  -RC      Precautions/Limitations  fall precautions  -RW     Recorded by [RC] LEIGH CannonA/L [RW] Pipe Gruber PTA [EC] Clementina Murrell CCC-SLP    Vital Signs    Intra Systolic BP Rehab  112  -RW     Intra Treatment Diastolic BP  63  -RW     Intratreatment Heart Rate (beats/min)  59  -RW     Recorded by  [RW] Pipe Gruber PTA     Pain Assessment    Pain Assessment No/denies pain  -RC No/denies pain  -RW No/denies pain  -EC    Recorded by [RC] LEIGH CannonA/L [RW] Pipe Gruber PTA [EC] Clementina Murrell CCC-SLP    Cognitive Assessment/Intervention    Current Cognitive/Communication Assessment functional  -RC functional  -RW     Orientation Status  oriented to;oriented x 4  -RW     Follows Commands/Answers Questions  100% of the time  -RW     Personal Safety Interventions  gait belt;nonskid shoes/slippers when out of bed  -RW     Recorded by [RC] LEIGH CannonA/L [RW] Pipe Gruber PTA     Cognitive Assessment Intervention    Behavior/Mood Observations  behavior appropriate to situation, WNL/WFL  -RW     Recorded by  [RW] Pipe Gruber PTA     Communication Treatment Objective and Progress    Cognitive Linguistic Treatment Objectives   Improve memory skills;Improve functional problem solving  -EC    Recorded by   [EC] Clementina Murrell CCC-SLP    Improve memory skills    Improve memory skills through:   recalling related  word lists with an imposed delay;90%;with inconsistent cues  -EC    Status: Improve memory skills   Achieved  -EC    Memory Skills Progress   90%  -EC    Comments: Improve memory skills   pt recalled word list from last friday and from yesterday with no cues.    -EC    Recorded by   [EC] Clementina Murrell CCC-SLP    Improve functional problem solving    Improve functional problem solving through:   complete organization/home management task;tell similarity between items  -EC    Status: Improve functional problem solving   Progressing as expected  -EC    Functional Problem Solving Progress   80%;with consistent cues;continue to address  -EC    Comments: Improve functional problem solving   moderate cue  -EC    Recorded by   [EC] Clementina Murrell CCC-SLP    Transfer Assessment/Treatment    Transfers, Bed-Chair [place]  supervision required  -RW     Transfers, Chair-Bed [place]  supervision required  -RW     Transfers, Sit-Stand [place]  stand by assist;verbal cues required  -RW     Transfers, Stand-Sit [place]  stand by assist;verbal cues required  -RW     Transfers, Sit-Stand-Sit, Assist Device  rolling walker  -RW     Toilet Transfer, [place] supervision required  -RC      Toilet Transfer, Assistive Device rolling walker  -RC      Recorded by [RC] MARCO Cannon/L [RW] Pipe Gruber PTA     Gait Assessment/Treatment    Gait, [place] Level  verbal cues required;contact guard assist;supervision required  -RW     Gait, Assistive Device  rolling walker  -RW     Gait, Distance (Feet)  150   150 x 2 plus 70 x 2  -RW     Gait, Comment  improved gt quality  -RW     Recorded by  [RW] Pipe Gruber PTA     Wheelchair Training/Management    Wheelchair, Distance Propelled 150  - mod ind  -RW     Recorded by [RC] MARCO Cannon/L [RW] Pipe Gruber PTA     Upper Body Bathing Assessment/Training    UB Bathing Assess/Train Assistive Device --   sponge bath  -RC      UB  Bathing Assess/Train, Position sitting;sink side  -RC      UB Bathing Assess/Train, [place] Level conditional independence  -RC      Recorded by [RC] MARCO Cannon/L      Lower Body Bathing Assessment/Training    LB Bathing Assess/Train Assistive Device --   sponge bath  -RC      LB Bathing Assess/Train, Position sitting;sink side  -RC      LB Bathing Assess/Train, [place] Level set up required;conditional independence  -RC      Recorded by [RC] MARCO Cannon/L      Upper Body Dressing Assessment/Training    UB Dressing Assess/Train, Clothing Type pull over  -RC      UB Dressing Assess/Train, Position sitting  -RC      UB Dressing Assess/Train, [place] conditional independence  -RC      Recorded by [RC] MARCO Cannon/L      Lower Body Dressing Assessment/Training    LB Dressing Assess/Train, Clothing Type doffing:;donning:;pants;shoes;socks  -RC      LB Dressing Assess/Train, Position sitting;standing  -RC      LB Dressing Assess/Train, [place] minimum assist (75% patient effort)  -RC      Recorded by [RC] MARCO Cannon/L      Toileting Assessment/Training    Toileting Assess/Train, Position sitting;standing  -RC      Toileting Assess/Train, Indepen Level supervision required  -RC      Recorded by [RC] MARCO Cannon/L      Grooming Assessment/Training    Grooming Assess/Train, Position sitting  -RC      Grooming Assess/Train, [place]ep[place] Level independent  -RC      Recorded by [RC] MARCO Cannon/L      Self-Feeding Assessment/Training    Self-Feeding Assess/Train, [place] independent  -RC      Recorded by [RC] LEIGH CannonA/L      Balance Skills Training    Sitting-Balance Activities  Ball toss;Reaching for objects;Reaching across midline;Lateral lean;Forward lean  -RW     Standing-Balance Activities  Reaching for objects  -RW     Gait Balance Support  assistive device   side stepping with rail in hallway  -RW     Gait Balance Activities   side-stepping;stepping over object;[place]  -RW     Recorded by  [RW] Pipe Gruber, PTA     Therapy Exercises    Bilateral Lower Extremities  AROM:;20 reps;sitting;knee flexion;LAQ   pro 2 level 3 x 12 min  -RW     Bilateral Upper Extremity --   ue bike 15 min, pulley ex 5 min, t-putty 5 min  -RC      Recorded by [RC] MARCO Cannon/L [RW] Pipe Gruber, PTA     Positioning and Restraints    Pre-Treatment Position sitting in chair/recliner  -RC sitting in chair/recliner  -RW     Post Treatment Position wheelchair  -RC wheelchair  -RW     In Wheelchair with SLP  -RC with nsg  -RW     Recorded by [RC] MARCO Cannon/L [RW] Pipe Gruber, PTA       11/08/17 0737          Rehab Assessment/Intervention    Discipline occupational therapy assistant  -RC      Document Type therapy note (daily note)  -RC      Subjective Information agree to therapy  -RC      Patient Effort, Rehab Treatment good  -RC      Precautions/Limitations fall precautions  -RC      Recorded by [RC] MARCO Cannon/DAWN      Pain Assessment    Pain Assessment No/denies pain  -RC      Recorded by [RC] MARCO Cannon/L      Cognitive Assessment/Intervention    Current Cognitive/Communication Assessment functional  -RC      Orientation Status oriented x 4  -RC      Recorded by [RC] MARCO Cannon/L      Transfer Assessment/Treatment    Transfers, Bed-Chair [place] --   multiple transfers multiple surfaces  -RC      Transfers, Chair-Bed [place] supervision required;verbal cues required  -RC      Transfers, Bed-Chair-Bed, Assist Device rolling walker  -RC      Transfers, Sit-Stand [place] stand by assist  -RC      Transfers, Stand-Sit [place] stand by assist   practiced tech  -RC      Recorded by [RC] MARCO Cannon/DAWN      Functional Mobility    Functional Mobility- Ind. Level supervision required  -RC      Functional Mobility- Device rolling walker  -RC      Functional Mobility-Distance (Feet)  150  -RC      Recorded by [RC] LEIGH CannonA/L      Wheelchair Training/Management    Wheelchair, Distance Propelled 150  -RC      Recorded by [RC] LEIGH CannonA/L      Lower Body Dressing Assessment/Training    LB Dressing Assess/Train, Clothing Type donning:;shoes  -RC      LB Dressing Assess/Train, Position sitting  -RC      LB Dressing Assess/Train, [place] conditional independence  -RC      Recorded by [RC] LEIGH CannonA/L      Toileting Assessment/Training    Toileting Assess/Train, [place]ep[place] Level supervision required  -RC      Recorded by [RC] MARCO Cannon/L      Grooming Assessment/Training    Grooming Assess/Train, Position sitting  -RC      Grooming Assess/Train, [place]ep[place] Level independent  -RC      Recorded by [RC] MARCO Cannon/L      Self-Feeding Assessment/Training    Self-Feeding Assess/Train, Position sitting  -RC      Self-Feeding Assess/Train, [place] independent  -RC      Recorded by [RC] LEIGH CannonA/L      Balance Skills Training    Standing-Level of Assistance Close supervision  -RC      Static Standing Balance Support assistive device  -RC      Standing Balance # of Minutes 8  -RC      Recorded by [RC] LEIGH CannonA/L      Therapy Exercises    Bilateral Upper Extremity --   pulley ex 5 min, rickshaw 15# 20x, ue bike 15 min+  -RC      Recorded by [RC] LEIGH CannonA/L      Fine Motor Coordination Training    Detail (Fine Motor Coordination Training) --   velcro board,   -RC      Recorded by [RC] LEIGH CannonA/L      Positioning and Restraints    Pre-Treatment Position sitting in chair/recliner  -RC      Post Treatment Position wheelchair  -RC      In Bed with SLP;encouraged to call for assist  -RC      Recorded by [RC] LEIGH CannonA/L        User Key  (r) = Recorded By, (t) = Taken By, (c) = Cosigned By    Initials Name Effective Dates    EC Clementina Murrell, CCC-SLP 12/30/16 -     HR Shante Otero, MS  CCC-SLP 12/15/16 -     RADHA Flores, PTA 10/17/16 -     RW Pipe Gruber, PTA 10/17/16 -     RC Cassie Carpio, LARA/L 10/17/16 -               IP SLP Goals       11/10/17 1334 11/09/17 1138 11/08/17 1329    Cognitive Linguistic- Improve Safety and Awareness    Cognitive Linguistic Improve Safety and Awareness- SLP, Date Goal Reviewed 11/10/17  -HR 11/09/17  -EC 11/08/17  -EC    Cognitive Linguistic Improve Safety and Awareness- SLP, Outcome goal ongoing  -HR goal partially met  -EC goal partially met  -EC      11/07/17 1145 11/06/17 1314 11/04/17 1411    Cognitive Linguistic- Improve Safety and Awareness    Cognitive Linguistic Improve Safety and Awareness- SLP, Date Goal Reviewed 11/07/17  -HR 11/06/17  -CK 11/04/17  -CK    Cognitive Linguistic Improve Safety and Awareness- SLP, Outcome goal ongoing  -HR goal ongoing  -CK goal ongoing  -CK      11/03/17 1346 11/02/17 1614 11/01/17 1159    Cognitive Linguistic- Improve Safety and Awareness    Cognitive Linguistic Improve Safety and Awareness- SLP, Date Goal Reviewed 11/03/17  -EC 11/02/17  -EC 11/01/17  -EC    Cognitive Linguistic Improve Safety and Awareness- SLP, Outcome goal not met  -EC goal not met  -EC goal not met  -EC      10/31/17 1244          Cognitive Linguistic- Improve Safety and Awareness    Cognitive Linguistic Improve Safety and Awareness- SLP, Date Established 10/31/17  -HR      Cognitive Linguistic Improve Safety and Awareness- SLP, Time to Achieve by discharge  -HR      Cognitive Linguistic Improve Safety and Awareness- SLP, Activity Level Patient will improve orientation for increased safety in environment;Patient will improve memory skills for increased safety in environment;Patient will improve functional problem solving skills for increased safety in environment  -HR      Cognitive Linguistic Improve Safety and Awareness- SLP, Date Goal Reviewed 10/31/17  -HR      Cognitive Linguistic Improve Safety and Awareness- SLP, Outcome  goal ongoing  -HR        User Key  (r) = Recorded By, (t) = Taken By, (c) = Cosigned By    Initials Name Provider Type    DANELLE Murrell, CCC-SLP Speech and Language Pathologist    HR Shante Otero MS CCC-SLP Speech and Language Pathologist    CK Concetta Springer, MS CCC-SLP Speech and Language Pathologist          EDUCATION  The patient has been educated in the following areas:   Cognitive Impairment.    SLP Recommendation and Plan  SLP Diagnosis: symbolic dysfunction     Rehab Potential: good, to achieve stated therapy goals  Criteria for Skilled Therapeutic Interventions Met: skilled criteria for cognitive linguistic intervention met  Anticipated Discharge Disposition: home with assist     Therapy Frequency: 3-5 times/wk  Predicted Duration of Therapy Intervention (days/wks): until discharge  Expected Duration of Therapy Session (min): 30-45 minutes    Plan of Care Review  Plan Of Care Reviewed With: patient  Progress: progress toward functional goals as expected  Outcome Summary/Follow up Plan: Pt discharged from  this date d/t all goals achieved. SLP discussed d/c with patient this date.          Time Calculation:         Time Calculation- SLP       11/10/17 1336          Time Calculation- SLP    SLP Start Time 1045  -HR      SLP Stop Time 1138  -HR      SLP Time Calculation (min) 53 min  -HR      Total Timed Code Minutes- SLP 53 minute(s)  -HR      SLP Received On 11/10/17  -HR        User Key  (r) = Recorded By, (t) = Taken By, (c) = Cosigned By    Initials Name Provider Type    HR Shante Otero MS CCC-SLP Speech and Language Pathologist          Therapy Charges for Today     Code Description Service Date Service Provider Modifiers Qty    [id number] [facility] TREATMENT SPEECH 4 11/10/2017 Shante Otero MS CCC-SLP GN 1               Shante Otero MS CCC-SLP  11/10/2017

## 2017-11-10 NOTE — THERAPY TREATMENT NOTE
Inpatient Rehabilitation - Occupational Therapy Treatment Note  [facility]     Patient Name: Kenneth Harper Jr.  : 1934  MRN: [id number]  Today's Date: 11/10/2017  Onset of Illness/Injury or Date of Surgery Date: 10/30/17  Date of Referral to OT: 10/30/17  Referring Physician: TERRENCE Mckinnon MD      Admit Date: 10/30/2017    Visit Dx:     ICD-10-CM ICD-9-CM   1. Symbolic dysfunction R48.9 784.60   2. Impaired mobility and ADLs Z74.09 799.89   3. Abnormality of gait and mobility R26.9 781.2   4. Muscle weakness (generalized) M62.81 728.87     Patient Active Problem List   Diagnosis   • Spinal stenosis, lumbar region, with neurogenic claudication   • Former smoker   • Overweight   • Left-sided weakness   • Right-sided lacunar stroke   • Essential hypertension   • Diabetes mellitus   • Chronic anxiety   • Chronic back pain greater than 3 months duration   • Prostatic hypertrophy   • Degenerative joint disease involving multiple joints   • Atrial fibrillation, chronic   • Encounter for rehabilitation   • Obstructive sleep apnea syndrome             Adult Rehabilitation Note       11/10/17 0725 17 1417 17 1040    Rehab Assessment/Intervention    Discipline occupational therapy assistant  -RC physical therapy assistant  -RW physical therapy assistant  -RW    Document Type therapy note (daily note)  -RC therapy note (daily note)  -RW therapy note (daily note)  -RW    Subjective Information agree to therapy  -RC agree to therapy  -RW agree to therapy  -RW    Patient Effort, Rehab Treatment good  -RC good  -RW good  -RW    Recorded by [RC] MARCO Cannon/L [RW] Pipe Gruber PTA [RW] Pipe Gruber PTA    Pain Assessment    Pain Assessment No/denies pain  -RC No/denies pain  -RW No/denies pain  -RW    Recorded by [RC] MARCO Cannon/L [RW] Pipe Gruber PTA [RW] Pipe Gruber PTA    Cognitive Assessment/Intervention    Current Cognitive/Communication Assessment  functional   -RW functional  -RW    Orientation Status  oriented to;oriented x 4  -RW oriented to;oriented x 4  -RW    Follows Commands/Answers Questions  100% of the time  -% of the time  -RW    Personal Safety Interventions  gait belt;nonskid shoes/slippers when out of bed  -RW gait belt;nonskid shoes/slippers when out of bed  -RW    Recorded by  [RW] Pipe Gruber PTA [RW] Pipe Gruber PTA    Cognitive Assessment Intervention    Behavior/Mood Observations  behavior appropriate to situation, WNL/WFL  -RW behavior appropriate to situation, WNL/WFL  -RW    Recorded by  [RW] Pipe Gruber PTA [RW] Pipe Gruber PTA    Transfer Assessment/Treatment    Transfers, Bed-Chair [place] supervision required  -RC      Transfers, Chair-Bed [place] supervision required  -RC      Transfers, Bed-Chair-Bed, Assist Device rolling walker  -RC      Transfers, Sit-Stand [place] stand by assist  -RC stand by assist;verbal cues required  -RW stand by assist;verbal cues required  -RW    Transfers, Stand-Sit [place] stand by assist  -RC stand by assist;verbal cues required  -RW stand by assist;verbal cues required  -RW    Transfers, Sit-Stand-Sit, Assist Device rolling walker  -RC rolling walker  -RW rolling walker  -RW    Transfer, Comment   sit to stand 2/5  -RW    Recorded by [RC] MARCO Cannon/L [RW] Pipe Gruber PTA [RW] Pipe Gruber PTA    Gait Assessment/Treatment    Gait, [place] Level  verbal cues required;contact guard assist;supervision required  -RW verbal cues required;contact guard assist;supervision required  -RW    Gait, Assistive Device  rolling walker  -RW rolling walker  -RW    Gait, Distance (Feet)  120  -   150 x 2,  70 x 3  -RW    Recorded by  [RW] Pipe Gruber PTA [RW] Pipe Gruber PTA    Stairs Assessment/Treatment    Number of Stairs   4 x 2  -RW    Stairs, Handrail Location  --  -RW left side (ascending)  -RW    Stairs, [place] Level  --  -RW contact guard  assist  -RW    Stairs, Comment   pt needed consitant cues to ascend and decend correctily  -RW    Recorded by  [RW] Pipe Gruber PTA [RW] Pipe Gruber PTA    Functional Mobility    Functional Mobility- Ind. Level supervision required  -RC      Functional Mobility- Device rolling walker  -RC      Functional Mobility-Distance (Feet) 200  -RC      Functional Mobility- Comment --   practiced simulated household mobility w/ multi sit-stand  -RC      Recorded by [RC] MARCO Cannon/L      Wheelchair Training/Management    Wheelchair, Distance Propelled 200  - mod ind  -    x 2 mod ind  -RW    Recorded by [RC] MARCO Cannon/L [RW] Pipe Gruber PTA [RW] Pipe Gruber PTA    Lower Body Dressing Assessment/Training    LB Dressing Assess/Train, Clothing Type donning:;shoes  -RC      LB Dressing Assess/Train, Position sitting  -RC      LB Dressing Assess/Train, [place] minimum assist (75% patient effort)  -RC      Recorded by [RC] MARCO Cannon/L      Grooming Assessment/Training    Grooming Assess/Train, Position sitting  -RC      Grooming Assess/Train, [place] Level independent  -RC      Recorded by [RC] MARCO Cannon/L      Self-Feeding Assessment/Training    Self-Feeding Assess/Train, Position sitting  -RC      Self-Feeding Assess/Train, [place] independent  -RC      Recorded by [RC] LEIGH CannonA/L      Balance Skills Training    Gait Balance Support  parallel bars  -RW     Gait Balance Activities  backwards;side-stepping;tandem   practiced gt w/o hha in // bars  -RW     Recorded by  [RW] Pipe Gruber PTA     Therapy Exercises    Bilateral Lower Extremities  knee flexion;LAQ   pro2 level 3 x 10 min  -RW AROM:;20 reps;sitting;knee flexion;LAQ;hip flexion;ankle pumps/circles;standing;heel raises   2 sets  -RW    Bilateral Upper Extremity --   ue bike 16 min, rickshaw 15# 20 x2, pulley ex 10 min  -RC      Recorded by [RC] MARCO Cannon/DAWN [RW]  Pipe Gruber PTA [RW] Pipe Gruber PTA    Positioning and Restraints    Pre-Treatment Position sitting in chair/recliner  -RC sitting in chair/recliner  -RW sitting in chair/recliner  -RW    Post Treatment Position wheelchair  -RC chair  -RW wheelchair  -RW    In Chair  call light within reach  -RW     In Wheelchair encouraged to call for assist  -RC      Recorded by [RC] MARCO Cannon/L [RW] Pipe Gruber PTA [RW] Pipe Gruber, MARTIN      11/09/17 0902 11/09/17 0730 11/08/17 0936    Rehab Assessment/Intervention    Discipline speech language pathologist  -EC occupational therapy assistant  -RC physical therapy assistant  -RW    Document Type therapy note (daily note)  -EC therapy note (daily note)  -RC therapy note (daily note)  -RW    Subjective Information agree to therapy  -EC agree to therapy  -RC agree to therapy  -RW    Patient Effort, Rehab Treatment  good  -RC good  -RW    Symptoms Noted During/After Treatment  none  -RC     Precautions/Limitations   fall precautions  -RW    Recorded by [EC] Clementina Murrell CCC-SLP [RC] MARCO Cannon/L [RW] Pipe Gruber PTA    Vital Signs    Intra Systolic BP Rehab   112  -RW    Intra Treatment Diastolic BP   63  -RW    Intratreatment Heart Rate (beats/min)   59  -RW    Recorded by   [RW] Pipe Gruber PTA    Pain Assessment    Pain Assessment No/denies pain  -EC No/denies pain  -RC No/denies pain  -RW    Recorded by [EC] Clementina Murrell CCC-SLP [RC] MARCO Cannon/L [RW] Pipe Gruber PTA    Cognitive Assessment/Intervention    Current Cognitive/Communication Assessment  functional  -RC functional  -RW    Orientation Status   oriented to;oriented x 4  -RW    Follows Commands/Answers Questions   100% of the time  -RW    Personal Safety Interventions   gait belt;nonskid shoes/slippers when out of bed  -RW    Recorded by  [RC] MARCO Cannon/L [RW] Pipe Gruber PTA    Cognitive Assessment Intervention    Behavior/Mood  Observations   behavior appropriate to situation, WNL/WFL  -RW    Recorded by   [RW] Pipe Gruber PTA    Communication Treatment Objective and Progress    Cognitive Linguistic Treatment Objectives Improve memory skills;Improve functional problem solving  -EC      Recorded by [EC] Clementina Murrell CCC-SLP      Improve functional problem solving    Improve functional problem solving through: complete organization/home management task;tell similarity between items  -EC      Status: Improve functional problem solving Progressing as expected  -EC      Functional Problem Solving Progress with consistent cues;continue to address;70%;80%  -EC      Recorded by [EC] Clementina Murrell CCC-SLP      Transfer Assessment/Treatment    Transfers, Bed-Chair [place]   supervision required  -RW    Transfers, Chair-Bed [place]   supervision required  -RW    Transfers, Sit-Stand [place]   stand by assist;verbal cues required  -RW    Transfers, Stand-Sit [place]   stand by assist;verbal cues required  -RW    Transfers, Sit-Stand-Sit, Assist Device   rolling walker  -RW    Toilet Transfer, [place]  supervision required  -RC     Toilet Transfer, Assistive Device  rolling walker  -RC     Recorded by  [RC] MARCO Cannon/L [RW] Pipe Gruber PTA    Gait Assessment/Treatment    Gait, [place] Level   verbal cues required;contact guard assist;supervision required  -RW    Gait, Assistive Device   rolling walker  -RW    Gait, Distance (Feet)   150   150 x 2 plus 70 x 2  -RW    Gait, Comment   improved gt quality  -RW    Recorded by   [RW] Pipe Gruber PTA    Wheelchair Training/Management    Wheelchair, Distance Propelled  150  - mod ind  -RW    Recorded by  [RC] MARCO Cannon/L [RW] Pipe Gruber PTA    Upper Body Bathing Assessment/Training    UB Bathing Assess/Train Assistive Device  --   sponge bath  -RC     UB Bathing Assess/Train, Position  sitting;sink side  -RC     UB  Bathing Assess/Train, [place] Level  conditional independence  -RC     Recorded by  [RC] Cassie Carpio LARA/L     Lower Body Bathing Assessment/Training    LB Bathing Assess/Train Assistive Device  --   sponge bath  -RC     LB Bathing Assess/Train, Position  sitting;sink side  -RC     LB Bathing Assess/Train, [place] Level  set up required;conditional independence  -RC     Recorded by  [RC] Cassie Carpio, LARA/L     Upper Body Dressing Assessment/Training    UB Dressing Assess/Train, Clothing Type  pull over  -RC     UB Dressing Assess/Train, Position  sitting  -RC     UB Dressing Assess/Train, [place]  conditional independence  -RC     Recorded by  [RC] Cassie Carpio, LARA/L     Lower Body Dressing Assessment/Training    LB Dressing Assess/Train, Clothing Type  doffing:;donning:;pants;shoes;socks  -RC     LB Dressing Assess/Train, Position  sitting;standing  -RC     LB Dressing Assess/Train, [place]  minimum assist (75% patient effort)  -RC     Recorded by  [RC] Cassie Carpio LARA/L     Toileting Assessment/Training    Toileting Assess/Train, Position  sitting;standing  -RC     Toileting Assess/Train, [place] Level  supervision required  -RC     Recorded by  [RC] Cassie Carpio LARA/L     Grooming Assessment/Training    Grooming Assess/Train, Position  sitting  -RC     Grooming Assess/Train, [place] Level  independent  -RC     Recorded by  [RC] Cassie Carpio LARA/L     Self-Feeding Assessment/Training    Self-Feeding Assess/Train, [place]  independent  -RC     Recorded by  [RC] Cassie Carpio LARA/L     Balance Skills Training    Sitting-Balance Activities   Ball toss;Reaching for objects;Reaching across midline;Lateral lean;Forward lean  -RW    Standing-Balance Activities   Reaching for objects  -RW    Gait Balance Support   assistive device   side stepping with rail in hallway  -RW    Gait Balance Activities   side-stepping;stepping over object;[place]  -RW    Recorded  by   [RW] Pipe Gruber PTA    Therapy Exercises    Bilateral Lower Extremities   AROM:;20 reps;sitting;knee flexion;LAQ   pro 2 level 3 x 12 min  -RW    Bilateral Upper Extremity  --   ue bike 15 min, pulley ex 5 min, t-putty 5 min  -RC     Recorded by  [RC] MARCO Cannon/L [RW] Pipe Gruber PTA    Positioning and Restraints    Pre-Treatment Position  sitting in chair/recliner  -RC sitting in chair/recliner  -RW    Post Treatment Position  wheelchair  -RC wheelchair  -RW    In Wheelchair  with SLP  -RC with nsg  -RW    Recorded by  [RC] MARCO Cannon/L [RW] Pipe Gruber PTA      11/08/17 0905 11/08/17 0737 11/07/17 1320    Rehab Assessment/Intervention    Discipline speech language pathologist  -EC occupational therapy assistant  -RC physical therapy assistant  -RW    Document Type therapy note (daily note)  -EC therapy note (daily note)  -RC therapy note (daily note)  -RW    Subjective Information no complaints;agree to therapy  -EC agree to therapy  -RC agree to therapy  -RW    Patient Effort, Rehab Treatment good  -EC good  -RC good  -RW    Precautions/Limitations  fall precautions  -RC     Recorded by [EC] Clementina Murrell CCC-SLP [RC] MARCO Cannon/L [RW] Pipe Gruber PTA    Pain Assessment    Pain Assessment No/denies pain  -EC No/denies pain  -RC No/denies pain  -RW    Recorded by [EC] Clementina Murrell CCC-SLP [RC] MARCO Cannon/L [RW] Pipe Gruber PTA    Cognitive Assessment/Intervention    Current Cognitive/Communication Assessment  functional  -RC functional  -RW    Orientation Status  oriented x 4  -RC oriented to;oriented x 4  -RW    Recorded by  [RC] MARCO Cannon/L [RW] Pipe Gruber PTA    Cognitive Assessment Intervention    Behavior/Mood Observations   behavior appropriate to situation, WNL/WFL  -RW    Recorded by   [RW] Pipe Gruber PTA    Communication Treatment Objective and Progress    Cognitive Linguistic Treatment Objectives  Improve memory skills;Improve functional problem solving  -EC      Recorded by [EC] MERI Schultz      Improve memory skills    Improve memory skills through: recalling related word lists with an imposed delay;90%;with inconsistent cues  -EC      Status: Improve memory skills Achieved  -EC      Memory Skills Progress 90%  -EC      Comments: Improve memory skills pt recalled word list from last friday and from yesterday with no cues.    -EC      Recorded by [EC] MERI Schultz      Improve functional problem solving    Improve functional problem solving through: complete organization/home management task;tell similarity between items  -EC      Status: Improve functional problem solving Progressing as expected  -EC      Functional Problem Solving Progress 80%;with consistent cues;continue to address  -EC      Comments: Improve functional problem solving moderate cue  -EC      Recorded by [EC] MERI Schultz      Bed Mobility, Assessment/Treatment    Bed Mobility, [place], [place]   independent  -RW    Bed Mob, Supine to Sit, [place]   independent  -RW    Bed Mob, Sit to Supine, [place]   independent  -RW    Recorded by   [RW] Pipe Gruber PTA    Transfer Assessment/Treatment    Transfers, Bed-Chair [place]  --   multiple transfers multiple surfaces  -RC supervision required  -RW    Transfers, Chair-Bed [place]  supervision required;verbal cues required  -RC supervision required  -RW    Transfers, Bed-Chair-Bed, Assist Device  rolling walker  -RC     Transfers, Sit-Stand [place]  stand by assist  -RC stand by assist;verbal cues required  -RW    Transfers, Stand-Sit [place]  stand by assist   practiced tech  -RC stand by assist;verbal cues required  -RW    Transfers, Sit-Stand-Sit, Assist Device   rolling walker  -RW    Recorded by  [RC] MARCO Cannon/L [RW] Pipe Gruber PTA    Gait Assessment/Treatment    Gait, [place]  Level   verbal cues required;contact guard assist  -RW    Gait, Assistive Device   rolling walker  -RW    Recorded by   [RW] Pipe Gruber PTA    Functional Mobility    Functional Mobility- Ind. Level  supervision required  -RC     Functional Mobility- Device  rolling walker  -RC     Functional Mobility-Distance (Feet)  150  -RC     Recorded by  [RC] MARCO Cannon/L     Wheelchair Training/Management    Wheelchair, Distance Propelled  150  - mod ind  -RW    Recorded by  [RC] MARCO Cannon/L [RW] Pipe Gruber PTA    Lower Body Dressing Assessment/Training    LB Dressing Assess/Train, Clothing Type  donning:;shoes  -RC     LB Dressing Assess/Train, Position  sitting  -RC     LB Dressing Assess/Train, [place]  conditional independence  -RC     Recorded by  [RC] MARCO Cannon/L     Toileting Assessment/Training    Toileting Assess/Train, [place] Level  supervision required  -RC     Recorded by  [RC] MAROC Cannon/L     Grooming Assessment/Training    Grooming Assess/Train, Position  sitting  -RC     Grooming Assess/Train, [place] Level  independent  -RC     Recorded by  [RC] MARCO Cannon/L     Self-Feeding Assessment/Training    Self-Feeding Assess/Train, Position  sitting  -RC     Self-Feeding Assess/Train, [place]  independent  -RC     Recorded by  [RC] MARCO Cannon/L     Balance Skills Training    Standing-Level of Assistance  Close supervision  -RC     Static Standing Balance Support  assistive device  -RC     Standing Balance # of Minutes  8  -RC     Recorded by  [RC] MARCO Cannon/L     Therapy Exercises    Bilateral Lower Extremities   AROM:;20 reps;supine;ankle pumps/circles;heel slides;hip abduction/adduction;SAQ   pro 2 level 3 x 10 min. 2 sets of hs  -RW    Bilateral Upper Extremity  --   pulley ex 5 min, rickshaw 15# 20x, ue bike 15 min+  -RC     Recorded by  [RC] MARCO Cannon/L [RW] Pipe Gruber PTA    Fine Motor  Coordination Training    Detail (Fine Motor Coordination Training)  --   velcro board,   -RC     Recorded by  [RC] MARCO Cannon/L     Positioning and Restraints    Pre-Treatment Position  sitting in chair/recliner  -RC sitting in chair/recliner  -RW    Post Treatment Position  wheelchair  -RC wheelchair  -RW    In Bed  with SLP;encouraged to call for assist  -RC     Recorded by  [RC] MARCO Cannon/L [RW] Pipe Gruber PTA      11/07/17 1108 11/07/17 1010       Rehab Assessment/Intervention    Discipline speech language pathologist  -HR physical therapy assistant  -RW     Document Type therapy note (daily note)  -HR therapy note (daily note)  -RW     Subjective Information no complaints;agree to therapy  -HR agree to therapy  -RW     Patient Effort, Rehab Treatment good  -HR good  -RW     Symptoms Noted During/After Treatment none  -HR      Precautions/Limitations fall precautions  -HR      Precautions/Limitations, Vision WFL with corrective lenses  -HR      Precautions/Limitations, Hearing WFL  -HR      Recorded by [HR] Shante Otero MS CCC-SLP [RW] Pipe Gruber, MARTIN     Vital Signs    Pretreatment Heart Rate (beats/min)  81  -RW     Pre SpO2 (%)  96  -RW     O2 Delivery Pre Treatment  room air  -RW     Recorded by  [RW] Pipe Gruber PTA     Pain Assessment    Pain Assessment No/denies pain  -HR No/denies pain  -RW     Pain Score 0  -HR      Post Pain Score 0  -HR      Recorded by [HR] Shante Otero, MS CANCNIO-SLP [RW] Pipe Gruber, MARTIN     Vision Assessment/Intervention    Visual Impairment WFL with corrective lenses  -HR      Recorded by [HR] Shante Otero MS CCC-SLP      Cognitive Assessment/Intervention    Current Cognitive/Communication Assessment functional  -HR functional  -RW     Orientation Status oriented x 4  -HR oriented to;oriented x 4  -RW     Follows Commands/Answers Questions 100% of the time  -% of the time  -RW     Personal Safety Interventions  gait belt;nonskid  shoes/slippers when out of bed  -RW     Recorded by [HR] Shante Otero MS CCC-SLP [RW] Pipe Gruber PTA     Cognitive Assessment Intervention    Behavior/Mood Observations  behavior appropriate to situation, WNL/WFL  -RW     Recorded by  [RW] Pipe Gruber PTA     Communication Treatment Objective and Progress    Cognitive Linguistic Treatment Objectives Improve orientation;Improve memory skills;Improve functional problem solving  -HR      Recorded by [HR] Shante Otero MS CCC-SLP      Improve orientation    Improve orientation through: demonstrating orientation to day;demonstrating orientation to month;demonstrating orientation to year;demonstrating orientation to place;demonstrating orientation to disease/impairment;90%;with inconsistent cues  -HR      Status: Improve orientation through Achieved  -HR      Recorded by [HR] Shante Otero MS CCC-SLP      Improve memory skills    Improve memory skills through: recalling related word lists with an imposed delay;90%;with inconsistent cues  -HR      Status: Improve memory skills Progressing as expected  -HR      Memory Skills Progress 70%;80%;continue to address  -HR      Recorded by [HR] Shante Otero MS CCC-SLP      Improve functional problem solving    Improve functional problem solving through: complete organization/home management task;tell similarity between items  -HR      Status: Improve functional problem solving Progressing as expected  -HR      Functional Problem Solving Progress 80%;with consistent cues;continue to address  -HR      Comments: Improve functional problem solving mod cues for map reading task this date with high accuracy  -HR      Recorded by [HR] Shante Otero MS CCC-SLP      Transfer Assessment/Treatment    Transfers, Sit-Stand [place]  stand by assist;verbal cues required  -RW     Transfers, Stand-Sit [place]  stand by assist;verbal cues required  -RW     Transfers, Sit-Stand-Sit, Assist Device  rolling walker  -RW      Recorded by  [RW] Pipe Gruber PTA     Gait Assessment/Treatment    Gait, [place] Level  verbal cues required;contact guard assist  -RW     Gait, Assistive Device  rolling walker  -RW     Gait, Distance (Feet)  150    x 2  -RW     Gait, Comment  gt improving but will get walker too far away during turns  -RW     Recorded by  [RW] Pipe Gruber PTA     Stairs Assessment/Treatment    Number of Stairs  12  -RW     Stairs, Handrail Location  both sides  -RW     Stairs, [place] Level  supervision required;contact guard assist  -RW     Recorded by  [RW] Pipe Gruber PTA     Wheelchair Training/Management    Wheelchair, Distance Propelled  160 mod ind  -RW     Recorded by  [RW] Pipe Gruber PTA     Balance Skills Training    Gait Balance-Level of Assistance  Close supervision  -RW     Gait Balance Support  parallel bars  -RW     Gait Balance Activities  tandem;side-stepping  -RW     Recorded by  [RW] Pipe Gruber PTA     Therapy Exercises    Bilateral Lower Extremities  AROM:;20 reps;sitting;hip flexion;knee flexion;LAQ   2 sets  -RW     BLE Resistance  theraband  -RW     Bilateral Upper Extremity  AROM:;15 reps;20 reps;sitting   tband row  -RW     BUE Resistance  theraband  -RW     Recorded by  [RW] Pipe Gruber PTA     Positioning and Restraints    Pre-Treatment Position  sitting in chair/recliner  -RW     Post Treatment Position  wheelchair  -RW     Recorded by  [RW] Pipe Gruber PTA       User Key  (r) = Recorded By, (t) = Taken By, (c) = Cosigned By    Initials Name Effective Dates    EC Clementina Murrell CCC-SLP 12/30/16 -     HR Shante Otero, MS JULITA-SLP 12/15/16 -     RW Pipe Gruber PTA 10/17/16 -     RC LEIGH CannonA/DAWN 10/17/16 -                 OT Goals       11/10/17 0914 11/09/17 1155 11/08/17 0929    Transfer Training OT LTG    Transfer Training OT LTG, Date Goal Reviewed  11/09/17  -RC 11/08/17  -    Patient Education OT LTG    Patient Education OT LTG, Date  Goal Reviewed 11/10/17  -RC 11/09/17  -RC 11/08/17  -RC    Safety Awareness OT LTG    Safety Awareness OT LTG, Date Goal Reviewed 11/10/17  -RC 11/09/17  -RC 11/08/17  -RC    Safety Awareness OT LTG, Outcome  goal ongoing  -RC     ADL OT LTG    ADL OT LTG, Date Goal Reviewed 11/10/17  -RC 11/09/17  -RC 11/08/17  -RC    ADL OT LTG, Outcome  goal partially met  -RC goal ongoing  -RC    Endurance OT LTG    Endurance Goal OT LTG, Date Goal Reviewed   11/08/17  -RC    Endurance Goal OT LTG, Outcome   goal met  -RC      11/07/17 1351 11/06/17 1425 11/06/17 0724    Transfer Training OT LTG    Transfer Training OT LTG, Assist Device   walker, rolling platform  -KD    Transfer Training OT LTG, Date Goal Reviewed 11/07/17  -RC  11/06/17  -KD    Transfer Training OT LTG, Outcome goal ongoing  -RC  goal not met  -KD    Patient Education OT LTG    Patient Education OT LTG, Date Goal Reviewed 11/07/17  -RC  11/06/17  -KD    Patient Education OT LTG Outcome goal ongoing  -RC  goal not met  -KD    Safety Awareness OT LTG    Safety Awareness OT LTG, Date Goal Reviewed 11/07/17  -RC 11/06/17  -KD     Safety Awareness OT LTG, Outcome goal ongoing  -RC goal not met  -KD     ADL OT LTG    ADL OT LTG, Date Goal Reviewed 11/07/17  -RC  11/06/17  -KD    ADL OT LTG, Outcome goal ongoing  -RC  goal not met  -KD    Endurance OT LTG    Endurance Goal OT LTG, Date Goal Reviewed 11/07/17  -RC  11/06/17  -KD    Endurance Goal OT LTG, Outcome goal partially met  -RC  goal not met  -KD      11/04/17 0738 11/03/17 1359 11/02/17 0715    Transfer Training OT LTG    Transfer Training OT LTG, Date Goal Reviewed 11/04/17  -RC 11/03/17  -LM 11/02/17  -KD    Transfer Training OT LTG, Outcome  goal ongoing  -LM goal not met  -KD    Patient Education OT LTG    Patient Education OT LTG, Date Goal Reviewed 11/04/17  -RC 11/03/17  -LM 11/02/17  -KD    Patient Education OT LTG Outcome  goal not met  -LM goal not met  -KD    Safety Awareness OT LTG    Safety  Awareness OT LTG, Date Goal Reviewed 11/04/17  -RC 11/03/17  -LM 11/02/17  -KD    Safety Awareness OT LTG, Outcome  goal not met  -LM goal not met  -KD    ADL OT LTG    ADL OT LTG, Date Goal Reviewed 11/04/17  -RC 11/03/17  -LM     ADL OT LTG, Outcome  goal not met  -LM goal not met  -KD    Endurance OT LTG    Endurance Goal OT LTG, Date Goal Reviewed 11/04/17  -RC 11/03/17  -LM 11/02/17  -KD    Endurance Goal OT LTG, Outcome goal ongoing  -RC goal not met  -LM goal not met  -KD      11/01/17 1635 10/31/17 0925       Transfer Training OT LTG    Transfer Training OT LTG, Date Established  10/31/17  -BH (r) SP (t) BH (c)     Transfer Training OT LTG, Time to Achieve  by discharge  -BH (r) SP (t) BH (c)     Transfer Training OT LTG, Activity Type  all transfers  -BH (r) SP (t) BH (c)     Transfer Training OT LTG, [place] Level  contact guard assist  -BH (r) SP (t) BH (c)     Transfer Training OT LTG, Date Goal Reviewed 11/01/17  -RW      Transfer Training OT LTG, Outcome goal ongoing  -RW      Patient Education OT LTG    Patient Education OT LTG, Date Established  10/31/17  -BH (r) SP (t) BH (c)     Patient Education OT LTG, Time to Achieve  by discharge  -BH (r) SP (t) BH (c)     Patient Education OT LTG, Education Type  HEP;posture/body mechanics;1 hand/anni technique;home safety;adaptive equipment mgmt;energy conservation  -BH (r) SP (t) BH (c)     Patient Education OT LTG, Education Understanding  independent;demonstrates adequately;verbalizes understanding   with caregiver assist as necessary  -BH (r) SP (t) BH (c)     Patient Education OT LTG, Date Goal Reviewed 11/01/17  -RW      Patient Education OT LTG Outcome goal ongoing  -RW      Safety Awareness OT LTG    Safety Awareness OT LTG, Date Established  10/31/17  -BH (r) SP (t) BH (c)     Safety Awareness OT LTG, Time to Achieve  by discharge  -BH (r) SP (t) BH (c)     Safety Awareness OT LTG, Activity Type  good safety awareness;with kitchen mobility;with  ADL's  -BH (r) SP (t) BH (c)     Safety Awareness OT LTG, [place] Level  min verbal cues  -BH (r) SP (t) BH (c)     Safety Awareness OT LTG, Date Goal Reviewed 11/01/17  -RW      Safety Awareness OT LTG, Outcome goal ongoing  -RW      ADL OT LTG    ADL OT LTG, Date Established  10/31/17  -BH (r) SP (t) BH (c)     ADL OT LTG, Time to Achieve  by discharge  -BH (r) SP (t) BH (c)     ADL OT LTG, Activity Type  ADL skills  -BH (r) SP (t) BH (c)     ADL OT LTG, Additional Goal  Set-up A with self-feeding and grooming; VC for UB bathing and UB dressing; CGA with LB bathing and LB dressing  -BH (r) SP (t) BH (c)     ADL OT LTG, Date Goal Reviewed 11/01/17  -RW      ADL OT LTG, Outcome goal ongoing  -RW      Endurance OT LTG    Endurance Goal OT LTG, Date Established  10/31/17  -BH (r) SP (t) BH (c)     Endurance Goal OT LTG, Time to Achieve  by discharge  -BH (r) SP (t) BH (c)     Endurance Goal OT LTG, Activity Level  endurance 2 good-  -BH (r) SP (t) BH (c)     Endurance Goal OT LTG, Additional Goal  During 30 minutes of functional tasks, ADL, and therapeutic exercise  -BH (r) SP (t) BH (c)     Endurance Goal OT LTG, Date Goal Reviewed 11/01/17  -RW      Endurance Goal OT LTG, Outcome goal ongoing  -RW        User Key  (r) = Recorded By, (t) = Taken By, (c) = Cosigned By    Initials Name Provider Type     Karoline Huang, OTR/L Occupational Therapist    RW Jacinta Pitts, OTR/L Occupational Therapist    JIA Carpio, LARA/L Occupational Therapy Assistant    JOSS King, LARA/L Occupational Therapy Assistant    MOSHE Boucher, LARA/L Occupational Therapy Assistant    DIONTE Bruce, OT Student OT Student          Occupational Therapy Education     Title: PT OT SLP Therapies (Active)     Topic: Occupational Therapy (Active)     Point: ADL training (Active)    Description: Instruct learner(s) on proper safety adaptation and remediation techniques during self care or transfers.   Instruct in  proper use of assistive devices.    Learning Progress Summary    Learner Readiness Method Response Comment Documented by Status   Patient Acceptance E NR,VU   11/10/17 0914 Done    Acceptance E NR   11/09/17 1155 Active    Acceptance TB NR  KD 11/02/17 1108 Active    Acceptance E,D NR   11/01/17 1518 Active    Acceptance E VU Educated about OT and POC. Educated about anni dressing technique. Educated about safety with ADL and t/f. Educated to call for assist and not to stand independently. SP 10/31/17 1354 Done   Family Acceptance E,D NR  RW 11/01/17 1518 Active    Acceptance E VU Educated about OT and POC. Educated about anni dressing technique. Educated about safety with ADL and t/f. Educated to call for assist and not to stand independently. SP 10/31/17 1354 Done               Point: Home exercise program (Active)    Description: Instruct learner(s) on appropriate technique for monitoring, assisting and/or progressing therapeutic exercises/activities.    Learning Progress Summary    Learner Readiness Method Response Comment Documented by Status   Patient Acceptance E,D NR theraputty  11/01/17 1634 Active   Family Acceptance E,D NR theraputty  11/01/17 1634 Active               Point: Precautions (Active)    Description: Instruct learner(s) on prescribed precautions during self-care and functional transfers.    Learning Progress Summary    Learner Readiness Method Response Comment Documented by Status   Patient Acceptance E VU recommend pt not drive w/o MD OK and discussed  evaluation program  11/10/17 0918 Done    Acceptance E NR,VU   11/10/17 0914 Done    Acceptance E NR   11/09/17 1155 Active    Acceptance E NR,VU recommned no climbing on ladders or step stools.  11/08/17 0928 Done    Acceptance E NR   11/07/17 1350 Active    Acceptance E NR   11/04/17 0930 Active    Acceptance E,D NR   11/01/17 1518 Active    Acceptance E VU Educated about OT and POC. Educated about anni dressing  technique. Educated about safety with ADL and t/f. Educated to call for assist and not to stand independently. SP 10/31/17 1354 Done   Family Acceptance E,D NR   11/01/17 1518 Active    Acceptance E VU Educated about OT and POC. Educated about anni dressing technique. Educated about safety with ADL and t/f. Educated to call for assist and not to stand independently. SP 10/31/17 1354 Done               Point: Body mechanics (Done)    Description: Instruct learner(s) on proper positioning and spine alignment during self-care, functional mobility activities and/or exercises.    Learning Progress Summary    Learner Readiness Method Response Comment Documented by Status   Patient Acceptance E NR,VU   11/10/17 0914 Done    Acceptance E NR   11/09/17 1155 Active    Acceptance E NR,VU recommned no climbing on ladders or step stools.  11/08/17 0928 Done    Acceptance E NR   11/07/17 1350 Active    Acceptance E NR   11/04/17 0930 Active    Acceptance E VU Educated about OT and POC. Educated about anni dressing technique. Educated about safety with ADL and t/f. Educated to call for assist and not to stand independently. SP 10/31/17 1354 Done   Family Acceptance E VU Educated about OT and POC. Educated about anni dressing technique. Educated about safety with ADL and t/f. Educated to call for assist and not to stand independently.  10/31/17 1354 Done                      User Key     Initials Effective Dates Name Provider Type Discipline     10/17/16 -  ESTEBAN Silver/L Occupational Therapist OT     10/17/16 -  MARCO Cannon/L Occupational Therapy Assistant OT     10/17/16 -  MARCO Vidales/L Occupational Therapy Assistant OT    SP 01/31/17 -  Alem Bruce OT Student OT Student OT                  OT Recommendation and Plan  Anticipated Equipment Needs At Discharge: bathing equipment, dressing equipment, front wheeled walker  Anticipated Discharge Disposition: home with 24/7 care,  home with home health  Planned Therapy Interventions: activity intolerance, adaptive equipment training, ADL retraining, IADL retraining, balance training, bed mobility training, energy conservation, fine motor coordination training, home exercise program, motor coordination training, neuromuscular re-education, ROM (Range of Motion), strengthening, transfer training  Therapy Frequency: other (see comments) (3-14x/wk)  Plan of Care Review  Plan Of Care Reviewed With: patient  Progress: improving  Outcome Summary/Follow up Plan: practiced fx mobility this AM pt cont to improve,    cont poc        Outcome Measures       11/10/17 0725 11/09/17 0730 11/08/17 0737    How much help from another is currently needed...    Putting on and taking off regular lower body clothing? 3  -RC 3  -RC 3  -RC    Bathing (including washing, rinsing, and drying) 4  -RC 4  -RC 3  -RC    Toileting (which includes using toilet bed pan or urinal) 3  -RC 3  -RC 3  -RC    Putting on and taking off regular upper body clothing 4  -RC 4  -RC 3  -RC    Taking care of personal grooming (such as brushing teeth) 4  -RC 4  -RC 3  -RC    Eating meals 4  -RC 4  -RC 4  -RC    Score 22  -RC 22  -RC 19  -RC      User Key  (r) = Recorded By, (t) = Taken By, (c) = Cosigned By    Initials Name Provider Type    MARCO Young/L Occupational Therapy Assistant           Time Calculation:         Time Calculation- OT       11/10/17 0916          Time Calculation- OT    OT Start Time 0725  -RC      OT Stop Time 0855  -      OT Time Calculation (min) 90 min  -      Total Timed Code Minutes- OT 90 minute(s)  -      OT Received On 11/10/17  -        User Key  (r) = Recorded By, (t) = Taken By, (c) = Cosigned By    Initials Name Provider Type    MARCO Young/L Occupational Therapy Assistant           Therapy Charges for Today     Code Description Service Date Service Provider Modifiers Qty    53059753884  OT SELF CARE/MGMT/TRAIN EA 15  MIN 11/9/2017 Cassie TAMAYO Balaji, LARA/L GO 3    86528041727 HC OT THER PROC EA 15 MIN 11/9/2017 Cassie G Balaji, LARA/L GO 2    53520895398 HC OT THERAPEUTIC ACT EA 15 MIN 11/9/2017 Cassie G Balaji, LARA/L GO 1    93704725304 HC OT SELF CARE/MGMT/TRAIN EA 15 MIN 11/10/2017 Cassie G Balaji, LARA/L GO 1    [id number] HC OT THER PROC EA 15 MIN 11/10/2017 Cassie G Balaji, LARA/L GO 2    92100483205 HC OT THERAPEUTIC ACT EA 15 MIN 11/10/2017 Cassie G Balaji, LARA/L GO 3          OT G-codes  OT Professional Judgement Used?: Yes  OT Functional Scales Options: AM-PAC 6 Clicks Daily Activity (OT)  Score: 15  Functional Limitation: Self care  Self Care Current Status (): At least 40 percent but less than 60 percent impaired, limited or restricted  Self Care Goal Status (): At least 1 percent but less than 20 percent impaired, limited or restricted    Cassie Carpio, LARA/L  11/10/2017

## 2017-11-10 NOTE — PROGRESS NOTES
"Checked in on pt at lunch. He was in good spirits and said he was \"ready for lunch\". Pt states his appetite is fair but continuing to get better. RDN will continue to monitor.  "

## 2017-11-11 LAB
GLUCOSE BLDC GLUCOMTR-MCNC: 114 MG/DL (ref 70–130)
GLUCOSE BLDC GLUCOMTR-MCNC: 75 MG/DL (ref 70–130)
GLUCOSE BLDC GLUCOMTR-MCNC: 91 MG/DL (ref 70–130)

## 2017-11-11 PROCEDURE — 25010000002 ENOXAPARIN PER 10 MG: Performed by: FAMILY MEDICINE

## 2017-11-11 PROCEDURE — 82962 GLUCOSE BLOOD TEST: CPT

## 2017-11-11 RX ADMIN — ASPIRIN 81 MG 81 MG: 81 TABLET ORAL at 08:24

## 2017-11-11 RX ADMIN — AMLODIPINE BESYLATE 10 MG: 10 TABLET ORAL at 20:37

## 2017-11-11 RX ADMIN — PANTOPRAZOLE SODIUM 40 MG: 40 TABLET, DELAYED RELEASE ORAL at 06:05

## 2017-11-11 RX ADMIN — THERA TABS 1 TABLET: TAB at 08:24

## 2017-11-11 RX ADMIN — MAGNESIUM OXIDE TAB 400 MG (241.3 MG ELEMENTAL MG) 400 MG: 400 (241.3 MG) TAB at 08:24

## 2017-11-11 RX ADMIN — ATORVASTATIN CALCIUM 10 MG: 10 TABLET, FILM COATED ORAL at 20:38

## 2017-11-11 RX ADMIN — HYDROCORTISONE: 1 CREAM TOPICAL at 17:13

## 2017-11-11 RX ADMIN — LISINOPRIL 10 MG: 10 TABLET ORAL at 08:24

## 2017-11-11 RX ADMIN — FINASTERIDE 5 MG: 5 TABLET, FILM COATED ORAL at 08:24

## 2017-11-11 RX ADMIN — POTASSIUM CHLORIDE 10 MEQ: 750 CAPSULE, EXTENDED RELEASE ORAL at 08:24

## 2017-11-11 RX ADMIN — METFORMIN HYDROCHLORIDE 1000 MG: 500 TABLET ORAL at 08:24

## 2017-11-11 RX ADMIN — METOPROLOL SUCCINATE 50 MG: 50 TABLET, EXTENDED RELEASE ORAL at 20:38

## 2017-11-11 RX ADMIN — METFORMIN HYDROCHLORIDE 1000 MG: 500 TABLET ORAL at 17:13

## 2017-11-11 RX ADMIN — CLOPIDOGREL BISULFATE 75 MG: 75 TABLET ORAL at 08:24

## 2017-11-11 RX ADMIN — TERAZOSIN HYDROCHLORIDE ANHYDROUS 5 MG: 5 CAPSULE ORAL at 20:38

## 2017-11-11 RX ADMIN — HYDROCORTISONE: 1 CREAM TOPICAL at 08:26

## 2017-11-11 RX ADMIN — ENOXAPARIN SODIUM 40 MG: 40 INJECTION SUBCUTANEOUS at 08:23

## 2017-11-11 RX ADMIN — FLUTICASONE PROPIONATE 2 SPRAY: 50 SPRAY, METERED NASAL at 08:26

## 2017-11-11 RX ADMIN — GLIPIZIDE 10 MG: 10 TABLET ORAL at 08:24

## 2017-11-11 NOTE — PLAN OF CARE
Problem: Patient Care Overview (Adult)  Goal: Plan of Care Review  Outcome: Ongoing (interventions implemented as appropriate)    11/11/17 0541 11/11/17 1250   Coping/Psychosocial Response Interventions   Plan Of Care Reviewed With --  patient   Patient Care Overview   Progress progress toward functional goals as expected --    Outcome Evaluation   Outcome Summary/Follow up Plan up with standby assist only. self care with toileting. --        Goal: Adult Individualization and Mutuality  Outcome: Ongoing (interventions implemented as appropriate)  Goal: Discharge Needs Assessment  Outcome: Ongoing (interventions implemented as appropriate)    Problem: Fall Risk (Adult)  Goal: Absence of Falls  Outcome: Ongoing (interventions implemented as appropriate)    Problem: Stroke (Ischemic) (Adult)  Goal: Signs and Symptoms of Listed Potential Problems Will be Absent or Manageable (Stroke)  Outcome: Ongoing (interventions implemented as appropriate)    Problem: Diabetes, Type 2 (Adult)  Goal: Signs and Symptoms of Listed Potential Problems Will be Absent or Manageable (Diabetes, Type 2)  Outcome: Ongoing (interventions implemented as appropriate)

## 2017-11-11 NOTE — PLAN OF CARE
Problem: Patient Care Overview (Adult)  Goal: Plan of Care Review  Outcome: Ongoing (interventions implemented as appropriate)  Goal: Adult Individualization and Mutuality  Outcome: Ongoing (interventions implemented as appropriate)  Goal: Discharge Needs Assessment  Outcome: Ongoing (interventions implemented as appropriate)    Problem: Fall Risk (Adult)  Goal: Absence of Falls  Outcome: Ongoing (interventions implemented as appropriate)    Problem: Stroke (Ischemic) (Adult)  Goal: Signs and Symptoms of Listed Potential Problems Will be Absent or Manageable (Stroke)  Outcome: Ongoing (interventions implemented as appropriate)    Problem: Diabetes, Type 2 (Adult)  Goal: Signs and Symptoms of Listed Potential Problems Will be Absent or Manageable (Diabetes, Type 2)  Outcome: Ongoing (interventions implemented as appropriate)    11/11/17 0541   Diabetes, Type 2   Problems Assessed (Type 2 Diabetes) all   Problems Present (Type 2 Diabetes) none

## 2017-11-11 NOTE — PROGRESS NOTES
[facility] Medicine Services  INPATIENT PROGRESS NOTE    Length of Stay: 12  Date of Admission: 10/30/2017  Primary Care Physician: Evan Marrero MD    Subjective   Chief Complaint/HPI:  Patient admitted to acute rehab services secondary to left sided CVA, weakness, and ataxia.  Currently resting comfortably with no complaints.    Review of Systems   Constitutional: Negative for chills and fever.   Respiratory: Negative for shortness of breath.    Cardiovascular: Negative for chest pain.   Gastrointestinal: Negative for abdominal pain, nausea and vomiting.   Neurological: Negative for syncope and headaches.       Objective    Temp:  [97.6 °F (36.4 °C)-98.3 °F (36.8 °C)] 97.6 °F (36.4 °C)  Heart Rate:  [63-85] 85  Resp:  [18] 18  BP: ()/(53-63) 136/63    Physical Exam   Constitutional: He is oriented to person, place, and time. He appears well-developed and well-nourished. No distress.   HENT:   Head: Normocephalic and atraumatic.   Cardiovascular: Normal rate and regular rhythm.    Pulmonary/Chest: Effort normal and breath sounds normal. No respiratory distress. He has no wheezes.   Abdominal: Soft. Bowel sounds are normal. He exhibits no distension. There is no tenderness.   Neurological: He is alert and oriented to person, place, and time.   Skin: Skin is warm and dry. He is not diaphoretic.   Psychiatric: He has a normal mood and affect. His behavior is normal.     Results Review:  I have reviewed the labs, radiology results, and diagnostic studies.    Laboratory Data:     Results from last 7 days  Lab Units 11/09/17  0651 11/05/17  0456   SODIUM mmol/L 140 142   POTASSIUM mmol/L 4.2 4.0   CHLORIDE mmol/L 105 104   CO2 mmol/L 24.0 26.0   BUN mg/dL 19 22*   CREATININE mg/dL 0.90 1.00   GLUCOSE mg/dL 135* 116*   CALCIUM mg/dL 9.2 9.2   BILIRUBIN mg/dL 1.1  --    ALK PHOS U/L 46  --    ALT (SGPT) U/L 37  --    AST (SGOT) U/L 33  --    ANION GAP mmol/L 11.0 12.0      Estimated Creatinine Clearance: 72.7 mL/min (by C-G formula based on Cr of 0.9).    Results from last 7 days  Lab Units 11/05/17  0456   PHOSPHORUS mg/dL 3.3           Results from last 7 days  Lab Units 11/09/17  0545   WBC 10*3/mm3 7.62   HEMOGLOBIN g/dL 13.0*   HEMATOCRIT % 38.9*   PLATELETS 10*3/mm3 258           Culture Data:   No results found for: BLOODCX  No results found for: URINECX  No results found for: RESPCX  No results found for: WOUNDCX  No results found for: STOOLCX  No components found for: BODYFLD    Radiology Data:   Imaging Results (last 24 hours)     ** No results found for the last 24 hours. **          I have reviewed the patient current medications.     Assessment/Plan     Hospital Problem List     * (Principal)Right-sided lacunar stroke    Former smoker    Left-sided weakness    Essential hypertension    Diabetes mellitus    Chronic anxiety    Chronic back pain greater than 3 months duration    Degenerative joint disease involving multiple joints    Atrial fibrillation, chronic    Encounter for rehabilitation    Obstructive sleep apnea syndrome          Plan:  Continue PT/OT, continue per primary team, discharge next week.                This document has been electronically signed by ALEXANDRA Riojas on November 11, 2017 10:15 AM

## 2017-11-12 LAB
GLUCOSE BLDC GLUCOMTR-MCNC: 107 MG/DL (ref 70–130)
GLUCOSE BLDC GLUCOMTR-MCNC: 123 MG/DL (ref 70–130)
GLUCOSE BLDC GLUCOMTR-MCNC: 68 MG/DL (ref 70–130)
GLUCOSE BLDC GLUCOMTR-MCNC: 70 MG/DL (ref 70–130)

## 2017-11-12 PROCEDURE — 25010000002 ENOXAPARIN PER 10 MG: Performed by: FAMILY MEDICINE

## 2017-11-12 PROCEDURE — 82962 GLUCOSE BLOOD TEST: CPT

## 2017-11-12 RX ADMIN — LISINOPRIL 10 MG: 10 TABLET ORAL at 08:16

## 2017-11-12 RX ADMIN — POTASSIUM CHLORIDE 10 MEQ: 750 CAPSULE, EXTENDED RELEASE ORAL at 08:16

## 2017-11-12 RX ADMIN — GLIPIZIDE 10 MG: 10 TABLET ORAL at 08:15

## 2017-11-12 RX ADMIN — TERAZOSIN HYDROCHLORIDE ANHYDROUS 5 MG: 5 CAPSULE ORAL at 20:31

## 2017-11-12 RX ADMIN — METOPROLOL SUCCINATE 50 MG: 50 TABLET, EXTENDED RELEASE ORAL at 20:31

## 2017-11-12 RX ADMIN — METFORMIN HYDROCHLORIDE 1000 MG: 500 TABLET ORAL at 08:15

## 2017-11-12 RX ADMIN — METFORMIN HYDROCHLORIDE 1000 MG: 500 TABLET ORAL at 17:17

## 2017-11-12 RX ADMIN — THERA TABS 1 TABLET: TAB at 08:16

## 2017-11-12 RX ADMIN — ACETAMINOPHEN 650 MG: 325 TABLET ORAL at 20:30

## 2017-11-12 RX ADMIN — HYDROCORTISONE: 1 CREAM TOPICAL at 08:16

## 2017-11-12 RX ADMIN — MAGNESIUM OXIDE TAB 400 MG (241.3 MG ELEMENTAL MG) 400 MG: 400 (241.3 MG) TAB at 08:16

## 2017-11-12 RX ADMIN — AMLODIPINE BESYLATE 10 MG: 10 TABLET ORAL at 20:31

## 2017-11-12 RX ADMIN — HYDROCORTISONE: 1 CREAM TOPICAL at 17:17

## 2017-11-12 RX ADMIN — ATORVASTATIN CALCIUM 10 MG: 10 TABLET, FILM COATED ORAL at 20:30

## 2017-11-12 RX ADMIN — ENOXAPARIN SODIUM 40 MG: 40 INJECTION SUBCUTANEOUS at 08:15

## 2017-11-12 RX ADMIN — FINASTERIDE 5 MG: 5 TABLET, FILM COATED ORAL at 08:16

## 2017-11-12 RX ADMIN — PANTOPRAZOLE SODIUM 40 MG: 40 TABLET, DELAYED RELEASE ORAL at 05:42

## 2017-11-12 RX ADMIN — FLUTICASONE PROPIONATE 2 SPRAY: 50 SPRAY, METERED NASAL at 08:16

## 2017-11-12 RX ADMIN — CLOPIDOGREL BISULFATE 75 MG: 75 TABLET ORAL at 08:16

## 2017-11-12 RX ADMIN — ASPIRIN 81 MG 81 MG: 81 TABLET ORAL at 08:15

## 2017-11-12 NOTE — PLAN OF CARE
Problem: Patient Care Overview (Adult)  Goal: Plan of Care Review  Outcome: Ongoing (interventions implemented as appropriate)  Goal: Adult Individualization and Mutuality  Outcome: Ongoing (interventions implemented as appropriate)  Goal: Discharge Needs Assessment  Outcome: Ongoing (interventions implemented as appropriate)    Problem: Fall Risk (Adult)  Goal: Absence of Falls  Outcome: Ongoing (interventions implemented as appropriate)    Problem: Stroke (Ischemic) (Adult)  Goal: Signs and Symptoms of Listed Potential Problems Will be Absent or Manageable (Stroke)  Outcome: Ongoing (interventions implemented as appropriate)    Problem: Diabetes, Type 2 (Adult)  Goal: Signs and Symptoms of Listed Potential Problems Will be Absent or Manageable (Diabetes, Type 2)  Outcome: Ongoing (interventions implemented as appropriate)    11/12/17 0434   Diabetes, Type 2   Problems Assessed (Type 2 Diabetes) all   Problems Present (Type 2 Diabetes) none

## 2017-11-12 NOTE — PROGRESS NOTES
[facility] Medicine Services  INPATIENT PROGRESS NOTE    Length of Stay: 13  Date of Admission: 10/30/2017  Primary Care Physician: Evan Marrero MD    Subjective   11/12/2017:  No new complaints.  Resting comfortably.  No nausea, vomiting, chest pain, dizziness, shortness of air.    Chief Complaint/HPI:  Patient admitted to acute rehab services secondary to left sided CVA, weakness, and ataxia.  Currently resting comfortably with no complaints.    Review of Systems   Constitutional: Negative for chills and fever.   Respiratory: Negative for shortness of breath.    Cardiovascular: Negative for chest pain.   Gastrointestinal: Negative for abdominal pain, nausea and vomiting.   Genitourinary: Negative for dysuria.   Neurological: Negative for syncope and headaches.   Psychiatric/Behavioral: Negative for confusion.       Objective    Temp:  [96.9 °F (36.1 °C)] 96.9 °F (36.1 °C)  Heart Rate:  [80-82] 80  Resp:  [18] 18  BP: (100-124)/(59-65) 116/65    Physical Exam   Constitutional: He is oriented to person, place, and time. He appears well-developed and well-nourished. No distress.   HENT:   Head: Normocephalic and atraumatic.   Cardiovascular: Normal rate and regular rhythm.    Pulmonary/Chest: Effort normal and breath sounds normal. No respiratory distress. He has no wheezes.   Abdominal: Soft. Bowel sounds are normal. He exhibits no distension. There is no tenderness.   Neurological: He is alert and oriented to person, place, and time.   Skin: Skin is warm and dry. He is not diaphoretic.   Psychiatric: He has a normal mood and affect. His behavior is normal.     Results Review:  I have reviewed the labs, radiology results, and diagnostic studies.    Laboratory Data:     Results from last 7 days  Lab Units 11/09/17  0651   SODIUM mmol/L 140   POTASSIUM mmol/L 4.2   CHLORIDE mmol/L 105   CO2 mmol/L 24.0   BUN mg/dL 19   CREATININE mg/dL 0.90   GLUCOSE mg/dL 135*    CALCIUM mg/dL 9.2   BILIRUBIN mg/dL 1.1   ALK PHOS U/L 46   ALT (SGPT) U/L 37   AST (SGOT) U/L 33   ANION GAP mmol/L 11.0     Estimated Creatinine Clearance: 72.1 mL/min (by C-G formula based on Cr of 0.9).            Results from last 7 days  Lab Units 11/09/17  0545   WBC 10*3/mm3 7.62   HEMOGLOBIN g/dL 13.0*   HEMATOCRIT % 38.9*   PLATELETS 10*3/mm3 258           Culture Data:   No results found for: BLOODCX  No results found for: URINECX  No results found for: RESPCX  No results found for: WOUNDCX  No results found for: STOOLCX  No components found for: BODYFLD    Radiology Data:   Imaging Results (last 24 hours)     ** No results found for the last 24 hours. **          I have reviewed the patient current medications.     Assessment/Plan     Hospital Problem List     * (Principal)Right-sided lacunar stroke    Former smoker    Left-sided weakness    Essential hypertension    Diabetes mellitus    Chronic anxiety    Chronic back pain greater than 3 months duration    Degenerative joint disease involving multiple joints    Atrial fibrillation, chronic    Encounter for rehabilitation    Obstructive sleep apnea syndrome          Plan:  Continue PT/OT, continue per primary team, this week.                This document has been electronically signed by ALEXANDRA Riojas on November 12, 2017 9:19 AM

## 2017-11-12 NOTE — PLAN OF CARE
Problem: Patient Care Overview (Adult)  Goal: Plan of Care Review  Outcome: Ongoing (interventions implemented as appropriate)    11/12/17 0434 11/12/17 1329   Coping/Psychosocial Response Interventions   Plan Of Care Reviewed With --  patient   Patient Care Overview   Progress progress toward functional goals as expected --    Outcome Evaluation   Outcome Summary/Follow up Plan Patient more independent with care. Anxious about going home soon. --        Goal: Adult Individualization and Mutuality  Outcome: Ongoing (interventions implemented as appropriate)  Goal: Discharge Needs Assessment  Outcome: Ongoing (interventions implemented as appropriate)    Problem: Fall Risk (Adult)  Goal: Absence of Falls  Outcome: Ongoing (interventions implemented as appropriate)    Problem: Stroke (Ischemic) (Adult)  Goal: Signs and Symptoms of Listed Potential Problems Will be Absent or Manageable (Stroke)  Outcome: Ongoing (interventions implemented as appropriate)    Problem: Diabetes, Type 2 (Adult)  Goal: Signs and Symptoms of Listed Potential Problems Will be Absent or Manageable (Diabetes, Type 2)  Outcome: Ongoing (interventions implemented as appropriate)

## 2017-11-13 LAB
GLUCOSE BLDC GLUCOMTR-MCNC: 120 MG/DL (ref 70–130)
GLUCOSE BLDC GLUCOMTR-MCNC: 140 MG/DL (ref 70–130)
GLUCOSE BLDC GLUCOMTR-MCNC: 91 MG/DL (ref 70–130)
GLUCOSE BLDC GLUCOMTR-MCNC: 96 MG/DL (ref 70–130)

## 2017-11-13 PROCEDURE — 97535 SELF CARE MNGMENT TRAINING: CPT

## 2017-11-13 PROCEDURE — G8987 SELF CARE CURRENT STATUS: HCPCS

## 2017-11-13 PROCEDURE — 97116 GAIT TRAINING THERAPY: CPT | Performed by: PHYSICAL THERAPIST

## 2017-11-13 PROCEDURE — 97116 GAIT TRAINING THERAPY: CPT

## 2017-11-13 PROCEDURE — 97110 THERAPEUTIC EXERCISES: CPT

## 2017-11-13 PROCEDURE — 82962 GLUCOSE BLOOD TEST: CPT

## 2017-11-13 PROCEDURE — 25010000002 ENOXAPARIN PER 10 MG: Performed by: FAMILY MEDICINE

## 2017-11-13 PROCEDURE — 99232 SBSQ HOSP IP/OBS MODERATE 35: CPT | Performed by: FAMILY MEDICINE

## 2017-11-13 PROCEDURE — G8988 SELF CARE GOAL STATUS: HCPCS

## 2017-11-13 PROCEDURE — 97530 THERAPEUTIC ACTIVITIES: CPT

## 2017-11-13 RX ADMIN — METOPROLOL SUCCINATE 50 MG: 50 TABLET, EXTENDED RELEASE ORAL at 20:02

## 2017-11-13 RX ADMIN — ENOXAPARIN SODIUM 40 MG: 40 INJECTION SUBCUTANEOUS at 08:11

## 2017-11-13 RX ADMIN — ATORVASTATIN CALCIUM 10 MG: 10 TABLET, FILM COATED ORAL at 20:01

## 2017-11-13 RX ADMIN — POTASSIUM CHLORIDE 10 MEQ: 750 CAPSULE, EXTENDED RELEASE ORAL at 08:11

## 2017-11-13 RX ADMIN — FLUTICASONE PROPIONATE 2 SPRAY: 50 SPRAY, METERED NASAL at 09:01

## 2017-11-13 RX ADMIN — FINASTERIDE 5 MG: 5 TABLET, FILM COATED ORAL at 08:11

## 2017-11-13 RX ADMIN — LISINOPRIL 10 MG: 10 TABLET ORAL at 08:11

## 2017-11-13 RX ADMIN — MAGNESIUM OXIDE TAB 400 MG (241.3 MG ELEMENTAL MG) 400 MG: 400 (241.3 MG) TAB at 08:11

## 2017-11-13 RX ADMIN — AMLODIPINE BESYLATE 10 MG: 10 TABLET ORAL at 20:01

## 2017-11-13 RX ADMIN — CLOPIDOGREL BISULFATE 75 MG: 75 TABLET ORAL at 08:11

## 2017-11-13 RX ADMIN — TERAZOSIN HYDROCHLORIDE ANHYDROUS 5 MG: 5 CAPSULE ORAL at 20:00

## 2017-11-13 RX ADMIN — HYDROCORTISONE: 1 CREAM TOPICAL at 09:03

## 2017-11-13 RX ADMIN — ASPIRIN 81 MG 81 MG: 81 TABLET ORAL at 08:11

## 2017-11-13 RX ADMIN — PANTOPRAZOLE SODIUM 40 MG: 40 TABLET, DELAYED RELEASE ORAL at 06:00

## 2017-11-13 RX ADMIN — GLIPIZIDE 10 MG: 10 TABLET ORAL at 08:11

## 2017-11-13 RX ADMIN — THERA TABS 1 TABLET: TAB at 08:11

## 2017-11-13 RX ADMIN — METFORMIN HYDROCHLORIDE 1000 MG: 500 TABLET ORAL at 08:11

## 2017-11-13 NOTE — PLAN OF CARE
Problem: Patient Care Overview (Adult)  Goal: Plan of Care Review  Outcome: Ongoing (interventions implemented as appropriate)    11/13/17 0738   Coping/Psychosocial Response Interventions   Plan Of Care Reviewed With patient   Patient Care Overview   Progress progress toward functional goals as expected   Outcome Evaluation   Outcome Summary/Follow up Plan OT treatment/recert completed this date. Pt mostly SBA with sit to stand t/f and reaching for items SBA to CGA for high and low places. Pt highly educated and given handout over theraputty and BUE ROM act and given instruction on weights to use and reps to complete. Started education with safety at home with IADL. Pt could cont to benefit from skilled OT to reach maximum level of potential. Recommend d/c home with assist.          Problem: Inpatient Occupational Therapy  Goal: Transfer Training Goal 1 LTG- OT  Outcome: Ongoing (interventions implemented as appropriate)    10/31/17 0925 11/06/17 0724 11/13/17 1527   Transfer Training OT LTG   Transfer Training OT LTG, Date Established 10/31/17 --  --    Transfer Training OT LTG, Time to Achieve by discharge --  --    Transfer Training OT LTG, Activity Type all transfers --  --    Transfer Training OT LTG, [place] Level contact guard assist --  --    Transfer Training OT LTG, Assist Device --  walker, rolling platform --    Transfer Training OT LTG, Date Goal Reviewed --  --  11/13/17   Transfer Training OT LTG, Outcome --  --  goal partially met       Goal: Patient Education Goal LTG- OT  Outcome: Ongoing (interventions implemented as appropriate)    10/31/17 0925 11/13/17 0738   Patient Education OT LTG   Patient Education OT LTG, Date Established 10/31/17 --    Patient Education OT LTG, Time to Achieve by discharge --    Patient Education OT LTG, Education Type HEP;posture/body mechanics;1 hand/anni technique;home safety;adaptive equipment mgmt;energy conservation --    Patient Education OT LTG, Education  Understanding independent;demonstrates adequately;verbalizes understanding  (with caregiver assist as necessary) --    Patient Education OT LTG, Date Goal Reviewed --  11/13/17   Patient Education OT LTG Outcome --  goal partially met       Goal: Safety Awareness Goal LTG- OT  Outcome: Ongoing (interventions implemented as appropriate)    10/31/17 0925 11/13/17 1527   Safety Awareness OT LTG   Safety Awareness OT LTG, Date Established 10/31/17 --    Safety Awareness OT LTG, Time to Achieve by discharge --    Safety Awareness OT LTG, Activity Type good safety awareness;with kitchen mobility;with ADL's --    Safety Awareness OT LTG, [place] Level min verbal cues --    Safety Awareness OT LTG, Date Goal Reviewed --  11/13/17   Safety Awareness OT LTG, Outcome --  goal ongoing       Goal: ADL Goal LTG- OT  Outcome: Ongoing (interventions implemented as appropriate)    10/31/17 0925 11/13/17 0738   ADL OT LTG   ADL OT LTG, Date Established 10/31/17 --    ADL OT LTG, Time to Achieve by discharge --    ADL OT LTG, Activity Type ADL skills --    ADL OT LTG, Additional Goal Set-up A with self-feeding and grooming; VC for UB bathing and UB dressing; CGA with LB bathing and LB dressing --    ADL OT LTG, Date Goal Reviewed --  11/13/17   ADL OT LTG, Outcome --  goal partially met

## 2017-11-13 NOTE — THERAPY TREATMENT NOTE
Inpatient Rehabilitation - Physical Therapy Treatment Note  [facility]     Patient Name: Kenneth Harper Jr.  : 1934  MRN: [id number]  Today's Date: 2017  Onset of Illness/Injury or Date of Surgery Date: 10/30/17  Date of Referral to PT: 10/30/17  Referring Physician: Dr. Mckinnon    Admit Date: 10/30/2017    Visit Dx:    ICD-10-CM ICD-9-CM   1. Right-sided lacunar stroke I63.9 434.91   2. Symbolic dysfunction R48.9 784.60   3. Impaired mobility and ADLs Z74.09 799.89   4. Abnormality of gait and mobility R26.9 781.2   5. Muscle weakness (generalized) M62.81 728.87   6. Left-sided weakness R53.1 728.87   7. Chronic back pain greater than 3 months duration M54.9 724.5    G89.29 338.29     Patient Active Problem List   Diagnosis   • Spinal stenosis, lumbar region, with neurogenic claudication   • Former smoker   • Overweight   • Left-sided weakness   • Right-sided lacunar stroke   • Essential hypertension   • Diabetes mellitus   • Chronic anxiety   • Chronic back pain greater than 3 months duration   • Prostatic hypertrophy   • Degenerative joint disease involving multiple joints   • Atrial fibrillation, chronic   • Encounter for rehabilitation   • Obstructive sleep apnea syndrome               Adult Rehabilitation Note       17 1429 17 1030 17 0738    Rehab Assessment/Intervention    Discipline physical therapy assistant  -RW physical therapist  -LM occupational therapist  -    Document Type therapy note (daily note)  -RW progress note  -LM     Subjective Information agree to therapy  -RW agree to therapy;no complaints  -LM     Patient Effort, Rehab Treatment good  -RW      Precautions/Limitations fall precautions  -RW      Recorded by [RW] Pipe Gruber, PTA [] Landy Mckeon, PT [] Karoline Huang, OTR/L    Vital Signs    Pre Systolic BP Rehab  121  -LM     Pre Treatment Diastolic BP  57  -LM     Pretreatment Heart Rate (beats/min)  59  -LM     Pre SpO2 (%)  95  -LM      O2 Delivery Pre Treatment  room air  -LM     Pre Patient Position  Sitting  -LM     Recorded by  [LM] Landy Mckeon, PT     Pain Assessment    Pain Assessment No/denies pain  -RW No/denies pain  -LM     Recorded by [RW] Pipe Gruber PTA [LM] Landy Mckeon, PT     Cognitive Assessment/Intervention    Current Cognitive/Communication Assessment functional  -RW      Orientation Status oriented x 4  -RW      Follows Commands/Answers Questions 100% of the time  -RW      Personal Safety Interventions gait belt;nonskid shoes/slippers when out of bed  -RW      Recorded by [RW] Pipe Gruber PTA      Cognitive Assessment Intervention    Behavior/Mood Observations behavior appropriate to situation, WNL/WFL  -RW      Recorded by [RW] Pipe Gruber PTA      Bed Mobility, Assessment/Treatment    Bed Mob, Supine to Sit, [place] independent  -RW      Recorded by [RW] Pipe Gruber PTA      Transfer Assessment/Treatment    Transfers, Bed-Chair [place] stand by assist  -RW      Transfers, Chair-Bed [place] stand by assist  -RW      Transfers, Sit-Stand [place] stand by assist  -RW      Transfers, Stand-Sit [place] stand by assist  -RW      Transfers, Sit-Stand-Sit, Assist Device rolling walker  -RW      Recorded by [RW] Pipe Gruber PTA      Gait Assessment/Treatment    Gait, [place] Level stand by assist  -RW      Gait, Assistive Device rolling walker  -RW      Gait, Distance (Feet) 150  -RW      Gait, Impairments strength decreased;impaired balance  -RW      Recorded by [RW] Pipe Gruber PTA      Stairs Assessment/Treatment    Stairs, Handrail Location --  -RW      Stairs, [place] Level --  -RW      Recorded by [RW] Pipe Gruber PTA      Wheelchair Training/Management    Wheelchair, Distance Propelled 150 mod ind  -RW      Recorded by [RW] Pipe Gruber PTA      Lower Body Dressing Assessment/Training    LB Dressing Assess/Train, Clothing Type donning:;shoes  -RW      Recorded  by [RW] Pipe Gruber PTA      Therapy Exercises    Bilateral Lower Extremities AROM:;20 reps;sitting;hip flexion;LAQ;standing;heel raises;hip abduction/adduction   pro 2 level 3 x 5 min  -RW      Recorded by [RW] Pipe Gruber PTA      Positioning and Restraints    Pre-Treatment Position in bed  -RW      Post Treatment Position chair  -RW      In Chair call light within reach  -RW      Recorded by [RW] Pipe Gruber PTA        User Key  (r) = Recorded By, (t) = Taken By, (c) = Cosigned By    Initials Name Effective Dates     Landy Mckeon, PT 06/15/16 -      Karoline Huang, OTR/L 10/17/16 -     RW iPpe Gruber, PTA 10/17/16 -                 IP PT Goals       11/13/17 1525 11/13/17 1030 11/10/17 1355    Gait Training PT LTG    Gait Training Goal PT LTG, Date Goal Reviewed  11/13/17  -LM 11/10/17  -JA    Gait Training Goal PT LTG, Outcome  goal met  -LM goal ongoing  -JA    Stair Training PT LTG    Stair Training Goal PT LTG, Date Goal Reviewed 11/13/17  -RW 11/13/17  -LM 11/10/17  -JA    Stair Training Goal PT LTG, Outcome goal ongoing  -RW goal ongoing  -LM goal ongoing  -JA      11/09/17 1535 11/08/17 1119 11/07/17 1357    Transfer Training PT LTG    Transfer Training PT  LTG, Date Goal Reviewed   11/07/17  -RW    Transfer Training PT LTG, Outcome   goal met  -RW    Gait Training PT LTG    Gait Training Goal PT LTG, Date Goal Reviewed 11/09/17  -RW 11/08/17  -RW 11/07/17  -RW    Gait Training Goal PT LTG, Outcome goal ongoing  -RW goal ongoing  -RW     Stair Training PT STG    Stair Training Goal PT STG, Date Goal Reviewed 11/09/17  -RW 11/08/17  -RW 11/07/17  -RW    Stair Training Goal PT STG, Outcome goal met  -RW goal ongoing  -RW goal partially met   needed 2 hr  -RW    Stair Training PT LTG    Stair Training Goal PT LTG, Date Goal Reviewed 11/09/17  -RW 11/08/17  -RW 11/07/17  -RW    Stair Training Goal PT LTG, Outcome goal ongoing  -RW goal ongoing  -RW goal partially met   needed cga  -RW       11/06/17 1415 11/04/17 1440 11/03/17 1540    Transfer Training PT LTG    Transfer Training PT  LTG, Date Goal Reviewed 11/06/17  -RW 11/04/17  -JW 11/03/17  -RW    Transfer Training PT LTG, Outcome goal ongoing  -RW goal ongoing  -JW goal ongoing  -RW    Gait Training PT LTG    Gait Training Goal PT LTG, Date Goal Reviewed 11/06/17  -RW 11/04/17  -JW 11/03/17  -RW    Gait Training Goal PT LTG, Outcome goal ongoing  -RW goal ongoing  -JW goal ongoing  -RW    Stair Training PT STG    Stair Training Goal PT STG, Date Goal Reviewed 11/06/17  -RW 11/04/17  -JW 11/03/17  -RW    Stair Training Goal PT STG, Outcome goal ongoing  -RW goal ongoing  -JW goal ongoing  -RW    Stair Training PT LTG    Stair Training Goal PT LTG, Date Established  11/04/17  -JW     Stair Training Goal PT LTG, Date Goal Reviewed 11/06/17  -RW 11/04/17  -JW 11/03/17  -RW    Stair Training Goal PT LTG, Outcome goal ongoing  -RW goal ongoing  -JW goal ongoing  -RW      11/02/17 1631 11/01/17 1543 10/31/17 0825    Bed Mobility PT LTG    Bed Mobility PT LTG, Date Established   10/31/17  -LM    Bed Mobility PT LTG, Time to Achieve   by discharge  -LM    Bed Mobility PT LTG, Activity Type   supine to sit/sit to supine  -LM    Bed Mobility PT LTG, [place] Level   supervision required  -LM    Bed Mobility PT LTG, Additional Goal   HOB flat; No BR's  -LM    Bed Mobility PT LTG, Date Goal Reviewed 11/02/17  -RW 11/01/17  -RW     Bed Mobility PT LTG, Outcome goal met  -RW goal ongoing  -RW goal ongoing  -LM    Transfer Training PT LTG    Transfer Training PT LTG, Date Established   10/31/17  -LM    Transfer Training PT LTG, Time to Achieve   by discharge  -LM    Transfer Training PT LTG, Activity Type   bed to chair /chair to bed  -LM    Transfer Training PT LTG, [place] Level   supervision required  -LM    Transfer Training PT LTG, Assist Device   --   AAD  -LM    Transfer Training PT  LTG, Date Goal Reviewed 11/02/17  -RW 11/01/17  -RW     Transfer  Training PT LTG, Outcome goal ongoing  -RW goal ongoing  -RW goal ongoing  -LM    Gait Training PT LTG    Gait Training Goal PT LTG, Date Established   10/31/17  -LM    Gait Training Goal PT LTG, Time to Achieve   by discharge  -LM    Gait Training Goal PT LTG, [place] Level   supervision required  -LM    Gait Training Goal PT LTG, Assist Device   --   AAD  -LM    Gait Training Goal PT LTG, Distance to Achieve   150 feet  -LM    Gait Training Goal PT LTG, Date Goal Reviewed 11/02/17  -RW 11/01/17  -RW     Gait Training Goal PT LTG, Outcome goal ongoing  -RW goal ongoing  -RW goal ongoing  -LM    Stair Training PT STG    Stair Training Goal PT STG, Date Established   10/31/17  -LM    Stair Training Goal PT STG, Time to Achieve   4 days  -LM    Stair Training Goal PT STG, Number of Steps   4 steps  -LM    Stair Training Goal PT STG, [place] Level   contact guard assist  -LM    Stair Training Goal PT STG, Assist Device   1 handrail  -LM    Stair Training Goal PT STG, Date Goal Reviewed 11/02/17  -RW 11/01/17  -RW     Stair Training Goal PT STG, Outcome goal ongoing  -RW goal ongoing  -RW goal ongoing  -LM    Stair Training PT LTG    Stair Training Goal PT LTG, Date Established   10/31/17  -LM    Stair Training Goal PT LTG, Time to Achieve   by discharge  -LM    Stair Training Goal PT LTG, Number of Steps   1 flight  -LM    Stair Training Goal PT LTG, [place] Level   supervision required  -LM    Stair Training Goal PT LTG, Assist Device   2 handrails  -LM    Stair Training Goal PT LTG, Date Goal Reviewed 11/02/17  -RW 11/01/17  -RW     Stair Training Goal PT LTG, Outcome goal ongoing  -RW goal ongoing  -RW goal ongoing  -LM      User Key  (r) = Recorded By, (t) = Taken By, (c) = Cosigned By    Initials Name Provider Type    JENA Haynes, PTA Physical Therapy Assistant    MOSHE Mckeon, PT Physical Therapist    RADHA Flores, PTA Physical Therapy Assistant    KATHIE Gruber, PTA  Physical Therapy Assistant          Physical Therapy Education     Title: PT OT SLP Therapies (Active)     Topic: Physical Therapy (Done)     Point: Mobility training (Done)    Learning Progress Summary    Learner Readiness Method Response Comment Documented by Status   Patient Acceptance E VU again reviewed correct gt and transfer safey.  11/03/17 0910 Done    Acceptance E VU reviewed safe transfers and risks of falls RW 11/01/17 1052 Done    Acceptance E NR Reviewed safety with transfers and ambulation.  10/31/17 1506 Active               Point: Home exercise program (Done)    Learning Progress Summary    Learner Readiness Method Response Comment Documented by Status   Patient Acceptance E,H VU HEP provided with picture inst.  11/13/17 1525 Done               Point: Precautions (Done)    Learning Progress Summary    Learner Readiness Method Response Comment Documented by Status   Patient Acceptance E VU again reviewed correct gt and transfer safey.  11/03/17 0910 Done    Acceptance E VU reviewed safe transfers and risks of falls  11/01/17 1052 Done    Acceptance E NR Reviewed safety with transfers and ambulation.  10/31/17 1506 Active                      User Key     Initials Effective Dates Name Provider Type Discipline     06/15/16 -  Landy Mckeon, PT Physical Therapist PT     10/17/16 -  Pipe Gruber PTA Physical Therapy Assistant PT                    PT Recommendation and Plan  Anticipated Equipment Needs At Discharge: front wheeled walker  Anticipated Discharge Disposition: home with 24/7 care, home with home health  Planned Therapy Interventions: balance training, bed mobility training, gait training, home exercise program, motor coordination training, neuromuscular re-education, patient/family education, postural re-education, ROM (Range of Motion), stair training, strengthening, stretching, transfer training, wheelchair management/propulsion training  PT Frequency: other (see comments)  (5-14 times/wk)  Plan of Care Review  Plan Of Care Reviewed With: patient  Outcome Summary/Follow up Plan: pt with sba for all gt and transfers. HEP provided and ready for dc home tomorrow, outpt to follow          Outcome Measures       11/13/17 1030 11/13/17 0738       How much help from another person do you currently need...    Turning from your back to your side while in flat bed without using bedrails? 4  -LM      Moving from lying on back to sitting on the side of a flat bed without bedrails? 3  -LM      Moving to and from a bed to a chair (including a wheelchair)? 3  -LM      Standing up from a chair using your arms (e.g., wheelchair, bedside chair)? 3  -LM      Climbing 3-5 steps with a railing? 3  -LM      To walk in hospital room? 3  -LM      AM-PAC 6 Clicks Score 19  -LM      How much help from another is currently needed...    Putting on and taking off regular lower body clothing?  3  -BH     Bathing (including washing, rinsing, and drying)  4  -BH     Toileting (which includes using toilet bed pan or urinal)  3  -BH     Putting on and taking off regular upper body clothing  4  -BH     Taking care of personal grooming (such as brushing teeth)  4  -BH     Eating meals  4  -BH     Score  22  -     Functional Assessment    Outcome Measure Options AM-PAC 6 Clicks Basic Mobility (PT)  -LM        User Key  (r) = Recorded By, (t) = Taken By, (c) = Cosigned By    Initials Name Provider Type    LM Landy Mckeon, PT Physical Therapist     Karoline Huang, OTR/L Occupational Therapist           Time Calculation:         PT Charges       11/13/17 1527 11/13/17 1030       Time Calculation    Start Time 1429  -RW 1030  -LM     Stop Time 1515  -RW 1119  -LM     Time Calculation (min) 46 min  -RW 49 min  -LM     PT Received On  11/13/17  -LM     PT Goal Re-Cert Due Date  11/26/17  -LM     Time Calculation- PT    Total Timed Code Minutes- PT 46 minute(s)  -RW 49 minute(s)  -LM       User Key  (r) = Recorded By, (t) =  Taken By, (c) = Cosigned By    Initials Name Provider Type    LM Landy Mckeon, PT Physical Therapist    RW Pipe Gruber PTA Physical Therapy Assistant          Therapy Charges for Today     Code Description Service Date Service Provider Modifiers Qty    12391410918 HC GAIT TRAINING EA 15 MIN 11/13/2017 Pipe Gruber, PTA GP 1    99251586636 HC PT SELF CARE/MGMT/TRAIN EA 15 MIN 11/13/2017 Pipe Gruber PTA GP 1    40946474106 HC PT THER PROC EA 15 MIN 11/13/2017 Pipe Gruber PTA GP 1          PT G-Codes  PT Professional Judgement Used?: Yes  Outcome Measure Options: AM-PAC 6 Clicks Basic Mobility (PT)  Score: 15  Functional Limitation: Mobility: Walking and moving around  Mobility: Walking and Moving Around Current Status (): At least 40 percent but less than 60 percent impaired, limited or restricted  Mobility: Walking and Moving Around Goal Status (): At least 1 percent but less than 20 percent impaired, limited or restricted    Pipe Gruber PTA  11/13/2017

## 2017-11-13 NOTE — PLAN OF CARE
Problem: Patient Care Overview (Adult)  Goal: Plan of Care Review  Outcome: Ongoing (interventions implemented as appropriate)    11/13/17 1217   Coping/Psychosocial Response Interventions   Plan Of Care Reviewed With patient   Patient Care Overview   Progress improving   Outcome Evaluation   Outcome Summary/Follow up Plan pt is doing great and ready to go home tomorrow         Problem: Fall Risk (Adult)  Goal: Absence of Falls  Outcome: Ongoing (interventions implemented as appropriate)    Problem: Stroke (Ischemic) (Adult)  Goal: Signs and Symptoms of Listed Potential Problems Will be Absent or Manageable (Stroke)  Outcome: Ongoing (interventions implemented as appropriate)    Problem: Diabetes, Type 2 (Adult)  Goal: Signs and Symptoms of Listed Potential Problems Will be Absent or Manageable (Diabetes, Type 2)  Outcome: Outcome(s) achieved Date Met:  11/13/17

## 2017-11-13 NOTE — PLAN OF CARE
Problem: Patient Care Overview (Adult)  Goal: Plan of Care Review  Outcome: Ongoing (interventions implemented as appropriate)  Goal: Adult Individualization and Mutuality  Outcome: Ongoing (interventions implemented as appropriate)  Goal: Discharge Needs Assessment  Outcome: Ongoing (interventions implemented as appropriate)    Problem: Fall Risk (Adult)  Goal: Absence of Falls  Outcome: Ongoing (interventions implemented as appropriate)    Problem: Stroke (Ischemic) (Adult)  Goal: Signs and Symptoms of Listed Potential Problems Will be Absent or Manageable (Stroke)  Outcome: Ongoing (interventions implemented as appropriate)    Problem: Diabetes, Type 2 (Adult)  Goal: Signs and Symptoms of Listed Potential Problems Will be Absent or Manageable (Diabetes, Type 2)  Outcome: Ongoing (interventions implemented as appropriate)    11/13/17 0400   Diabetes, Type 2   Problems Assessed (Type 2 Diabetes) all   Problems Present (Type 2 Diabetes) none

## 2017-11-13 NOTE — PROGRESS NOTES
LOS: 14 days   Patient Care Team:  Evan Marrero MD as PCP - General (Family Medicine)    Chief Complaint:   Right-sided lacunar stroke          Interval History:     SUBJECTIVE:  No complaints today anxious to go home tomorrow  History taken from: patient chart In therapy staff    Objective     Vital Signs  Temp:  [96.6 °F (35.9 °C)-97.5 °F (36.4 °C)] 96.6 °F (35.9 °C)  Heart Rate:  [64-88] 67  Resp:  [18] 18  BP: (101-127)/(58-68) 101/58  Last 3 weights    11/10/17  0410 11/11/17  0500 11/12/17  0500   Weight: 205 lb 8 oz (93.2 kg) 205 lb 12.8 oz (93.4 kg) 202 lb 4.8 oz (91.8 kg)         Physical Exam:     General Appearance:    Alert, cooperative, in no acute distress   Head:    Normocephalic, without obvious abnormality, atraumatic   Eyes:            Lids and lashes normal, conjunctivae and sclerae normal, no   icterus, no pallor, corneas clear, PERRLA   Throat:   No oral lesions, no thrush, oral mucosa moist   Neck:   No adenopathy, supple, trachea midline, no thyromegaly, no   carotid bruit, no JVD       Lungs:     Clear to auscultation,respirations regular, even and                  Unlabored     Heart:    Regular rhythm and normal rate, normal S1 and S2, no            murmur, no gallop, no rub, no click    Chest Wall:    No abnormalities observed   Abdomen:     Normal bowel sounds, no masses, no organomegaly, soft        non-tender, non-distended, no guarding, no rebound                Tenderness    Extremities:   Moves all extremities well, no edema, no cyanosis, no             Redness Strength range of motion ataxia all improved        Skin:   No bleeding, bruising or rash   Lymph nodes:   No palpable adenopathy   Neurologic:   Cranial nerves 2 - 12 grossly intact, sensation intact, DTR       present and equal bilaterally Strength range of motion ataxia improved       RESULTS REVIEW:     Lab Results (last 24 hours)     Procedure Component Value Units Date/Time    POC Glucose Fingerstick  [[id number]]  (Abnormal) Collected:  11/12/17 1052    Specimen:  Blood Updated:  11/12/17 1105     Glucose 68 (L) mg/dL       Meter: [id number]: [id number] KING ARNULFO       POC Glucose Fingerstick [[id number]]  (Normal) Collected:  11/12/17 1559    Specimen:  Blood Updated:  11/12/17 1621     Glucose 70 mg/dL       Meter: [id number]: [id number] SILVIA ARNULFO       POC Glucose Fingerstick [[id number]]  (Normal) Collected:  11/12/17 2038    Specimen:  Blood Updated:  11/13/17 0437     Glucose 96 mg/dL       Meter: [id number]: [id number] KEYLA SUNITA ROBERT       POC Glucose Fingerstick [[id number]]  (Normal) Collected:  11/13/17 0443    Specimen:  Blood Updated:  11/13/17 0501     Glucose 120 mg/dL       Meter: [id number]: [id number] KEYLA SUNITA ROBERT           Imaging Results (last 72 hours)     ** No results found for the last 72 hours. **          Assessment/Plan     Principal Problem:    Right-sided lacunar stroke  Active Problems:    Left-sided weakness    Atrial fibrillation, chronic    Essential hypertension    Diabetes mellitus    Chronic anxiety    Chronic back pain greater than 3 months duration    Degenerative joint disease involving multiple joints    Encounter for rehabilitation    Obstructive sleep apnea syndrome    Former smoker        He has done very well with therapy.  He will be going home tomorrow with family.  He has a rolling walker and a quad cane at home  He would like outpatient therapy in [place]  He would like to go to Dr. Dumont's office for physical therapy.  Reviewed with the therapy staff and outpatient PT would be acceptable and they can probably work with his arm as well .  Apparently outpatient occupational therapy is not available and is felt that he would do well with PT alone.  He was discharged from speech therapy on Friday, 11/10/17        Vishnu Mckinnon MD  11/13/17  8:43 AM

## 2017-11-13 NOTE — DISCHARGE PLACEMENT REQUEST
"Faith Harper Jr. (82 y.o. Male)     Date of Birth Social Security Number Address Home Phone MRN    1934  [address] [facility] [address] [phone] [id number]    Jainism Marital Status          None        Admission Date Admission Type Admitting Provider Attending Provider Department, Room/Bed    10/30/17 Elective Vishnu Mckinnon MD Hargrove, Kenneth R, MD [facility] ACUTE REHAB, 529/1    Discharge Date Discharge Disposition Discharge Destination                      Attending Provider: Vishnu Mckinnon MD     Allergies:  Levaquin [Levofloxacin]    Isolation:  None   Infection:  None   Code Status:  FULL    Ht:  70\" (177.8 cm)   Wt:  202 lb 4.8 oz (91.8 kg)    Admission Cmt:  None   Principal Problem:  Right-sided lacunar stroke [I63.9]                 Active Insurance as of 10/30/2017     Primary Coverage     Payor Plan Insurance Group Employer/Plan Group    HUMANA MEDICARE REPLACEMENT HUMANA MEDICARE REPL [id number]     Payor Plan Address Payor Plan Phone Number Effective From Effective To    [address] [address] [phone] 1/1/2015     [place] [address]       Subscriber Name Subscriber Birth Date Member ID       FAITH HARPER JR. 1934 [id number]                 Emergency Contacts      (Rel.) Home Phone Work Phone Mobile Phone    Maxine Harper (Spouse) -- -- [phone]    Nika Chow (Daughter) [phone] -- [phone]            Emergency Contact Information     Name Relation Home Work Mobile    Maxine Harper Spouse   [phone]    Nika Chow Daughter [phone]          Insurance Information                HUMANA MEDICARE REPLACEMENT/HUMANA MEDICARE REPL Phone: [phone]    Subscriber: Faith Harper Jr. Subscriber#: [id number]    Group#: [id number] Precert#:              History & Physical      Vishnu Mckinnon MD at 10/30/2017  3:29 PM           [facility]  [address] [place]. [address]  T - [phone]     H/P NOTE         SUBJECTIVE:   Patient Care Team:  Evan Marrero MD as PCP - General (Family Medicine)    Chief Complaint:   Acute stroke with left-sided weakness,ataxia.  In addition he is having some confusion    Subjective     Patient is 82 y.o. male presents with Recurrent falls and left-sided weakness.  Over the 1-2 weeks prior to his admission he had falls and left-sided weakness but he did not tell his family.  Eventually he was taken to the hospital for evaluation.  Workup consistently showed left-sided weakness.  Dr. Cordova of neurology saw him in consultation and thought that he had a lacunar infarct as the cause of his left-sided weakness.  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  Since his admission he has had some incontinence of urine because he can't hold his urine and he is having redness in the groin area.      ROS/HISTORY/ CURRENT MEDICATIONS/OBJECTIVE/VS/PE:       History:     Past Medical History:   Diagnosis Date   • Arthritis    • Chronic anxiety    • Chronic back pain greater than 3 months duration    • Degenerative joint disease involving multiple joints    • Diabetes    • Diabetes mellitus    • Essential hypertension    • Gout    • Hypertension    • Left-sided weakness 10/2017   • Prostatic hypertrophy    • Right-sided lacunar stroke 10/2017     Past Surgical History:   Procedure Laterality Date   • CATARACT EXTRACTION     • KNEE SURGERY     • TOTAL KNEE ARTHROPLASTY     • UMBILICAL HERNIA REPAIR       No family history on file.  Social History   Substance Use Topics   • Smoking status: Former Smoker   • Smokeless tobacco: Never Used      Comment: 30 pack year history   • Alcohol use No     Prescriptions Prior to Admission   Medication Sig Dispense Refill Last Dose   • Acetaminophen 500 MG coapsule       • Cholecalciferol (VITAMIN D PO)       •  Homeopathic Products (ALLERGY MEDICINE PO)       • HYDROcodone-acetaminophen (NORCO) 7.5-325 MG per tablet       • Multiple Vitamin (MULTI VITAMIN DAILY PO)         Allergies:  Levaquin [levofloxacin]    Current Medications:     Current Facility-Administered Medications:   •  acetaminophen (TYLENOL) tablet 650 mg, 650 mg, Oral, Q4H PRN, Vishnu Mckinnon MD  •  ALPRAZolam (XANAX) tablet 0.5 mg, 0.5 mg, Oral, Nightly PRN, Vishnu Mckinnon MD  •  amLODIPine (NORVASC) tablet 10 mg, 10 mg, Oral, Nightly, Vishnu Mckinnon MD  •  [START ON 10/31/2017] aspirin chewable tablet 81 mg, 81 mg, Oral, Daily, Vishnu Mckinnon MD  •  atorvastatin (LIPITOR) tablet 10 mg, 10 mg, Oral, Nightly, Vishnu Mckinnon MD  •  calcium carbonate (TUMS) chewable tablet 500 mg (200 mg elemental), 2 tablet, Oral, BID PRN, Vishnu Mckinnon MD  •  [START ON 10/31/2017] clopidogrel (PLAVIX) tablet 75 mg, 75 mg, Oral, Daily, Vishnu Mckinnon MD  •  enoxaparin (LOVENOX) syringe 40 mg, 40 mg, Subcutaneous, Daily, Vishnu Mckinnon MD  •  [START ON 10/31/2017] glipiZIDE (GLUCOTROL) tablet 10 mg, 10 mg, Oral, QAM AC, Vishnu Mckinnon MD  •  HYDROcodone-acetaminophen (NORCO) 7.5-325 MG per tablet 1 tablet, 1 tablet, Oral, Q8H PRN, Vishnu Mckinnon MD  •  [START ON 10/31/2017] lisinopril (PRINIVIL,ZESTRIL) tablet 10 mg, 10 mg, Oral, Daily With Breakfast, Vishnu Mckinnon MD  •  [START ON 10/31/2017] magnesium oxide (MAGOX) tablet 400 mg, 400 mg, Oral, Daily, Vishnu Mckinnon MD  •  metFORMIN (GLUCOPHAGE) tablet 1,000 mg, 1,000 mg, Oral, BID With Meals, Vishnu Mckinnon MD  •  metoprolol succinate XL (TOPROL-XL) 24 hr tablet 50 mg, 50 mg, Oral, Nightly, Vishnu Mckinnon MD  •  [START ON 10/31/2017] multivitamin with beta carotene tablet 1 tablet, 1 tablet, Oral, Daily, Vishnu Mckinnon MD  •  [START ON 10/31/2017] pantoprazole (PROTONIX) EC tablet 40 mg, 40 mg, Oral, Q AM, Vishnu Mckinnon MD  •  terazosin  (HYTRIN) capsule 5 mg, 5 mg, Oral, Nightly, Vishnu Mckinnon MD      REVIEW OF SYSTEMS:  Review of Systems   HENT: Positive for congestion and voice change. Negative for facial swelling, hearing loss, mouth sores, nosebleeds, postnasal drip and trouble swallowing.    Eyes: Negative.    Respiratory: Negative.    Cardiovascular: Negative.    Gastrointestinal: Negative.         He has loose bowels on metformin   Endocrine: Negative.    Genitourinary: Positive for frequency and urgency.   Musculoskeletal: Positive for arthralgias, back pain, gait problem and joint swelling.   Skin: Negative.    Allergic/Immunologic: Negative.    Neurological: Positive for speech difficulty and weakness. Negative for dizziness, tremors, seizures, syncope, facial asymmetry, light-headedness, numbness and headaches.        Initially he had some significant difficulty with speech and left-sided weakness both have improved.  Daughter says when I saw him he woke and his speech was difficult to understand she says that usually occurs with fatigue now   Hematological: Negative.    Psychiatric/Behavioral: Positive for confusion. The patient is nervous/anxious.         He has been having some confusion at night.  Daughter says last night was the first night that he did not awake with confusion       Objective     Physical Exam:   Temp:  [97.3 °F (36.3 °C)] 97.3 °F (36.3 °C)  Heart Rate:  [72] 72  Resp:  [20] 20  BP: (144)/(76) 144/76    Physical Exam:    Physical Exam   Constitutional: He is oriented to person, place, and time. He appears well-developed and well-nourished. No distress.   HENT:   Head: Normocephalic.   Eyes: Conjunctivae and EOM are normal. Pupils are equal, round, and reactive to light. Right eye exhibits no discharge. Left eye exhibits no discharge. No scleral icterus.   Neck: Normal range of motion. Neck supple. No JVD present. No tracheal deviation present. No thyromegaly present.   Cardiovascular: Normal rate and normal  heart sounds.  Exam reveals no gallop and no friction rub.    No murmur heard.  He does have an irregular rhythm   Pulmonary/Chest: Effort normal. No stridor. No respiratory distress. He has no wheezes. He has no rales. He exhibits no tenderness.   Abdominal: Soft. Bowel sounds are normal. He exhibits no distension. There is no tenderness. There is no guarding.   Musculoskeletal: Normal range of motion. He exhibits edema. He exhibits no tenderness or deformity.   Neurological: He is alert and oriented to person, place, and time. He displays abnormal reflex. No cranial nerve deficit. He exhibits abnormal muscle tone. Coordination abnormal.   He has weakness in drift on the left upper and lower extremity.  He is able to resist gravity with both left upper and lower   Skin: Skin is warm and dry. No rash noted. He is not diaphoretic. No erythema. No pallor.   Psychiatric: He has a normal mood and affect. His behavior is normal. Judgment and thought content normal.   Nursing note and vitals reviewed.           Results Review:      Lab Results (last 24 hours)     ** No results found for the last 24 hours. **                          Imaging Results (last 24 hours)     ** No results found for the last 24 hours. **          I reviewed the patient's  clinical results.  I reviewed the patient's  imaging results and agree with the interpretation.   I reviewed the therapy notes   ASSESSMENT/PLAN:   Assessment/Plan   Principal Problem:    Right-sided lacunar stroke  Active Problems:    Left-sided weakness    Atrial fibrillation, chronic    Essential hypertension    Diabetes mellitus    Chronic anxiety    Chronic back pain greater than 3 months duration    Degenerative joint disease involving multiple joints    Encounter for rehabilitation    Obstructive sleep apnea syndrome    Former smoker      He will be admitted to the acute rehabilitation unit for intensive rehabilitation.  He'll need close medical supervision because of his  multiple polyps.  He should be able to participate 3 hours a day in therapy make measurable improvement and be able to return home at discharge with his family.  Family will bring his CPAP equipment to help with his sleep.  Also discussed that anxiety and some confusion at night and hopefully that will improve but it may be worse first night here.  We will control his diabetes and his hypertension as well as benign prostatic hypertrophy.  Suspect he is in atrial fibrillation and the decision was made by neurology and hospitalist not to anticoagulate him but we will continue Lovenox while he is in acute rehabilitation.  I am going to get an EKG to document his rhythm which is atrial fibrillation seems to be rate controlled    I discussed the patients findings and my recommendations with patient and family.      John Carreon MD  10/30/17  3:29 PM          Electronically signed by John Carreon MD at 2017 10:41 AM            [facility] ACUTE REHAB  [address] [place] [address]  Phone:  [phone]  Fax:   Date: 2017      Ambulatory Referral to Physical Therapy Evaluate and treat, Neuro     Patient:  Kenneth Harper Jr. MRN:  [id number]   [address] [facility] [address] :  1934  SSN:    Phone: [phone] Sex:  M      INSURANCE PAYOR PLAN GROUP # SUBSCRIBER ID   Primary: HUMANA MEDICARE REPLACEMENT [id number]      Referring Provider Information:  JOHN CARREON Phone: [phone] Fax:       Referral Information:   # Visits:  1 Referral Type: Therapy [AE1]   Urgency:  Routine Referral Reason: Specialty Services Required   Start Date: 2017 End Date:  To be determined by Insurer   Diagnosis: Abnormality of gait and mobility (R26.9 [ICD-10-CM] 781.2 [ICD-9-CM])  Left-sided weakness (R53.1 [ICD-10-CM] 728.87 [ICD-9-CM])  Right-sided lacunar stroke (I63.9 [ICD-10-CM] 434.91 [ICD-9-CM])  Chronic back  pain greater than 3 months duration (M54.9,G89.29 [ICD-10-CM] 724.5,338.29 [ICD-9-CM])      Refer to Dept:   Refer to Provider:   Refer to Facility:    She requested outpatient physical therapy at Dr. Dumont's office  Specialty modality needed? Evaluate and treat  Specialty modality needed? Neuro     This document serves as a request of services and does not constitute Insurance authorization or approval of services.  To determine eligibility, please contact the members Insurance carrier to verify and review coverage.     If you have medical questions regarding this request for services. Please contact [facility] ACUTE REHAB at [phone] between the hours of 8:00am - 5:00pm (Mon-Fri).             Authorizing Provider:Vishnu Mckinnon MD  Authorizing Provider's NPI: [id number]  Order Entered By: Vishnu Mckinnon MD 11/13/2017  8:46 AM     Electronically signed by: Vishnu Mckinnon MD 11/13/2017  8:46 AM            Physician Progress Notes (last 24 hours) (Notes from 11/12/2017  3:18 PM through 11/13/2017  3:18 PM)      Vishnu Mckinnon MD at 11/13/2017  8:43 AM  Version 1 of 1              LOS: 14 days   Patient Care Team:  Evan Marrero MD as PCP - General (Family Medicine)    Chief Complaint:   Right-sided lacunar stroke          Interval History:     SUBJECTIVE:  No complaints today anxious to go home tomorrow  History taken from: patient chart In therapy staff    Objective     Vital Signs  Temp:  [96.6 °F (35.9 °C)-97.5 °F (36.4 °C)] 96.6 °F (35.9 °C)  Heart Rate:  [64-88] 67  Resp:  [18] 18  BP: (101-127)/(58-68) 101/58  Last 3 weights    11/10/17  0410 11/11/17  0500 11/12/17  0500   Weight: 205 lb 8 oz (93.2 kg) 205 lb 12.8 oz (93.4 kg) 202 lb 4.8 oz (91.8 kg)         Physical Exam:     General Appearance:    Alert, cooperative, in no acute distress   Head:    Normocephalic, without obvious abnormality, atraumatic   Eyes:            Lids and lashes normal, conjunctivae and  sclerae normal, no   icterus, no pallor, corneas clear, PERRLA   Throat:   No oral lesions, no thrush, oral mucosa moist   Neck:   No adenopathy, supple, trachea midline, no thyromegaly, no   carotid bruit, no JVD       Lungs:     Clear to auscultation,respirations regular, even and                  Unlabored     Heart:    Regular rhythm and normal rate, normal S1 and S2, no            murmur, no gallop, no rub, no click    Chest Wall:    No abnormalities observed   Abdomen:     Normal bowel sounds, no masses, no organomegaly, soft        non-tender, non-distended, no guarding, no rebound                Tenderness    Extremities:   Moves all extremities well, no edema, no cyanosis, no             Redness Strength range of motion ataxia all improved        Skin:   No bleeding, bruising or rash   Lymph nodes:   No palpable adenopathy   Neurologic:   Cranial nerves 2 - 12 grossly intact, sensation intact, DTR       present and equal bilaterally Strength range of motion ataxia improved       RESULTS REVIEW:     Lab Results (last 24 hours)     Procedure Component Value Units Date/Time    POC Glucose Fingerstick [[id number]]  (Abnormal) Collected:  11/12/17 1052    Specimen:  Blood Updated:  11/12/17 1105     Glucose 68 (L) mg/dL       Meter: [id number]: [id number] KING ARNULFO       POC Glucose Fingerstick [[id number]]  (Normal) Collected:  11/12/17 1559    Specimen:  Blood Updated:  11/12/17 1621     Glucose 70 mg/dL       Meter: [id number]: [id number] KING ARNULFO       POC Glucose Fingerstick [[id number]]  (Normal) Collected:  11/12/17 2038    Specimen:  Blood Updated:  11/13/17 0437     Glucose 96 mg/dL       Meter: [id number]: [id number] KEYLA JONES       POC Glucose Fingerstick [[id number]]  (Normal) Collected:  11/13/17 0443    Specimen:  Blood Updated:  11/13/17 0501     Glucose 120 mg/dL       Meter: [id number]: [id number] KEYLA DORSEY K           Imaging Results (last 72  hours)     ** No results found for the last 72 hours. **          Assessment/Plan     Principal Problem:    Right-sided lacunar stroke  Active Problems:    Left-sided weakness    Atrial fibrillation, chronic    Essential hypertension    Diabetes mellitus    Chronic anxiety    Chronic back pain greater than 3 months duration    Degenerative joint disease involving multiple joints    Encounter for rehabilitation    Obstructive sleep apnea syndrome    Former smoker        He has done very well with therapy.  He will be going home tomorrow with family.  He has a rolling walker and a quad cane at home  He would like outpatient therapy in [place]  He would like to go to Dr. Dumont's office for physical therapy.  Reviewed with the therapy staff and outpatient PT would be acceptable and they can probably work with his arm as well .  Apparently outpatient occupational therapy is not available and is felt that he would do well with PT alone.  He was discharged from speech therapy on Friday, 11/10/17        Vishnu Mckinnon MD  11/13/17  8:43 AM               Electronically signed by Vishnu Mckinnon MD at 11/13/2017  8:50 AM

## 2017-11-13 NOTE — PLAN OF CARE
Problem: Patient Care Overview (Adult)  Goal: Plan of Care Review  Outcome: Ongoing (interventions implemented as appropriate)    11/13/17 1525   Coping/Psychosocial Response Interventions   Plan Of Care Reviewed With patient   Outcome Evaluation   Outcome Summary/Follow up Plan pt with sba for all gt and transfers. HEP provided and ready for dc home tomorrow, outpt to follow         Problem: Inpatient Physical Therapy  Goal: Stair Training Goal LTG- PT  Outcome: Ongoing (interventions implemented as appropriate)    10/31/17 0825 11/04/17 1440 11/13/17 1525   Stair Training PT LTG   Stair Training Goal PT LTG, Date Established --  11/04/17 --    Stair Training Goal PT LTG, Time to Achieve by discharge --  --    Stair Training Goal PT LTG, Number of Steps 1 flight --  --    Stair Training Goal PT LTG, [place] Level supervision required --  --    Stair Training Goal PT LTG, Assist Device 2 handrails --  --    Stair Training Goal PT LTG, Date Goal Reviewed --  --  11/13/17   Stair Training Goal PT LTG, Outcome --  --  goal ongoing

## 2017-11-13 NOTE — CONSULTS
"Adult Nutrition  Assessment    Patient Name:  Kenneth Harper Jr.  YOB: 1934  MRN: [id number]  Admit Date:  10/30/2017    Assessment Date:  11/13/2017    Comments:  Pt seen per follow up protocol. Pt states he is \"doing great\". Pt reports having a better appetite. He has been eating at about 75% the past few meals. Pt says he is \"ready to go home\".              Nutrition/Diet History       11/13/17 1333    Nutrition/Diet History    Patient Reported Diet/Restrictions/Preferences (P)  carbohydrate counting    Typical Food/Fluid Intake (P)  Pt says he enjoyed his lunch and has a decent appetite. Due to the increased appetite, pt is not consuming Glucerna that is on his tray. RDN has discontinued it.                Labs/Tests/Procedures/Meds       11/13/17 1335    Labs/Tests/Procedures/Meds    Labs/Tests Review (P)  Reviewed    Medication Review (P)  Reviewed, pertinent                Nutrition Prescription Ordered       11/13/17 1335    Nutrition Prescription PO    Current PO Diet (P)  Regular    Common Modifiers (P)  Consistent Carbohydrate              Electronically signed by:  Polina Noland  11/13/17 1:36 PM  "

## 2017-11-13 NOTE — DISCHARGE INSTR - APPOINTMENTS
Nov 20,2017 1:00 PM                                                                                                                          [address]  Follow Up with Evan Marrero MD                                                                                                      [facility] [address]                                                                                                                                                                [phone]    November 16, 2017 1:00 PM                                                                                                              [address] [facility]  Outpatient Physical Therapy Services                                                                                                  [facility] [address]                                                                                                                                                                 [phone]  November 30, 2017 3:45 PM  Follow up with Fabrice Cordova MD  *make sure to bring your [facility] discharge papers with you!

## 2017-11-13 NOTE — PLAN OF CARE
Problem: Patient Care Overview (Adult)  Goal: Plan of Care Review  Outcome: Ongoing (interventions implemented as appropriate)    11/13/17 1030   Coping/Psychosocial Response Interventions   Plan Of Care Reviewed With patient   Outcome Evaluation   Outcome Summary/Follow up Plan 14 day progress note completed. Pt ambulated multiple gait cycles with the longest being 200 feet with SBA. Pt ambulated up/down 10 stairs using one handrail mainly with SBA but had one LOB requiring CGA. Pt to d/c home tomorrow with spouse - pt feels ready and has no concerns re: d/c.       Goal: Discharge Needs Assessment  Outcome: Ongoing (interventions implemented as appropriate)    11/13/17 1030   Discharge Needs Assessment   Discharge Facility/Level Of Care Needs home with home health         Problem: Inpatient Physical Therapy  Goal: Gait Training Goal LTG- PT  Outcome: Outcome(s) achieved Date Met:  11/13/17    10/31/17 0825 11/13/17 1030   Gait Training PT LTG   Gait Training Goal PT LTG, Date Established 10/31/17 --    Gait Training Goal PT LTG, Time to Achieve by discharge --    Gait Training Goal PT LTG, [place] Level supervision required --    Gait Training Goal PT LTG, Assist Device (AAD) --    Gait Training Goal PT LTG, Distance to Achieve 150 feet --    Gait Training Goal PT LTG, Date Goal Reviewed --  11/13/17   Gait Training Goal PT LTG, Outcome --  goal met       Goal: Stair Training Goal LTG- PT  Outcome: Ongoing (interventions implemented as appropriate)    10/31/17 0825 11/04/17 1440 11/13/17 1030   Stair Training PT LTG   Stair Training Goal PT LTG, Date Established --  11/04/17 --    Stair Training Goal PT LTG, Time to Achieve by discharge --  --    Stair Training Goal PT LTG, Number of Steps 1 flight --  --    Stair Training Goal PT LTG, [place] Level supervision required --  --    Stair Training Goal PT LTG, Assist Device 2 handrails --  --    Stair Training Goal PT LTG, Date Goal Reviewed --  --  11/13/17    Stair Training Goal PT LTG, Outcome --  --  goal ongoing

## 2017-11-13 NOTE — THERAPY PROGRESS REPORT/RE-CERT
Inpatient Rehabilitation - Occupational Therapy Progress Note  [facility]     Patient Name: Kenneth Harper Jr.  : 1934  MRN: [id number]  Today's Date: 2017  Onset of Illness/Injury or Date of Surgery Date: 10/30/17  Date of Referral to OT: 10/30/17  Referring Physician: Dr. Mckinnon    Admit Date: 10/30/2017       ICD-10-CM ICD-9-CM   1. Right-sided lacunar stroke I63.9 434.91   2. Symbolic dysfunction R48.9 784.60   3. Impaired mobility and ADLs Z74.09 799.89   4. Abnormality of gait and mobility R26.9 781.2   5. Muscle weakness (generalized) M62.81 728.87   6. Left-sided weakness R53.1 728.87   7. Chronic back pain greater than 3 months duration M54.9 724.5    G89.29 338.29     Patient Active Problem List   Diagnosis   • Spinal stenosis, lumbar region, with neurogenic claudication   • Former smoker   • Overweight   • Left-sided weakness   • Right-sided lacunar stroke   • Essential hypertension   • Diabetes mellitus   • Chronic anxiety   • Chronic back pain greater than 3 months duration   • Prostatic hypertrophy   • Degenerative joint disease involving multiple joints   • Atrial fibrillation, chronic   • Encounter for rehabilitation   • Obstructive sleep apnea syndrome     Past Medical History:   Diagnosis Date   • Arthritis    • Chronic anxiety    • Chronic back pain greater than 3 months duration    • Degenerative joint disease involving multiple joints    • Diabetes    • Diabetes mellitus    • Essential hypertension    • Gout    • Hypertension    • Left-sided weakness 10/2017   • Prostatic hypertrophy    • Right-sided lacunar stroke 10/2017     Past Surgical History:   Procedure Laterality Date   • CATARACT EXTRACTION     • KNEE SURGERY     • TOTAL KNEE ARTHROPLASTY     • UMBILICAL HERNIA REPAIR            OT ASSESSMENT FLOWSHEET (last 72 hours)      OT Evaluation       17 1429 17 1030 17 0738 17 1945 17 [id number]    Rehab Evaluation    Document Type therapy  note (daily note)  -RW progress note  -LM progress note  -      Subjective Information agree to therapy  -RW agree to therapy;no complaints  -LM agree to therapy;no complaints  -      Patient Effort, Rehab Treatment good  -RW good  -LM good  -      Symptoms Noted During/After Treatment  none  -LM fatigue   with BUE act/hands  -      General Information    Patient Profile Review  yes  -LM yes  -      Referring Physician   Dr. Mckinnon  -      General Observations  Pt sitting up in w/c playing on laptop.  Pt very pleasant and agreeable to PT 14 day progress note.  -LM Pt up in w/c already dressed and bath. On room air  -      Precautions/Limitations fall precautions  -RW fall precautions  -LM fall precautions  -      Benefits Reviewed   patient:  -      Clinical Impression    OT Diagnosis   impaired mobility and ADL  -      Prognosis   good  -      Functional Level At Time Of Evaluation   Pt fatigued with hand/arm act and was given an extensive handouts for hand putty tasks and BUE ex and given education on weights to use. Pt required some redirection. Pt struggles with safety with steering/self propell w/c and would run into objects, pt slightly unsteady standing and educated pt to use RW at home till PT cleared him.   -      Impairments Found (describe specific impairments)   aerobic capacity/endurance;arousal, attention, and cognition;gait, locomotion, and balance;motor function;muscle performance;posture;ROM  -      Patient/Family Goals Statement   to go home  -      Criteria for Skilled Therapeutic Interventions Met   yes;treatment indicated  -      Rehab Potential   good, to achieve stated therapy goals  -      Therapy Frequency   other (see comments)   3-14x a week  -      Predicted Duration of Therapy Intervention (days/wks)   until d/c or all goals met  -      Anticipated Discharge Disposition   home with assist;home with outpatient services  -      Vital Signs    Pre  Systolic BP Rehab  121  -  -BH      Pre Treatment Diastolic BP  57  -LM 58  -BH      Post Systolic BP Rehab  139  -  -BH      Post Treatment Diastolic BP  61  -LM 61  -BH      Pretreatment Heart Rate (beats/min)  59  -LM 59  -BH      Posttreatment Heart Rate (beats/min)  60  -LM 44  -BH      Pre SpO2 (%)  95  -LM 97  -BH      O2 Delivery Pre Treatment  room air  -LM room air  -BH      Post SpO2 (%)  98  -LM 95  -BH      O2 Delivery Post Treatment  room air  -LM room air  -BH      Pre Patient Position  Sitting  -LM Sitting  -BH      Post Patient Position  Sitting  -LM       Pain Assessment    Pain Assessment No/denies pain  -RW No/denies pain  -LM       Pain Score   0  -BH      Post Pain Score   0  -BH      Cognitive Assessment/Intervention    Current Cognitive/Communication Assessment functional  -RW functional  -LM functional  -      Orientation Status oriented x 4  -RW oriented x 4  -LM oriented x 4  -BH      Follows Commands/Answers Questions 100% of the time  -% of the time  -% of the time;able to follow single-step instructions;needs cueing;needs increased time;needs repetition   pt got agitated with peg test and instructions  -      Personal Safety  WNL/WFL  -LM WNL/WFL;mild impairment  -      Personal Safety Interventions gait belt;nonskid shoes/slippers when out of bed  -RW fall prevention program maintained;gait belt;nonskid shoes/slippers when out of bed;muscle strengthening facilitated  - fall prevention program maintained;muscle strengthening facilitated;nonskid shoes/slippers when out of bed;gait belt;supervised activity  -      Cognitive Assessment Intervention    Behavior/Mood Observations behavior appropriate to situation, WNL/WFL  -RW        ROM (Range of Motion)    General ROM   upper extremity range of motion deficits identified  -      General ROM Detail  BLE's AROM WFL with exception of L ankle DF  -LM RUE WFL not full range LUE approx 70 degrees can get  higher with compensation. rest WFL, fair+ digit to thumb opposition and digit to nose. Some difficulty with range but mostly WFL  -      MMT (Manual Muscle Testing)    General MMT Assessment   upper extremity strength deficits identified  -      General MMT Assessment Detail  LLE - Hip flex - 4/5; Knee flex - 4/5; Knee ext - 4+/5; Ankle DF - 2+/5; RLE - Hip flex - 4+/5; Knee flex - 4/5; Knee ext - 4+/5; Ankle DF - 4/5  - BUE  grossly 4 to 4+/5; BUE grossly 4 to 4+/5 in limited range  -      Bed Mobility, Assessment/Treatment    Bed Mob, Supine to Sit, [place] independent  -        Bed Mobility, Comment  Pt up in chair.  - defer pt up in w/c and in room.   -      Transfer Assessment/Treatment    Transfers, Bed-Chair [place] stand by assist  -        Transfers, Chair-Bed [place] stand by assist  -        Transfers, Sit-Stand [place] stand by assist  -RW stand by assist  -LM stand by assist  -      Transfers, Stand-Sit [place] stand by assist  - stand by assist  - stand by assist  -      Transfers, Sit-Stand-Sit, Assist Device rolling walker  - rolling walker  -LM rolling walker  -      Transfer, Comment   pt sit to stand several times for act during session.   -      Stairs Assessment/Treatment    Number of Stairs  10  -LM       Stairs, Handrail Location --  - left side (ascending)  -       Stairs, [place] Level --  - supervision required;contact guard assist  -       Stairs, Comment  Pt ambulated up/down flight of stairs.  Pt mainly SBA - however, when descending steps pt had one LOB when his foot did not clear and PT had to grab onto belt to steady pt.  -       Wheelchair Training/Management    Wheelchair, Distance Propelled 150 mod ind  - feet x 2 - Mod I  -LM approx 150 feet x2 SBA with vc around objects running his arm/hand into items 2x.   -      Lower Body Bathing Assessment/Training    LB Bathing Assess/Train, Comment   pt  voiced he had already had a shower/bath and gotten dressed and did other ADL  -      Upper Body Dressing Assessment/Training    UB Dressing Assess/Train, Clothing Type   donning:;button up  -      UB Dressing Assess/Train, Position   standing;sitting  -      UB Dressing Assess/Train, [place]   set up required;supervision required;verbal cues required  -      UB Dressing Assess/Train, Comment   pt struggled with pulling down in the back and buttons and required extended time and effort but no physical assist required.   -      Lower Body Dressing Assessment/Training    LB Dressing Assess/Train, Clothing Type donning:;shoes  -        LB Dressing Assess/Train, Comment   Pt agreed to don and doff one shoe only his right he did with mod I and extended time   -      Motor Skills/Interventions    Additional Documentation   Gross Motor Coordination Training (Group);Fine Motor Coordination Training (Group);Functional Endurance (Group)  -      Balance Skills Training    Sitting-Level of Assistance   Independent   in w/c  -      Standing-Level of Assistance   Close supervision;Distant supervision   depends on level of activity  -      Standing-Balance Activities   Lateral lean;Forward lean;Weight Shift A-P;Reaching for objects;Retrieve object from floor;Weight Shift R-L  -      Therapy Exercises    Bilateral Lower Extremities AROM:;20 reps;sitting;hip flexion;LAQ;standing;heel raises;hip abduction/adduction   pro 2 level 3 x 5 min  -        Gross Motor Coordination Training    Gross Motor Skill, Impairments Detail   Pt engaged in multiple tasks. Started kitchen education. Pt reports he will not be doing laundry at home however went over education in case he has to and his wife unable safety with lifiting and using a RW. Pt completed reaching in high and low places with SBA using RW with fair balance. Pt engaged in BUE arm bike for approx 5 minutes good toleration and then wanted to stop. Pt  struggled with 9 hole peg test and go upset when he did not (hear/understand) instructions that OT gave of one peg at a time and only one hand. Pt completed tasks afer redirection. Pt was given a handout for BUE HEP - pt able to demo BUE arm movements with handout with min redirection. Pt was given an extensive handout for theraputty and OT went through each one with him with pt requiring min A and vc/redirection. Highly educated pt on exercies and coordination tasks for home for BUE. Pt educated not to over do act but gradually increase using 1 pound weight then 2 pound and with putty not to do too many reps at once. Pt struggled with fine motor control of buttons. Pt struggled with self propelling w/c and was running into items with vc required to wach his arms in tight places.   -      Fine Motor Coordination Training    9-Hole Peg Right   31 seconds  -      9-Hole Peg Left   50 seconds struggled with task  -      Opposition   Right:;Left:;intact   struggled   -      Sensory Assessment/Intervention    Light Touch   LUE;RUE  - LUE;RUE  -NW LUE;RUE  -    LUE Light Touch   WNL  - mild impairment  -NW mild impairment  -MK    RUE Light Touch   WNL  -BH WNL  -NW WNL  -MK    LLE Light Touch    mild impairment  -NW mild impairment  -MK    RLE Light Touch    mild impairment  -NW mild impairment  -MK    General Therapy Interventions    Planned Therapy Interventions   activity intolerance;adaptive equipment training;ADL retraining;IADL retraining;balance training;bed mobility training;fine motor coordination training;energy conservation;home exercise program;motor coordination training;neuromuscular re-education;ROM (Range of Motion);strengthening;stretching;transfer training  -      Positioning and Restraints    Pre-Treatment Position in bed  -RW sitting in chair/recliner  -LM --   w/c  -      Post Treatment Position chair  -RW wheelchair  -LM wheelchair  -BH      In Chair call light within reach  -RW         In Wheelchair  sitting;call light within reach;encouraged to call for assist  -LM sitting;call light within reach;encouraged to call for assist   in veiw of RN stations  -        11/11/17 2000 11/11/17 0716 11/10/17 2000          Pain Assessment    Pain Score 0  -NW  0  -NW      Sensory Assessment/Intervention    Light Touch LUE;RUE  -NW LUE;RUE  -MK LUE;RUE  -NW      LUE Light Touch mild impairment  -NW mild impairment  -MK mild impairment  -NW      RUE Light Touch WNL  -NW WNL  -MK WNL  -NW      LLE Light Touch mild impairment  -NW mild impairment  -MK mild impairment  -NW      RLE Light Touch mild impairment  -NW mild impairment  -MK mild impairment  -NW        User Key  (r) = Recorded By, (t) = Taken By, (c) = Cosigned By    Initials Name Effective Dates    LM Landy Gallegosjennyernestina, PT 06/15/16 -      Karoline Huang, OTR/L 10/17/16 -     NW Grisel Polanco RN 10/17/16 -     KANDACE Avila RN 10/17/16 -     RW Pipe Gruber, MARTIN 10/17/16 -            Occupational Therapy Education     Title: PT OT SLP Therapies (Active)     Topic: Occupational Therapy (Active)     Point: ADL training (Active)    Description: Instruct learner(s) on proper safety adaptation and remediation techniques during self care or transfers.   Instruct in proper use of assistive devices.    Learning Progress Summary    Learner Readiness Method Response Comment Documented by Status   Patient Acceptance E,TB VU,DU Pt highly educated about HEP for home and given handouts. Started discussing safety at home with IADL. Educated to call if he needed anything.  11/13/17 1526 Done    Acceptance E NR,VU   11/10/17 0914 Done    Acceptance E NR   11/09/17 1155 Active    Acceptance TB NR  KD 11/02/17 1108 Active    Acceptance E,D NR  RW 11/01/17 1518 Active    Acceptance E VU Educated about OT and POC. Educated about anni dressing technique. Educated about safety with ADL and t/f. Educated to call for assist and not to stand independently. SP  10/31/17 1354 Done   Family Acceptance E,D NR   11/01/17 1518 Active    Acceptance E VU Educated about OT and POC. Educated about anni dressing technique. Educated about safety with ADL and t/f. Educated to call for assist and not to stand independently. SP 10/31/17 1354 Done               Point: Home exercise program (Active)    Description: Instruct learner(s) on appropriate technique for monitoring, assisting and/or progressing therapeutic exercises/activities.    Learning Progress Summary    Learner Readiness Method Response Comment Documented by Status   Patient Acceptance E,TB VU,DU Pt highly educated about HEP for home and given handouts. Started discussing safety at home with IADL. Educated to call if he needed anything.  11/13/17 1526 Done    Acceptance E,D NR theraputty  11/01/17 1634 Active   Family Acceptance E,D NR theraputty  11/01/17 1634 Active               Point: Precautions (Active)    Description: Instruct learner(s) on prescribed precautions during self-care and functional transfers.    Learning Progress Summary    Learner Readiness Method Response Comment Documented by Status   Patient Acceptance E VU recommend pt not drive w/o MD OK and discussed  evaluation program  11/10/17 0918 Done    Acceptance E NR,VU   11/10/17 0914 Done    Acceptance E NR   11/09/17 1155 Active    Acceptance E NR,VU recommned no climbing on ladders or step stools.  11/08/17 0928 Done    Acceptance E NR   11/07/17 1350 Active    Acceptance E NR   11/04/17 0930 Active    Acceptance E,D NR   11/01/17 1518 Active    Acceptance E VU Educated about OT and POC. Educated about anni dressing technique. Educated about safety with ADL and t/f. Educated to call for assist and not to stand independently. SP 10/31/17 1354 Done   Family Acceptance E,D NR   11/01/17 1518 Active    Acceptance E VU Educated about OT and POC. Educated about anni dressing technique. Educated about safety with ADL and t/f.  Educated to call for assist and not to stand independently.  10/31/17 1354 Done               Point: Body mechanics (Done)    Description: Instruct learner(s) on proper positioning and spine alignment during self-care, functional mobility activities and/or exercises.    Learning Progress Summary    Learner Readiness Method Response Comment Documented by Status   Patient Acceptance E NR,VU   11/10/17 0914 Done    Acceptance E NR   11/09/17 1155 Active    Acceptance E NR,VU recommned no climbing on ladders or step stools.  11/08/17 0928 Done    Acceptance E NR   11/07/17 1350 Active    Acceptance E NR   11/04/17 0930 Active    Acceptance E VU Educated about OT and POC. Educated about anni dressing technique. Educated about safety with ADL and t/f. Educated to call for assist and not to stand independently.  10/31/17 1354 Done   Family Acceptance E VU Educated about OT and POC. Educated about anni dressing technique. Educated about safety with ADL and t/f. Educated to call for assist and not to stand independently.  10/31/17 1354 Done                      User Key     Initials Effective Dates Name Provider Type Discipline     10/17/16 -  Karoline Huang, OTR/L Occupational Therapist OT    RW 10/17/16 -  Jacinta Pitts OTR/L Occupational Therapist OT    RC 10/17/16 -  MARCO Cannon/L Occupational Therapy Assistant OT    KD 10/17/16 -  MARCO Vidales/L Occupational Therapy Assistant OT    SP 01/31/17 -  Alem Bruce OT Student OT Student OT                  OT Recommendation and Plan  Anticipated Equipment Needs At Discharge: bathing equipment, dressing equipment, front wheeled walker  Anticipated Discharge Disposition: home with assist, home with outpatient services  Planned Therapy Interventions: activity intolerance, adaptive equipment training, ADL retraining, IADL retraining, balance training, bed mobility training, fine motor coordination training, energy conservation, home  exercise program, motor coordination training, neuromuscular re-education, ROM (Range of Motion), strengthening, stretching, transfer training  Therapy Frequency: other (see comments) (3-14x a week)  Plan of Care Review  Plan Of Care Reviewed With: patient  Progress: progress toward functional goals as expected  Outcome Summary/Follow up Plan: OT treatment/recert completed this date. Pt mostly SBA with sit to stand t/f and reaching for items SBA to CGA for high and low places. Pt highly educated and given handout over theraputty and BUE ROM act and given instruction on weights to use and reps to complete. Started education with safety at home with IADL. Pt could cont to benefit from skilled OT to reach maximum level of potential. Recommend d/c home with assist.           OT Goals       11/13/17 1527 11/13/17 0738 11/10/17 0914    Transfer Training OT LTG    Transfer Training OT LTG, Date Goal Reviewed 11/13/17  -      Transfer Training OT LTG, Outcome goal partially met  -      Patient Education OT LTG    Patient Education OT LTG, Date Goal Reviewed  11/13/17  - 11/10/17  -    Patient Education OT LTG Outcome  goal partially met  -     Safety Awareness OT LTG    Safety Awareness OT LTG, Date Goal Reviewed 11/13/17  -  11/10/17  -    Safety Awareness OT LTG, Outcome goal ongoing  -      ADL OT LTG    ADL OT LTG, Date Goal Reviewed  11/13/17  - 11/10/17  -    ADL OT LTG, Outcome  goal partially met  -       11/09/17 1155 11/08/17 0929 11/07/17 1351    Transfer Training OT LTG    Transfer Training OT LTG, Date Goal Reviewed 11/09/17  - 11/08/17  - 11/07/17  -    Transfer Training OT LTG, Outcome   goal ongoing  -    Patient Education OT LTG    Patient Education OT LTG, Date Goal Reviewed 11/09/17  - 11/08/17  - 11/07/17  -    Patient Education OT LTG Outcome   goal ongoing  -    Safety Awareness OT LTG    Safety Awareness OT LTG, Date Goal Reviewed 11/09/17  - 11/08/17  -  11/07/17  -RC    Safety Awareness OT LTG, Outcome goal ongoing  -RC  goal ongoing  -RC    ADL OT LTG    ADL OT LTG, Date Goal Reviewed 11/09/17  -RC 11/08/17  -RC 11/07/17  -RC    ADL OT LTG, Outcome goal partially met  -RC goal ongoing  -RC goal ongoing  -RC    Endurance OT LTG    Endurance Goal OT LTG, Date Goal Reviewed  11/08/17  -RC 11/07/17  -RC    Endurance Goal OT LTG, Outcome  goal met  -RC goal partially met  -RC      11/06/17 1425 11/06/17 0724 11/04/17 0738    Transfer Training OT LTG    Transfer Training OT LTG, Assist Device  walker, rolling platform  -KD     Transfer Training OT LTG, Date Goal Reviewed  11/06/17  -KD 11/04/17  -RC    Transfer Training OT LTG, Outcome  goal not met  -KD     Patient Education OT LTG    Patient Education OT LTG, Date Goal Reviewed  11/06/17  -KD 11/04/17  -RC    Patient Education OT LTG Outcome  goal not met  -KD     Safety Awareness OT LTG    Safety Awareness OT LTG, Date Goal Reviewed 11/06/17  -KD  11/04/17  -RC    Safety Awareness OT LTG, Outcome goal not met  -KD      ADL OT LTG    ADL OT LTG, Date Goal Reviewed  11/06/17  -KD 11/04/17  -RC    ADL OT LTG, Outcome  goal not met  -KD     Endurance OT LTG    Endurance Goal OT LTG, Date Goal Reviewed  11/06/17  -KD 11/04/17  -RC    Endurance Goal OT LTG, Outcome  goal not met  -KD goal ongoing  -RC      11/03/17 1359 11/02/17 0715 11/01/17 1635    Transfer Training OT LTG    Transfer Training OT LTG, Date Goal Reviewed 11/03/17  -LM 11/02/17  -KD 11/01/17  -RW    Transfer Training OT LTG, Outcome goal ongoing  -LM goal not met  -KD goal ongoing  -RW    Patient Education OT LTG    Patient Education OT LTG, Date Goal Reviewed 11/03/17  -LM 11/02/17  -KD 11/01/17  -RW    Patient Education OT LTG Outcome goal not met  -LM goal not met  -KD goal ongoing  -RW    Safety Awareness OT LTG    Safety Awareness OT LTG, Date Goal Reviewed 11/03/17  -LM 11/02/17  -KD 11/01/17  -RW    Safety Awareness OT LTG, Outcome goal not met  -LM  goal not met  -KD goal ongoing  -RW    ADL OT LTG    ADL OT LTG, Date Goal Reviewed 11/03/17  -LM  11/01/17  -RW    ADL OT LTG, Outcome goal not met  -LM goal not met  -KD goal ongoing  -RW    Endurance OT LTG    Endurance Goal OT LTG, Date Goal Reviewed 11/03/17  -LM 11/02/17  -KD 11/01/17  -RW    Endurance Goal OT LTG, Outcome goal not met  -LM goal not met  -KD goal ongoing  -RW      10/31/17 0925          Transfer Training OT LTG    Transfer Training OT LTG, Date Established 10/31/17  -BH (r) SP (t) BH (c)      Transfer Training OT LTG, Time to Achieve by discharge  -BH (r) SP (t) BH (c)      Transfer Training OT LTG, Activity Type all transfers  -BH (r) SP (t) BH (c)      Transfer Training OT LTG, [place] Level contact guard assist  -BH (r) SP (t) BH (c)      Patient Education OT LTG    Patient Education OT LTG, Date Established 10/31/17  -BH (r) SP (t) BH (c)      Patient Education OT LTG, Time to Achieve by discharge  -BH (r) SP (t) BH (c)      Patient Education OT LTG, Education Type HEP;posture/body mechanics;1 hand/anni technique;home safety;adaptive equipment mgmt;energy conservation  -BH (r) SP (t) BH (c)      Patient Education OT LTG, Education Understanding independent;demonstrates adequately;verbalizes understanding   with caregiver assist as necessary  -BH (r) SP (t) BH (c)      Safety Awareness OT LTG    Safety Awareness OT LTG, Date Established 10/31/17  -BH (r) SP (t) BH (c)      Safety Awareness OT LTG, Time to Achieve by discharge  -BH (r) SP (t) BH (c)      Safety Awareness OT LTG, Activity Type good safety awareness;with kitchen mobility;with ADL's  -BH (r) SP (t) BH (c)      Safety Awareness OT LTG, [place] min verbal cues  -BH (r) SP (t) BH (c)      ADL OT LTG    ADL OT LTG, Date Established 10/31/17  -BH (r) SP (t) BH (c)      ADL OT LTG, Time to Achieve by discharge  -BH (r) SP (t) BH (c)      ADL OT LTG, Activity Type ADL skills  -BH (r) SP (t) BH (c)      ADL OT LTG,  Additional Goal Set-up A with self-feeding and grooming; VC for UB bathing and UB dressing; CGA with LB bathing and LB dressing  -BH (r) SP (t) BH (c)      Endurance OT LTG    Endurance Goal OT LTG, Date Established 10/31/17  -BH (r) SP (t) BH (c)      Endurance Goal OT LTG, Time to Achieve by discharge  -BH (r) SP (t) BH (c)      Endurance Goal OT LTG, Activity Level endurance 2 good-  -BH (r) SP (t) BH (c)      Endurance Goal OT LTG, Additional Goal During 30 minutes of functional tasks, ADL, and therapeutic exercise  -BH (r) SP (t) BH (c)        User Key  (r) = Recorded By, (t) = Taken By, (c) = Cosigned By    Initials Name Provider Type     Karoline Huang, OTR/L Occupational Therapist    RW Jacinta Pitts, OTR/L Occupational Therapist    RC Cassie Carpio, LARA/L Occupational Therapy Assistant    JOSS King, LARA/L Occupational Therapy Assistant    MOSHE Boucher LARA/L Occupational Therapy Assistant    DIONTE Bruce, OT Student OT Student                Outcome Measures       11/13/17 1030 11/13/17 0738       How much help from another person do you currently need...    Turning from your back to your side while in flat bed without using bedrails? 4  -LM      Moving from lying on back to sitting on the side of a flat bed without bedrails? 3  -LM      Moving to and from a bed to a chair (including a wheelchair)? 3  -LM      Standing up from a chair using your arms (e.g., wheelchair, bedside chair)? 3  -LM      Climbing 3-5 steps with a railing? 3  -LM      To walk in hospital room? 3  -LM      AM-PAC 6 Clicks Score 19  -LM      How much help from another is currently needed...    Putting on and taking off regular lower body clothing?  3  -BH     Bathing (including washing, rinsing, and drying)  4  -BH     Toileting (which includes using toilet bed pan or urinal)  3  -BH     Putting on and taking off regular upper body clothing  4  -BH     Taking care of personal grooming (such as  brushing teeth)  4  -     Eating meals  4  -     Score  22  -     Functional Assessment    Outcome Measure Options AM-PAC 6 Clicks Basic Mobility (PT)  -LM        User Key  (r) = Recorded By, (t) = Taken By, (c) = Cosigned By    Initials Name Provider Type    MOSHE Mckeon, PT Physical Therapist     Karoline Huang OTR/L Occupational Therapist          Time Calculation:   OT Start Time: 0738  OT Stop Time: 0916  OT Time Calculation (min): 98 min    Therapy Charges for Today     Code Description Service Date Service Provider Modifiers Qty    72232938342 HC OT SELF CARE/MGMT/TRAIN EA 15 MIN 11/13/2017 Karoline Huang OTR/L GO 1    13005388344 HC OT THERAPEUTIC ACT EA 15 MIN 11/13/2017 Karoline Huang OTR/L GO 3    72703696231 HC OT THER PROC EA 15 MIN 11/13/2017 Karoline Huang OTR/L GO 3    74373342212 HC OT SELFCARE CURRENT 11/13/2017 Karoline Huang OTR/L GO, CJ 1    08285675612 HC OT SELFCARE PROJECTED 11/13/2017 Karoline Huang OTR/L GO, CI 1          OT G-codes  OT Professional Judgement Used?: Yes  OT Functional Scales Options: AM-PAC 6 Clicks Daily Activity (OT)  Score: 22  Functional Limitation: Self care  Self Care Current Status (): At least 20 percent but less than 40 percent impaired, limited or restricted  Self Care Goal Status (): At least 1 percent but less than 20 percent impaired, limited or restricted    Karoline Huang OTR/L  11/13/2017

## 2017-11-13 NOTE — THERAPY PROGRESS REPORT/RE-CERT
Inpatient Rehabilitation - Physical Therapy Progress Note  [facility]     Patient Name: Kenneth Harper Jr.  : 1934  MRN: [id number]  Today's Date: 2017   Onset of Illness/Injury or Date of Surgery Date: 10/30/17  Date of Referral to PT: 10/30/17  Referring Physician: TERRENCE Mckinnon MD      Admit Date: 10/30/2017     Visit Dx:    ICD-10-CM ICD-9-CM   1. Right-sided lacunar stroke I63.9 434.91   2. Symbolic dysfunction R48.9 784.60   3. Impaired mobility and ADLs Z74.09 799.89   4. Abnormality of gait and mobility R26.9 781.2   5. Muscle weakness (generalized) M62.81 728.87   6. Left-sided weakness R53.1 728.87   7. Chronic back pain greater than 3 months duration M54.9 724.5    G89.29 338.29     Patient Active Problem List   Diagnosis   • Spinal stenosis, lumbar region, with neurogenic claudication   • Former smoker   • Overweight   • Left-sided weakness   • Right-sided lacunar stroke   • Essential hypertension   • Diabetes mellitus   • Chronic anxiety   • Chronic back pain greater than 3 months duration   • Prostatic hypertrophy   • Degenerative joint disease involving multiple joints   • Atrial fibrillation, chronic   • Encounter for rehabilitation   • Obstructive sleep apnea syndrome     Past Medical History:   Diagnosis Date   • Arthritis    • Chronic anxiety    • Chronic back pain greater than 3 months duration    • Degenerative joint disease involving multiple joints    • Diabetes    • Diabetes mellitus    • Essential hypertension    • Gout    • Hypertension    • Left-sided weakness 10/2017   • Prostatic hypertrophy    • Right-sided lacunar stroke 10/2017     Past Surgical History:   Procedure Laterality Date   • CATARACT EXTRACTION     • KNEE SURGERY     • TOTAL KNEE ARTHROPLASTY     • UMBILICAL HERNIA REPAIR            PT ASSESSMENT (last 72 hours)      PT Evaluation       17 1030 17 0738    Rehab Evaluation    Document Type progress note  - progress note  -     Subjective Information agree to therapy;no complaints  -LM agree to therapy  -BH    Patient Effort, Rehab Treatment good  -LM     Symptoms Noted During/After Treatment none  -LM     General Information    Patient Profile Review yes  -LM yes  -BH    General Observations Pt sitting up in w/c playing on laptop.  Pt very pleasant and agreeable to PT 14 day progress note.  -LM Pt up in w/c already dressed and bath. On room air  -BH    Precautions/Limitations fall precautions  -LM fall precautions  -BH    Vital Signs    Pre Systolic BP Rehab 121  -  -BH    Pre Treatment Diastolic BP 57  -LM 58  -BH    Post Systolic BP Rehab 139  -  -BH    Post Treatment Diastolic BP 61  -LM 61  -BH    Pretreatment Heart Rate (beats/min) 59  -LM 59  -BH    Posttreatment Heart Rate (beats/min) 60  -LM 44  -BH    Pre SpO2 (%) 95  -LM 97  -BH    O2 Delivery Pre Treatment room air  -LM room air  -BH    Post SpO2 (%) 98  -LM 95  -BH    O2 Delivery Post Treatment room air  -LM room air  -BH    Pre Patient Position Sitting  -LM Sitting  -BH    Post Patient Position Sitting  -LM     Pain Assessment    Pain Assessment No/denies pain  -LM     Pain Score  0  -BH    Post Pain Score  0  -BH    Cognitive Assessment/Intervention    Current Cognitive/Communication Assessment functional  -LM functional  -BH    Orientation Status oriented x 4  -LM oriented x 4  -BH    Follows Commands/Answers Questions 100% of the time  -LM     Personal Safety WNL/WFL  -LM     Personal Safety Interventions fall prevention program maintained;gait belt;nonskid shoes/slippers when out of bed;muscle strengthening facilitated  -LM     ROM (Range of Motion)    General ROM  upper extremity range of motion deficits identified  -    General ROM Detail BLE's AROM WFL with exception of L ankle DF  -LM RUE WFL not full range LUE approx 70 degrees can get higher with compensation. rest WFL, fair+ digit to thumb opposition and digit to nose. Some difficulty with range but  mostly WFL  -    MMT (Manual Muscle Testing)    General MMT Assessment  upper extremity strength deficits identified  -    General MMT Assessment Detail LLE - Hip flex - 4/5; Knee flex - 4/5; Knee ext - 4+/5; Ankle DF - 2+/5; RLE - Hip flex - 4+/5; Knee flex - 4/5; Knee ext - 4+/5; Ankle DF - 4/5  - BUE  grossly 4 to 4+/5; BUE grossly 4 to 4+/5 in limited range  -    Bed Mobility, Assessment/Treatment    Bed Mobility, Comment Pt up in chair.  -LM     Transfer Assessment/Treatment    Transfers, Sit-Stand [place] stand by assist  -LM     Transfers, Stand-Sit [place] stand by assist  -LM     Transfers, Sit-Stand-Sit, Assist Device rolling walker  -     Gait Assessment/Treatment    Gait, [place] Level stand by assist  -     Gait, Assistive Device rolling walker  -     Gait, Distance (Feet) 200   1st - 200 feet; 2nd - 150 feet; 3rd - 150 feet  -     Gait, Gait Deviations left:;toe-to-floor clearance decreased  -     Gait, Impairments strength decreased;impaired balance  -     Stairs Assessment/Treatment    Number of Stairs 10  -LM     Stairs, Handrail Location left side (ascending)  -     Stairs, [place] Level supervision required;contact guard assist  -     Stairs, Comment Pt ambulated up/down flight of stairs.  Pt mainly SBA - however, when descending steps pt had one LOB when his foot did not clear and PT had to grab onto belt to steady pt.  -LM     Wheelchair Training/Management    Wheelchair, Distance Propelled 150 feet x 2 - Mod I  -LM     Sensory Assessment/Intervention    Light Touch  LUE;RUE  -    LUE Light Touch  [facility]  -    RUE Light Touch  [facility]    Positioning and Restraints    Pre-Treatment Position sitting in chair/recliner  -     Post Treatment Position wheelchair  -LM     In Wheelchair sitting;call light within reach;encouraged to call for assist  -LM       11/12/17 1945 11/12/17 0707    Sensory Assessment/Intervention    Light Touch LUE;RUE  -NW  LUE;RUE  -MK    LUE Light Touch mild impairment  -NW mild impairment  -MK    RUE Light Touch WNL  -NW WNL  -MK    LLE Light Touch mild impairment  -NW mild impairment  -MK    RLE Light Touch mild impairment  -NW mild impairment  -      11/11/17 2000 11/11/17 0716    Pain Assessment    Pain Score 0  -NW     Sensory Assessment/Intervention    Light Touch LUE;RUE  -NW LUE;RUE  -MK    LUE Light Touch mild impairment  -NW mild impairment  -MK    RUE Light Touch WNL  -NW WNL  -MK    LLE Light Touch mild impairment  -NW mild impairment  -MK    RLE Light Touch mild impairment  -NW mild impairment  -      11/10/17 2000 11/10/17 1355    Rehab Evaluation    Document Type  therapy note (daily note)  -    Subjective Information  agree to therapy  -    Patient Effort, Rehab Treatment  good  -    General Information    Precautions/Limitations  fall precautions  -    Vital Signs    Pre Patient Position  Sitting  -    Intra Patient Position  Standing  -    Post Patient Position  Sitting  -    Pain Assessment    Pain Assessment  No/denies pain  -    Pain Score 0  -     Transfer Assessment/Treatment    Transfers, Sit-Stand [place]  stand by assist  -    Transfers, Stand-Sit [place]  stand by assist  -    Transfers, Sit-Stand-Sit, Assist Device  rolling walker  -    Gait Assessment/Treatment    Gait, [place] Level  stand by assist  [facility]    Gait, Assistive Device  rolling walker  -    Gait, Distance (Feet)  120  -    Wheelchair Training/Management    Wheelchair, Distance Propelled  150  -    Wheelchair Training Comment  conditional independent  -    Therapy Exercises    Bilateral Lower Extremities  AROM:;10 reps;standing;hip abduction/adduction;hip flexion;mini squats;heel raises;other reps  -    BLE Other Reps  --   Pro 2 level 4.0 12mins   -    Sensory Assessment/Intervention    Light Touch LUE;RUE  -NW     LUE Light Touch mild impairment  -NW     RUE Light Touch WNL  -NW     LLE  Light Touch mild impairment  -NW     RLE Light Touch mild impairment  -NW     Positioning and Restraints    Pre-Treatment Position  sitting in chair/recliner  -JA    Post Treatment Position  wheelchair  -JA    In Wheelchair  sitting;with nsg  -JA      User Key  (r) = Recorded By, (t) = Taken By, (c) = Cosigned By    Initials Name Provider Type    LM Landy Mckeon, PT Physical Therapist     Karoline Huang, OTR/L Occupational Therapist    NW Grisel Polanco, RN Registered Nurse    KANDACE Avila, MAN Registered Nurse    RADHA Flores, MARTIN Physical Therapy Assistant          Physical Therapy Education     Title: PT OT SLP Therapies (Active)     Topic: Physical Therapy (Active)     Point: Mobility training (Done)    Learning Progress Summary    Learner Readiness Method Response Comment Documented by Status   Patient Acceptance E VU again reviewed correct gt and transfer safey.  11/03/17 0910 Done    Acceptance E VU reviewed safe transfers and risks of falls RW 11/01/17 1052 Done    Acceptance E NR Reviewed safety with transfers and ambulation.  10/31/17 1506 Active               Point: Precautions (Done)    Learning Progress Summary    Learner Readiness Method Response Comment Documented by Status   Patient Acceptance E VU again reviewed correct gt and transfer safey. RW 11/03/17 0910 Done    Acceptance E VU reviewed safe transfers and risks of falls RW 11/01/17 1052 Done    Acceptance E NR Reviewed safety with transfers and ambulation.  10/31/17 1506 Active                      User Key     Initials Effective Dates Name Provider Type Discipline     06/15/16 -  Landy Mckeon, PT Physical Therapist PT     10/17/16 -  Pipe Grbuer, PTA Physical Therapy Assistant PT                PT Recommendation and Plan  Anticipated Equipment Needs At Discharge: front wheeled walker  Anticipated Discharge Disposition: home with 24/7 care, home with home health  Planned Therapy Interventions: balance training, bed  mobility training, gait training, home exercise program, motor coordination training, neuromuscular re-education, patient/family education, postural re-education, ROM (Range of Motion), stair training, strengthening, stretching, transfer training, wheelchair management/propulsion training  PT Frequency: other (see comments) (5-14 times/wk)  Plan of Care Review  Plan Of Care Reviewed With: patient  Outcome Summary/Follow up Plan: 14 day progress note completed.  Pt ambulated multiple gait cycles with the longest being 200 feet with SBA.  Pt ambulated up/down 10 stairs using one handrail mainly with SBA but had one LOB requiring CGA.  Pt to d/c home tomorrow with spouse - pt feels ready and has no concerns re: d/c.          IP PT Goals       11/13/17 1030 11/10/17 1355 11/09/17 1535    Gait Training PT LTG    Gait Training Goal PT LTG, Date Goal Reviewed 11/13/17  -LM 11/10/17  -JA 11/09/17  -RW    Gait Training Goal PT LTG, Outcome goal met  -LM goal ongoing  -JA goal ongoing  -RW    Stair Training PT STG    Stair Training Goal PT STG, Date Goal Reviewed   11/09/17  -RW    Stair Training Goal PT STG, Outcome   goal met  -RW    Stair Training PT LTG    Stair Training Goal PT LTG, Date Goal Reviewed 11/13/17  -LM 11/10/17  -JA 11/09/17  -RW    Stair Training Goal PT LTG, Outcome goal ongoing  -LM goal ongoing  -JA goal ongoing  -RW      11/08/17 1119 11/07/17 1357 11/06/17 1415    Transfer Training PT LTG    Transfer Training PT  LTG, Date Goal Reviewed  11/07/17  -RW 11/06/17  -RW    Transfer Training PT LTG, Outcome  goal met  -RW goal ongoing  -RW    Gait Training PT LTG    Gait Training Goal PT LTG, Date Goal Reviewed 11/08/17  -RW 11/07/17  -RW 11/06/17  -RW    Gait Training Goal PT LTG, Outcome goal ongoing  -RW  goal ongoing  -RW    Stair Training PT STG    Stair Training Goal PT STG, Date Goal Reviewed 11/08/17  -RW 11/07/17  -RW 11/06/17  -RW    Stair Training Goal PT STG, Outcome goal ongoing  -RW goal  partially met   needed 2 hr  -RW goal ongoing  -RW    Stair Training PT LTG    Stair Training Goal PT LTG, Date Goal Reviewed 11/08/17  -RW 11/07/17  -RW 11/06/17  -RW    Stair Training Goal PT LTG, Outcome goal ongoing  -RW goal partially met   needed cga  -RW goal ongoing  -RW      11/04/17 1440 11/03/17 1540 11/02/17 1631    Bed Mobility PT LTG    Bed Mobility PT LTG, Date Goal Reviewed   11/02/17  -RW    Bed Mobility PT LTG, Outcome   goal met  -RW    Transfer Training PT LTG    Transfer Training PT  LTG, Date Goal Reviewed 11/04/17  -JW 11/03/17  -RW 11/02/17  -RW    Transfer Training PT LTG, Outcome goal ongoing  -JW goal ongoing  -RW goal ongoing  -RW    Gait Training PT LTG    Gait Training Goal PT LTG, Date Goal Reviewed 11/04/17  -JW 11/03/17  -RW 11/02/17  -RW    Gait Training Goal PT LTG, Outcome goal ongoing  -JW goal ongoing  -RW goal ongoing  -RW    Stair Training PT STG    Stair Training Goal PT STG, Date Goal Reviewed 11/04/17  -JW 11/03/17  -RW 11/02/17  -RW    Stair Training Goal PT STG, Outcome goal ongoing  -JW goal ongoing  -RW goal ongoing  -RW    Stair Training PT LTG    Stair Training Goal PT LTG, Date Established 11/04/17  -JW      Stair Training Goal PT LTG, Date Goal Reviewed 11/04/17  -JW 11/03/17  -RW 11/02/17  -RW    Stair Training Goal PT LTG, Outcome goal ongoing  -JW goal ongoing  -RW goal ongoing  -RW      11/01/17 1543 10/31/17 0825       Bed Mobility PT LTG    Bed Mobility PT LTG, Date Established  10/31/17  -LM     Bed Mobility PT LTG, Time to Achieve  by discharge  -LM     Bed Mobility PT LTG, Activity Type  supine to sit/sit to supine  -LM     Bed Mobility PT LTG, [place] Level  supervision required  -LM     Bed Mobility PT LTG, Additional Goal  HOB flat; No BR's  -LM     Bed Mobility PT LTG, Date Goal Reviewed 11/01/17  -RW      Bed Mobility PT LTG, Outcome goal ongoing  -RW goal ongoing  -LM     Transfer Training PT LTG    Transfer Training PT LTG, Date Established   10/31/17  -LM     Transfer Training PT LTG, Time to Achieve  by discharge  -LM     Transfer Training PT LTG, Activity Type  bed to chair /chair to bed  -LM     Transfer Training PT LTG, [place] Level  supervision required  -LM     Transfer Training PT LTG, Assist Device  --   AAD  -LM     Transfer Training PT  LTG, Date Goal Reviewed 11/01/17  -RW      Transfer Training PT LTG, Outcome goal ongoing  -RW goal ongoing  -LM     Gait Training PT LTG    Gait Training Goal PT LTG, Date Established  10/31/17  -LM     Gait Training Goal PT LTG, Time to Achieve  by discharge  -LM     Gait Training Goal PT LTG, [place] Level  supervision required  -LM     Gait Training Goal PT LTG, Assist Device  --   AAD  -LM     Gait Training Goal PT LTG, Distance to Achieve  150 feet  -LM     Gait Training Goal PT LTG, Date Goal Reviewed 11/01/17  -RW      Gait Training Goal PT LTG, Outcome goal ongoing  -RW goal ongoing  -LM     Stair Training PT STG    Stair Training Goal PT STG, Date Established  10/31/17  -LM     Stair Training Goal PT STG, Time to Achieve  4 days  -LM     Stair Training Goal PT STG, Number of Steps  4 steps  -LM     Stair Training Goal PT STG, [place] Level  contact guard assist  -LM     Stair Training Goal PT STG, Assist Device  1 handrail  -LM     Stair Training Goal PT STG, Date Goal Reviewed 11/01/17  -RW      Stair Training Goal PT STG, Outcome goal ongoing  -RW goal ongoing  -LM     Stair Training PT LTG    Stair Training Goal PT LTG, Date Established  10/31/17  -LM     Stair Training Goal PT LTG, Time to Achieve  by discharge  -LM     Stair Training Goal PT LTG, Number of Steps  1 flight  -LM     Stair Training Goal PT LTG, [place] Level  supervision required  -LM     Stair Training Goal PT LTG, Assist Device  2 handrails  -LM     Stair Training Goal PT LTG, Date Goal Reviewed 11/01/17  -RW      Stair Training Goal PT LTG, Outcome goal ongoing  -RW goal ongoing  -LM       User Key  (r) =  Recorded By, (t) = Taken By, (c) = Cosigned By    Initials Name Provider Type    JENA Haynes, PTA Physical Therapy Assistant    MOSHE Mckeon, PT Physical Therapist    RADHA Flores, PTA Physical Therapy Assistant    RW Pipe Gruber, PTA Physical Therapy Assistant                Outcome Measures       11/13/17 1030          How much help from another person do you currently need...    Turning from your back to your side while in flat bed without using bedrails? 4  -LM      Moving from lying on back to sitting on the side of a flat bed without bedrails? 3  -LM      Moving to and from a bed to a chair (including a wheelchair)? 3  -LM      Standing up from a chair using your arms (e.g., wheelchair, bedside chair)? 3  -LM      Climbing 3-5 steps with a railing? 3  -LM      To walk in hospital room? 3  -LM      AM-PAC 6 Clicks Score 19  -LM      Functional Assessment    Outcome Measure Options AM-PAC 6 Clicks Basic Mobility (PT)  -LM        User Key  (r) = Recorded By, (t) = Taken By, (c) = Cosigned By    Initials Name Provider Type    MOSHE Mckeon PT Physical Therapist           Time Calculation:         PT Charges       11/13/17 1030          Time Calculation    Start Time 1030  -LM      Stop Time 1119  -LM      Time Calculation (min) 49 min  -LM      PT Received On 11/13/17  -LM      PT Goal Re-Cert Due Date 11/26/17  -LM      Time Calculation- PT    Total Timed Code Minutes- PT 49 minute(s)  -LM        User Key  (r) = Recorded By, (t) = Taken By, (c) = Cosigned By    Initials Name Provider Type    MOSHE Mckeon PT Physical Therapist          Therapy Charges for Today     Code Description Service Date Service Provider Modifiers Qty    01924648160 HC GAIT TRAINING EA 15 MIN 11/13/2017 Landy Mckeon PT GP 3          PT G-Codes  PT Professional Judgement Used?: Yes  Outcome Measure Options: AM-PAC 6 Clicks Basic Mobility (PT)  Score: 15  Functional Limitation: Mobility: Walking and moving  around  Mobility: Walking and Moving Around Current Status (): At least 40 percent but less than 60 percent impaired, limited or restricted  Mobility: Walking and Moving Around Goal Status (): At least 1 percent but less than 20 percent impaired, limited or restricted      Landy Vanegas, PT  11/13/2017

## 2017-11-14 VITALS
TEMPERATURE: 96.1 F | HEART RATE: 79 BPM | BODY MASS INDEX: 28.96 KG/M2 | RESPIRATION RATE: 18 BRPM | WEIGHT: 202.3 LBS | HEIGHT: 70 IN | SYSTOLIC BLOOD PRESSURE: 129 MMHG | DIASTOLIC BLOOD PRESSURE: 66 MMHG | OXYGEN SATURATION: 96 %

## 2017-11-14 LAB — GLUCOSE BLDC GLUCOMTR-MCNC: 96 MG/DL (ref 70–130)

## 2017-11-14 PROCEDURE — 99238 HOSP IP/OBS DSCHRG MGMT 30/<: CPT | Performed by: FAMILY MEDICINE

## 2017-11-14 PROCEDURE — 97535 SELF CARE MNGMENT TRAINING: CPT

## 2017-11-14 PROCEDURE — 97116 GAIT TRAINING THERAPY: CPT

## 2017-11-14 PROCEDURE — 97110 THERAPEUTIC EXERCISES: CPT

## 2017-11-14 PROCEDURE — 82962 GLUCOSE BLOOD TEST: CPT

## 2017-11-14 PROCEDURE — 25010000002 ENOXAPARIN PER 10 MG: Performed by: FAMILY MEDICINE

## 2017-11-14 PROCEDURE — 97530 THERAPEUTIC ACTIVITIES: CPT

## 2017-11-14 RX ORDER — FINASTERIDE 5 MG/1
5 TABLET, FILM COATED ORAL DAILY
Qty: 30 TABLET | Refills: 0 | Status: SHIPPED | OUTPATIENT
Start: 2017-11-14

## 2017-11-14 RX ORDER — METOPROLOL SUCCINATE 50 MG/1
50 TABLET, EXTENDED RELEASE ORAL DAILY
Qty: 30 TABLET | Refills: 1 | Status: SHIPPED | OUTPATIENT
Start: 2017-11-14

## 2017-11-14 RX ORDER — FLUTICASONE PROPIONATE 50 MCG
2 SPRAY, SUSPENSION (ML) NASAL DAILY
Qty: 1 BOTTLE | Refills: 1 | Status: SHIPPED | OUTPATIENT
Start: 2017-11-14

## 2017-11-14 RX ORDER — LISINOPRIL 10 MG/1
10 TABLET ORAL DAILY
Qty: 30 TABLET | Refills: 1
Start: 2017-11-14

## 2017-11-14 RX ORDER — CLOPIDOGREL BISULFATE 75 MG/1
75 TABLET ORAL DAILY
Qty: 30 TABLET | Refills: 1 | Status: SHIPPED | OUTPATIENT
Start: 2017-11-14

## 2017-11-14 RX ORDER — AMLODIPINE BESYLATE 10 MG/1
10 TABLET ORAL NIGHTLY
Qty: 30 TABLET | Refills: 0 | Status: SHIPPED | OUTPATIENT
Start: 2017-11-14

## 2017-11-14 RX ORDER — ATORVASTATIN CALCIUM 10 MG/1
10 TABLET, FILM COATED ORAL NIGHTLY
Qty: 30 TABLET | Refills: 0 | Status: SHIPPED | OUTPATIENT
Start: 2017-11-14

## 2017-11-14 RX ORDER — LANOLIN ALCOHOL/MO/W.PET/CERES
1 CREAM (GRAM) TOPICAL DAILY
Qty: 1 TABLET | Refills: 1 | Status: SHIPPED | OUTPATIENT
Start: 2017-11-14

## 2017-11-14 RX ADMIN — PANTOPRAZOLE SODIUM 40 MG: 40 TABLET, DELAYED RELEASE ORAL at 05:12

## 2017-11-14 RX ADMIN — HYDROCORTISONE: 1 CREAM TOPICAL at 09:21

## 2017-11-14 RX ADMIN — GLIPIZIDE 10 MG: 10 TABLET ORAL at 09:21

## 2017-11-14 RX ADMIN — FLUTICASONE PROPIONATE 2 SPRAY: 50 SPRAY, METERED NASAL at 09:20

## 2017-11-14 RX ADMIN — LISINOPRIL 10 MG: 10 TABLET ORAL at 09:20

## 2017-11-14 RX ADMIN — FINASTERIDE 5 MG: 5 TABLET, FILM COATED ORAL at 09:20

## 2017-11-14 RX ADMIN — POTASSIUM CHLORIDE 10 MEQ: 750 CAPSULE, EXTENDED RELEASE ORAL at 09:20

## 2017-11-14 RX ADMIN — THERA TABS 1 TABLET: TAB at 09:21

## 2017-11-14 RX ADMIN — ASPIRIN 81 MG 81 MG: 81 TABLET ORAL at 09:20

## 2017-11-14 RX ADMIN — CLOPIDOGREL BISULFATE 75 MG: 75 TABLET ORAL at 09:20

## 2017-11-14 RX ADMIN — METFORMIN HYDROCHLORIDE 1000 MG: 500 TABLET ORAL at 09:21

## 2017-11-14 RX ADMIN — MAGNESIUM OXIDE TAB 400 MG (241.3 MG ELEMENTAL MG) 400 MG: 400 (241.3 MG) TAB at 09:21

## 2017-11-14 RX ADMIN — ENOXAPARIN SODIUM 40 MG: 40 INJECTION SUBCUTANEOUS at 09:20

## 2017-11-14 NOTE — DISCHARGE PLACEMENT REQUEST
"Faith Harper Jr. (82 y.o. Male)     Date of Birth Social Security Number Address Home Phone MRN    1934  [address] [facility] [address] [phone] [id number]    Worship Marital Status          None        Admission Date Admission Type Admitting Provider Attending Provider Department, Room/Bed    10/30/17 Elective Vishnu Mckinnon MD Hargrove, Kenneth R, MD [facility], 529/1    Discharge Date Discharge Disposition Discharge Destination         Home or Self Care             Attending Provider: Vishnu Mckinnon MD     Allergies:  Levaquin [Levofloxacin]    Isolation:  None   Infection:  None   Code Status:  FULL    Ht:  70\" (177.8 cm)   Wt:  202 lb 4.8 oz (91.8 kg)    Admission Cmt:  None   Principal Problem:  Right-sided lacunar stroke [I63.9]                 Active Insurance as of 10/30/2017     Primary Coverage     Payor Plan Insurance Group Employer/Plan Group    HUMANA MEDICARE REPLACEMENT HUMANA MEDICARE REPL [id number]     Payor Plan Address Payor Plan Phone Number Effective From Effective To    [address] [address] [phone] 1/1/2015     [place] [address]       Subscriber Name Subscriber Birth Date Member ID       FAITH HARPER JR. 1934 [id number]                 Emergency Contacts      (Rel.) Home Phone Work Phone Mobile Phone    Maxine Harper (Spouse) -- -- [phone]    Nika Chow (Daughter) [phone] -- [phone]            Emergency Contact Information     Name Relation Home Work Mobile    Maxine Harper Spouse   [phone]    Nika Chow Daughter [phone]          Insurance Information                HUMANA MEDICARE REPLACEMENT/HUMANA MEDICARE REPL Phone: [phone]    Subscriber: Faith Harper  Subscriber#: [id number]    Group#: [id number] Precert#:              History & Physical      Vishnu Mckinnon MD at 10/30/2017  3:29 PM           [facility]" [address] [address]  T - [phone]     H/P NOTE         SUBJECTIVE:   Patient Care Team:  Evan Marrero MD as PCP - General (Family Medicine)    Chief Complaint:   Acute stroke with left-sided weakness,ataxia.  In addition he is having some confusion    Subjective     Patient is 82 y.o. male presents with Recurrent falls and left-sided weakness.  Over the 1-2 weeks prior to his admission he had falls and left-sided weakness but he did not tell his family.  Eventually he was taken to the hospital for evaluation.  Workup consistently showed left-sided weakness.  Dr. Cordova of neurology saw him in consultation and thought that he had a lacunar infarct as the cause of his left-sided weakness.  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  Since his admission he has had some incontinence of urine because he can't hold his urine and he is having redness in the groin area.      ROS/HISTORY/ CURRENT MEDICATIONS/OBJECTIVE/VS/PE:       History:     Past Medical History:   Diagnosis Date   • Arthritis    • Chronic anxiety    • Chronic back pain greater than 3 months duration    • Degenerative joint disease involving multiple joints    • Diabetes    • Diabetes mellitus    • Essential hypertension    • Gout    • Hypertension    • Left-sided weakness 10/2017   • Prostatic hypertrophy    • Right-sided lacunar stroke 10/2017     Past Surgical History:   Procedure Laterality Date   • CATARACT EXTRACTION     • KNEE SURGERY     • TOTAL KNEE ARTHROPLASTY     • UMBILICAL HERNIA REPAIR       No family history on file.  Social History   Substance Use Topics   • Smoking status: Former Smoker   • Smokeless tobacco: Never Used      Comment: 30 pack year history   • Alcohol use No     Prescriptions Prior to Admission   Medication Sig Dispense Refill Last Dose   • Acetaminophen 500 MG coapsule       •  Cholecalciferol (VITAMIN D PO)       • Homeopathic Products (ALLERGY MEDICINE PO)       • HYDROcodone-acetaminophen (NORCO) 7.5-325 MG per tablet       • Multiple Vitamin (MULTI VITAMIN DAILY PO)         Allergies:  Levaquin [levofloxacin]    Current Medications:     Current Facility-Administered Medications:   •  acetaminophen (TYLENOL) tablet 650 mg, 650 mg, Oral, Q4H PRN, Vishnu Mckinnon MD  •  ALPRAZolam (XANAX) tablet 0.5 mg, 0.5 mg, Oral, Nightly PRN, Vishnu Mckinnon MD  •  amLODIPine (NORVASC) tablet 10 mg, 10 mg, Oral, Nightly, Vishnu Mckinnon MD  •  [START ON 10/31/2017] aspirin chewable tablet 81 mg, 81 mg, Oral, Daily, Vishnu Mckinnon MD  •  atorvastatin (LIPITOR) tablet 10 mg, 10 mg, Oral, Nightly, Vishnu Mckinnon MD  •  calcium carbonate (TUMS) chewable tablet 500 mg (200 mg elemental), 2 tablet, Oral, BID PRN, Vishnu Mckinnon MD  •  [START ON 10/31/2017] clopidogrel (PLAVIX) tablet 75 mg, 75 mg, Oral, Daily, Vishnu Mckinnon MD  •  enoxaparin (LOVENOX) syringe 40 mg, 40 mg, Subcutaneous, Daily, Vishnu Mckinnon MD  •  [START ON 10/31/2017] glipiZIDE (GLUCOTROL) tablet 10 mg, 10 mg, Oral, QAM AC, Vishnu Mckinnon MD  •  HYDROcodone-acetaminophen (NORCO) 7.5-325 MG per tablet 1 tablet, 1 tablet, Oral, Q8H PRN, Vishnu Mckinnon MD  •  [START ON 10/31/2017] lisinopril (PRINIVIL,ZESTRIL) tablet 10 mg, 10 mg, Oral, Daily With Breakfast, Vishnu Mckinnon MD  •  [START ON 10/31/2017] magnesium oxide (MAGOX) tablet 400 mg, 400 mg, Oral, Daily, Vishnu Mckinnon MD  •  metFORMIN (GLUCOPHAGE) tablet 1,000 mg, 1,000 mg, Oral, BID With Meals, Vishnu Mckinnon MD  •  metoprolol succinate XL (TOPROL-XL) 24 hr tablet 50 mg, 50 mg, Oral, Nightly, Vishnu Mckinnon MD  •  [START ON 10/31/2017] multivitamin with beta carotene tablet 1 tablet, 1 tablet, Oral, Daily, Vishnu Mckinnon MD  •  [START ON 10/31/2017] pantoprazole (PROTONIX) EC tablet 40 mg, 40 mg, Oral, Q AM,  Vishnu Mckinnon MD  •  terazosin (HYTRIN) capsule 5 mg, 5 mg, Oral, Nightly, Vishnu Mckinnon MD      REVIEW OF SYSTEMS:  Review of Systems   HENT: Positive for congestion and voice change. Negative for facial swelling, hearing loss, mouth sores, nosebleeds, postnasal drip and trouble swallowing.    Eyes: Negative.    Respiratory: Negative.    Cardiovascular: Negative.    Gastrointestinal: Negative.         He has loose bowels on metformin   Endocrine: Negative.    Genitourinary: Positive for frequency and urgency.   Musculoskeletal: Positive for arthralgias, back pain, gait problem and joint swelling.   Skin: Negative.    Allergic/Immunologic: Negative.    Neurological: Positive for speech difficulty and weakness. Negative for dizziness, tremors, seizures, syncope, facial asymmetry, light-headedness, numbness and headaches.        Initially he had some significant difficulty with speech and left-sided weakness both have improved.  Daughter says when I saw him he woke and his speech was difficult to understand she says that usually occurs with fatigue now   Hematological: Negative.    Psychiatric/Behavioral: Positive for confusion. The patient is nervous/anxious.         He has been having some confusion at night.  Daughter says last night was the first night that he did not awake with confusion       Objective     Physical Exam:   Temp:  [97.3 °F (36.3 °C)] 97.3 °F (36.3 °C)  Heart Rate:  [72] 72  Resp:  [20] 20  BP: (144)/(76) 144/76    Physical Exam:    Physical Exam   Constitutional: He is oriented to person, place, and time. He appears well-developed and well-nourished. No distress.   HENT:   Head: Normocephalic.   Eyes: Conjunctivae and EOM are normal. Pupils are equal, round, and reactive to light. Right eye exhibits no discharge. Left eye exhibits no discharge. No scleral icterus.   Neck: Normal range of motion. Neck supple. No JVD present. No tracheal deviation present. No thyromegaly present.    Cardiovascular: Normal rate and normal heart sounds.  Exam reveals no gallop and no friction rub.    No murmur heard.  He does have an irregular rhythm   Pulmonary/Chest: Effort normal. No stridor. No respiratory distress. He has no wheezes. He has no rales. He exhibits no tenderness.   Abdominal: Soft. Bowel sounds are normal. He exhibits no distension. There is no tenderness. There is no guarding.   Musculoskeletal: Normal range of motion. He exhibits edema. He exhibits no tenderness or deformity.   Neurological: He is alert and oriented to person, place, and time. He displays abnormal reflex. No cranial nerve deficit. He exhibits abnormal muscle tone. Coordination abnormal.   He has weakness in drift on the left upper and lower extremity.  He is able to resist gravity with both left upper and lower   Skin: Skin is warm and dry. No rash noted. He is not diaphoretic. No erythema. No pallor.   Psychiatric: He has a normal mood and affect. His behavior is normal. Judgment and thought content normal.   Nursing note and vitals reviewed.           Results Review:      Lab Results (last 24 hours)     ** No results found for the last 24 hours. **                          Imaging Results (last 24 hours)     ** No results found for the last 24 hours. **          I reviewed the patient's  clinical results.  I reviewed the patient's  imaging results and agree with the interpretation.   I reviewed the therapy notes   ASSESSMENT/PLAN:   Assessment/Plan   Principal Problem:    Right-sided lacunar stroke  Active Problems:    Left-sided weakness    Atrial fibrillation, chronic    Essential hypertension    Diabetes mellitus    Chronic anxiety    Chronic back pain greater than 3 months duration    Degenerative joint disease involving multiple joints    Encounter for rehabilitation    Obstructive sleep apnea syndrome    Former smoker      He will be admitted to the acute rehabilitation unit for intensive rehabilitation.  He'll need  "close medical supervision because of his multiple polyps.  He should be able to participate 3 hours a day in therapy make measurable improvement and be able to return home at discharge with his family.  Family will bring his CPAP equipment to help with his sleep.  Also discussed that anxiety and some confusion at night and hopefully that will improve but it may be worse first night here.  We will control his diabetes and his hypertension as well as benign prostatic hypertrophy.  Suspect he is in atrial fibrillation and the decision was made by neurology and hospitalist not to anticoagulate him but we will continue Lovenox while he is in acute rehabilitation.  I am going to get an EKG to document his rhythm which is atrial fibrillation seems to be rate controlled    I discussed the patients findings and my recommendations with patient and family.      Vishnu Mckinnon MD  10/30/17  3:29 PM          Electronically signed by Vishnu Mckinnon MD at 2017 10:41 AM        Physician Progress Notes (last 24 hours) (Notes from 2017  9:19 AM through 2017  9:19 AM)     No notes of this type exist for this encounter.          [facility]  [address] [place] [id number]  Phone:  [phone]  Fax:   Date Ordered: 2017         Patient:  Kenneth Harper Jr. MRN:  [id number]   [address] [facility] [address] :  1934  SSN:    Phone: [phone] Sex:  M     Weight: 202 lb 4.8 oz (91.8 kg)         Ht Readings from Last 1 Encounters:   10/30/17 70\" (177.8 cm)         Walker                         (Order ID: [id number])    Diagnosis:  Abnormality of gait and mobility (R26.9 [ICD-10-CM] 781.2 [ICD-9-CM])  Right-sided lacunar stroke (I63.9 [ICD-10-CM] 434.91 [ICD-9-CM])   Quantity:  1     Equipment:  Standard Walker Folding  Length of Need (99 Months = Lifetime): 99 Months = Lifetime            Authorizing Provider:Vishnu NEWSOME" MD Pratibha  Authorizing Provider's NPI: [id number]  Order Entered By: Vishnu Mckinnon MD 11/14/2017  8:34 AM     Electronically signed by: Vishnu Mckinnon MD 11/14/2017  8:34 AM              Physical Therapy Notes (last 24 hours) (Notes from 11/13/2017  9:19 AM through 11/14/2017  9:19 AM)      Landy Mckeon, PT at 11/13/2017 11:53 AM  Version 1 of 1         Problem: Patient Care Overview (Adult)  Goal: Plan of Care Review  Outcome: Ongoing (interventions implemented as appropriate)    11/13/17 1030   Coping/Psychosocial Response Interventions   Plan Of Care Reviewed With patient   Outcome Evaluation   Outcome Summary/Follow up Plan 14 day progress note completed. Pt ambulated multiple gait cycles with the longest being 200 feet with SBA. Pt ambulated up/down 10 stairs using one handrail mainly with SBA but had one LOB requiring CGA. Pt to d/c home tomorrow with spouse - pt feels ready and has no concerns re: d/c.       Goal: Discharge Needs Assessment  Outcome: Ongoing (interventions implemented as appropriate)    11/13/17 1030   Discharge Needs Assessment   Discharge Facility/Level Of Care Needs home with home health         Problem: Inpatient Physical Therapy  Goal: Gait Training Goal LTG- PT  Outcome: Outcome(s) achieved Date Met:  11/13/17    10/31/17 0825 11/13/17 1030   Gait Training PT LTG   Gait Training Goal PT LTG, Date Established 10/31/17 --    Gait Training Goal PT LTG, Time to Achieve by discharge --    Gait Training Goal PT LTG, [place] Level supervision required --    Gait Training Goal PT LTG, Assist Device (AAD) --    Gait Training Goal PT LTG, Distance to Achieve 150 feet --    Gait Training Goal PT LTG, Date Goal Reviewed --  11/13/17   Gait Training Goal PT LTG, Outcome --  goal met       Goal: Stair Training Goal LTG- PT  Outcome: Ongoing (interventions implemented as appropriate)    10/31/17 0825 11/04/17 1440 11/13/17 1030   Stair Training PT LTG   Stair Training Goal PT LTG,  Date Established --  17 --    Stair Training Goal PT LTG, Time to Achieve by discharge --  --    Stair Training Goal PT LTG, Number of Steps 1 flight --  --    Stair Training Goal PT LTG, [place] Level supervision required --  --    Stair Training Goal PT LTG, Assist Device 2 handrails --  --    Stair Training Goal PT LTG, Date Goal Reviewed --  --  17   Stair Training Goal PT LTG, Outcome --  --  goal ongoing              Electronically signed by Landy Mckeon PT at 2017 11:53 AM      Landy Mckeon, PT at 2017 11:53 AM  Version 1 of 1         Inpatient Rehabilitation - Physical Therapy Progress Note  [facility]     Patient Name: Kenneth Harper Jr.  : 1934  MRN: [id number]  Today's Date: 2017   Onset of Illness/Injury or Date of Surgery Date: 10/30/17  Date of Referral to PT: 10/30/17  Referring Physician: TERRENCE Mckinnon MD      Admit Date: 10/30/2017     Visit Dx:    ICD-10-CM ICD-9-CM   1. Right-sided lacunar stroke I63.9 434.91   2. Symbolic dysfunction R48.9 784.60   3. Impaired mobility and ADLs Z74.09 799.89   4. Abnormality of gait and mobility R26.9 781.2   5. Muscle weakness (generalized) M62.81 728.87   6. Left-sided weakness R53.1 728.87   7. Chronic back pain greater than 3 months duration M54.9 724.5    G89.29 338.29     Patient Active Problem List   Diagnosis   • Spinal stenosis, lumbar region, with neurogenic claudication   • Former smoker   • Overweight   • Left-sided weakness   • Right-sided lacunar stroke   • Essential hypertension   • Diabetes mellitus   • Chronic anxiety   • Chronic back pain greater than 3 months duration   • Prostatic hypertrophy   • Degenerative joint disease involving multiple joints   • Atrial fibrillation, chronic   • Encounter for rehabilitation   • Obstructive sleep apnea syndrome     Past Medical History:   Diagnosis Date   • Arthritis    • Chronic anxiety    • Chronic back pain greater than 3 months duration    •  Degenerative joint disease involving multiple joints    • Diabetes    • Diabetes mellitus    • Essential hypertension    • Gout    • Hypertension    • Left-sided weakness 10/2017   • Prostatic hypertrophy    • Right-sided lacunar stroke 10/2017     Past Surgical History:   Procedure Laterality Date   • CATARACT EXTRACTION     • KNEE SURGERY     • TOTAL KNEE ARTHROPLASTY     • UMBILICAL HERNIA REPAIR            PT ASSESSMENT (last 72 hours)      PT Evaluation       11/13/17 1030 11/13/17 0738    Rehab Evaluation    Document Type progress note  -LM progress note  -BH    Subjective Information agree to therapy;no complaints  -LM agree to therapy  -BH    Patient Effort, Rehab Treatment good  -LM     Symptoms Noted During/After Treatment none  -LM     General Information    Patient Profile Review yes  -LM yes  -BH    General Observations Pt sitting up in w/c playing on laptop.  Pt very pleasant and agreeable to PT 14 day progress note.  -LM Pt up in w/c already dressed and bath. On room air  -BH    Precautions/Limitations fall precautions  -LM fall precautions  -BH    Vital Signs    Pre Systolic BP Rehab 121  -  -BH    Pre Treatment Diastolic BP 57  -LM 58  -BH    Post Systolic BP Rehab 139  -  -BH    Post Treatment Diastolic BP 61  -LM 61  -BH    Pretreatment Heart Rate (beats/min) 59  -LM 59  -BH    Posttreatment Heart Rate (beats/min) 60  -LM 44  -BH    Pre SpO2 (%) 95  -LM 97  -BH    O2 Delivery Pre Treatment room air  -LM room air  -BH    Post SpO2 (%) 98  -LM 95  -BH    O2 Delivery Post Treatment room air  -LM room air  -BH    Pre Patient Position Sitting  -LM Sitting  -BH    Post Patient Position Sitting  -LM     Pain Assessment    Pain Assessment No/denies pain  -LM     Pain Score  0  -BH    Post Pain Score  0  -BH    Cognitive Assessment/Intervention    Current Cognitive/Communication Assessment functional  -LM functional  -BH    Orientation Status oriented x 4  -LM oriented x 4  -BH    Follows  Commands/Answers Questions 100% of the time  -LM     Personal Safety WNL/WFL  -LM     Personal Safety Interventions fall prevention program maintained;gait belt;nonskid shoes/slippers when out of bed;muscle strengthening facilitated  -     ROM (Range of Motion)    General ROM  upper extremity range of motion deficits identified  -    General ROM Detail BLE's AROM WFL with exception of L ankle DF  -LM RUE WFL not full range LUE approx 70 degrees can get higher with compensation. rest WFL, fair+ digit to thumb opposition and digit to nose. Some difficulty with range but mostly WFL  -    MMT (Manual Muscle Testing)    General MMT Assessment  upper extremity strength deficits identified  -    General MMT Assessment Detail LLE - Hip flex - 4/5; Knee flex - 4/5; Knee ext - 4+/5; Ankle DF - 2+/5; RLE - Hip flex - 4+/5; Knee flex - 4/5; Knee ext - 4+/5; Ankle DF - 4/5  - BUE  grossly 4 to 4+/5; BUE grossly 4 to 4+/5 in limited range  -    Bed Mobility, Assessment/Treatment    Bed Mobility, Comment Pt up in chair.  -LM     Transfer Assessment/Treatment    Transfers, Sit-Stand [place] stand by assist  -LM     Transfers, Stand-Sit [place] stand by assist  -LM     Transfers, Sit-Stand-Sit, Assist Device rolling walker  -     Gait Assessment/Treatment    Gait, [place] Level stand by assist  -     Gait, Assistive Device rolling walker  -     Gait, Distance (Feet) 200   1st - 200 feet; 2nd - 150 feet; 3rd - 150 feet  -     Gait, Gait Deviations left:;toe-to-floor clearance decreased  -     Gait, Impairments strength decreased;impaired balance  -     Stairs Assessment/Treatment    Number of Stairs 10  -LM     Stairs, Handrail Location left side (ascending)  -LM     Stairs, [place] Level supervision required;contact guard assist  -     Stairs, Comment Pt ambulated up/down flight of stairs.  Pt mainly SBA - however, when descending steps pt had one LOB when his foot did not clear and  PT had to grab onto belt to steady pt.  -LM     Wheelchair Training/Management    Wheelchair, Distance Propelled 150 feet x 2 - Mod I  -LM     Sensory Assessment/Intervention    Light Touch  LUE;RUE  -BH    LUE Light Touch  WNL  -BH    RUE Light Touch  WNL  -BH    Positioning and Restraints    Pre-Treatment Position sitting in chair/recliner  -LM     Post Treatment Position wheelchair  -LM     In Wheelchair sitting;call light within reach;encouraged to call for assist  -LM       11/12/17 1945 11/12/17 0707    Sensory Assessment/Intervention    Light Touch LUE;RUE  -NW LUE;RUE  -MK    LUE Light Touch mild impairment  -NW mild impairment  -    RUE Light Touch WNL  -NW WNL  -MK    LLE Light Touch mild impairment  -NW mild impairment  -    RLE Light Touch mild impairment  -NW mild impairment  -      11/11/17 2000 11/11/17 0716    Pain Assessment    Pain Score 0  -NW     Sensory Assessment/Intervention    Light Touch LUE;RUE  -NW LUE;RUE  -MK    LUE Light Touch mild impairment  -NW mild impairment  -    RUE Light Touch WNL  - WNL  -    LLE Light Touch mild impairment  -NW mild impairment  -    RLE Light Touch mild impairment  -NW mild impairment  -      11/10/17 2000 11/10/17 1355    Rehab Evaluation    Document Type  therapy note (daily note)  -JA    Subjective Information  agree to therapy  -JA    Patient Effort, Rehab Treatment  good  -    General Information    Precautions/Limitations  fall precautions  -    Vital Signs    Pre Patient Position  Sitting  -JA    Intra Patient Position  Standing  -JA    Post Patient Position  Sitting  -JA    Pain Assessment    Pain Assessment  No/denies pain  -JA    Pain Score 0  -NW     Transfer Assessment/Treatment    Transfers, Sit-Stand [place]  stand by assist  -JA    Transfers, Stand-Sit [place]  stand by assist  -JA    Transfers, Sit-Stand-Sit, Assist Device  rolling walker  -JA    Gait Assessment/Treatment    Gait, [place] Level  stand by  assist  -JA    Gait, Assistive Device  rolling walker  -JA    Gait, Distance (Feet)  120  -JA    Wheelchair Training/Management    Wheelchair, Distance Propelled  150  -JA    Wheelchair Training Comment  conditional independent  -    Therapy Exercises    Bilateral Lower Extremities  AROM:;10 reps;standing;hip abduction/adduction;hip flexion;mini squats;heel raises;other reps  -JA    BLE Other Reps  --   Pro 2 level 4.0 12mins   -JA    Sensory Assessment/Intervention    Light Touch LUE;RUE  -NW     LUE Light Touch mild impairment  -NW     RUE Light Touch WNL  -NW     LLE Light Touch mild impairment  -NW     RLE Light Touch mild impairment  -NW     Positioning and Restraints    Pre-Treatment Position  sitting in chair/recliner  -JA    Post Treatment Position  wheelchair  -JA    In Wheelchair  sitting;with nsg  -JA      User Key  (r) = Recorded By, (t) = Taken By, (c) = Cosigned By    Initials Name Provider Type     Landy Mckeon, PT Physical Therapist     Karoline Huang, OTR/L Occupational Therapist    NW Grisel Polanco, MAN Registered Nurse    KANDACE Avila, RN Registered Nurse    RADHA Flores PTA Physical Therapy Assistant          Physical Therapy Education     Title: PT OT SLP Therapies (Active)     Topic: Physical Therapy (Active)     Point: Mobility training (Done)    Learning Progress Summary    Learner Readiness Method Response Comment Documented by Status   Patient Acceptance E VU again reviewed correct gt and transfer safey.  11/03/17 0910 Done    Acceptance E VU reviewed safe transfers and risks of falls RW 11/01/17 1052 Done    Acceptance E NR Reviewed safety with transfers and ambulation.  10/31/17 1506 Active               Point: Precautions (Done)    Learning Progress Summary    Learner Readiness Method Response Comment Documented by Status   Patient Acceptance E VU again reviewed correct gt and transfer safey.  11/03/17 0910 Done    Acceptance E VU reviewed safe transfers and  risks of falls  11/01/17 1052 Done    Acceptance E NR Reviewed safety with transfers and ambulation.  10/31/17 1506 Active                      User Key     Initials Effective Dates Name Provider Type Discipline     06/15/16 -  Landy Mckeon, PT Physical Therapist PT    RW 10/17/16 -  Pipe Gruber, PTA Physical Therapy Assistant PT                PT Recommendation and Plan  Anticipated Equipment Needs At Discharge: front wheeled walker  Anticipated Discharge Disposition: home with /7 care, home with home health  Planned Therapy Interventions: balance training, bed mobility training, gait training, home exercise program, motor coordination training, neuromuscular re-education, patient/family education, postural re-education, ROM (Range of Motion), stair training, strengthening, stretching, transfer training, wheelchair management/propulsion training  PT Frequency: other (see comments) (5-14 times/wk)  Plan of Care Review  Plan Of Care Reviewed With: patient  Outcome Summary/Follow up Plan: 14 day progress note completed.  Pt ambulated multiple gait cycles with the longest being 200 feet with SBA.  Pt ambulated up/down 10 stairs using one handrail mainly with SBA but had one LOB requiring CGA.  Pt to d/c home tomorrow with spouse - pt feels ready and has no concerns re: d/c.          IP PT Goals       11/13/17 1030 11/10/17 1355 11/09/17 1535    Gait Training PT LTG    Gait Training Goal PT LTG, Date Goal Reviewed 11/13/17  -LM 11/10/17  -JA 11/09/17  -RW    Gait Training Goal PT LTG, Outcome goal met  -LM goal ongoing  -JA goal ongoing  -RW    Stair Training PT STG    Stair Training Goal PT STG, Date Goal Reviewed   11/09/17  -RW    Stair Training Goal PT STG, Outcome   goal met  -RW    Stair Training PT LTG    Stair Training Goal PT LTG, Date Goal Reviewed 11/13/17  -LM 11/10/17  -JA 11/09/17  -RW    Stair Training Goal PT LTG, Outcome goal ongoing  -LM goal ongoing  -JA goal ongoing  -RW      11/08/17 1119  11/07/17 1357 11/06/17 1415    Transfer Training PT LTG    Transfer Training PT  LTG, Date Goal Reviewed  11/07/17  -RW 11/06/17  -RW    Transfer Training PT LTG, Outcome  goal met  -RW goal ongoing  -RW    Gait Training PT LTG    Gait Training Goal PT LTG, Date Goal Reviewed 11/08/17  -RW 11/07/17  -RW 11/06/17  -RW    Gait Training Goal PT LTG, Outcome goal ongoing  -RW  goal ongoing  -RW    Stair Training PT STG    Stair Training Goal PT STG, Date Goal Reviewed 11/08/17  -RW 11/07/17  -RW 11/06/17  -RW    Stair Training Goal PT STG, Outcome goal ongoing  -RW goal partially met   needed 2 hr  -RW goal ongoing  -RW    Stair Training PT LTG    Stair Training Goal PT LTG, Date Goal Reviewed 11/08/17  -RW 11/07/17  -RW 11/06/17  -RW    Stair Training Goal PT LTG, Outcome goal ongoing  -RW goal partially met   needed cga  -RW goal ongoing  -RW      11/04/17 1440 11/03/17 1540 11/02/17 1631    Bed Mobility PT LTG    Bed Mobility PT LTG, Date Goal Reviewed   11/02/17  -RW    Bed Mobility PT LTG, Outcome   goal met  -RW    Transfer Training PT LTG    Transfer Training PT  LTG, Date Goal Reviewed 11/04/17  -JW 11/03/17  -RW 11/02/17  -RW    Transfer Training PT LTG, Outcome goal ongoing  -JW goal ongoing  -RW goal ongoing  -RW    Gait Training PT LTG    Gait Training Goal PT LTG, Date Goal Reviewed 11/04/17  -JW 11/03/17  -RW 11/02/17  -RW    Gait Training Goal PT LTG, Outcome goal ongoing  -JW goal ongoing  -RW goal ongoing  -RW    Stair Training PT STG    Stair Training Goal PT STG, Date Goal Reviewed 11/04/17  -JW 11/03/17  -RW 11/02/17  -RW    Stair Training Goal PT STG, Outcome goal ongoing  -JW goal ongoing  -RW goal ongoing  -RW    Stair Training PT LTG    Stair Training Goal PT LTG, Date Established 11/04/17  -JW      Stair Training Goal PT LTG, Date Goal Reviewed 11/04/17  -JW 11/03/17  -RW 11/02/17  -RW    Stair Training Goal PT LTG, Outcome goal ongoing  -JW goal ongoing  -RW goal ongoing  -RW      11/01/17 1548  10/31/17 0825       Bed Mobility PT LTG    Bed Mobility PT LTG, Date Established  10/31/17  -LM     Bed Mobility PT LTG, Time to Achieve  by discharge  -LM     Bed Mobility PT LTG, Activity Type  supine to sit/sit to supine  -LM     Bed Mobility PT LTG, [place] Level  supervision required  -LM     Bed Mobility PT LTG, Additional Goal  HOB flat; No BR's  -LM     Bed Mobility PT LTG, Date Goal Reviewed 11/01/17  -RW      Bed Mobility PT LTG, Outcome goal ongoing  -RW goal ongoing  -LM     Transfer Training PT LTG    Transfer Training PT LTG, Date Established  10/31/17  -LM     Transfer Training PT LTG, Time to Achieve  by discharge  -LM     Transfer Training PT LTG, Activity Type  bed to chair /chair to bed  -LM     Transfer Training PT LTG, [place] Level  supervision required  -LM     Transfer Training PT LTG, Assist Device  --   AAD  -LM     Transfer Training PT  LTG, Date Goal Reviewed 11/01/17  -RW      Transfer Training PT LTG, Outcome goal ongoing  -RW goal ongoing  -LM     Gait Training PT LTG    Gait Training Goal PT LTG, Date Established  10/31/17  -LM     Gait Training Goal PT LTG, Time to Achieve  by discharge  -LM     Gait Training Goal PT LTG, [place] Level  supervision required  -LM     Gait Training Goal PT LTG, Assist Device  --   AAD  -LM     Gait Training Goal PT LTG, Distance to Achieve  150 feet  -LM     Gait Training Goal PT LTG, Date Goal Reviewed 11/01/17  -RW      Gait Training Goal PT LTG, Outcome goal ongoing  -RW goal ongoing  -LM     Stair Training PT STG    Stair Training Goal PT STG, Date Established  10/31/17  -LM     Stair Training Goal PT STG, Time to Achieve  4 days  -LM     Stair Training Goal PT STG, Number of Steps  4 steps  -LM     Stair Training Goal PT STG, [place] Level  contact guard assist  -LM     Stair Training Goal PT STG, Assist Device  1 handrail  -LM     Stair Training Goal PT STG, Date Goal Reviewed 11/01/17  -RW      Stair Training Goal PT STG,  Outcome goal ongoing  -RW goal ongoing  -LM     Stair Training PT LTG    Stair Training Goal PT LTG, Date Established  10/31/17  -LM     Stair Training Goal PT LTG, Time to Achieve  by discharge  -LM     Stair Training Goal PT LTG, Number of Steps  1 flight  -LM     Stair Training Goal PT LTG, [place] Level  supervision required  -LM     Stair Training Goal PT LTG, Assist Device  2 handrails  -LM     Stair Training Goal PT LTG, Date Goal Reviewed 11/01/17  -RW      Stair Training Goal PT LTG, Outcome goal ongoing  -RW goal ongoing  -LM       User Key  (r) = Recorded By, (t) = Taken By, (c) = Cosigned By    Initials Name Provider Type    JENA Haynes, PTA Physical Therapy Assistant    MOSHE Mckeon, PT Physical Therapist    RADHA Flores, PTA Physical Therapy Assistant    RW Pipe Gruber, PTA Physical Therapy Assistant                Outcome Measures       11/13/17 1030          How much help from another person do you currently need...    Turning from your back to your side while in flat bed without using bedrails? 4  -LM      Moving from lying on back to sitting on the side of a flat bed without bedrails? 3  -LM      Moving to and from a bed to a chair (including a wheelchair)? 3  -LM      Standing up from a chair using your arms (e.g., wheelchair, bedside chair)? 3  -LM      Climbing 3-5 steps with a railing? 3  -LM      To walk in hospital room? 3  -LM      AM-PAC 6 Clicks Score 19  -LM      Functional Assessment    Outcome Measure Options AM-PAC 6 Clicks Basic Mobility (PT)  -LM        User Key  (r) = Recorded By, (t) = Taken By, (c) = Cosigned By    Initials Name Provider Type    MOSHE Mckeon, PT Physical Therapist           Time Calculation:         PT Charges       11/13/17 1030          Time Calculation    Start Time 1030  -LM      Stop Time 1119  -LM      Time Calculation (min) 49 min  -LM      PT Received On 11/13/17  -LM      PT Goal Re-Cert Due Date 11/26/17  -LM      Time  Calculation- PT    Total Timed Code Minutes- PT 49 minute(s)  -LM        User Key  (r) = Recorded By, (t) = Taken By, (c) = Cosigned By    Initials Name Provider Type    MOSHE Mckeon PT Physical Therapist          Therapy Charges for Today     Code Description Service Date Service Provider Modifiers Qty    88344104518 HC GAIT TRAINING EA 15 MIN 11/13/2017 Landy Mckeon PT GP 3          PT G-Codes  PT Professional Judgement Used?: Yes  Outcome Measure Options: AM-PAC 6 Clicks Basic Mobility (PT)  Score: 15  Functional Limitation: Mobility: Walking and moving around  Mobility: Walking and Moving Around Current Status (): At least 40 percent but less than 60 percent impaired, limited or restricted  Mobility: Walking and Moving Around Goal Status (): At least 1 percent but less than 20 percent impaired, limited or restricted      Landy Mckeon PT  11/13/2017            Electronically signed by Landy Mckeon PT at 11/13/2017 11:54 AM      Pipe Gruber, PTA at 11/13/2017  3:26 PM  Version 1 of 1         Problem: Patient Care Overview (Adult)  Goal: Plan of Care Review  Outcome: Ongoing (interventions implemented as appropriate)    11/13/17 1525   Coping/Psychosocial Response Interventions   Plan Of Care Reviewed With patient   Outcome Evaluation   Outcome Summary/Follow up Plan pt with sba for all gt and transfers. HEP provided and ready for dc home tomorrow, outpt to follow         Problem: Inpatient Physical Therapy  Goal: Stair Training Goal LTG- PT  Outcome: Ongoing (interventions implemented as appropriate)    10/31/17 0825 11/04/17 1440 11/13/17 1525   Stair Training PT LTG   Stair Training Goal PT LTG, Date Established --  11/04/17 --    Stair Training Goal PT LTG, Time to Achieve by discharge --  --    Stair Training Goal PT LTG, Number of Steps 1 flight --  --    Stair Training Goal PT LTG, [place] Level supervision required --  --    Stair Training Goal PT LTG, Assist Device 2 handrails --   --    Stair Training Goal PT LTG, Date Goal Reviewed --  --  17   Stair Training Goal PT LTG, Outcome --  --  goal ongoing              Electronically signed by Pipe Gruber PTA at 2017  3:27 PM      Pipe Gruber PTA at 2017  3:27 PM  Version 1 of 1         Inpatient Rehabilitation - Physical Therapy Treatment Note  [facility]     Patient Name: Kennteh Harper Jr.  : 1934  MRN: [id number]  Today's Date: 2017  Onset of Illness/Injury or Date of Surgery Date: 10/30/17  Date of Referral to PT: 10/30/17  Referring Physician: Dr. Mckinnon    Admit Date: 10/30/2017    Visit Dx:    ICD-10-CM ICD-9-CM   1. Right-sided lacunar stroke I63.9 434.91   2. Symbolic dysfunction R48.9 784.60   3. Impaired mobility and ADLs Z74.09 799.89   4. Abnormality of gait and mobility R26.9 781.2   5. Muscle weakness (generalized) M62.81 728.87   6. Left-sided weakness R53.1 728.87   7. Chronic back pain greater than 3 months duration M54.9 724.5    G89.29 338.29     Patient Active Problem List   Diagnosis   • Spinal stenosis, lumbar region, with neurogenic claudication   • Former smoker   • Overweight   • Left-sided weakness   • Right-sided lacunar stroke   • Essential hypertension   • Diabetes mellitus   • Chronic anxiety   • Chronic back pain greater than 3 months duration   • Prostatic hypertrophy   • Degenerative joint disease involving multiple joints   • Atrial fibrillation, chronic   • Encounter for rehabilitation   • Obstructive sleep apnea syndrome               Adult Rehabilitation Note       17 1429 17 1030 17 0738    Rehab Assessment/Intervention    Discipline physical therapy assistant  -RW physical therapist  -LM occupational therapist  -    Document Type therapy note (daily note)  -RW progress note  -LM     Subjective Information agree to therapy  -RW agree to therapy;no complaints  -LM     Patient Effort, Rehab Treatment good  -RW       Precautions/Limitations fall precautions  -RW      Recorded by [RW] Pipe Gruber PTA [LM] Landy Mckeon, PT [BH] Karoline Huang, OTR/L    Vital Signs    Pre Systolic BP Rehab  121  -LM     Pre Treatment Diastolic BP  57  -LM     Pretreatment Heart Rate (beats/min)  59  -LM     Pre SpO2 (%)  95  -LM     O2 Delivery Pre Treatment  room air  -LM     Pre Patient Position  Sitting  -LM     Recorded by  [LM] Landy Mckeon, PT     Pain Assessment    Pain Assessment No/denies pain  -RW No/denies pain  -LM     Recorded by [RW] Pipe Gruber PTA [LM] Landy Mckeon, PT     Cognitive Assessment/Intervention    Current Cognitive/Communication Assessment functional  -RW      Orientation Status oriented x 4  -RW      Follows Commands/Answers Questions 100% of the time  -RW      Personal Safety Interventions gait belt;nonskid shoes/slippers when out of bed  -RW      Recorded by [RW] Pipe Gruber PTA      Cognitive Assessment Intervention    Behavior/Mood Observations behavior appropriate to situation, WNL/WFL  -RW      Recorded by [RW] Pipe Gruber PTA      Bed Mobility, Assessment/Treatment    Bed Mob, Supine to Sit, [place] independent  -RW      Recorded by [RW] Pipe Gruber PTA      Transfer Assessment/Treatment    Transfers, Bed-Chair [place] stand by assist  -RW      Transfers, Chair-Bed [place] stand by assist  -RW      Transfers, Sit-Stand [place] stand by assist  -RW      Transfers, Stand-Sit [place] stand by assist  -RW      Transfers, Sit-Stand-Sit, Assist Device rolling walker  -RW      Recorded by [RW] Pipe Gruber PTA      Gait Assessment/Treatment    Gait, [place] Level stand by assist  -RW      Gait, Assistive Device rolling walker  -RW      Gait, Distance (Feet) 150  -RW      Gait, Impairments strength decreased;impaired balance  -RW      Recorded by [RW] Pipe Gruber PTA      Stairs Assessment/Treatment    Stairs, Handrail Location --  -RW      Stairs, [place]  Level --  -RW      Recorded by [RW] Pipe Gruber PTA      Wheelchair Training/Management    Wheelchair, Distance Propelled 150 mod ind  -RW      Recorded by [RW] Pipe Gruber PTA      Lower Body Dressing Assessment/Training    LB Dressing Assess/Train, Clothing Type donning:;shoes  -RW      Recorded by [RW] Pipe Gruber PTA      Therapy Exercises    Bilateral Lower Extremities AROM:;20 reps;sitting;hip flexion;LAQ;standing;heel raises;hip abduction/adduction   pro 2 level 3 x 5 min  -RW      Recorded by [RW] Pipe Gruber PTA      Positioning and Restraints    Pre-Treatment Position in bed  -RW      Post Treatment Position chair  -RW      In Chair call light within reach  -RW      Recorded by [RW] Pipe Gruber PTA        User Key  (r) = Recorded By, (t) = Taken By, (c) = Cosigned By    Initials Name Effective Dates     Landy Mckeon, PT 06/15/16 -      Karoline Huang, OTR/L 10/17/16 -     RW Pipe Gruber PTA 10/17/16 -                 IP PT Goals       11/13/17 1525 11/13/17 1030 11/10/17 1355    Gait Training PT LTG    Gait Training Goal PT LTG, Date Goal Reviewed  11/13/17  -LM 11/10/17  -JA    Gait Training Goal PT LTG, Outcome  goal met  - goal ongoing  -JA    Stair Training PT LTG    Stair Training Goal PT LTG, Date Goal Reviewed 11/13/17  -RW 11/13/17  -LM 11/10/17  -JA    Stair Training Goal PT LTG, Outcome goal ongoing  -RW goal ongoing  -LM goal ongoing  -JA      11/09/17 1535 11/08/17 1119 11/07/17 1357    Transfer Training PT LTG    Transfer Training PT  LTG, Date Goal Reviewed   11/07/17  -RW    Transfer Training PT LTG, Outcome   goal met  -RW    Gait Training PT LTG    Gait Training Goal PT LTG, Date Goal Reviewed 11/09/17  -RW 11/08/17  -RW 11/07/17  -RW    Gait Training Goal PT LTG, Outcome goal ongoing  -RW goal ongoing  -RW     Stair Training PT STG    Stair Training Goal PT STG, Date Goal Reviewed 11/09/17  -RW 11/08/17  -RW 11/07/17  -RW    Stair Training Goal PT STG,  Outcome goal met  -RW goal ongoing  -RW goal partially met   needed 2 hr  -RW    Stair Training PT LTG    Stair Training Goal PT LTG, Date Goal Reviewed 11/09/17  -RW 11/08/17  -RW 11/07/17  -RW    Stair Training Goal PT LTG, Outcome goal ongoing  -RW goal ongoing  -RW goal partially met   needed cga  -RW      11/06/17 1415 11/04/17 1440 11/03/17 1540    Transfer Training PT LTG    Transfer Training PT  LTG, Date Goal Reviewed 11/06/17  -RW 11/04/17  -JW 11/03/17  -RW    Transfer Training PT LTG, Outcome goal ongoing  -RW goal ongoing  -JW goal ongoing  -RW    Gait Training PT LTG    Gait Training Goal PT LTG, Date Goal Reviewed 11/06/17  -RW 11/04/17  -JW 11/03/17  -RW    Gait Training Goal PT LTG, Outcome goal ongoing  -RW goal ongoing  -JW goal ongoing  -RW    Stair Training PT STG    Stair Training Goal PT STG, Date Goal Reviewed 11/06/17  -RW 11/04/17  -JW 11/03/17  -RW    Stair Training Goal PT STG, Outcome goal ongoing  -RW goal ongoing  -JW goal ongoing  -RW    Stair Training PT LTG    Stair Training Goal PT LTG, Date Established  11/04/17  -JW     Stair Training Goal PT LTG, Date Goal Reviewed 11/06/17  -RW 11/04/17  -JW 11/03/17  -RW    Stair Training Goal PT LTG, Outcome goal ongoing  -RW goal ongoing  -JW goal ongoing  -RW      11/02/17 1631 11/01/17 1543 10/31/17 0825    Bed Mobility PT LTG    Bed Mobility PT LTG, Date Established   10/31/17  -LM    Bed Mobility PT LTG, Time to Achieve   by discharge  -LM    Bed Mobility PT LTG, Activity Type   supine to sit/sit to supine  -LM    Bed Mobility PT LTG, [place] Level   supervision required  -LM    Bed Mobility PT LTG, Additional Goal   HOB flat; No BR's  -LM    Bed Mobility PT LTG, Date Goal Reviewed 11/02/17  -RW 11/01/17  -RW     Bed Mobility PT LTG, Outcome goal met  -RW goal ongoing  -RW goal ongoing  -LM    Transfer Training PT LTG    Transfer Training PT LTG, Date Established   10/31/17  -LM    Transfer Training PT LTG, Time to Achieve   by  discharge  -LM    Transfer Training PT LTG, Activity Type   bed to chair /chair to bed  -LM    Transfer Training PT LTG, [place] Level   supervision required  -LM    Transfer Training PT LTG, Assist Device   --   AAD  -LM    Transfer Training PT  LTG, Date Goal Reviewed 11/02/17  -RW 11/01/17  -RW     Transfer Training PT LTG, Outcome goal ongoing  -RW goal ongoing  -RW goal ongoing  -LM    Gait Training PT LTG    Gait Training Goal PT LTG, Date Established   10/31/17  -LM    Gait Training Goal PT LTG, Time to Achieve   by discharge  -LM    Gait Training Goal PT LTG, [place] Level   supervision required  -LM    Gait Training Goal PT LTG, Assist Device   --   AAD  -LM    Gait Training Goal PT LTG, Distance to Achieve   150 feet  -LM    Gait Training Goal PT LTG, Date Goal Reviewed 11/02/17  -RW 11/01/17  -RW     Gait Training Goal PT LTG, Outcome goal ongoing  -RW goal ongoing  -RW goal ongoing  -LM    Stair Training PT STG    Stair Training Goal PT STG, Date Established   10/31/17  -LM    Stair Training Goal PT STG, Time to Achieve   4 days  -LM    Stair Training Goal PT STG, Number of Steps   4 steps  -LM    Stair Training Goal PT STG, [place] Level   contact guard assist  -LM    Stair Training Goal PT STG, Assist Device   1 handrail  -LM    Stair Training Goal PT STG, Date Goal Reviewed 11/02/17  -RW 11/01/17  -RW     Stair Training Goal PT STG, Outcome goal ongoing  -RW goal ongoing  -RW goal ongoing  -LM    Stair Training PT LTG    Stair Training Goal PT LTG, Date Established   10/31/17  -LM    Stair Training Goal PT LTG, Time to Achieve   by discharge  -LM    Stair Training Goal PT LTG, Number of Steps   1 flight  -LM    Stair Training Goal PT LTG, [place] Level   supervision required  -LM    Stair Training Goal PT LTG, Assist Device   2 handrails  -LM    Stair Training Goal PT LTG, Date Goal Reviewed 11/02/17  -RW 11/01/17  -RW     Stair Training Goal PT LTG, Outcome goal ongoing  -RW goal  ongoing  -RW goal ongoing  -      User Key  (r) = Recorded By, (t) = Taken By, (c) = Cosigned By    Initials Name Provider Type    JENA Haynes, PTA Physical Therapy Assistant     Landy Mckeon, PT Physical Therapist    RADHA Flores, PTA Physical Therapy Assistant    RW Pipe Gruber, PTA Physical Therapy Assistant          Physical Therapy Education     Title: PT OT SLP Therapies (Active)     Topic: Physical Therapy (Done)     Point: Mobility training (Done)    Learning Progress Summary    Learner Readiness Method Response Comment Documented by Status   Patient Acceptance E VU again reviewed correct gt and transfer safey.  11/03/17 0910 Done    Acceptance E VU reviewed safe transfers and risks of falls  11/01/17 1052 Done    Acceptance E NR Reviewed safety with transfers and ambulation.  10/31/17 1506 Active               Point: Home exercise program (Done)    Learning Progress Summary    Learner Readiness Method Response Comment Documented by Status   Patient Acceptance E,H VU HEP provided with picture inst.  11/13/17 1525 Done               Point: Precautions (Done)    Learning Progress Summary    Learner Readiness Method Response Comment Documented by Status   Patient Acceptance E VU again reviewed correct gt and transfer safey.  11/03/17 0910 Done    Acceptance E VU reviewed safe transfers and risks of falls  11/01/17 1052 Done    Acceptance E NR Reviewed safety with transfers and ambulation.  10/31/17 1506 Active                      User Key     Initials Effective Dates Name Provider Type Discipline     06/15/16 -  Landy Mckeon, PT Physical Therapist PT    RW 10/17/16 -  Pipe Gruber, PTA Physical Therapy Assistant PT                    PT Recommendation and Plan  Anticipated Equipment Needs At Discharge: front wheeled walker  Anticipated Discharge Disposition: home with 24/7 care, home with home health  Planned Therapy Interventions: balance training, bed mobility  training, gait training, home exercise program, motor coordination training, neuromuscular re-education, patient/family education, postural re-education, ROM (Range of Motion), stair training, strengthening, stretching, transfer training, wheelchair management/propulsion training  PT Frequency: other (see comments) (5-14 times/wk)  Plan of Care Review  Plan Of Care Reviewed With: patient  Outcome Summary/Follow up Plan: pt with sba for all gt and transfers. HEP provided and ready for dc home tomorrow, outpt to follow          Outcome Measures       11/13/17 1030 11/13/17 0738       How much help from another person do you currently need...    Turning from your back to your side while in flat bed without using bedrails? 4  -LM      Moving from lying on back to sitting on the side of a flat bed without bedrails? 3  -LM      Moving to and from a bed to a chair (including a wheelchair)? 3  -LM      Standing up from a chair using your arms (e.g., wheelchair, bedside chair)? 3  -LM      Climbing 3-5 steps with a railing? 3  -LM      To walk in hospital room? 3  -LM      AM-PAC 6 Clicks Score 19  -LM      How much help from another is currently needed...    Putting on and taking off regular lower body clothing?  3  -BH     Bathing (including washing, rinsing, and drying)  4  -BH     Toileting (which includes using toilet bed pan or urinal)  3  -BH     Putting on and taking off regular upper body clothing  4  -BH     Taking care of personal grooming (such as brushing teeth)  4  -BH     Eating meals  4  -BH     Score  22  -BH     Functional Assessment    Outcome Measure Options AM-PAC 6 Clicks Basic Mobility (PT)  -LM        User Key  (r) = Recorded By, (t) = Taken By, (c) = Cosigned By    Initials Name Provider Type    LM Landy Mckeon, PT Physical Therapist     Karoline Huang OTR/L Occupational Therapist           Time Calculation:         PT Charges       11/13/17 1527 11/13/17 1030       Time Calculation    Start Time  "1429  -RW 1030  -LM     Stop Time 1515  -RW 1119  -LM     Time Calculation (min) 46 min  -RW 49 min  -LM     PT Received On  17  -LM     PT Goal Re-Cert Due Date  17  -LM     Time Calculation- PT    Total Timed Code Minutes- PT 46 minute(s)  -RW 49 minute(s)  -LM       User Key  (r) = Recorded By, (t) = Taken By, (c) = Cosigned By    Initials Name Provider Type    LM Landy Mckeon, PT Physical Therapist    KATHIE Gruber PTA Physical Therapy Assistant          Therapy Charges for Today     Code Description Service Date Service Provider Modifiers Qty    08916216024 HC GAIT TRAINING EA 15 MIN 2017 Pipe Gruber PTA GP 1    93470789030 HC PT SELF CARE/MGMT/TRAIN EA 15 MIN 2017 Pipe Gruber PTA GP 1    [id number] HC PT THER PROC EA 15 MIN 2017 Pipe Gruber PTA GP 1          PT G-Codes  PT Professional Judgement Used?: Yes  Outcome Measure Options: AM-PAC 6 Clicks Basic Mobility (PT)  Score: 15  Functional Limitation: Mobility: Walking and moving around  Mobility: Walking and Moving Around Current Status (): At least 40 percent but less than 60 percent impaired, limited or restricted  Mobility: Walking and Moving Around Goal Status (): At least 1 percent but less than 20 percent impaired, limited or restricted    Pipe Gruber PTA  2017            Electronically signed by Pipe Gruber PTA at 2017  3:28 PM          [facility]  [address] [place] [id number]  Phone:  [phone]  Fax:   Date Ordered: 2017         Patient:  Kenneth Harper Jr. MRN:  [id number]   [address] [facility] [address] :  1934  SSN:    Phone: [phone] Sex:  M     Weight: 202 lb 4.8 oz (91.8 kg)         Ht Readings from Last 1 Encounters:   10/30/17 70\" (177.8 cm)         Miscellaneous DME             (Order ID: [id number])    Diagnosis:  Abnormality of gait and mobility (R26.9 [ICD-10-CM] " 781.2 [ICD-9-CM])  Right-sided lacunar stroke (I63.9 [ICD-10-CM] 434.91 [ICD-9-CM])   Quantity:  1     Type of DME: shower chair  Length of Need (99 Months = Lifetime): 99 Months = Lifetime            Authorizing Provider:Vishnu Mckinnon MD  Authorizing Provider's NPI: [id number]  Order Entered By: Vishnu Mckinnon MD 11/14/2017  8:34 AM     Electronically signed by: Vishnu Mckinnon MD 11/14/2017  8:34 AM

## 2017-11-14 NOTE — THERAPY TREATMENT NOTE
Inpatient Rehabilitation - Physical Therapy Treatment Note  [facility]     Patient Name: Kenneth Harper Jr.  : 1934  MRN: [id number]  Today's Date: 2017  Onset of Illness/Injury or Date of Surgery Date: 10/30/17  Date of Referral to PT: 10/30/17  Referring Physician: Dr. Mckinnon    Admit Date: 10/30/2017    Visit Dx:    ICD-10-CM ICD-9-CM   1. Right-sided lacunar stroke I63.9 434.91   2. Symbolic dysfunction R48.9 784.60   3. Impaired mobility and ADLs Z74.09 799.89   4. Abnormality of gait and mobility R26.9 781.2   5. Muscle weakness (generalized) M62.81 728.87   6. Left-sided weakness R53.1 728.87   7. Chronic back pain greater than 3 months duration M54.9 724.5    G89.29 338.29   8. Essential hypertension I10 401.9     Patient Active Problem List   Diagnosis   • Spinal stenosis, lumbar region, with neurogenic claudication   • Former smoker   • Overweight   • Left-sided weakness   • Right-sided lacunar stroke   • Essential hypertension   • Diabetes mellitus   • Chronic anxiety   • Chronic back pain greater than 3 months duration   • Prostatic hypertrophy   • Degenerative joint disease involving multiple joints   • Atrial fibrillation, chronic   • Encounter for rehabilitation   • Obstructive sleep apnea syndrome               Adult Rehabilitation Note       17 0857 17 0730 17 1429    Rehab Assessment/Intervention    Discipline physical therapy assistant  -JW occupational therapy assistant  -RC physical therapy assistant  -RW    Document Type therapy note (daily note)  -JW therapy note (daily note)  -RC therapy note (daily note)  -RW    Subjective Information agree to therapy  -JW agree to therapy  -RC agree to therapy  -RW    Patient Effort, Rehab Treatment good  -JW good  -RC good  -RW    Precautions/Limitations fall precautions  -JW fall precautions  -RC fall precautions  -RW    Recorded by [JW] Felicia Haynes PTA [RC] MARCO Cannon/DAWN [RW] Pipe HOWARD  MARTIN Gruber    Vital Signs    Pre Patient Position Sitting  -JW      Post Patient Position Sitting  -JW      Recorded by [JW] Felicia Haynes PTA      Pain Assessment    Pain Assessment 0-10  -JW No/denies pain  -RC No/denies pain  -RW    Pain Score 0  -JW      Post Pain Score 0  -JW      Recorded by [JW] Felicia Haynes, MARTIN [RC] Cassie Carpio, LARA/L [RW] Pipe Gruber PTA    Cognitive Assessment/Intervention    Current Cognitive/Communication Assessment functional  -JW  functional  -RW    Orientation Status oriented x 4  -JW  oriented x 4  -RW    Follows Commands/Answers Questions 100% of the time  -JW  100% of the time  -RW    Personal Safety Interventions gait belt;nonskid shoes/slippers when out of bed  -JW  gait belt;nonskid shoes/slippers when out of bed  -RW    Recorded by [JW] Felicia Haynes, MARTIN  [RW] Pipe Gruber PTA    Cognitive Assessment Intervention    Behavior/Mood Observations   behavior appropriate to situation, WNL/WFL  -RW    Recorded by   [RW] Pipe Gruber PTA    Bed Mobility, Assessment/Treatment    Bed Mob, Supine to Sit, [place]   independent  -RW    Recorded by   [RW] Pipe Gruber PTA    Transfer Assessment/Treatment    Transfers, Bed-Chair [place]  stand by assist  -RC stand by assist  -RW    Transfers, Chair-Bed [place]  stand by assist  -RC stand by assist  -RW    Transfers, Sit-Stand [place] supervision required;conditional independence  -JW stand by assist  -RC stand by assist  -RW    Transfers, Stand-Sit [place] supervision required;conditional independence  -JW stand by assist  -RC stand by assist  -RW    Transfers, Sit-Stand-Sit, Assist Device rolling walker  -JW  rolling walker  -RW    Toilet Transfer, [place]  supervision required  -RC     Toilet Transfer, Assistive Device  rolling walker  -RC     Recorded by [JW] Felicia Haynes, PTA [RC] Cassie Carpio, LARA/L [RW] Pipe Gruber PTA    Gait Assessment/Treatment     Gait, [place] Level stand by assist  -JW  stand by assist  -RW    Gait, Assistive Device rolling walker  -JW  rolling walker  -RW    Gait, Distance (Feet) 150  -JW  150  -RW    Gait, Impairments   strength decreased;impaired balance  -RW    Recorded by [JW] Felicia Haynes, PTA  [RW] Pipe Gruber PTA    Stairs Assessment/Treatment    Number of Stairs 10  -JW      Stairs, Handrail Location left side (ascending)  -JW  --  -RW    Stairs, [place] Level supervision required  -JW  --  -RW    Recorded by [JW] Felicia Haynes, PTA  [RW] Pipe Gruber PTA    Functional Mobility    Functional Mobility- Ind. Level  supervision required  -RC     Functional Mobility- Device  rolling walker  -RC     Functional Mobility-Distance (Feet)  150  -RC     Recorded by  [RC] Cassie Caprio LARA/L     Wheelchair Training/Management    Wheelchair, Distance Propelled   150 mod ind  -RW    Recorded by   [RW] Pipe Gruber PTA    ADL Assessment/Intervention    Additional Documentation  --   kitchen task w/ supervision  -RC     Recorded by  [RC] Cassie Carpio LARA/L     Upper Body Bathing Assessment/Training    UB Bathing Assess/Train, Position  sitting;long sitting  -RC     UB Bathing Assess/Train, [place] Level  conditional independence  -RC     Recorded by  [RC] Cassie Carpio LARA/L     Lower Body Bathing Assessment/Training    LB Bathing Assess/Train, Position  sink side;sitting  -RC     LB Bathing Assess/Train, [place] Level  conditional independence  -RC     Recorded by  [RC] Cassie Carpio, LARA/L     Upper Body Dressing Assessment/Training    UB Dressing Assess/Train, Clothing Type  doffing:;donning:;pull over;button up  -RC     UB Dressing Assess/Train, Position  sitting  -RC     UB Dressing Assess/Train, [place]  conditional independence  -RC     Recorded by  [RC] Cassie Carpio, LARA/L     Lower Body Dressing Assessment/Training    LB Dressing Assess/Train, Clothing Type   doffing:;donning:;pants;shoes;socks;shorts  -RC donning:;shoes  -RW    LB Dressing Assess/Train, Position  sitting;standing  -RC     LB Dressing Assess/Train, [place]  conditional independence;supervision required  -RC     Recorded by  [RC] MARCO Cannon/L [RW] Pipe Gruber PTA    Toileting Assessment/Training    Toileting Assess/Train, Position  sitting;standing  -RC     Toileting Assess/Train, Indepen Level  supervision required  -RC     Recorded by  [RC] MARCO Cannon/L     Grooming Assessment/Training    Grooming Assess/Train, Indepen Level  independent  -RC     Recorded by  [RC] MARCO Cannon/L     Therapy Exercises    Bilateral Lower Extremities AROM:;20 reps;sitting;ankle pumps/circles;hip flexion;LAQ;standing;heel raises;hip abduction/adduction   2 sets all ther ex  -JW  AROM:;20 reps;sitting;hip flexion;LAQ;standing;heel raises;hip abduction/adduction   pro 2 level 3 x 5 min  -RW    Bilateral Upper Extremity  --   ue bike 10 min pulley ex 5 min  -RC     Recorded by [JW] Felicia Haynes PTA [RC] MARCO Cannon/L [RW] Pipe Gruber PTA    Positioning and Restraints    Pre-Treatment Position other (comment)   w/c  -JW  in bed  -RW    Post Treatment Position wheelchair  -JW  chair  -RW    In Chair   call light within reach  -RW    In Wheelchair sitting;call light within reach;encouraged to call for assist  -JW      Recorded by [JW] Felicia Haynes PTA  [RW] Pipe Gruber PTA      11/13/17 1030 11/13/17 0738       Rehab Assessment/Intervention    Discipline physical therapist  -LM occupational therapist  -     Document Type progress note  -LM      Subjective Information agree to therapy;no complaints  -LM      Recorded by [LM] Landy Mckeon, PT [] Karoline Huang, OTR/L     Vital Signs    Pre Systolic BP Rehab 121  -LM      Pre Treatment Diastolic BP 57  -LM      Pretreatment Heart Rate (beats/min) 59  -LM      Pre SpO2 (%) 95  -LM      O2 Delivery Pre  Treatment room air  -LM      Pre Patient Position Sitting  -LM      Recorded by [LM] Landy Mckeon, PT      Pain Assessment    Pain Assessment No/denies pain  -LM      Recorded by [LM] Landy Mckeon, PT        User Key  (r) = Recorded By, (t) = Taken By, (c) = Cosigned By    Initials Name Effective Dates    JW Felicia LEE Haynes, PTA 08/11/15 -     LM Landy Mckeon, PT 06/15/16 -      Karoline Huang, OTR/L 10/17/16 -     RW Pipe Gruber, PTA 10/17/16 -     RC Cassie Carpio, LARA/L 10/17/16 -                 IP PT Goals       11/14/17 0857 11/13/17 1525 11/13/17 1030    Gait Training PT LTG    Gait Training Goal PT LTG, Date Goal Reviewed   11/13/17  -LM    Gait Training Goal PT LTG, Outcome   goal met  -LM    Stair Training PT LTG    Stair Training Goal PT LTG, Date Goal Reviewed 11/14/17  -JW 11/13/17  -RW 11/13/17  -LM    Stair Training Goal PT LTG, Outcome goal met   w/ 1 HR  -JW goal ongoing  -RW goal ongoing  -LM      11/10/17 1355 11/09/17 1535 11/08/17 1119    Gait Training PT LTG    Gait Training Goal PT LTG, Date Goal Reviewed 11/10/17  -JA 11/09/17  -RW 11/08/17  -RW    Gait Training Goal PT LTG, Outcome goal ongoing  -JA goal ongoing  -RW goal ongoing  -RW    Stair Training PT STG    Stair Training Goal PT STG, Date Goal Reviewed  11/09/17  -RW 11/08/17  -RW    Stair Training Goal PT STG, Outcome  goal met  -RW goal ongoing  -RW    Stair Training PT LTG    Stair Training Goal PT LTG, Date Goal Reviewed 11/10/17  -JA 11/09/17  -RW 11/08/17  -RW    Stair Training Goal PT LTG, Outcome goal ongoing  -JA goal ongoing  -RW goal ongoing  -RW      11/07/17 1357 11/06/17 1415 11/04/17 1440    Transfer Training PT LTG    Transfer Training PT  LTG, Date Goal Reviewed 11/07/17  -RW 11/06/17  -RW 11/04/17  -JW    Transfer Training PT LTG, Outcome goal met  -RW goal ongoing  -RW goal ongoing  -JW    Gait Training PT LTG    Gait Training Goal PT LTG, Date Goal Reviewed 11/07/17  -RW 11/06/17  -RW 11/04/17  -JENA     Gait Training Goal PT LTG, Outcome  goal ongoing  -RW goal ongoing  -JW    Stair Training PT STG    Stair Training Goal PT STG, Date Goal Reviewed 11/07/17  -RW 11/06/17  -RW 11/04/17  -JW    Stair Training Goal PT STG, Outcome goal partially met   needed 2 hr  -RW goal ongoing  -RW goal ongoing  -JW    Stair Training PT LTG    Stair Training Goal PT LTG, Date Established   11/04/17  -JW    Stair Training Goal PT LTG, Date Goal Reviewed 11/07/17  -RW 11/06/17  -RW 11/04/17  -JW    Stair Training Goal PT LTG, Outcome goal partially met   needed cga  -RW goal ongoing  -RW goal ongoing  -JW      11/03/17 1540 11/02/17 1631 11/01/17 1543    Bed Mobility PT LTG    Bed Mobility PT LTG, Date Goal Reviewed  11/02/17  -RW 11/01/17  -RW    Bed Mobility PT LTG, Outcome  goal met  -RW goal ongoing  -RW    Transfer Training PT LTG    Transfer Training PT  LTG, Date Goal Reviewed 11/03/17  -RW 11/02/17  -RW 11/01/17  -RW    Transfer Training PT LTG, Outcome goal ongoing  -RW goal ongoing  -RW goal ongoing  -RW    Gait Training PT LTG    Gait Training Goal PT LTG, Date Goal Reviewed 11/03/17  -RW 11/02/17  -RW 11/01/17  -RW    Gait Training Goal PT LTG, Outcome goal ongoing  -RW goal ongoing  -RW goal ongoing  -RW    Stair Training PT STG    Stair Training Goal PT STG, Date Goal Reviewed 11/03/17  -RW 11/02/17  -RW 11/01/17  -RW    Stair Training Goal PT STG, Outcome goal ongoing  -RW goal ongoing  -RW goal ongoing  -RW    Stair Training PT LTG    Stair Training Goal PT LTG, Date Goal Reviewed 11/03/17  -RW 11/02/17  -RW 11/01/17  -RW    Stair Training Goal PT LTG, Outcome goal ongoing  -RW goal ongoing  -RW goal ongoing  -RW      User Key  (r) = Recorded By, (t) = Taken By, (c) = Cosigned By    Initials Name Provider Type    JENA Haynes, PTA Physical Therapy Assistant    MOSHE Mckeon, PT Physical Therapist    RADHA Flores, PTA Physical Therapy Assistant    KATHIE Gruber, PTA Physical Therapy Assistant           Physical Therapy Education     Title: PT OT SLP Therapies (Active)     Topic: Physical Therapy (Done)     Point: Mobility training (Done)    Learning Progress Summary    Learner Readiness Method Response Comment Documented by Status   Patient Acceptance E VU again reviewed correct gt and transfer safey.  11/03/17 0910 Done    Acceptance E VU reviewed safe transfers and risks of falls  11/01/17 1052 Done    Acceptance E NR Reviewed safety with transfers and ambulation.  10/31/17 1506 Active               Point: Home exercise program (Done)    Learning Progress Summary    Learner Readiness Method Response Comment Documented by Status   Patient Acceptance E,H VU HEP provided with picture inst.  11/13/17 1525 Done               Point: Precautions (Done)    Learning Progress Summary    Learner Readiness Method Response Comment Documented by Status   Patient Acceptance E VU again reviewed correct gt and transfer safey.  11/03/17 0910 Done    Acceptance E VU reviewed safe transfers and risks of falls  11/01/17 1052 Done    Acceptance E NR Reviewed safety with transfers and ambulation.  10/31/17 1506 Active                      User Key     Initials Effective Dates Name Provider Type Discipline     06/15/16 -  Landy Mckeon, PT Physical Therapist PT     10/17/16 -  Pipe Gruber PTA Physical Therapy Assistant PT                    PT Recommendation and Plan  Anticipated Equipment Needs At Discharge: front wheeled walker  Anticipated Discharge Disposition: home with 24/7 care, home with home health  Planned Therapy Interventions: balance training, bed mobility training, gait training, home exercise program, motor coordination training, neuromuscular re-education, patient/family education, postural re-education, ROM (Range of Motion), stair training, strengthening, stretching, transfer training, wheelchair management/propulsion training  PT Frequency: other (see comments) (5-14 times/wk)  Plan of  Care Review  Plan Of Care Reviewed With: patient  Progress: improving  Outcome Summary/Follow up Plan: pt fatigued this afternoon, pt required vc's for safety to stay up in wx and to  L LE w/ gt, pt tends to drag L LE w/ gt as he fatigues, pt able to perform 20 reps of B LE ther ex          Outcome Measures       11/14/17 0857 11/14/17 0730 11/13/17 1030    How much help from another person do you currently need...    Turning from your back to your side while in flat bed without using bedrails? 4  -JW  4  -LM    Moving from lying on back to sitting on the side of a flat bed without bedrails? 4  -JW  3  -LM    Moving to and from a bed to a chair (including a wheelchair)? 3  -JW  3  -LM    Standing up from a chair using your arms (e.g., wheelchair, bedside chair)? 3  -JW  3  -LM    Climbing 3-5 steps with a railing? 3  -JW  3  -LM    To walk in hospital room? 3  -JW  3  -LM    AM-PAC 6 Clicks Score 20  -JW  19  -LM    How much help from another is currently needed...    Putting on and taking off regular lower body clothing?  3  -RC     Bathing (including washing, rinsing, and drying)  4  -RC     Toileting (which includes using toilet bed pan or urinal)  4  -RC     Putting on and taking off regular upper body clothing  4  -RC     Taking care of personal grooming (such as brushing teeth)  4  -RC     Eating meals  4  -RC     Score  23  -RC     Functional Assessment    Outcome Measure Options AM-PAC 6 Clicks Basic Mobility (PT)  -JW  AM-PAC 6 Clicks Basic Mobility (PT)  -      11/13/17 0738          How much help from another is currently needed...    Putting on and taking off regular lower body clothing? 3  -BH      Bathing (including washing, rinsing, and drying) 4  -BH      Toileting (which includes using toilet bed pan or urinal) 3  -BH      Putting on and taking off regular upper body clothing 4  -BH      Taking care of personal grooming (such as brushing teeth) 4  -BH      Eating meals 4  -BH      Score 22   -        User Key  (r) = Recorded By, (t) = Taken By, (c) = Cosigned By    Initials Name Provider Type    JENA Haynes PTA Physical Therapy Assistant    MOSHE Mckeon, PT Physical Therapist     Karoline Huang, OTR/L Occupational Therapist    JIA Carpio, LARA/L Occupational Therapy Assistant           Time Calculation:         PT Charges       11/14/17 0857          Time Calculation    Start Time 0857  -      Stop Time 0936  -      Time Calculation (min) 39 min  -      PT Received On 11/14/17  -      Time Calculation- PT    Total Timed Code Minutes- PT 39 minute(s)  -        User Key  (r) = Recorded By, (t) = Taken By, (c) = Cosigned By    Initials Name Provider Type    JENA Haynes PTA Physical Therapy Assistant          Therapy Charges for Today     Code Description Service Date Service Provider Modifiers Qty    82379097308 HC PT SELF CARE/MGMT/TRAIN EA 15 MIN 11/14/2017 Felicia Haynes, PTA GP 1    38440067975 HC GAIT TRAINING EA 15 MIN 11/14/2017 Felicia Haynes, MARTIN GP 1    18299729143 HC PT THERAPEUTIC ACT EA 15 MIN 11/14/2017 Felicia Haynes, PTA GP 1          PT G-Codes  PT Professional Judgement Used?: Yes  Outcome Measure Options: AM-PAC 6 Clicks Basic Mobility (PT)  Score: 15  Functional Limitation: Mobility: Walking and moving around  Mobility: Walking and Moving Around Current Status (): At least 40 percent but less than 60 percent impaired, limited or restricted  Mobility: Walking and Moving Around Goal Status (): At least 1 percent but less than 20 percent impaired, limited or restricted    Felicia Haynes PTA  11/14/2017

## 2017-11-14 NOTE — PLAN OF CARE
Problem: Patient Care Overview (Adult)  Goal: Plan of Care Review  Outcome: Ongoing (interventions implemented as appropriate)    11/14/17 1038   Coping/Psychosocial Response Interventions   Plan Of Care Reviewed With patient   Patient Care Overview   Progress progress toward functional goals as expected   Outcome Evaluation   Outcome Summary/Follow up Plan pt met all goals, he is sba for any standing, transfer or ambulation, He was advised not to drive unless cleared by MD or driving evaluation. he is d/c'd home w/ wife and out pt PT services         Problem: Inpatient Occupational Therapy  Goal: Transfer Training Goal 1 LTG- OT  Outcome: Outcome(s) achieved Date Met:  11/14/17    10/31/17 0925 11/06/17 0724 11/14/17 1038   Transfer Training OT LTG   Transfer Training OT LTG, Date Established 10/31/17 --  --    Transfer Training OT LTG, Time to Achieve by discharge --  --    Transfer Training OT LTG, Activity Type all transfers --  --    Transfer Training OT LTG, [place] Level contact guard assist --  --    Transfer Training OT LTG, Assist Device --  walker, rolling platform --    Transfer Training OT LTG, Date Goal Reviewed --  --  11/14/17   Transfer Training OT LTG, Outcome --  --  goal met       Goal: Patient Education Goal LTG- OT  Outcome: Outcome(s) achieved Date Met:  11/14/17    10/31/17 0925 11/14/17 1038   Patient Education OT LTG   Patient Education OT LTG, Date Established 10/31/17 --    Patient Education OT LTG, Time to Achieve by discharge --    Patient Education OT LTG, Education Type HEP;posture/body mechanics;1 hand/anni technique;home safety;adaptive equipment mgmt;energy conservation --    Patient Education OT LTG, Education Understanding independent;demonstrates adequately;verbalizes understanding  (with caregiver assist as necessary) --    Patient Education OT LTG, Date Goal Reviewed --  11/14/17   Patient Education OT LTG Outcome --  goal met       Goal: Safety Awareness Goal LTG-  OT  Outcome: Outcome(s) achieved Date Met:  11/14/17    10/31/17 0925 11/14/17 1038   Safety Awareness OT LTG   Safety Awareness OT LTG, Date Established 10/31/17 --    Safety Awareness OT LTG, Time to Achieve by discharge --    Safety Awareness OT LTG, Activity Type good safety awareness;with kitchen mobility;with ADL's --    Safety Awareness OT LTG, [place] Level min verbal cues --    Safety Awareness OT LTG, Date Goal Reviewed --  11/14/17   Safety Awareness OT LTG, Outcome --  goal met       Goal: ADL Goal LTG- OT  Outcome: Outcome(s) achieved Date Met:  11/14/17    10/31/17 0925 11/14/17 1038   ADL OT LTG   ADL OT LTG, Date Established 10/31/17 --    ADL OT LTG, Time to Achieve by discharge --    ADL OT LTG, Activity Type ADL skills --    ADL OT LTG, Additional Goal Set-up A with self-feeding and grooming; VC for UB bathing and UB dressing; CGA with LB bathing and LB dressing --    ADL OT LTG, Date Goal Reviewed --  11/14/17   ADL OT LTG, Outcome --  goal met

## 2017-11-14 NOTE — THERAPY DISCHARGE NOTE
Inpatient Rehabilitation - Occupational Therapy Treatment Note/Discharge  [facility]     Patient Name: Kenneth Harper Jr.  : 1934  MRN: [id number]  Today's Date: 2017  Onset of Illness/Injury or Date of Surgery Date: 10/30/17  Date of Referral to OT: 10/30/17  Referring Physician: Dr. Mckinnon      Admit Date: 10/30/2017    Visit Dx:     ICD-10-CM ICD-9-CM   1. Right-sided lacunar stroke I63.9 434.91   2. Symbolic dysfunction R48.9 784.60   3. Impaired mobility and ADLs Z74.09 799.89   4. Abnormality of gait and mobility R26.9 781.2   5. Muscle weakness (generalized) M62.81 728.87   6. Left-sided weakness R53.1 728.87   7. Chronic back pain greater than 3 months duration M54.9 724.5    G89.29 338.29   8. Essential hypertension I10 401.9     Patient Active Problem List   Diagnosis   • Spinal stenosis, lumbar region, with neurogenic claudication   • Former smoker   • Overweight   • Left-sided weakness   • Right-sided lacunar stroke   • Essential hypertension   • Diabetes mellitus   • Chronic anxiety   • Chronic back pain greater than 3 months duration   • Prostatic hypertrophy   • Degenerative joint disease involving multiple joints   • Atrial fibrillation, chronic   • Encounter for rehabilitation   • Obstructive sleep apnea syndrome             Adult Rehabilitation Note       17 0857 17 0730 17 1429    Rehab Assessment/Intervention    Discipline physical therapy assistant  -JW occupational therapy assistant  -RC physical therapy assistant  -RW    Document Type therapy note (daily note)  -JW therapy note (daily note)  -RC therapy note (daily note)  -RW    Subjective Information agree to therapy  -JW agree to therapy  -RC agree to therapy  -RW    Patient Effort, Rehab Treatment good  -JW good  -RC good  -RW    Precautions/Limitations fall precautions  -JW fall precautions  -RC fall precautions  -RW    Recorded by [JW] Felicia Haynes, MARTIN [RC] MARCO Cannon/DAWN  [RW] Pipe Gruber PTA    Vital Signs    Pre Patient Position Sitting  -JW      Post Patient Position Sitting  -JW      Recorded by [JW] Felicia Haynes PTA      Pain Assessment    Pain Assessment 0-10  -JW No/denies pain  -RC No/denies pain  -RW    Pain Score 0  -JW      Post Pain Score 0  -JW      Recorded by [JW] Felicia Haynes, PTA [RC] Cassie Carpio, LARA/L [RW] Pipe Gruber PTA    Cognitive Assessment/Intervention    Current Cognitive/Communication Assessment functional  -JW  functional  -RW    Orientation Status oriented x 4  -JW  oriented x 4  -RW    Follows Commands/Answers Questions 100% of the time  -JW  100% of the time  -RW    Personal Safety Interventions gait belt;nonskid shoes/slippers when out of bed  -JW  gait belt;nonskid shoes/slippers when out of bed  -RW    Recorded by [JW] Felicia Haynes, MARTIN  [RW] Pipe Gruber PTA    Cognitive Assessment Intervention    Behavior/Mood Observations   behavior appropriate to situation, WNL/WFL  -RW    Recorded by   [RW] Pipe Gruber PTA    Bed Mobility, Assessment/Treatment    Bed Mob, Supine to Sit, [place]   independent  -RW    Recorded by   [RW] Pipe Gruber PTA    Transfer Assessment/Treatment    Transfers, Bed-Chair [place]  stand by assist  -RC stand by assist  -RW    Transfers, Chair-Bed [place]  stand by assist  -RC stand by assist  -RW    Transfers, Sit-Stand [place] supervision required;conditional independence  -JW stand by assist  -RC stand by assist  -RW    Transfers, Stand-Sit [place] supervision required;conditional independence  -JW stand by assist  -RC stand by assist  -RW    Transfers, Sit-Stand-Sit, Assist Device rolling walker  -JW  rolling walker  -RW    Toilet Transfer, [place]  supervision required  -RC     Toilet Transfer, Assistive Device  rolling walker  -RC     Recorded by [JW] Felicia Haynes, PTA [RC] Cassie Carpio, LARA/L [RW] Pipe Gruber PTA    Gait  Assessment/Treatment    Gait, [place] Level stand by assist  -JW  stand by assist  -RW    Gait, Assistive Device rolling walker  -JW  rolling walker  -RW    Gait, Distance (Feet) 150  -JW  150  -RW    Gait, Impairments   strength decreased;impaired balance  -RW    Recorded by [JW] Felicia Haynes, PTA  [RW] Pipe Gruber PTA    Stairs Assessment/Treatment    Number of Stairs 10  -JW      Stairs, Handrail Location left side (ascending)  -JW  --  -RW    Stairs, [place] Level supervision required  -JW  --  -RW    Recorded by [JW] Felicia Haynes, PTA  [RW] Pipe Gruber PTA    Functional Mobility    Functional Mobility- Ind. Level  supervision required  -RC     Functional Mobility- Device  rolling walker  -RC     Functional Mobility-Distance (Feet)  150  -RC     Recorded by  [RC] LEIGH CannonA/L     Wheelchair Training/Management    Wheelchair, Distance Propelled   150 mod ind  -RW    Recorded by   [RW] Pipe Gruber PTA    ADL Assessment/Intervention    Additional Documentation  --   kitchen task w/ supervision  -RC     Recorded by  [RC] LEIGH CannonA/L     Upper Body Bathing Assessment/Training    UB Bathing Assess/Train, Position  sitting;long sitting  -RC     UB Bathing Assess/Train, [place] Level  conditional independence  -RC     Recorded by  [RC] Cassie Carpio LARA/L     Lower Body Bathing Assessment/Training    LB Bathing Assess/Train, Position  sink side;sitting  -RC     LB Bathing Assess/Train, [place] Level  conditional independence  -RC     Recorded by  [RC] Cassie Carpio LARA/L     Upper Body Dressing Assessment/Training    UB Dressing Assess/Train, Clothing Type  doffing:;donning:;pull over;button up  -RC     UB Dressing Assess/Train, Position  sitting  -RC     UB Dressing Assess/Train, [place]  conditional independence  -RC     Recorded by  [RC] Cassie Carpio LARA/L     Lower Body Dressing Assessment/Training    LB Dressing Assess/Train,  Clothing Type  doffing:;donning:;pants;shoes;socks;shorts  -RC donning:;shoes  -RW    LB Dressing Assess/Train, Position  sitting;standing  -RC     LB Dressing Assess/Train, [place]  conditional independence;supervision required  -RC     Recorded by  [RC] MARCO Cannon/L [RW] Pipe Gruber PTA    Toileting Assessment/Training    Toileting Assess/Train, Position  sitting;standing  -RC     Toileting Assess/Train, [place]ep[place] Level  supervision required  -RC     Recorded by  [RC] MARCO Cannon/L     Grooming Assessment/Training    Grooming Assess/Train, Indepen Level  independent  -RC     Recorded by  [RC] MARCO Cannon/L     Therapy Exercises    Bilateral Lower Extremities AROM:;20 reps;sitting;ankle pumps/circles;hip flexion;LAQ;standing;heel raises;hip abduction/adduction   2 sets all ther ex  -JW  AROM:;20 reps;sitting;hip flexion;LAQ;standing;heel raises;hip abduction/adduction   pro 2 level 3 x 5 min  -RW    Bilateral Upper Extremity  --   ue bike 10 min pulley ex 5 min  -RC     Recorded by [JW] Felicia Haynes PTA [RC] MARCO Cannon/L [RW] Pipe Gruber PTA    Positioning and Restraints    Pre-Treatment Position other (comment)   w/c  -JW  in bed  -RW    Post Treatment Position wheelchair  -JW  chair  -RW    In Chair   call light within reach  -RW    In Wheelchair sitting;call light within reach;encouraged to call for assist  -JW      Recorded by [JW] Felicia Haynes PTA  [RW] Pipe Gruber PTA      11/13/17 1030 11/13/17 0738       Rehab Assessment/Intervention    Discipline physical therapist  -LM occupational therapist  -     Document Type progress note  -LM      Subjective Information agree to therapy;no complaints  -LM      Recorded by [LM] Landy Mckeon, PT [] Karoline Huang, OTR/L     Vital Signs    Pre Systolic BP Rehab 121  -LM      Pre Treatment Diastolic BP 57  -LM      Pretreatment Heart Rate (beats/min) 59  -LM      Pre SpO2 (%) 95  -LM      O2  Delivery Pre Treatment room air  -LM      Pre Patient Position Sitting  -LM      Recorded by [LM] Landy Mckeon, LEOLA      Pain Assessment    Pain Assessment No/denies pain  -LM      Recorded by [LM] Landy Mckeon, LEOLA        User Key  (r) = Recorded By, (t) = Taken By, (c) = Cosigned By    Initials Name Effective Dates    JW Felicia HOWARD [place], PTA 08/11/15 -     LM Landy Mckeon, PT 06/15/16 -     BH Karoline Huang, OTR/L 10/17/16 -     RW Pipe Gruber, PTA 10/17/16 -     RC Cassie RONI Carpio, LARA/L 10/17/16 -                 OT Goals       11/14/17 1038 11/13/17 1527 11/13/17 0738    Transfer Training OT LTG    Transfer Training OT LTG, Date Goal Reviewed 11/14/17  -RC 11/13/17  -     Transfer Training OT LTG, Outcome goal met  -RC goal partially met  -BH     Patient Education OT LTG    Patient Education OT LTG, Date Goal Reviewed 11/14/17  -RC  11/13/17  -    Patient Education OT LTG Outcome goal met  -RC  goal partially met  -BH    Safety Awareness OT LTG    Safety Awareness OT LTG, Date Goal Reviewed 11/14/17  -RC 11/13/17  -     Safety Awareness OT LTG, Outcome goal met  -RC goal ongoing  -BH     ADL OT LTG    ADL OT LTG, Date Goal Reviewed 11/14/17  -RC  11/13/17  -BH    ADL OT LTG, Outcome goal met  -RC  goal partially met  -BH      11/10/17 0914 11/09/17 1155 11/08/17 0929    Transfer Training OT LTG    Transfer Training OT LTG, Date Goal Reviewed  11/09/17  -RC 11/08/17  -RC    Patient Education OT LTG    Patient Education OT LTG, Date Goal Reviewed 11/10/17  -RC 11/09/17  -RC 11/08/17  -RC    Safety Awareness OT LTG    Safety Awareness OT LTG, Date Goal Reviewed 11/10/17  -RC 11/09/17  -RC 11/08/17  -RC    Safety Awareness OT LTG, Outcome  goal ongoing  -RC     ADL OT LTG    ADL OT LTG, Date Goal Reviewed 11/10/17  -RC 11/09/17  -RC 11/08/17  -RC    ADL OT LTG, Outcome  goal partially met  - goal ongoing  -RC    Endurance OT LTG    Endurance Goal OT LTG, Date Goal Reviewed   11/08/17  -RC     Endurance Goal OT LTG, Outcome   goal met  -RC      11/07/17 1351 11/06/17 1425 11/06/17 0724    Transfer Training OT LTG    Transfer Training OT LTG, Assist Device   walker, rolling platform  -KD    Transfer Training OT LTG, Date Goal Reviewed 11/07/17  -RC  11/06/17  -KD    Transfer Training OT LTG, Outcome goal ongoing  -RC  goal not met  -KD    Patient Education OT LTG    Patient Education OT LTG, Date Goal Reviewed 11/07/17  -RC  11/06/17  -KD    Patient Education OT LTG Outcome goal ongoing  -RC  goal not met  -KD    Safety Awareness OT LTG    Safety Awareness OT LTG, Date Goal Reviewed 11/07/17  -RC 11/06/17  -KD     Safety Awareness OT LTG, Outcome goal ongoing  -RC goal not met  -KD     ADL OT LTG    ADL OT LTG, Date Goal Reviewed 11/07/17  -RC  11/06/17  -KD    ADL OT LTG, Outcome goal ongoing  -RC  goal not met  -KD    Endurance OT LTG    Endurance Goal OT LTG, Date Goal Reviewed 11/07/17  -RC  11/06/17  -KD    Endurance Goal OT LTG, Outcome goal partially met  -RC  goal not met  -KD      11/04/17 0738 11/03/17 1359 11/02/17 0715    Transfer Training OT LTG    Transfer Training OT LTG, Date Goal Reviewed 11/04/17  -RC 11/03/17  -LM 11/02/17  -KD    Transfer Training OT LTG, Outcome  goal ongoing  -LM goal not met  -KD    Patient Education OT LTG    Patient Education OT LTG, Date Goal Reviewed 11/04/17  -RC 11/03/17  -LM 11/02/17  -KD    Patient Education OT LTG Outcome  goal not met  -LM goal not met  -KD    Safety Awareness OT LTG    Safety Awareness OT LTG, Date Goal Reviewed 11/04/17  -RC 11/03/17  -LM 11/02/17  -KD    Safety Awareness OT LTG, Outcome  goal not met  -LM goal not met  -KD    ADL OT LTG    ADL OT LTG, Date Goal Reviewed 11/04/17  -RC 11/03/17  -LM     ADL OT LTG, Outcome  goal not met  -LM goal not met  -KD    Endurance OT LTG    Endurance Goal OT LTG, Date Goal Reviewed 11/04/17  -RC 11/03/17  -LM 11/02/17  -KD    Endurance Goal OT LTG, Outcome goal ongoing  -RC goal not met  -LM goal  not met  -KD      11/01/17 1635          Transfer Training OT LTG    Transfer Training OT LTG, Date Goal Reviewed 11/01/17  -RW      Transfer Training OT LTG, Outcome goal ongoing  -RW      Patient Education OT LTG    Patient Education OT LTG, Date Goal Reviewed 11/01/17  -RW      Patient Education OT LTG Outcome goal ongoing  -RW      Safety Awareness OT LTG    Safety Awareness OT LTG, Date Goal Reviewed 11/01/17  -RW      Safety Awareness OT LTG, Outcome goal ongoing  -RW      ADL OT LTG    ADL OT LTG, Date Goal Reviewed 11/01/17  -RW      ADL OT LTG, Outcome goal ongoing  -RW      Endurance OT LTG    Endurance Goal OT LTG, Date Goal Reviewed 11/01/17  -RW      Endurance Goal OT LTG, Outcome goal ongoing  -RW        User Key  (r) = Recorded By, (t) = Taken By, (c) = Cosigned By    Initials Name Provider Type     Karoline Huang, OTR/L Occupational Therapist     Jacinta Pitts, OTR/L Occupational Therapist     Cassie Carpio, LARA/L Occupational Therapy Assistant    KD Therese King LARA/L Occupational Therapy Assistant    MOSHE Boucher LARA/L Occupational Therapy Assistant          Occupational Therapy Education     Title: PT OT SLP Therapies (Done)     Topic: Occupational Therapy (Resolved)     Point: ADL training (Resolved)    Description: Instruct learner(s) on proper safety adaptation and remediation techniques during self care or transfers.   Instruct in proper use of assistive devices.    Learning Progress Summary    Learner Readiness Method Response Comment Documented by Status   Patient Acceptance E,H CARLOS reviewed and issued hamdout for Home safety fall prevention. issued and discussed informantion on driving evaluation.  11/14/17 1037 Done    Acceptance E,TB VU,DU Pt highly educated about HEP for home and given handouts. Started discussing safety at home with IADL. Educated to call if he needed anything.  11/13/17 1526 Done    Acceptance E NRCARLOS   11/10/17 0914 Done    Acceptance  E NR   11/09/17 1155 Active    Acceptance TB NR  KD 11/02/17 1108 Active    Acceptance E,D NR  RW 11/01/17 1518 Active    Acceptance E VU Educated about OT and POC. Educated about anni dressing technique. Educated about safety with ADL and t/f. Educated to call for assist and not to stand independently. SP 10/31/17 1354 Done   Family Acceptance E,D NR  RW 11/01/17 1518 Active    Acceptance E VU Educated about OT and POC. Educated about anni dressing technique. Educated about safety with ADL and t/f. Educated to call for assist and not to stand independently. SP 10/31/17 1354 Done               Point: Home exercise program (Resolved)    Description: Instruct learner(s) on appropriate technique for monitoring, assisting and/or progressing therapeutic exercises/activities.    Learning Progress Summary    Learner Readiness Method Response Comment Documented by Status   Patient Acceptance E,TB VU,DU Pt highly educated about HEP for home and given handouts. Started discussing safety at home with IADL. Educated to call if he needed anything.  11/13/17 1526 Done    Acceptance E,D NR theraputty  11/01/17 1634 Active   Family Acceptance E,D NR theraputty  11/01/17 1634 Active               Point: Precautions (Resolved)    Description: Instruct learner(s) on prescribed precautions during self-care and functional transfers.    Learning Progress Summary    Learner Readiness Method Response Comment Documented by Status   Patient Acceptance E,H CARLOS reviewed and issued hamdout for Home safety fall prevention. issued and discussed informantion on driving evaluation.  11/14/17 1037 Done    Acceptance E VU recommend pt not drive w/o MD OK and discussed  evaluation program  11/10/17 0918 Done    Acceptance E NR,VU   11/10/17 0914 Done    Acceptance E NR   11/09/17 1155 Active    Acceptance E NR,VU recommned no climbing on ladders or step stools.  11/08/17 0928 Done    Acceptance E NR   11/07/17 1350 Active     Acceptance E NR   11/04/17 0930 Active    Acceptance E,D NR   11/01/17 1518 Active    Acceptance E VU Educated about OT and POC. Educated about anni dressing technique. Educated about safety with ADL and t/f. Educated to call for assist and not to stand independently. SP 10/31/17 1354 Done   Family Acceptance E,D NR   11/01/17 1518 Active    Acceptance E VU Educated about OT and POC. Educated about anni dressing technique. Educated about safety with ADL and t/f. Educated to call for assist and not to stand independently. SP 10/31/17 1354 Done               Point: Body mechanics (Resolved)    Description: Instruct learner(s) on proper positioning and spine alignment during self-care, functional mobility activities and/or exercises.    Learning Progress Summary    Learner Readiness Method Response Comment Documented by Status   Patient Acceptance E,H CARLOS reviewed and issued hamdout for Home safety fall prevention. issued and discussed informantion on driving evaluation.  11/14/17 1037 Done    Acceptance E NR,VU   11/10/17 0914 Done    Acceptance E NR   11/09/17 1155 Active    Acceptance E NR,VU recommned no climbing on ladders or step stools.  11/08/17 0928 Done    Acceptance E NR   11/07/17 1350 Active    Acceptance E NR   11/04/17 0930 Active    Acceptance E VU Educated about OT and POC. Educated about anni dressing technique. Educated about safety with ADL and t/f. Educated to call for assist and not to stand independently. SP 10/31/17 1354 Done   Family Acceptance E VU Educated about OT and POC. Educated about anni dressing technique. Educated about safety with ADL and t/f. Educated to call for assist and not to stand independently.  10/31/17 1354 Done                      User Key     Initials Effective Dates Name Provider Type Discipline     10/17/16 -  Karoline Huang, OTR/L Occupational Therapist OT     10/17/16 -  Jacinta Pitts OTR/L Occupational Therapist OT     10/17/16 -  Cassie  MARCO Moreno/DAWN Occupational Therapy Assistant OT    KD 10/17/16 -  MARCO Vidales/L Occupational Therapy Assistant OT    SP 01/31/17 -  Alem Bruce OT Student OT Student OT                OT Recommendation and Plan  Anticipated Equipment Needs At Discharge: bathing equipment, dressing equipment, front wheeled walker  Anticipated Discharge Disposition: home with assist  Planned Therapy Interventions: activity intolerance, adaptive equipment training, ADL retraining, IADL retraining, balance training, bed mobility training, fine motor coordination training, energy conservation, home exercise program, motor coordination training, neuromuscular re-education, ROM (Range of Motion), strengthening, stretching, transfer training  Therapy Frequency: other (see comments) (3-14x a week)  Plan of Care Review  Plan Of Care Reviewed With: patient  Progress: progress toward functional goals as expected  Outcome Summary/Follow up Plan: pt met all goals, he is sba for any standing, transfer or ambulation, He was advised not to drive unless cleared by MD or driving evaluation.  he is d/c'd home w/ wife and out pt  PT services          Outcome Measures       11/14/17 0857 11/14/17 0730 11/13/17 1030    How much help from another person do you currently need...    Turning from your back to your side while in flat bed without using bedrails? 4  -JW  4  -LM    Moving from lying on back to sitting on the side of a flat bed without bedrails? 4  -JW  3  -LM    Moving to and from a bed to a chair (including a wheelchair)? 3  -JW  3  -LM    Standing up from a chair using your arms (e.g., wheelchair, bedside chair)? 3  -JW  3  -LM    Climbing 3-5 steps with a railing? 3  -JW  3  -LM    To walk in hospital room? 3  -JW  3  -LM    AM-PAC 6 Clicks Score 20  -JW  19  -LM    How much help from another is currently needed...    Putting on and taking off regular lower body clothing?  3  -RC     Bathing (including washing, rinsing, and  drying)  4  -RC     Toileting (which includes using toilet bed pan or urinal)  4  -RC     Putting on and taking off regular upper body clothing  4  -RC     Taking care of personal grooming (such as brushing teeth)  4  -RC     Eating meals  4  -     Score  23  -     Functional Assessment    Outcome Measure Options AM-PAC 6 Clicks Basic Mobility (PT)  -  AM-PAC 6 Clicks Basic Mobility (PT)  -      11/13/17 0738          How much help from another is currently needed...    Putting on and taking off regular lower body clothing? 3  -BH      Bathing (including washing, rinsing, and drying) 4  -BH      Toileting (which includes using toilet bed pan or urinal) 3  -BH      Putting on and taking off regular upper body clothing 4  -BH      Taking care of personal grooming (such as brushing teeth) 4  -BH      Eating meals 4  -      Score 22  -        User Key  (r) = Recorded By, (t) = Taken By, (c) = Cosigned By    Initials Name Provider Type     Felicia Haynes, PTA Physical Therapy Assistant     Landy Mckeon, PT Physical Therapist     Karoline Huang, OTR/L Occupational Therapist    JIA Carpio LARA/L Occupational Therapy Assistant           Time Calculation:          Time Calculation- OT       11/14/17 1045 11/14/17 0719       Time Calculation- OT    OT Start Time 0730  -      OT Stop Time 0855  -      OT Time Calculation (min) 85 min  -      Total Timed Code Minutes- OT 85 minute(s)  -      OT Received On 11/14/17  -      OT Goal Re-Cert Due Date  11/26/17  -       User Key  (r) = Recorded By, (t) = Taken By, (c) = Cosigned By    Initials Name Provider Type     Karoline Huang OTR/L Occupational Therapist    LEIGH YoungA/L Occupational Therapy Assistant          Therapy Charges for Today     Code Description Service Date Service Provider Modifiers Qty    [id number]  OT SELF CARE/MGMT/TRAIN EA 15 MIN 11/14/2017 MARCO Cannon/L GO 4    [id number]  OT THER  PROC EA 15 MIN 11/14/2017 Cassie CarpioMARCO/L GO 1    [id number] HC OT SELF CARE/MGMT/TRAIN EA 15 MIN 11/14/2017 Cassie TAMAYO BalajiLEIGHA/L GO 1          OT G-codes  OT Professional Judgement Used?: Yes  OT Functional Scales Options: AM-PAC 6 Clicks Daily Activity (OT)  Score: 22  Functional Limitation: Self care  Self Care Current Status (): At least 20 percent but less than 40 percent impaired, limited or restricted  Self Care Goal Status (): At least 1 percent but less than 20 percent impaired, limited or restricted    OT Discharge Summary  Anticipated Discharge Disposition: home with assist  Reason for Discharge: Per MD order, All goals achieved  Outcomes Achieved: Able to achieve all goals within established timeline  Discharge Destination: Home with assist    Cassie TAMAYO MARCO Carpio/DAWN  11/14/2017

## 2017-11-14 NOTE — PLAN OF CARE
Problem: Inpatient Physical Therapy  Goal: Stair Training Goal LTG- PT  Outcome: Outcome(s) achieved Date Met:  11/14/17    10/31/17 0825 11/04/17 1440 11/14/17 0857   Stair Training PT LTG   Stair Training Goal PT LTG, Date Established --  11/04/17 --    Stair Training Goal PT LTG, Time to Achieve by discharge --  --    Stair Training Goal PT LTG, Number of Steps 1 flight --  --    Stair Training Goal PT LTG, [place] Level supervision required --  --    Stair Training Goal PT LTG, Assist Device 2 handrails --  --    Stair Training Goal PT LTG, Date Goal Reviewed --  --  11/14/17   Stair Training Goal PT LTG, Outcome --  --  goal met  (w/ 1 HR)

## 2017-11-14 NOTE — DISCHARGE PLACEMENT REQUEST
"Faith Harper Jr. (82 y.o. Male)     Date of Birth Social Security Number Address Home Phone MRN    1934  [address] [facility] [address] [phone] [id number]    Latter day Marital Status          None        Admission Date Admission Type Admitting Provider Attending Provider Department, Room/Bed    10/30/17 Elective Vishnu Mckinnon MD Harg[place], Kenneth R, MD [facility] ACUTE REHAB, 529/1    Discharge Date Discharge Disposition Discharge Destination         Home or Self Care             Attending Provider: Vishnu Mckinnon MD     Allergies:  Levaquin [Levofloxacin]    Isolation:  None   Infection:  None   Code Status:  FULL    Ht:  70\" (177.8 cm)   Wt:  202 lb 4.8 oz (91.8 kg)    Admission Cmt:  None   Principal Problem:  Right-sided lacunar stroke [I63.9]                 Active Insurance as of 10/30/2017     Primary Coverage     Payor Plan Insurance Group Employer/Plan Group    HUMANA MEDICARE REPLACEMENT HUMANA MEDICARE REPL [id number]     Payor Plan Address Payor Plan Phone Number Effective From Effective To    [address] [address] [phone] 1/1/2015     [place] [address]       Subscriber Name Subscriber Birth Date Member ID       FAITH HARPER JR. 1934 [id number]                 Emergency Contacts      (Rel.) Home Phone Work Phone Mobile Phone    Maxine Harper (Spouse) -- -- [phone]    Nika Chow (Daughter) [phone] -- [phone]            Emergency Contact Information     Name Relation Home Work Mobile    Maxine Harper Spouse   [phone]    Nika Chow Daughter [phone]          Insurance Information                HUMANA MEDICARE REPLACEMENT/HUMANA MEDICARE REPL Phone: [phone]    Subscriber: Faith Harper  Subscriber#: [id number]    Group#: [id number] Precert#:              History & Physical      Vishnu Mckinnon MD at 10/30/2017  3:29 PM           [facility]" [address] [address]  T - [phone]     H/P NOTE         SUBJECTIVE:   Patient Care Team:  Evan Marrero MD as PCP - General (Family Medicine)    Chief Complaint:   Acute stroke with left-sided weakness,ataxia.  In addition he is having some confusion    Subjective     Patient is 82 y.o. male presents with Recurrent falls and left-sided weakness.  Over the 1-2 weeks prior to his admission he had falls and left-sided weakness but he did not tell his family.  Eventually he was taken to the hospital for evaluation.  Workup consistently showed left-sided weakness.  Dr. Cordova of neurology saw him in consultation and thought that he had a lacunar infarct as the cause of his left-sided weakness.  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  Since his admission he has had some incontinence of urine because he can't hold his urine and he is having redness in the groin area.      ROS/HISTORY/ CURRENT MEDICATIONS/OBJECTIVE/VS/PE:       History:     Past Medical History:   Diagnosis Date   • Arthritis    • Chronic anxiety    • Chronic back pain greater than 3 months duration    • Degenerative joint disease involving multiple joints    • Diabetes    • Diabetes mellitus    • Essential hypertension    • Gout    • Hypertension    • Left-sided weakness 10/2017   • Prostatic hypertrophy    • Right-sided lacunar stroke 10/2017     Past Surgical History:   Procedure Laterality Date   • CATARACT EXTRACTION     • KNEE SURGERY     • TOTAL KNEE ARTHROPLASTY     • UMBILICAL HERNIA REPAIR       No family history on file.  Social History   Substance Use Topics   • Smoking status: Former Smoker   • Smokeless tobacco: Never Used      Comment: 30 pack year history   • Alcohol use No     Prescriptions Prior to Admission   Medication Sig Dispense Refill Last Dose   • Acetaminophen 500 MG coapsule       •  Cholecalciferol (VITAMIN D PO)       • Homeopathic Products (ALLERGY MEDICINE PO)       • HYDROcodone-acetaminophen (NORCO) 7.5-325 MG per tablet       • Multiple Vitamin (MULTI VITAMIN DAILY PO)         Allergies:  Levaquin [levofloxacin]    Current Medications:     Current Facility-Administered Medications:   •  acetaminophen (TYLENOL) tablet 650 mg, 650 mg, Oral, Q4H PRN, Vishnu Mckinnon MD  •  ALPRAZolam (XANAX) tablet 0.5 mg, 0.5 mg, Oral, Nightly PRN, Vishnu Mckinnon MD  •  amLODIPine (NORVASC) tablet 10 mg, 10 mg, Oral, Nightly, Vishnu Mckinnon MD  •  [START ON 10/31/2017] aspirin chewable tablet 81 mg, 81 mg, Oral, Daily, Vishnu Mckinnon MD  •  atorvastatin (LIPITOR) tablet 10 mg, 10 mg, Oral, Nightly, Vishnu Mckinnon MD  •  calcium carbonate (TUMS) chewable tablet 500 mg (200 mg elemental), 2 tablet, Oral, BID PRN, Vishnu Mckinnon MD  •  [START ON 10/31/2017] clopidogrel (PLAVIX) tablet 75 mg, 75 mg, Oral, Daily, Vishnu Mckinnon MD  •  enoxaparin (LOVENOX) syringe 40 mg, 40 mg, Subcutaneous, Daily, Vishnu Mckinnon MD  •  [START ON 10/31/2017] glipiZIDE (GLUCOTROL) tablet 10 mg, 10 mg, Oral, QAM AC, Vishnu Mckinnon MD  •  HYDROcodone-acetaminophen (NORCO) 7.5-325 MG per tablet 1 tablet, 1 tablet, Oral, Q8H PRN, Vishnu Mckinnon MD  •  [START ON 10/31/2017] lisinopril (PRINIVIL,ZESTRIL) tablet 10 mg, 10 mg, Oral, Daily With Breakfast, Vishnu Mckinnon MD  •  [START ON 10/31/2017] magnesium oxide (MAGOX) tablet 400 mg, 400 mg, Oral, Daily, Vishnu Mckinnon MD  •  metFORMIN (GLUCOPHAGE) tablet 1,000 mg, 1,000 mg, Oral, BID With Meals, Vishnu Mckinnon MD  •  metoprolol succinate XL (TOPROL-XL) 24 hr tablet 50 mg, 50 mg, Oral, Nightly, Vishnu Mckinnon MD  •  [START ON 10/31/2017] multivitamin with beta carotene tablet 1 tablet, 1 tablet, Oral, Daily, Vishnu Mckinnon MD  •  [START ON 10/31/2017] pantoprazole (PROTONIX) EC tablet 40 mg, 40 mg, Oral, Q AM,  Vishnu Mckinnon MD  •  terazosin (HYTRIN) capsule 5 mg, 5 mg, Oral, Nightly, Vishnu Mckinnon MD      REVIEW OF SYSTEMS:  Review of Systems   HENT: Positive for congestion and voice change. Negative for facial swelling, hearing loss, mouth sores, nosebleeds, postnasal drip and trouble swallowing.    Eyes: Negative.    Respiratory: Negative.    Cardiovascular: Negative.    Gastrointestinal: Negative.         He has loose bowels on metformin   Endocrine: Negative.    Genitourinary: Positive for frequency and urgency.   Musculoskeletal: Positive for arthralgias, back pain, gait problem and joint swelling.   Skin: Negative.    Allergic/Immunologic: Negative.    Neurological: Positive for speech difficulty and weakness. Negative for dizziness, tremors, seizures, syncope, facial asymmetry, light-headedness, numbness and headaches.        Initially he had some significant difficulty with speech and left-sided weakness both have improved.  Daughter says when I saw him he woke and his speech was difficult to understand she says that usually occurs with fatigue now   Hematological: Negative.    Psychiatric/Behavioral: Positive for confusion. The patient is nervous/anxious.         He has been having some confusion at night.  Daughter says last night was the first night that he did not awake with confusion       Objective     Physical Exam:   Temp:  [97.3 °F (36.3 °C)] 97.3 °F (36.3 °C)  Heart Rate:  [72] 72  Resp:  [20] 20  BP: (144)/(76) 144/76    Physical Exam:    Physical Exam   Constitutional: He is oriented to person, place, and time. He appears well-developed and well-nourished. No distress.   HENT:   Head: Normocephalic.   Eyes: Conjunctivae and EOM are normal. Pupils are equal, round, and reactive to light. Right eye exhibits no discharge. Left eye exhibits no discharge. No scleral icterus.   Neck: Normal range of motion. Neck supple. No JVD present. No tracheal deviation present. No thyromegaly present.    Cardiovascular: Normal rate and normal heart sounds.  Exam reveals no gallop and no friction rub.    No murmur heard.  He does have an irregular rhythm   Pulmonary/Chest: Effort normal. No stridor. No respiratory distress. He has no wheezes. He has no rales. He exhibits no tenderness.   Abdominal: Soft. Bowel sounds are normal. He exhibits no distension. There is no tenderness. There is no guarding.   Musculoskeletal: Normal range of motion. He exhibits edema. He exhibits no tenderness or deformity.   Neurological: He is alert and oriented to person, place, and time. He displays abnormal reflex. No cranial nerve deficit. He exhibits abnormal muscle tone. Coordination abnormal.   He has weakness in drift on the left upper and lower extremity.  He is able to resist gravity with both left upper and lower   Skin: Skin is warm and dry. No rash noted. He is not diaphoretic. No erythema. No pallor.   Psychiatric: He has a normal mood and affect. His behavior is normal. Judgment and thought content normal.   Nursing note and vitals reviewed.           Results Review:      Lab Results (last 24 hours)     ** No results found for the last 24 hours. **                          Imaging Results (last 24 hours)     ** No results found for the last 24 hours. **          I reviewed the patient's  clinical results.  I reviewed the patient's  imaging results and agree with the interpretation.   I reviewed the therapy notes   ASSESSMENT/PLAN:   Assessment/Plan   Principal Problem:    Right-sided lacunar stroke  Active Problems:    Left-sided weakness    Atrial fibrillation, chronic    Essential hypertension    Diabetes mellitus    Chronic anxiety    Chronic back pain greater than 3 months duration    Degenerative joint disease involving multiple joints    Encounter for rehabilitation    Obstructive sleep apnea syndrome    Former smoker      He will be admitted to the acute rehabilitation unit for intensive rehabilitation.  He'll need  "close medical supervision because of his multiple polyps.  He should be able to participate 3 hours a day in therapy make measurable improvement and be able to return home at discharge with his family.  Family will bring his CPAP equipment to help with his sleep.  Also discussed that anxiety and some confusion at night and hopefully that will improve but it may be worse first night here.  We will control his diabetes and his hypertension as well as benign prostatic hypertrophy.  Suspect he is in atrial fibrillation and the decision was made by neurology and hospitalist not to anticoagulate him but we will continue Lovenox while he is in acute rehabilitation.  I am going to get an EKG to document his rhythm which is atrial fibrillation seems to be rate controlled    I discussed the patients findings and my recommendations with patient and family.      Vishnu Mckinnon MD  10/30/17  3:29 PM          Electronically signed by Vishnu Mckinnon MD at 2017 10:41 AM        Physician Progress Notes (last 24 hours) (Notes from 2017  9:40 AM through 2017  9:40 AM)     No notes of this type exist for this encounter.          [facility] ACUTE REHAB  [address] [place] [address]  Phone:  [phone]  Fax:   Date Ordered: 2017         Patient:  Kenneth Harper Jr. MRN:  [id number]   [address] [facility] [address] :  1934  SSN:    Phone: [phone] Sex:  M     Weight: 202 lb 4.8 oz (91.8 kg)         Ht Readings from Last 1 Encounters:   10/30/17 70\" (177.8 cm)         Miscellaneous DME             (Order ID: [id number])    Diagnosis:  Abnormality of gait and mobility (R26.9 [ICD-10-CM] 781.2 [ICD-9-CM])  Right-sided lacunar stroke (I63.9 [ICD-10-CM] 434.91 [ICD-9-CM])   Quantity:  1     Type of DME: shower chair  Length of Need (99 Months = Lifetime): 99 Months = Lifetime            Authorizing Provider:Vishnu Mckinnon" MD  Authorizing Provider's NPI: [id number]  Order Entered By: Vishnu Mckinnon MD 11/14/2017  8:34 AM     Electronically signed by: Vishnu Mckinnon MD 11/14/2017  8:34 AM              Physical Therapy Notes (last 24 hours) (Notes from 11/13/2017  9:40 AM through 11/14/2017  9:40 AM)      Landy Mckeon, PT at 11/13/2017 11:53 AM  Version 1 of 1         Problem: Patient Care Overview (Adult)  Goal: Plan of Care Review  Outcome: Ongoing (interventions implemented as appropriate)    11/13/17 1030   Coping/Psychosocial Response Interventions   Plan Of Care Reviewed With patient   Outcome Evaluation   Outcome Summary/Follow up Plan 14 day progress note completed. Pt ambulated multiple gait cycles with the longest being 200 feet with SBA. Pt ambulated up/down 10 stairs using one handrail mainly with SBA but had one LOB requiring CGA. Pt to d/c home tomorrow with spouse - pt feels ready and has no concerns re: d/c.       Goal: Discharge Needs Assessment  Outcome: Ongoing (interventions implemented as appropriate)    11/13/17 1030   Discharge Needs Assessment   Discharge Facility/Level Of Care Needs home with home health         Problem: Inpatient Physical Therapy  Goal: Gait Training Goal LTG- PT  Outcome: Outcome(s) achieved Date Met:  11/13/17    10/31/17 0825 11/13/17 1030   Gait Training PT LTG   Gait Training Goal PT LTG, Date Established 10/31/17 --    Gait Training Goal PT LTG, Time to Achieve by discharge --    Gait Training Goal PT LTG, [place] Level supervision required --    Gait Training Goal PT LTG, Assist Device (AAD) --    Gait Training Goal PT LTG, Distance to Achieve 150 feet --    Gait Training Goal PT LTG, Date Goal Reviewed --  11/13/17   Gait Training Goal PT LTG, Outcome --  goal met       Goal: Stair Training Goal LTG- PT  Outcome: Ongoing (interventions implemented as appropriate)    10/31/17 0825 11/04/17 1440 11/13/17 1030   Stair Training PT LTG   Stair Training Goal PT LTG, Date  Established --  17 --    Stair Training Goal PT LTG, Time to Achieve by discharge --  --    Stair Training Goal PT LTG, Number of Steps 1 flight --  --    Stair Training Goal PT LTG, [place] Level supervision required --  --    Stair Training Goal PT LTG, Assist Device 2 handrails --  --    Stair Training Goal PT LTG, Date Goal Reviewed --  --  17   Stair Training Goal PT LTG, Outcome --  --  goal ongoing              Electronically signed by Landy Mckeon PT at 2017 11:53 AM      Landy Mckeon, PT at 2017 11:53 AM  Version 1 of 1         Inpatient Rehabilitation - Physical Therapy Progress Note  [facility]     Patient Name: Kenneth Harper Jr.  : 1934  MRN: [id number]  Today's Date: 2017   Onset of Illness/Injury or Date of Surgery Date: 10/30/17  Date of Referral to PT: 10/30/17  Referring Physician: TERRENCE Mckinnon MD      Admit Date: 10/30/2017     Visit Dx:    ICD-10-CM ICD-9-CM   1. Right-sided lacunar stroke I63.9 434.91   2. Symbolic dysfunction R48.9 784.60   3. Impaired mobility and ADLs Z74.09 799.89   4. Abnormality of gait and mobility R26.9 781.2   5. Muscle weakness (generalized) M62.81 728.87   6. Left-sided weakness R53.1 728.87   7. Chronic back pain greater than 3 months duration M54.9 724.5    G89.29 338.29     Patient Active Problem List   Diagnosis   • Spinal stenosis, lumbar region, with neurogenic claudication   • Former smoker   • Overweight   • Left-sided weakness   • Right-sided lacunar stroke   • Essential hypertension   • Diabetes mellitus   • Chronic anxiety   • Chronic back pain greater than 3 months duration   • Prostatic hypertrophy   • Degenerative joint disease involving multiple joints   • Atrial fibrillation, chronic   • Encounter for rehabilitation   • Obstructive sleep apnea syndrome     Past Medical History:   Diagnosis Date   • Arthritis    • Chronic anxiety    • Chronic back pain greater than 3 months duration    •  Degenerative joint disease involving multiple joints    • Diabetes    • Diabetes mellitus    • Essential hypertension    • Gout    • Hypertension    • Left-sided weakness 10/2017   • Prostatic hypertrophy    • Right-sided lacunar stroke 10/2017     Past Surgical History:   Procedure Laterality Date   • CATARACT EXTRACTION     • KNEE SURGERY     • TOTAL KNEE ARTHROPLASTY     • UMBILICAL HERNIA REPAIR            PT ASSESSMENT (last 72 hours)      PT Evaluation       11/13/17 1030 11/13/17 0738    Rehab Evaluation    Document Type progress note  -LM progress note  -BH    Subjective Information agree to therapy;no complaints  -LM agree to therapy  -BH    Patient Effort, Rehab Treatment good  -LM     Symptoms Noted During/After Treatment none  -LM     General Information    Patient Profile Review yes  -LM yes  -BH    General Observations Pt sitting up in w/c playing on laptop.  Pt very pleasant and agreeable to PT 14 day progress note.  -LM Pt up in w/c already dressed and bath. On room air  -BH    Precautions/Limitations fall precautions  -LM fall precautions  -BH    Vital Signs    Pre Systolic BP Rehab 121  -  -BH    Pre Treatment Diastolic BP 57  -LM 58  -BH    Post Systolic BP Rehab 139  -  -BH    Post Treatment Diastolic BP 61  -LM 61  -BH    Pretreatment Heart Rate (beats/min) 59  -LM 59  -BH    Posttreatment Heart Rate (beats/min) 60  -LM 44  -BH    Pre SpO2 (%) 95  -LM 97  -BH    O2 Delivery Pre Treatment room air  -LM room air  -BH    Post SpO2 (%) 98  -LM 95  -BH    O2 Delivery Post Treatment room air  -LM room air  -BH    Pre Patient Position Sitting  -LM Sitting  -BH    Post Patient Position Sitting  -LM     Pain Assessment    Pain Assessment No/denies pain  -LM     Pain Score  0  -BH    Post Pain Score  0  -BH    Cognitive Assessment/Intervention    Current Cognitive/Communication Assessment functional  -LM functional  -BH    Orientation Status oriented x 4  -LM oriented x 4  -BH    Follows  Commands/Answers Questions 100% of the time  -LM     Personal Safety WNL/WFL  -LM     Personal Safety Interventions fall prevention program maintained;gait belt;nonskid shoes/slippers when out of bed;muscle strengthening facilitated  -     ROM (Range of Motion)    General ROM  upper extremity range of motion deficits identified  -    General ROM Detail BLE's AROM WFL with exception of L ankle DF  -LM RUE WFL not full range LUE approx 70 degrees can get higher with compensation. rest WFL, fair+ digit to thumb opposition and digit to nose. Some difficulty with range but mostly WFL  -    MMT (Manual Muscle Testing)    General MMT Assessment  upper extremity strength deficits identified  -    General MMT Assessment Detail LLE - Hip flex - 4/5; Knee flex - 4/5; Knee ext - 4+/5; Ankle DF - 2+/5; RLE - Hip flex - 4+/5; Knee flex - 4/5; Knee ext - 4+/5; Ankle DF - 4/5  - BUE  grossly 4 to 4+/5; BUE grossly 4 to 4+/5 in limited range  -    Bed Mobility, Assessment/Treatment    Bed Mobility, Comment Pt up in chair.  -LM     Transfer Assessment/Treatment    Transfers, Sit-Stand [place] stand by assist  -LM     Transfers, Stand-Sit [place] stand by assist  -LM     Transfers, Sit-Stand-Sit, Assist Device rolling walker  -     Gait Assessment/Treatment    Gait, [place] Level stand by assist  -     Gait, Assistive Device rolling walker  -     Gait, Distance (Feet) 200   1st - 200 feet; 2nd - 150 feet; 3rd - 150 feet  -     Gait, Gait Deviations left:;toe-to-floor clearance decreased  -     Gait, Impairments strength decreased;impaired balance  -     Stairs Assessment/Treatment    Number of Stairs 10  -LM     Stairs, Handrail Location left side (ascending)  -LM     Stairs, [place] Level supervision required;contact guard assist  -     Stairs, Comment Pt ambulated up/down flight of stairs.  Pt mainly SBA - however, when descending steps pt had one LOB when his foot did not clear and  PT had to grab onto belt to steady pt.  -LM     Wheelchair Training/Management    Wheelchair, Distance Propelled 150 feet x 2 - Mod I  -LM     Sensory Assessment/Intervention    Light Touch  LUE;RUE  -BH    LUE Light Touch  WNL  -BH    RUE Light Touch  WNL  -BH    Positioning and Restraints    Pre-Treatment Position sitting in chair/recliner  -LM     Post Treatment Position wheelchair  -LM     In Wheelchair sitting;call light within reach;encouraged to call for assist  -LM       11/12/17 1945 11/12/17 0707    Sensory Assessment/Intervention    Light Touch LUE;RUE  -NW LUE;RUE  -MK    LUE Light Touch mild impairment  -NW mild impairment  -    RUE Light Touch WNL  -NW WNL  -MK    LLE Light Touch mild impairment  -NW mild impairment  -    RLE Light Touch mild impairment  -NW mild impairment  -      11/11/17 2000 11/11/17 0716    Pain Assessment    Pain Score 0  -NW     Sensory Assessment/Intervention    Light Touch LUE;RUE  -NW LUE;RUE  -MK    LUE Light Touch mild impairment  -NW mild impairment  -    RUE Light Touch WNL  - WNL  -    LLE Light Touch mild impairment  -NW mild impairment  -    RLE Light Touch mild impairment  -NW mild impairment  -      11/10/17 2000 11/10/17 1355    Rehab Evaluation    Document Type  therapy note (daily note)  -JA    Subjective Information  agree to therapy  -JA    Patient Effort, Rehab Treatment  good  -    General Information    Precautions/Limitations  fall precautions  -    Vital Signs    Pre Patient Position  Sitting  -JA    Intra Patient Position  Standing  -JA    Post Patient Position  Sitting  -JA    Pain Assessment    Pain Assessment  No/denies pain  -JA    Pain Score 0  -NW     Transfer Assessment/Treatment    Transfers, Sit-Stand [place]  stand by assist  -JA    Transfers, Stand-Sit [place]  stand by assist  -JA    Transfers, Sit-Stand-Sit, Assist Device  rolling walker  -JA    Gait Assessment/Treatment    Gait, [place] Level  stand by  assist  -JA    Gait, Assistive Device  rolling walker  -JA    Gait, Distance (Feet)  120  -JA    Wheelchair Training/Management    Wheelchair, Distance Propelled  150  -JA    Wheelchair Training Comment  conditional independent  -    Therapy Exercises    Bilateral Lower Extremities  AROM:;10 reps;standing;hip abduction/adduction;hip flexion;mini squats;heel raises;other reps  -JA    BLE Other Reps  --   Pro 2 level 4.0 12mins   -JA    Sensory Assessment/Intervention    Light Touch LUE;RUE  -NW     LUE Light Touch mild impairment  -NW     RUE Light Touch WNL  -NW     LLE Light Touch mild impairment  -NW     RLE Light Touch mild impairment  -NW     Positioning and Restraints    Pre-Treatment Position  sitting in chair/recliner  -JA    Post Treatment Position  wheelchair  -JA    In Wheelchair  sitting;with nsg  -JA      User Key  (r) = Recorded By, (t) = Taken By, (c) = Cosigned By    Initials Name Provider Type     Landy Mckeon, PT Physical Therapist     Karoline Huang, OTR/L Occupational Therapist    NW Grisel Polanco, MAN Registered Nurse    KANDACE Avila, RN Registered Nurse    RADHA Flores PTA Physical Therapy Assistant          Physical Therapy Education     Title: PT OT SLP Therapies (Active)     Topic: Physical Therapy (Active)     Point: Mobility training (Done)    Learning Progress Summary    Learner Readiness Method Response Comment Documented by Status   Patient Acceptance E VU again reviewed correct gt and transfer safey.  11/03/17 0910 Done    Acceptance E VU reviewed safe transfers and risks of falls RW 11/01/17 1052 Done    Acceptance E NR Reviewed safety with transfers and ambulation.  10/31/17 1506 Active               Point: Precautions (Done)    Learning Progress Summary    Learner Readiness Method Response Comment Documented by Status   Patient Acceptance E VU again reviewed correct gt and transfer safey.  11/03/17 0910 Done    Acceptance E VU reviewed safe transfers and  risks of falls  11/01/17 1052 Done    Acceptance E NR Reviewed safety with transfers and ambulation.  10/31/17 1506 Active                      User Key     Initials Effective Dates Name Provider Type Discipline     06/15/16 -  Landy Mckeon, PT Physical Therapist PT    RW 10/17/16 -  Pipe Gruber, PTA Physical Therapy Assistant PT                PT Recommendation and Plan  Anticipated Equipment Needs At Discharge: front wheeled walker  Anticipated Discharge Disposition: home with /7 care, home with home health  Planned Therapy Interventions: balance training, bed mobility training, gait training, home exercise program, motor coordination training, neuromuscular re-education, patient/family education, postural re-education, ROM (Range of Motion), stair training, strengthening, stretching, transfer training, wheelchair management/propulsion training  PT Frequency: other (see comments) (5-14 times/wk)  Plan of Care Review  Plan Of Care Reviewed With: patient  Outcome Summary/Follow up Plan: 14 day progress note completed.  Pt ambulated multiple gait cycles with the longest being 200 feet with SBA.  Pt ambulated up/down 10 stairs using one handrail mainly with SBA but had one LOB requiring CGA.  Pt to d/c home tomorrow with spouse - pt feels ready and has no concerns re: d/c.          IP PT Goals       11/13/17 1030 11/10/17 1355 11/09/17 1535    Gait Training PT LTG    Gait Training Goal PT LTG, Date Goal Reviewed 11/13/17  -LM 11/10/17  -JA 11/09/17  -RW    Gait Training Goal PT LTG, Outcome goal met  -LM goal ongoing  -JA goal ongoing  -RW    Stair Training PT STG    Stair Training Goal PT STG, Date Goal Reviewed   11/09/17  -RW    Stair Training Goal PT STG, Outcome   goal met  -RW    Stair Training PT LTG    Stair Training Goal PT LTG, Date Goal Reviewed 11/13/17  -LM 11/10/17  -JA 11/09/17  -RW    Stair Training Goal PT LTG, Outcome goal ongoing  -LM goal ongoing  -JA goal ongoing  -RW      11/08/17 1119  11/07/17 1357 11/06/17 1415    Transfer Training PT LTG    Transfer Training PT  LTG, Date Goal Reviewed  11/07/17  -RW 11/06/17  -RW    Transfer Training PT LTG, Outcome  goal met  -RW goal ongoing  -RW    Gait Training PT LTG    Gait Training Goal PT LTG, Date Goal Reviewed 11/08/17  -RW 11/07/17  -RW 11/06/17  -RW    Gait Training Goal PT LTG, Outcome goal ongoing  -RW  goal ongoing  -RW    Stair Training PT STG    Stair Training Goal PT STG, Date Goal Reviewed 11/08/17  -RW 11/07/17  -RW 11/06/17  -RW    Stair Training Goal PT STG, Outcome goal ongoing  -RW goal partially met   needed 2 hr  -RW goal ongoing  -RW    Stair Training PT LTG    Stair Training Goal PT LTG, Date Goal Reviewed 11/08/17  -RW 11/07/17  -RW 11/06/17  -RW    Stair Training Goal PT LTG, Outcome goal ongoing  -RW goal partially met   needed cga  -RW goal ongoing  -RW      11/04/17 1440 11/03/17 1540 11/02/17 1631    Bed Mobility PT LTG    Bed Mobility PT LTG, Date Goal Reviewed   11/02/17  -RW    Bed Mobility PT LTG, Outcome   goal met  -RW    Transfer Training PT LTG    Transfer Training PT  LTG, Date Goal Reviewed 11/04/17  -JW 11/03/17  -RW 11/02/17  -RW    Transfer Training PT LTG, Outcome goal ongoing  -JW goal ongoing  -RW goal ongoing  -RW    Gait Training PT LTG    Gait Training Goal PT LTG, Date Goal Reviewed 11/04/17  -JW 11/03/17  -RW 11/02/17  -RW    Gait Training Goal PT LTG, Outcome goal ongoing  -JW goal ongoing  -RW goal ongoing  -RW    Stair Training PT STG    Stair Training Goal PT STG, Date Goal Reviewed 11/04/17  -JW 11/03/17  -RW 11/02/17  -RW    Stair Training Goal PT STG, Outcome goal ongoing  -JW goal ongoing  -RW goal ongoing  -RW    Stair Training PT LTG    Stair Training Goal PT LTG, Date Established 11/04/17  -JW      Stair Training Goal PT LTG, Date Goal Reviewed 11/04/17  -JW 11/03/17  -RW 11/02/17  -RW    Stair Training Goal PT LTG, Outcome goal ongoing  -JW goal ongoing  -RW goal ongoing  -RW      11/01/17 1549  10/31/17 0825       Bed Mobility PT LTG    Bed Mobility PT LTG, Date Established  10/31/17  -LM     Bed Mobility PT LTG, Time to Achieve  by discharge  -LM     Bed Mobility PT LTG, Activity Type  supine to sit/sit to supine  -LM     Bed Mobility PT LTG, [place] Level  supervision required  -LM     Bed Mobility PT LTG, Additional Goal  HOB flat; No BR's  -LM     Bed Mobility PT LTG, Date Goal Reviewed 11/01/17  -RW      Bed Mobility PT LTG, Outcome goal ongoing  -RW goal ongoing  -LM     Transfer Training PT LTG    Transfer Training PT LTG, Date Established  10/31/17  -LM     Transfer Training PT LTG, Time to Achieve  by discharge  -LM     Transfer Training PT LTG, Activity Type  bed to chair /chair to bed  -LM     Transfer Training PT LTG, [place] Level  supervision required  -LM     Transfer Training PT LTG, Assist Device  --   AAD  -LM     Transfer Training PT  LTG, Date Goal Reviewed 11/01/17  -RW      Transfer Training PT LTG, Outcome goal ongoing  -RW goal ongoing  -LM     Gait Training PT LTG    Gait Training Goal PT LTG, Date Established  10/31/17  -LM     Gait Training Goal PT LTG, Time to Achieve  by discharge  -LM     Gait Training Goal PT LTG, [place] Level  supervision required  -LM     Gait Training Goal PT LTG, Assist Device  --   AAD  -LM     Gait Training Goal PT LTG, Distance to Achieve  150 feet  -LM     Gait Training Goal PT LTG, Date Goal Reviewed 11/01/17  -RW      Gait Training Goal PT LTG, Outcome goal ongoing  -RW goal ongoing  -LM     Stair Training PT STG    Stair Training Goal PT STG, Date Established  10/31/17  -LM     Stair Training Goal PT STG, Time to Achieve  4 days  -LM     Stair Training Goal PT STG, Number of Steps  4 steps  -LM     Stair Training Goal PT STG, [place] Level  contact guard assist  -LM     Stair Training Goal PT STG, Assist Device  1 handrail  -LM     Stair Training Goal PT STG, Date Goal Reviewed 11/01/17  -RW      Stair Training Goal PT STG,  Outcome goal ongoing  -RW goal ongoing  -LM     Stair Training PT LTG    Stair Training Goal PT LTG, Date Established  10/31/17  -LM     Stair Training Goal PT LTG, Time to Achieve  by discharge  -LM     Stair Training Goal PT LTG, Number of Steps  1 flight  -LM     Stair Training Goal PT LTG, [place] Level  supervision required  -LM     Stair Training Goal PT LTG, Assist Device  2 handrails  -LM     Stair Training Goal PT LTG, Date Goal Reviewed 11/01/17  -RW      Stair Training Goal PT LTG, Outcome goal ongoing  -RW goal ongoing  -LM       User Key  (r) = Recorded By, (t) = Taken By, (c) = Cosigned By    Initials Name Provider Type    JENA Haynes, PTA Physical Therapy Assistant    MOSHE Mckeon, PT Physical Therapist    RADHA Flores, PTA Physical Therapy Assistant    RW Pipe Gruber, PTA Physical Therapy Assistant                Outcome Measures       11/13/17 1030          How much help from another person do you currently need...    Turning from your back to your side while in flat bed without using bedrails? 4  -LM      Moving from lying on back to sitting on the side of a flat bed without bedrails? 3  -LM      Moving to and from a bed to a chair (including a wheelchair)? 3  -LM      Standing up from a chair using your arms (e.g., wheelchair, bedside chair)? 3  -LM      Climbing 3-5 steps with a railing? 3  -LM      To walk in hospital room? 3  -LM      AM-PAC 6 Clicks Score 19  -LM      Functional Assessment    Outcome Measure Options AM-PAC 6 Clicks Basic Mobility (PT)  -LM        User Key  (r) = Recorded By, (t) = Taken By, (c) = Cosigned By    Initials Name Provider Type    MOSHE Mckeon, PT Physical Therapist           Time Calculation:         PT Charges       11/13/17 1030          Time Calculation    Start Time 1030  -LM      Stop Time 1119  -LM      Time Calculation (min) 49 min  -LM      PT Received On 11/13/17  -LM      PT Goal Re-Cert Due Date 11/26/17  -LM      Time  Calculation- PT    Total Timed Code Minutes- PT 49 minute(s)  -LM        User Key  (r) = Recorded By, (t) = Taken By, (c) = Cosigned By    Initials Name Provider Type    MOSHE Mckeon PT Physical Therapist          Therapy Charges for Today     Code Description Service Date Service Provider Modifiers Qty    84384387294 HC GAIT TRAINING EA 15 MIN 11/13/2017 Landy Mckeon PT GP 3          PT G-Codes  PT Professional Judgement Used?: Yes  Outcome Measure Options: AM-PAC 6 Clicks Basic Mobility (PT)  Score: 15  Functional Limitation: Mobility: Walking and moving around  Mobility: Walking and Moving Around Current Status (): At least 40 percent but less than 60 percent impaired, limited or restricted  Mobility: Walking and Moving Around Goal Status (): At least 1 percent but less than 20 percent impaired, limited or restricted      Landy Mckeon PT  11/13/2017            Electronically signed by Landy Mckeon PT at 11/13/2017 11:54 AM      Pipe Gruber, PTA at 11/13/2017  3:26 PM  Version 1 of 1         Problem: Patient Care Overview (Adult)  Goal: Plan of Care Review  Outcome: Ongoing (interventions implemented as appropriate)    11/13/17 1525   Coping/Psychosocial Response Interventions   Plan Of Care Reviewed With patient   Outcome Evaluation   Outcome Summary/Follow up Plan pt with sba for all gt and transfers. HEP provided and ready for dc home tomorrow, outpt to follow         Problem: Inpatient Physical Therapy  Goal: Stair Training Goal LTG- PT  Outcome: Ongoing (interventions implemented as appropriate)    10/31/17 0825 11/04/17 1440 11/13/17 1525   Stair Training PT LTG   Stair Training Goal PT LTG, Date Established --  11/04/17 --    Stair Training Goal PT LTG, Time to Achieve by discharge --  --    Stair Training Goal PT LTG, Number of Steps 1 flight --  --    Stair Training Goal PT LTG, [place] Level supervision required --  --    Stair Training Goal PT LTG, Assist Device 2 handrails --   --    Stair Training Goal PT LTG, Date Goal Reviewed --  --  17   Stair Training Goal PT LTG, Outcome --  --  goal ongoing              Electronically signed by Pipe Gruber PTA at 2017  3:27 PM      Pipe Gruber PTA at 2017  3:27 PM  Version 1 of 1         Inpatient Rehabilitation - Physical Therapy Treatment Note  [facility]     Patient Name: Kenneth Harper Jr.  : 1934  MRN: [id number]  Today's Date: 2017  Onset of Illness/Injury or Date of Surgery Date: 10/30/17  Date of Referral to PT: 10/30/17  Referring Physician: Dr. Mckinnon    Admit Date: 10/30/2017    Visit Dx:    ICD-10-CM ICD-9-CM   1. Right-sided lacunar stroke I63.9 434.91   2. Symbolic dysfunction R48.9 784.60   3. Impaired mobility and ADLs Z74.09 799.89   4. Abnormality of gait and mobility R26.9 781.2   5. Muscle weakness (generalized) M62.81 728.87   6. Left-sided weakness R53.1 728.87   7. Chronic back pain greater than 3 months duration M54.9 724.5    G89.29 338.29     Patient Active Problem List   Diagnosis   • Spinal stenosis, lumbar region, with neurogenic claudication   • Former smoker   • Overweight   • Left-sided weakness   • Right-sided lacunar stroke   • Essential hypertension   • Diabetes mellitus   • Chronic anxiety   • Chronic back pain greater than 3 months duration   • Prostatic hypertrophy   • Degenerative joint disease involving multiple joints   • Atrial fibrillation, chronic   • Encounter for rehabilitation   • Obstructive sleep apnea syndrome               Adult Rehabilitation Note       17 1429 17 1030 17 0738    Rehab Assessment/Intervention    Discipline physical therapy assistant  -RW physical therapist  -LM occupational therapist  -    Document Type therapy note (daily note)  -RW progress note  -LM     Subjective Information agree to therapy  -RW agree to therapy;no complaints  -LM     Patient Effort, Rehab Treatment good  -RW       Precautions/Limitations fall precautions  -RW      Recorded by [RW] Pipe Gruber PTA [LM] Landy Mckeon, PT [BH] Karoline Huang, OTR/L    Vital Signs    Pre Systolic BP Rehab  121  -LM     Pre Treatment Diastolic BP  57  -LM     Pretreatment Heart Rate (beats/min)  59  -LM     Pre SpO2 (%)  95  -LM     O2 Delivery Pre Treatment  room air  -LM     Pre Patient Position  Sitting  -LM     Recorded by  [LM] Landy Mckeon, PT     Pain Assessment    Pain Assessment No/denies pain  -RW No/denies pain  -LM     Recorded by [RW] Pipe Gruber PTA [LM] Landy Mckeon, PT     Cognitive Assessment/Intervention    Current Cognitive/Communication Assessment functional  -RW      Orientation Status oriented x 4  -RW      Follows Commands/Answers Questions 100% of the time  -RW      Personal Safety Interventions gait belt;nonskid shoes/slippers when out of bed  -RW      Recorded by [RW] Pipe Gruber PTA      Cognitive Assessment Intervention    Behavior/Mood Observations behavior appropriate to situation, WNL/WFL  -RW      Recorded by [RW] Pipe Gruber PTA      Bed Mobility, Assessment/Treatment    Bed Mob, Supine to Sit, [place] independent  -RW      Recorded by [RW] Pipe Gruber PTA      Transfer Assessment/Treatment    Transfers, Bed-Chair [place] stand by assist  -RW      Transfers, Chair-Bed [place] stand by assist  -RW      Transfers, Sit-Stand [place] stand by assist  -RW      Transfers, Stand-Sit [place] stand by assist  -RW      Transfers, Sit-Stand-Sit, Assist Device rolling walker  -RW      Recorded by [RW] Pipe Gruber PTA      Gait Assessment/Treatment    Gait, [place] Level stand by assist  -RW      Gait, Assistive Device rolling walker  -RW      Gait, Distance (Feet) 150  -RW      Gait, Impairments strength decreased;impaired balance  -RW      Recorded by [RW] Pipe Gruber PTA      Stairs Assessment/Treatment    Stairs, Handrail Location --  -RW      Stairs, [place]  Level --  -RW      Recorded by [RW] Pipe Gruber PTA      Wheelchair Training/Management    Wheelchair, Distance Propelled 150 mod ind  -RW      Recorded by [RW] Pipe Gruber PTA      Lower Body Dressing Assessment/Training    LB Dressing Assess/Train, Clothing Type donning:;shoes  -RW      Recorded by [RW] Pipe Gruber PTA      Therapy Exercises    Bilateral Lower Extremities AROM:;20 reps;sitting;hip flexion;LAQ;standing;heel raises;hip abduction/adduction   pro 2 level 3 x 5 min  -RW      Recorded by [RW] Pipe Gruber PTA      Positioning and Restraints    Pre-Treatment Position in bed  -RW      Post Treatment Position chair  -RW      In Chair call light within reach  -RW      Recorded by [RW] Pipe Gruber PTA        User Key  (r) = Recorded By, (t) = Taken By, (c) = Cosigned By    Initials Name Effective Dates     Landy Mckeon, PT 06/15/16 -      Karoline Huang, OTR/L 10/17/16 -     RW Pipe Gruber PTA 10/17/16 -                 IP PT Goals       11/13/17 1525 11/13/17 1030 11/10/17 1355    Gait Training PT LTG    Gait Training Goal PT LTG, Date Goal Reviewed  11/13/17  -LM 11/10/17  -JA    Gait Training Goal PT LTG, Outcome  goal met  - goal ongoing  -JA    Stair Training PT LTG    Stair Training Goal PT LTG, Date Goal Reviewed 11/13/17  -RW 11/13/17  -LM 11/10/17  -JA    Stair Training Goal PT LTG, Outcome goal ongoing  -RW goal ongoing  -LM goal ongoing  -JA      11/09/17 1535 11/08/17 1119 11/07/17 1357    Transfer Training PT LTG    Transfer Training PT  LTG, Date Goal Reviewed   11/07/17  -RW    Transfer Training PT LTG, Outcome   goal met  -RW    Gait Training PT LTG    Gait Training Goal PT LTG, Date Goal Reviewed 11/09/17  -RW 11/08/17  -RW 11/07/17  -RW    Gait Training Goal PT LTG, Outcome goal ongoing  -RW goal ongoing  -RW     Stair Training PT STG    Stair Training Goal PT STG, Date Goal Reviewed 11/09/17  -RW 11/08/17  -RW 11/07/17  -RW    Stair Training Goal PT STG,  Outcome goal met  -RW goal ongoing  -RW goal partially met   needed 2 hr  -RW    Stair Training PT LTG    Stair Training Goal PT LTG, Date Goal Reviewed 11/09/17  -RW 11/08/17  -RW 11/07/17  -RW    Stair Training Goal PT LTG, Outcome goal ongoing  -RW goal ongoing  -RW goal partially met   needed cga  -RW      11/06/17 1415 11/04/17 1440 11/03/17 1540    Transfer Training PT LTG    Transfer Training PT  LTG, Date Goal Reviewed 11/06/17  -RW 11/04/17  -JW 11/03/17  -RW    Transfer Training PT LTG, Outcome goal ongoing  -RW goal ongoing  -JW goal ongoing  -RW    Gait Training PT LTG    Gait Training Goal PT LTG, Date Goal Reviewed 11/06/17  -RW 11/04/17  -JW 11/03/17  -RW    Gait Training Goal PT LTG, Outcome goal ongoing  -RW goal ongoing  -JW goal ongoing  -RW    Stair Training PT STG    Stair Training Goal PT STG, Date Goal Reviewed 11/06/17  -RW 11/04/17  -JW 11/03/17  -RW    Stair Training Goal PT STG, Outcome goal ongoing  -RW goal ongoing  -JW goal ongoing  -RW    Stair Training PT LTG    Stair Training Goal PT LTG, Date Established  11/04/17  -JW     Stair Training Goal PT LTG, Date Goal Reviewed 11/06/17  -RW 11/04/17  -JW 11/03/17  -RW    Stair Training Goal PT LTG, Outcome goal ongoing  -RW goal ongoing  -JW goal ongoing  -RW      11/02/17 1631 11/01/17 1543 10/31/17 0825    Bed Mobility PT LTG    Bed Mobility PT LTG, Date Established   10/31/17  -LM    Bed Mobility PT LTG, Time to Achieve   by discharge  -LM    Bed Mobility PT LTG, Activity Type   supine to sit/sit to supine  -LM    Bed Mobility PT LTG, [place] Level   supervision required  -LM    Bed Mobility PT LTG, Additional Goal   HOB flat; No BR's  -LM    Bed Mobility PT LTG, Date Goal Reviewed 11/02/17  -RW 11/01/17  -RW     Bed Mobility PT LTG, Outcome goal met  -RW goal ongoing  -RW goal ongoing  -LM    Transfer Training PT LTG    Transfer Training PT LTG, Date Established   10/31/17  -LM    Transfer Training PT LTG, Time to Achieve   by  discharge  -LM    Transfer Training PT LTG, Activity Type   bed to chair /chair to bed  -LM    Transfer Training PT LTG, [place] Level   supervision required  -LM    Transfer Training PT LTG, Assist Device   --   AAD  -LM    Transfer Training PT  LTG, Date Goal Reviewed 11/02/17  -RW 11/01/17  -RW     Transfer Training PT LTG, Outcome goal ongoing  -RW goal ongoing  -RW goal ongoing  -LM    Gait Training PT LTG    Gait Training Goal PT LTG, Date Established   10/31/17  -LM    Gait Training Goal PT LTG, Time to Achieve   by discharge  -LM    Gait Training Goal PT LTG, [place] Level   supervision required  -LM    Gait Training Goal PT LTG, Assist Device   --   AAD  -LM    Gait Training Goal PT LTG, Distance to Achieve   150 feet  -LM    Gait Training Goal PT LTG, Date Goal Reviewed 11/02/17  -RW 11/01/17  -RW     Gait Training Goal PT LTG, Outcome goal ongoing  -RW goal ongoing  -RW goal ongoing  -LM    Stair Training PT STG    Stair Training Goal PT STG, Date Established   10/31/17  -LM    Stair Training Goal PT STG, Time to Achieve   4 days  -LM    Stair Training Goal PT STG, Number of Steps   4 steps  -LM    Stair Training Goal PT STG, [place] Level   contact guard assist  -LM    Stair Training Goal PT STG, Assist Device   1 handrail  -LM    Stair Training Goal PT STG, Date Goal Reviewed 11/02/17  -RW 11/01/17  -RW     Stair Training Goal PT STG, Outcome goal ongoing  -RW goal ongoing  -RW goal ongoing  -LM    Stair Training PT LTG    Stair Training Goal PT LTG, Date Established   10/31/17  -LM    Stair Training Goal PT LTG, Time to Achieve   by discharge  -LM    Stair Training Goal PT LTG, Number of Steps   1 flight  -LM    Stair Training Goal PT LTG, [place] Level   supervision required  -LM    Stair Training Goal PT LTG, Assist Device   2 handrails  -LM    Stair Training Goal PT LTG, Date Goal Reviewed 11/02/17  -RW 11/01/17  -RW     Stair Training Goal PT LTG, Outcome goal ongoing  -RW goal  ongoing  -RW goal ongoing  -      User Key  (r) = Recorded By, (t) = Taken By, (c) = Cosigned By    Initials Name Provider Type    JENA Haynes, PTA Physical Therapy Assistant     Landy Mckeon, PT Physical Therapist    RADHA Flores, PTA Physical Therapy Assistant    RW Pipe Gruber, PTA Physical Therapy Assistant          Physical Therapy Education     Title: PT OT SLP Therapies (Active)     Topic: Physical Therapy (Done)     Point: Mobility training (Done)    Learning Progress Summary    Learner Readiness Method Response Comment Documented by Status   Patient Acceptance E VU again reviewed correct gt and transfer safey.  11/03/17 0910 Done    Acceptance E VU reviewed safe transfers and risks of falls  11/01/17 1052 Done    Acceptance E NR Reviewed safety with transfers and ambulation.  10/31/17 1506 Active               Point: Home exercise program (Done)    Learning Progress Summary    Learner Readiness Method Response Comment Documented by Status   Patient Acceptance E,H VU HEP provided with picture inst.  11/13/17 1525 Done               Point: Precautions (Done)    Learning Progress Summary    Learner Readiness Method Response Comment Documented by Status   Patient Acceptance E VU again reviewed correct gt and transfer safey.  11/03/17 0910 Done    Acceptance E VU reviewed safe transfers and risks of falls  11/01/17 1052 Done    Acceptance E NR Reviewed safety with transfers and ambulation.  10/31/17 1506 Active                      User Key     Initials Effective Dates Name Provider Type Discipline     06/15/16 -  Landy Mckeon, PT Physical Therapist PT    RW 10/17/16 -  Pipe Gruber, PTA Physical Therapy Assistant PT                    PT Recommendation and Plan  Anticipated Equipment Needs At Discharge: front wheeled walker  Anticipated Discharge Disposition: home with 24/7 care, home with home health  Planned Therapy Interventions: balance training, bed mobility  training, gait training, home exercise program, motor coordination training, neuromuscular re-education, patient/family education, postural re-education, ROM (Range of Motion), stair training, strengthening, stretching, transfer training, wheelchair management/propulsion training  PT Frequency: other (see comments) (5-14 times/wk)  Plan of Care Review  Plan Of Care Reviewed With: patient  Outcome Summary/Follow up Plan: pt with sba for all gt and transfers. HEP provided and ready for dc home tomorrow, outpt to follow          Outcome Measures       11/13/17 1030 11/13/17 0738       How much help from another person do you currently need...    Turning from your back to your side while in flat bed without using bedrails? 4  -LM      Moving from lying on back to sitting on the side of a flat bed without bedrails? 3  -LM      Moving to and from a bed to a chair (including a wheelchair)? 3  -LM      Standing up from a chair using your arms (e.g., wheelchair, bedside chair)? 3  -LM      Climbing 3-5 steps with a railing? 3  -LM      To walk in hospital room? 3  -LM      AM-PAC 6 Clicks Score 19  -LM      How much help from another is currently needed...    Putting on and taking off regular lower body clothing?  3  -BH     Bathing (including washing, rinsing, and drying)  4  -BH     Toileting (which includes using toilet bed pan or urinal)  3  -BH     Putting on and taking off regular upper body clothing  4  -BH     Taking care of personal grooming (such as brushing teeth)  4  -BH     Eating meals  4  -BH     Score  22  -BH     Functional Assessment    Outcome Measure Options AM-PAC 6 Clicks Basic Mobility (PT)  -LM        User Key  (r) = Recorded By, (t) = Taken By, (c) = Cosigned By    Initials Name Provider Type    LM Landy Mckeon, PT Physical Therapist     Karoline Huang OTR/L Occupational Therapist           Time Calculation:         PT Charges       11/13/17 1527 11/13/17 1030       Time Calculation    Start Time  "1429  -RW 1030  -LM     Stop Time 1515  -RW 1119  -LM     Time Calculation (min) 46 min  -RW 49 min  -LM     PT Received On  17  -LM     PT Goal Re-Cert Due Date  17  -LM     Time Calculation- PT    Total Timed Code Minutes- PT 46 minute(s)  -RW 49 minute(s)  -LM       User Key  (r) = Recorded By, (t) = Taken By, (c) = Cosigned By    Initials Name Provider Type    LM Landy Mckeon, PT Physical Therapist    KATHIE Gruber PTA Physical Therapy Assistant          Therapy Charges for Today     Code Description Service Date Service Provider Modifiers Qty    07506485860 HC GAIT TRAINING EA 15 MIN 2017 Pipe Gruber PTA GP 1    82685474068 HC PT SELF CARE/MGMT/TRAIN EA 15 MIN 2017 Pipe Gruber PTA GP 1    [id number] HC PT THER PROC EA 15 MIN 2017 Pipe Gruber PTA GP 1          PT G-Codes  PT Professional Judgement Used?: Yes  Outcome Measure Options: AM-PAC 6 Clicks Basic Mobility (PT)  Score: 15  Functional Limitation: Mobility: Walking and moving around  Mobility: Walking and Moving Around Current Status (): At least 40 percent but less than 60 percent impaired, limited or restricted  Mobility: Walking and Moving Around Goal Status (): At least 1 percent but less than 20 percent impaired, limited or restricted    Pipe Gruber PTA  2017            Electronically signed by Pipe Gruber PTA at 2017  3:28 PM          [facility]  [address] [place] [id number]  Phone:  [phone]  Fax:   Date Ordered: 2017         Patient:  Kenneth Harper Jr. MRN:  [id number]   [address] [facility] [address] :  1934  SSN:    Phone: [phone] Sex:  M     Weight: 202 lb 4.8 oz (91.8 kg)         Ht Readings from Last 1 Encounters:   10/30/17 70\" (177.8 cm)         Walker                         (Order ID: [id number])    Diagnosis:  Abnormality of gait and mobility (R26.9 [ICD-10-CM] " 781.2 [ICD-9-CM])  Right-sided lacunar stroke (I63.9 [ICD-10-CM] 434.91 [ICD-9-CM])   Quantity:  1     Equipment:  Walker Folding with Wheels  Length of Need (99 Months = Lifetime): 99 Months = Lifetime            Authorizing Provider:Vishnu Mckinnon MD  Authorizing Provider's NPI: [id number]  Order Entered By: Vishnu Mckinnon MD 11/14/2017  9:38 AM     Electronically signed by: Vishnu Mckinnon MD 11/14/2017  9:38 AM

## 2017-11-14 NOTE — DISCHARGE INSTR - OTHER ORDERS
[facility] #[phone]  Patient to  rolling walker from [facility].   Shower chair is not covered by insurance. Price for shower chair (with a back) $35.73 and shower chair (w/out a back) $27.95

## 2017-11-14 NOTE — DISCHARGE SUMMARY
[address] [address]  T - [phone]     Rehabilitation Discharge Summary       BRIEF OVERVIEW   PATIENT NAME: Kenneth Harper Jr.                      PHYSICIAN: Vishnu Mckinnon MD  : 1934  MRN: [id number]    ADMISSION/DISCHARGE INFO   Admitting Provider: Vishnu Mckinnon MD  Discharge Provider: No att. providers found  ADMISSION DATE: 10/30/2017   DISCHARGE DATE: 2017    ADMISSION DIAGNOSES: Right-sided lacunar stroke [I63.9]  DISCHARGE DIAGNOSES: Principal Problem:    Right-sided lacunar stroke  Active Problems:    Left-sided weakness    Atrial fibrillation, chronic    Essential hypertension    Diabetes mellitus    Chronic anxiety    Chronic back pain greater than 3 months duration    Degenerative joint disease involving multiple joints    Encounter for rehabilitation    Obstructive sleep apnea syndrome    Former smoker      Primary Care Physician at Discharge: Evan Marrero [phone]      Secondary Discharge Diagnosis  Patient Active Problem List   Diagnosis Code   • Spinal stenosis, lumbar region, with neurogenic claudication M48.062   • Former smoker Z87.891   • Overweight E66.3   • Left-sided weakness R53.1   • Right-sided lacunar stroke I63.9   • Essential hypertension I10   • Diabetes mellitus E11.9   • Chronic anxiety F41.9   • Chronic back pain greater than 3 months duration M54.9, G89.29   • Prostatic hypertrophy N40.0   • Degenerative joint disease involving multiple joints M15.9   • Atrial fibrillation, chronic I48.2   • Encounter for rehabilitation Z51.89   • Obstructive sleep apnea syndrome G47.33       Discharge Disposition  Home or Self Care       Code Status at Discharge: Full code       CONSULTS   Consult Orders (all)     Start     Ordered    17 0835  Inpatient Consult to Case Management   Once     Provider:  (Not yet assigned)    17 0835    17 0847  Inpatient  Consult to Case Management   Once     Provider:  (Not yet assigned)    11/13/17 0847    10/30/17 1506  Inpatient consult to Nutrition  Once     Provider:  (Not yet assigned)    10/30/17 1508          DETAILS OF HOSPITAL STAY    Functional Status:                ADMIT             Discharge   Eating                                          4                        7   Grooming                                    4                        7  Bathing                                         3                        6  Dressing upper body                    3                        5  Dressing lower body                     3                        6  Toileting                                        4                        6  Bladder Management                   4                        6  Bowel Management                     4                        6  Transfers:bed chair wheelchair    3                        5  Transfers: toilet                            0                        5  Transfers: tub/shower                  0                        5  Locomotion: walking                    1                        5  Locomotion: Wheelchair              2                        6  Locomotion: stairs                       0                         5  Comprehension                           5                         6  Expression                                   5                         6  Social interaction                         4                        7  Problem solving                           2                        6  Memory                                        4                        5                                          Rehabilitation Course  He was admitted to the acute rehabilitation unit after having a lacunar stroke with left-sided weakness and ataxia.  Initially he had some episodes of confusion when he woke but that improved significantly over the first few days.  Since that time he  has been alert oriented and not confused at all.  We did taper his analgesics and anxiolytics and that seemed to help for confusion.  His diabetes is been well controlled during his stay on acute rehabilitation.  Blood pressures been well controlled.  He has participated well with therapy and made excellent progress      Heart Rate: 79  Resp: 18  BP: 129/66  Temp: 96.1 °F (35.6 °C)   Weight: 202 lb 4.8 oz (91.8 kg)    Pertinent Test Results:    Lab Results (last 72 hours)     Procedure Component Value Units Date/Time    POC Glucose Fingerstick [[id number]]  (Normal) Collected:  11/11/17 1034    Specimen:  Blood Updated:  11/11/17 1046     Glucose 91 mg/dL       Meter: [id number]: [id number] Woop!Wear       POC Glucose Fingerstick [[id number]]  (Normal) Collected:  11/11/17 1651    Specimen:  Blood Updated:  11/11/17 1705     Glucose 75 mg/dL       Meter: [id number]: [id number] SILVIA ARNULFO       POC Glucose Fingerstick [[id number]]  (Normal) Collected:  11/11/17 2042    Specimen:  Blood Updated:  11/12/17 0032     Glucose 123 mg/dL       Meter: [id number]: [id number] Eversnap       POC Glucose Fingerstick [[id number]]  (Normal) Collected:  11/12/17 0539    Specimen:  Blood Updated:  11/12/17 0657     Glucose 107 mg/dL       Meter: [id number]: [id number] Eversnap       POC Glucose Fingerstick [[id number]]  (Abnormal) Collected:  11/12/17 1052    Specimen:  Blood Updated:  11/12/17 1105     Glucose 68 (L) mg/dL       Meter: [id number]: [id number] SILVIA ARNULFO       POC Glucose Fingerstick [[id number]]  (Normal) Collected:  11/12/17 1559    Specimen:  Blood Updated:  11/12/17 1621     Glucose 70 mg/dL       Meter: [id number]: [id number] SILVIA ARNULFO       POC Glucose Fingerstick [[id number]]  (Normal) Collected:  11/12/17 2038    Specimen:  Blood Updated:  11/13/17 0437     Glucose 96 mg/dL       Meter: [id number]: [id number] KEYLA JONES        POC Glucose Fingerstick [[id number]]  (Normal) Collected:  11/13/17 0443    Specimen:  Blood Updated:  11/13/17 0501     Glucose 120 mg/dL       Meter: [id number]: [id number] KEYLA JONES       POC Glucose Fingerstick [[id number]]  (Abnormal) Collected:  11/13/17 1006    Specimen:  Blood Updated:  11/13/17 1019     Glucose 140 (H) mg/dL       RN NotifiedMeter: [id number]: [id number] RIGOBERTO KLARISSA       POC Glucose Fingerstick [[id number]]  (Normal) Collected:  11/13/17 1616    Specimen:  Blood Updated:  11/13/17 1639     Glucose 91 mg/dL       RN NotifiedMeter: [id number]: [id number] RIGOBETRO KLARISSA       POC Glucose Fingerstick [[id number]]  (Normal) Collected:  11/14/17 0513    Specimen:  Blood Updated:  11/14/17 0530     Glucose 96 mg/dL       Meter: [id number]: [id number] CHALO TUTTLE           Imaging Results (all)     None          Presenting Problem/History of Present Illness   Kenneth Harper Jr. is a 82 y.o. male who suffered lacunar infarction with some left-sided weakness.  He was admitted to [facility].  The showman has known atrial fibrillation was followed by his cardiologist Dr. Carrasco and also neurology Dr. Cordova.  Both recommended that he not be fully anticoagulated because of bleeding problems and recommended that he continue aspirin and Plavix.  As he was evaluated and stabilized we will consult in for admission to the acute rehabilitation unit.  He was admitted and has done very well during his stay      Physical Exam at Discharge      He is alert and oriented ×3    Supple no carotid bruits no JVD  Lungs were clear without rales rhonchi or wheezes  Heart rate regular without murmurs gallops or rubs  Abdomen soft nontender good bowel sounds no rebound guarding or rigidity  Extremities without clubbing cyanosis or edema  History on the left side continued to improve throughout his stay and the ataxia improved considerably.    Noted at  admission that his heart rate was very regular but his EKG did show atrial fibrillation              DISPOSITION   Discharge to home with his family     DISCHARGE MEDICATIONS        Your medication list       As of 11/14/2017  9:58 AM      START taking these medications       Instructions Last Dose Given Next Dose Due    amLODIPine 10 MG tablet   Commonly known as:  NORVASC        Take 1 tablet by mouth Every Night.         atorvastatin 10 MG tablet   Commonly known as:  LIPITOR        Take 1 tablet by mouth Every Night.         clopidogrel 75 MG tablet   Commonly known as:  PLAVIX        Take 1 tablet by mouth Daily.         doxazosin 8 MG tablet   Commonly known as:  CARDURA        Take 1 tablet by mouth Every Night.         finasteride 5 MG tablet   Commonly known as:  PROSCAR        Take 1 tablet by mouth Daily.         fluticasone 50 MCG/ACT nasal spray   Commonly known as:  FLONASE        2 sprays by Each Nare route Daily.         glimepiride 2 MG tablet   Commonly known as:  AMARYL        Take 1 tablet by mouth 2 (Two) Times a Day Before Meals.         lisinopril 10 MG tablet   Commonly known as:  PRINIVIL,ZESTRIL        Take 1 tablet by mouth Daily.         Magnesium Oxide 400 (240 Mg) MG tablet        Take 1 tablet by mouth Daily.         metFORMIN 1000 MG tablet   Commonly known as:  GLUCOPHAGE        Take 1 tablet by mouth 2 (Two) Times a Day With Meals.         metoprolol succinate XL 50 MG 24 hr tablet   Commonly known as:  TOPROL XL        Take 1 tablet by mouth Daily.           CHANGE how you take these medications       Instructions Last Dose Given Next Dose Due    aspirin 81 MG chewable tablet   What changed:    - medication strength  - how much to take  - how to take this  - when to take this        Chew 1 tablet Daily.           CONTINUE taking these medications       Instructions Last Dose Given Next Dose Due    Acetaminophen 500 MG coapsule              ALLERGY MEDICINE PO               HYDROcodone-acetaminophen 7.5-325 MG per tablet   Commonly known as:  NORCO              MULTI VITAMIN DAILY PO              omeprazole 20 MG capsule   Commonly known as:  priLOSEC              VITAMIN D PO                STOP taking these medications          ALPRAZolam 0.5 MG tablet   Commonly known as:  XANAX           naproxen 500 MG tablet   Commonly known as:  NAPROSYN                Where to Get Your Medications      These medications were sent to [facility] [address] [place] [address] - [address] - [phone]  - [phone]   [address], [facility] [address]     Phone:  [phone]    • amLODIPine 10 MG tablet   • atorvastatin 10 MG tablet   • clopidogrel 75 MG tablet   • finasteride 5 MG tablet   • fluticasone 50 MCG/ACT nasal spray   • Magnesium Oxide 400 (240 Mg) MG tablet   • metoprolol succinate XL 50 MG 24 hr tablet         Information about where to get these medications is not yet available     ! Ask your nurse or doctor about these medications    • aspirin 81 MG chewable tablet   • doxazosin 8 MG tablet   • glimepiride 2 MG tablet   • lisinopril 10 MG tablet   • metFORMIN 1000 MG tablet             INSTRUCTIONS   Activity: Activity will be as tolerated but I did suggest he not be on this season and that he not drive for now.  Diabetic diet    Diet: Diabetic diet    Special Instructions:   Rolling walker    FOLLOW UP   Additional Instructions for the Follow-ups that You Need to Schedule     Ambulatory Referral to Physical Therapy Evaluate and treat, Neuro    As directed    She requested outpatient physical therapy at Dr. Dumont's office   Specialty modality needed?:   Evaluate and treat  Neuro                Follow-up Information     Follow up with Evan Marrero MD Follow up in 1 week(s).    Specialty:  Family Medicine    Contact information:    [facility] [address] [phone]          Follow up with Fabrice Cordova MD Follow up in 1 week(s).     Specialty:  Neurology    Contact information:    [address] [place] [address] [phone]          No future appointments.               Vishnu Mckinnon MD  11/14/17  4:06 PM

## 2017-11-14 NOTE — THERAPY DISCHARGE NOTE
Inpatient Rehabilitation - Occupational Therapy Treatment Note/Discharge  [facility]     Patient Name: Kenneth Harper Jr.  : 1934  MRN: [id number]  Today's Date: 2017  Onset of Illness/Injury or Date of Surgery Date: 10/30/17  Date of Referral to OT: 10/30/17  Referring Physician: Dr. Mckinnon      Admit Date: 10/30/2017    Visit Dx:     ICD-10-CM ICD-9-CM   1. Right-sided lacunar stroke I63.9 434.91   2. Symbolic dysfunction R48.9 784.60   3. Impaired mobility and ADLs Z74.09 799.89   4. Abnormality of gait and mobility R26.9 781.2   5. Muscle weakness (generalized) M62.81 728.87   6. Left-sided weakness R53.1 728.87   7. Chronic back pain greater than 3 months duration M54.9 724.5    G89.29 338.29   8. Essential hypertension I10 401.9     Patient Active Problem List   Diagnosis   • Spinal stenosis, lumbar region, with neurogenic claudication   • Former smoker   • Overweight   • Left-sided weakness   • Right-sided lacunar stroke   • Essential hypertension   • Diabetes mellitus   • Chronic anxiety   • Chronic back pain greater than 3 months duration   • Prostatic hypertrophy   • Degenerative joint disease involving multiple joints   • Atrial fibrillation, chronic   • Encounter for rehabilitation   • Obstructive sleep apnea syndrome             Adult Rehabilitation Note       17 0857 17 0730 17 1429    Rehab Assessment/Intervention    Discipline physical therapy assistant  -JW occupational therapy assistant  -RC physical therapy assistant  -RW    Document Type therapy note (daily note)  -JW therapy note (daily note)  -RC therapy note (daily note)  -RW    Subjective Information agree to therapy  -JW agree to therapy  -RC agree to therapy  -RW    Patient Effort, Rehab Treatment good  -JW good  -RC good  -RW    Precautions/Limitations fall precautions  -JW fall precautions  -RC fall precautions  -RW    Recorded by [JW] Felicia Haynes, MARTIN [RC] MARCO Cannon/DAWN  [RW] Pipe Gruber PTA    Vital Signs    Pre Patient Position Sitting  -JW      Post Patient Position Sitting  -JW      Recorded by [JW] Felicia Haynes PTA      Pain Assessment    Pain Assessment 0-10  -JW No/denies pain  -RC No/denies pain  -RW    Pain Score 0  -JW      Post Pain Score 0  -JW      Recorded by [JW] Felicia Haynes, PTA [RC] Cassie Carpio, LARA/L [RW] Pipe Gruber PTA    Cognitive Assessment/Intervention    Current Cognitive/Communication Assessment functional  -JW  functional  -RW    Orientation Status oriented x 4  -JW  oriented x 4  -RW    Follows Commands/Answers Questions 100% of the time  -JW  100% of the time  -RW    Personal Safety Interventions gait belt;nonskid shoes/slippers when out of bed  -JW  gait belt;nonskid shoes/slippers when out of bed  -RW    Recorded by [JW] Felicia Haynes, MARTIN  [RW] Pipe Gruber PTA    Cognitive Assessment Intervention    Behavior/Mood Observations   behavior appropriate to situation, WNL/WFL  -RW    Recorded by   [RW] Pipe Gruber PTA    Bed Mobility, Assessment/Treatment    Bed Mob, Supine to Sit, [place]   independent  -RW    Recorded by   [RW] Pipe Gruber PTA    Transfer Assessment/Treatment    Transfers, Bed-Chair [place]  stand by assist  -RC stand by assist  -RW    Transfers, Chair-Bed [place]  stand by assist  -RC stand by assist  -RW    Transfers, Sit-Stand [place] supervision required;conditional independence  -JW stand by assist  -RC stand by assist  -RW    Transfers, Stand-Sit [place] supervision required;conditional independence  -JW stand by assist  -RC stand by assist  -RW    Transfers, Sit-Stand-Sit, Assist Device rolling walker  -JW  rolling walker  -RW    Toilet Transfer, [place]  supervision required  -RC     Toilet Transfer, Assistive Device  rolling walker  -RC     Recorded by [JW] Felicia Haynes, PTA [RC] Cassie Carpio, LARA/L [RW] Pipe Gruber PTA    Gait  Assessment/Treatment    Gait, [place] Level stand by assist  -JW  stand by assist  -RW    Gait, Assistive Device rolling walker  -JW  rolling walker  -RW    Gait, Distance (Feet) 150  -JW  150  -RW    Gait, Impairments   strength decreased;impaired balance  -RW    Recorded by [JW] Felicia Haynes, PTA  [RW] Pipe Gruber PTA    Stairs Assessment/Treatment    Number of Stairs 10  -JW      Stairs, Handrail Location left side (ascending)  -JW  --  -RW    Stairs, [place] Level supervision required  -JW  --  -RW    Recorded by [JW] Felicia Haynes, PTA  [RW] Pipe Gruber PTA    Functional Mobility    Functional Mobility- Ind. Level  supervision required  -RC     Functional Mobility- Device  rolling walker  -RC     Functional Mobility-Distance (Feet)  150  -RC     Recorded by  [RC] LEIGH CannonA/L     Wheelchair Training/Management    Wheelchair, Distance Propelled   150 mod ind  -RW    Recorded by   [RW] Pipe Gruber PTA    ADL Assessment/Intervention    Additional Documentation  --   kitchen task w/ supervision  -RC     Recorded by  [RC] LEIGH CannonA/L     Upper Body Bathing Assessment/Training    UB Bathing Assess/Train, Position  sitting;long sitting  -RC     UB Bathing Assess/Train, [place] Level  conditional independence  -RC     Recorded by  [RC] Cassie Carpio LARA/L     Lower Body Bathing Assessment/Training    LB Bathing Assess/Train, Position  sink side;sitting  -RC     LB Bathing Assess/Train, [place] Level  conditional independence  -RC     Recorded by  [RC] Cassie Carpio LARA/L     Upper Body Dressing Assessment/Training    UB Dressing Assess/Train, Clothing Type  doffing:;donning:;pull over;button up  -RC     UB Dressing Assess/Train, Position  sitting  -RC     UB Dressing Assess/Train, [place]  conditional independence  -RC     Recorded by  [RC] Cassie Carpio LARA/L     Lower Body Dressing Assessment/Training    LB Dressing Assess/Train,  Clothing Type  doffing:;donning:;pants;shoes;socks;shorts  -RC donning:;shoes  -RW    LB Dressing Assess/Train, Position  sitting;standing  -RC     LB Dressing Assess/Train, [place]  conditional independence;supervision required  -RC     Recorded by  [RC] MARCO Cannon/L [RW] Pipe Gruber PTA    Toileting Assessment/Training    Toileting Assess/Train, Position  sitting;standing  -RC     Toileting Assess/Train, [place] Level  supervision required  -RC     Recorded by  [RC] MARCO Cannon/L     Grooming Assessment/Training    Grooming Assess/Train, [place]ep[place] Level  independent  -RC     Recorded by  [RC] MARCO Cannon/L     Therapy Exercises    Bilateral Lower Extremities AROM:;20 reps;sitting;ankle pumps/circles;hip flexion;LAQ;standing;heel raises;hip abduction/adduction   2 sets all ther ex  -JW  AROM:;20 reps;sitting;hip flexion;LAQ;standing;heel raises;hip abduction/adduction   pro 2 level 3 x 5 min  -RW    Bilateral Upper Extremity  --   ue bike 10 min pulley ex 5 min  -RC     Recorded by [JW] Felicia Haynes PTA [RC] MARCO Cannon/L [RW] Pipe Gruber PTA    Positioning and Restraints    Pre-Treatment Position other (comment)   w/c  -JW  in bed  -RW    Post Treatment Position wheelchair  -JW  chair  -RW    In Chair   call light within reach  -RW    In Wheelchair sitting;call light within reach;encouraged to call for assist  -JW      Recorded by [JW] Felicia Haynes PTA  [RW] Pipe Gruber PTA      11/13/17 1030 11/13/17 0738       Rehab Assessment/Intervention    Discipline physical therapist  -LM occupational therapist  -     Document Type progress note  -LM      Subjective Information agree to therapy;no complaints  -LM      Recorded by [LM] Landy Mckeon, PT [] Karoline Huang, OTR/L     Vital Signs    Pre Systolic BP Rehab 121  -LM      Pre Treatment Diastolic BP 57  -LM      Pretreatment Heart Rate (beats/min) 59  -LM      Pre SpO2 (%) 95  -LM      O2  Delivery Pre Treatment room air  -LM      Pre Patient Position Sitting  -LM      Recorded by [LM] Landy Mckeon, LEOLA      Pain Assessment    Pain Assessment No/denies pain  -LM      Recorded by [LM] Ladny Mckeon, LEOLA        User Key  (r) = Recorded By, (t) = Taken By, (c) = Cosigned By    Initials Name Effective Dates    JW Felicia HOWARD [place], PTA 08/11/15 -     LM Landy Mckeon, PT 06/15/16 -     BH Karoline Huang, OTR/L 10/17/16 -     RW Pipe Gruber, PTA 10/17/16 -     RC Cassie RONI Carpio, LARA/L 10/17/16 -                 OT Goals       11/14/17 1038 11/13/17 1527 11/13/17 0738    Transfer Training OT LTG    Transfer Training OT LTG, Date Goal Reviewed 11/14/17  -RC 11/13/17  -     Transfer Training OT LTG, Outcome goal met  -RC goal partially met  -BH     Patient Education OT LTG    Patient Education OT LTG, Date Goal Reviewed 11/14/17  -RC  11/13/17  -    Patient Education OT LTG Outcome goal met  -RC  goal partially met  -BH    Safety Awareness OT LTG    Safety Awareness OT LTG, Date Goal Reviewed 11/14/17  -RC 11/13/17  -     Safety Awareness OT LTG, Outcome goal met  -RC goal ongoing  -BH     ADL OT LTG    ADL OT LTG, Date Goal Reviewed 11/14/17  -RC  11/13/17  -BH    ADL OT LTG, Outcome goal met  -RC  goal partially met  -BH      11/10/17 0914 11/09/17 1155 11/08/17 0929    Transfer Training OT LTG    Transfer Training OT LTG, Date Goal Reviewed  11/09/17  -RC 11/08/17  -RC    Patient Education OT LTG    Patient Education OT LTG, Date Goal Reviewed 11/10/17  -RC 11/09/17  -RC 11/08/17  -RC    Safety Awareness OT LTG    Safety Awareness OT LTG, Date Goal Reviewed 11/10/17  -RC 11/09/17  -RC 11/08/17  -RC    Safety Awareness OT LTG, Outcome  goal ongoing  -RC     ADL OT LTG    ADL OT LTG, Date Goal Reviewed 11/10/17  -RC 11/09/17  -RC 11/08/17  -RC    ADL OT LTG, Outcome  goal partially met  - goal ongoing  -RC    Endurance OT LTG    Endurance Goal OT LTG, Date Goal Reviewed   11/08/17  -RC     Endurance Goal OT LTG, Outcome   goal met  -RC      11/07/17 1351 11/06/17 1425 11/06/17 0724    Transfer Training OT LTG    Transfer Training OT LTG, Assist Device   walker, rolling platform  -KD    Transfer Training OT LTG, Date Goal Reviewed 11/07/17  -RC  11/06/17  -KD    Transfer Training OT LTG, Outcome goal ongoing  -RC  goal not met  -KD    Patient Education OT LTG    Patient Education OT LTG, Date Goal Reviewed 11/07/17  -RC  11/06/17  -KD    Patient Education OT LTG Outcome goal ongoing  -RC  goal not met  -KD    Safety Awareness OT LTG    Safety Awareness OT LTG, Date Goal Reviewed 11/07/17  -RC 11/06/17  -KD     Safety Awareness OT LTG, Outcome goal ongoing  -RC goal not met  -KD     ADL OT LTG    ADL OT LTG, Date Goal Reviewed 11/07/17  -RC  11/06/17  -KD    ADL OT LTG, Outcome goal ongoing  -RC  goal not met  -KD    Endurance OT LTG    Endurance Goal OT LTG, Date Goal Reviewed 11/07/17  -RC  11/06/17  -KD    Endurance Goal OT LTG, Outcome goal partially met  -RC  goal not met  -KD      11/04/17 0738 11/03/17 1359 11/02/17 0715    Transfer Training OT LTG    Transfer Training OT LTG, Date Goal Reviewed 11/04/17  -RC 11/03/17  -LM 11/02/17  -KD    Transfer Training OT LTG, Outcome  goal ongoing  -LM goal not met  -KD    Patient Education OT LTG    Patient Education OT LTG, Date Goal Reviewed 11/04/17  -RC 11/03/17  -LM 11/02/17  -KD    Patient Education OT LTG Outcome  goal not met  -LM goal not met  -KD    Safety Awareness OT LTG    Safety Awareness OT LTG, Date Goal Reviewed 11/04/17  -RC 11/03/17  -LM 11/02/17  -KD    Safety Awareness OT LTG, Outcome  goal not met  -LM goal not met  -KD    ADL OT LTG    ADL OT LTG, Date Goal Reviewed 11/04/17  -RC 11/03/17  -LM     ADL OT LTG, Outcome  goal not met  -LM goal not met  -KD    Endurance OT LTG    Endurance Goal OT LTG, Date Goal Reviewed 11/04/17  -RC 11/03/17  -LM 11/02/17  -KD    Endurance Goal OT LTG, Outcome goal ongoing  -RC goal not met  -LM goal  not met  -KD      11/01/17 1635          Transfer Training OT LTG    Transfer Training OT LTG, Date Goal Reviewed 11/01/17  -RW      Transfer Training OT LTG, Outcome goal ongoing  -RW      Patient Education OT LTG    Patient Education OT LTG, Date Goal Reviewed 11/01/17  -RW      Patient Education OT LTG Outcome goal ongoing  -RW      Safety Awareness OT LTG    Safety Awareness OT LTG, Date Goal Reviewed 11/01/17  -RW      Safety Awareness OT LTG, Outcome goal ongoing  -RW      ADL OT LTG    ADL OT LTG, Date Goal Reviewed 11/01/17  -RW      ADL OT LTG, Outcome goal ongoing  -RW      Endurance OT LTG    Endurance Goal OT LTG, Date Goal Reviewed 11/01/17  -RW      Endurance Goal OT LTG, Outcome goal ongoing  -RW        User Key  (r) = Recorded By, (t) = Taken By, (c) = Cosigned By    Initials Name Provider Type     Karoline Huang, OTR/L Occupational Therapist     Jacinta Pitts, OTR/L Occupational Therapist     Cassie Carpio, LARA/L Occupational Therapy Assistant    KD Therese King LARA/L Occupational Therapy Assistant    MOSHE Boucher LARA/L Occupational Therapy Assistant          Occupational Therapy Education     Title: PT OT SLP Therapies (Done)     Topic: Occupational Therapy (Resolved)     Point: ADL training (Resolved)    Description: Instruct learner(s) on proper safety adaptation and remediation techniques during self care or transfers.   Instruct in proper use of assistive devices.    Learning Progress Summary    Learner Readiness Method Response Comment Documented by Status   Patient Acceptance E,H CARLOS reviewed and issued hamdout for Home safety fall prevention. issued and discussed informantion on driving evaluation.  11/14/17 1037 Done    Acceptance E,TB VU,DU Pt highly educated about HEP for home and given handouts. Started discussing safety at home with IADL. Educated to call if he needed anything.  11/13/17 1526 Done    Acceptance E NRCARLOS   11/10/17 0914 Done    Acceptance  E NR   11/09/17 1155 Active    Acceptance TB NR  KD 11/02/17 1108 Active    Acceptance E,D NR  RW 11/01/17 1518 Active    Acceptance E VU Educated about OT and POC. Educated about anni dressing technique. Educated about safety with ADL and t/f. Educated to call for assist and not to stand independently. SP 10/31/17 1354 Done   Family Acceptance E,D NR  RW 11/01/17 1518 Active    Acceptance E VU Educated about OT and POC. Educated about anni dressing technique. Educated about safety with ADL and t/f. Educated to call for assist and not to stand independently. SP 10/31/17 1354 Done               Point: Home exercise program (Resolved)    Description: Instruct learner(s) on appropriate technique for monitoring, assisting and/or progressing therapeutic exercises/activities.    Learning Progress Summary    Learner Readiness Method Response Comment Documented by Status   Patient Acceptance E,TB VU,DU Pt highly educated about HEP for home and given handouts. Started discussing safety at home with IADL. Educated to call if he needed anything.  11/13/17 1526 Done    Acceptance E,D NR theraputty  11/01/17 1634 Active   Family Acceptance E,D NR theraputty  11/01/17 1634 Active               Point: Precautions (Resolved)    Description: Instruct learner(s) on prescribed precautions during self-care and functional transfers.    Learning Progress Summary    Learner Readiness Method Response Comment Documented by Status   Patient Acceptance E,H CARLOS reviewed and issued hamdout for Home safety fall prevention. issued and discussed informantion on driving evaluation.  11/14/17 1037 Done    Acceptance E VU recommend pt not drive w/o MD OK and discussed  evaluation program  11/10/17 0918 Done    Acceptance E NR,VU   11/10/17 0914 Done    Acceptance E NR   11/09/17 1155 Active    Acceptance E NR,VU recommned no climbing on ladders or step stools.  11/08/17 0928 Done    Acceptance E NR   11/07/17 1350 Active     Acceptance E NR   11/04/17 0930 Active    Acceptance E,D NR   11/01/17 1518 Active    Acceptance E VU Educated about OT and POC. Educated about anni dressing technique. Educated about safety with ADL and t/f. Educated to call for assist and not to stand independently. SP 10/31/17 1354 Done   Family Acceptance E,D NR   11/01/17 1518 Active    Acceptance E VU Educated about OT and POC. Educated about anni dressing technique. Educated about safety with ADL and t/f. Educated to call for assist and not to stand independently. SP 10/31/17 1354 Done               Point: Body mechanics (Resolved)    Description: Instruct learner(s) on proper positioning and spine alignment during self-care, functional mobility activities and/or exercises.    Learning Progress Summary    Learner Readiness Method Response Comment Documented by Status   Patient Acceptance E,H CARLOS reviewed and issued hamdout for Home safety fall prevention. issued and discussed informantion on driving evaluation.  11/14/17 1037 Done    Acceptance E NR,VU   11/10/17 0914 Done    Acceptance E NR   11/09/17 1155 Active    Acceptance E NR,VU recommned no climbing on ladders or step stools.  11/08/17 0928 Done    Acceptance E NR   11/07/17 1350 Active    Acceptance E NR   11/04/17 0930 Active    Acceptance E VU Educated about OT and POC. Educated about anni dressing technique. Educated about safety with ADL and t/f. Educated to call for assist and not to stand independently. SP 10/31/17 1354 Done   Family Acceptance E VU Educated about OT and POC. Educated about anni dressing technique. Educated about safety with ADL and t/f. Educated to call for assist and not to stand independently.  10/31/17 1354 Done                      User Key     Initials Effective Dates Name Provider Type Discipline     10/17/16 -  Karoline Huang, OTR/L Occupational Therapist OT     10/17/16 -  Jacinta Pitts OTR/L Occupational Therapist OT     10/17/16 -  Cassie  MARCO Moreno/DAWN Occupational Therapy Assistant OT    KD 10/17/16 -  MARCO Vidales/L Occupational Therapy Assistant OT    SP 01/31/17 -  Alem Bruce OT Student OT Student OT                OT Recommendation and Plan  Anticipated Equipment Needs At Discharge: bathing equipment, dressing equipment, front wheeled walker  Anticipated Discharge Disposition: home with assist  Planned Therapy Interventions: activity intolerance, adaptive equipment training, ADL retraining, IADL retraining, balance training, bed mobility training, fine motor coordination training, energy conservation, home exercise program, motor coordination training, neuromuscular re-education, ROM (Range of Motion), strengthening, stretching, transfer training  Therapy Frequency: other (see comments) (3-14x a week)  Plan of Care Review  Plan Of Care Reviewed With: patient  Progress: progress toward functional goals as expected  Outcome Summary/Follow up Plan: pt met all goals, he is sba for any standing, transfer or ambulation, He was advised not to drive unless cleared by MD or driving evaluation.  he is d/c'd home w/ wife and out pt  PT services          Outcome Measures       11/14/17 0857 11/14/17 0730 11/13/17 1030    How much help from another person do you currently need...    Turning from your back to your side while in flat bed without using bedrails? 4  -JW  4  -LM    Moving from lying on back to sitting on the side of a flat bed without bedrails? 4  -JW  3  -LM    Moving to and from a bed to a chair (including a wheelchair)? 3  -JW  3  -LM    Standing up from a chair using your arms (e.g., wheelchair, bedside chair)? 3  -JW  3  -LM    Climbing 3-5 steps with a railing? 3  -JW  3  -LM    To walk in hospital room? 3  -JW  3  -LM    AM-PAC 6 Clicks Score 20  -JW  19  -LM    How much help from another is currently needed...    Putting on and taking off regular lower body clothing?  3  -RC     Bathing (including washing, rinsing, and  drying)  4  -RC     Toileting (which includes using toilet bed pan or urinal)  4  -RC     Putting on and taking off regular upper body clothing  4  -RC     Taking care of personal grooming (such as brushing teeth)  4  -RC     Eating meals  4  -     Score  23  -     Functional Assessment    Outcome Measure Options AM-PAC 6 Clicks Basic Mobility (PT)  -  AM-PAC 6 Clicks Basic Mobility (PT)  -      11/13/17 0738          How much help from another is currently needed...    Putting on and taking off regular lower body clothing? 3  -BH      Bathing (including washing, rinsing, and drying) 4  -BH      Toileting (which includes using toilet bed pan or urinal) 3  -BH      Putting on and taking off regular upper body clothing 4  -BH      Taking care of personal grooming (such as brushing teeth) 4  -BH      Eating meals 4  -      Score 22  -        User Key  (r) = Recorded By, (t) = Taken By, (c) = Cosigned By    Initials Name Provider Type     Felicia Haynes, PTA Physical Therapy Assistant     Landy Mckeon, PT Physical Therapist     Karoline Huang, OTR/L Occupational Therapist    JIA Carpio LARA/L Occupational Therapy Assistant           Time Calculation:          Time Calculation- OT       11/14/17 1045 11/14/17 0719       Time Calculation- OT    OT Start Time 0730  -      OT Stop Time 0855  -      OT Time Calculation (min) 85 min  -      Total Timed Code Minutes- OT 85 minute(s)  -      OT Received On 11/14/17  -      OT Goal Re-Cert Due Date  11/26/17  -       User Key  (r) = Recorded By, (t) = Taken By, (c) = Cosigned By    Initials Name Provider Type     Karoline Huang OTR/L Occupational Therapist    LEIGH YoungA/L Occupational Therapy Assistant          Therapy Charges for Today     Code Description Service Date Service Provider Modifiers Qty    [id number]  OT SELF CARE/MGMT/TRAIN EA 15 MIN 11/14/2017 MARCO Cannon/L GO 4    [id number]  OT THER  PROC EA 15 MIN 11/14/2017 Cassie CarpioMARCO/L GO 1    [id number] HC OT SELF CARE/MGMT/TRAIN EA 15 MIN 11/14/2017 Cassie TAMAYO BalajiLEIGHA/L GO 1          OT G-codes  OT Professional Judgement Used?: Yes  OT Functional Scales Options: AM-PAC 6 Clicks Daily Activity (OT)  Score: 22  Functional Limitation: Self care  Self Care Current Status (): At least 20 percent but less than 40 percent impaired, limited or restricted  Self Care Goal Status (): At least 1 percent but less than 20 percent impaired, limited or restricted    OT Discharge Summary  Anticipated Discharge Disposition: home with assist  Reason for Discharge: Per MD order, All goals achieved  Outcomes Achieved: Able to achieve all goals within established timeline  Discharge Destination: Home with assist    Cassie TAMAYO MARCO Carpio/DAWN  11/14/2017

## 2017-11-14 NOTE — THERAPY DISCHARGE NOTE
Inpatient Rehabilitation - Occupational Therapy Treatment Note/Discharge  [facility]     Patient Name: Kenneth Harper Jr.  : 1934  MRN: [id number]  Today's Date: 2017  Onset of Illness/Injury or Date of Surgery Date: 10/30/17  Date of Referral to OT: 10/30/17  Referring Physician: Dr. Mckinnon      Admit Date: 10/30/2017    Visit Dx:     ICD-10-CM ICD-9-CM   1. Right-sided lacunar stroke I63.9 434.91   2. Symbolic dysfunction R48.9 784.60   3. Impaired mobility and ADLs Z74.09 799.89   4. Abnormality of gait and mobility R26.9 781.2   5. Muscle weakness (generalized) M62.81 728.87   6. Left-sided weakness R53.1 728.87   7. Chronic back pain greater than 3 months duration M54.9 724.5    G89.29 338.29   8. Essential hypertension I10 401.9     Patient Active Problem List   Diagnosis   • Spinal stenosis, lumbar region, with neurogenic claudication   • Former smoker   • Overweight   • Left-sided weakness   • Right-sided lacunar stroke   • Essential hypertension   • Diabetes mellitus   • Chronic anxiety   • Chronic back pain greater than 3 months duration   • Prostatic hypertrophy   • Degenerative joint disease involving multiple joints   • Atrial fibrillation, chronic   • Encounter for rehabilitation   • Obstructive sleep apnea syndrome             Adult Rehabilitation Note       17 0857 17 0730 17 1429    Rehab Assessment/Intervention    Discipline physical therapy assistant  -JW occupational therapy assistant  -RC physical therapy assistant  -RW    Document Type therapy note (daily note)  -JW therapy note (daily note)  -RC therapy note (daily note)  -RW    Subjective Information agree to therapy  -JW agree to therapy  -RC agree to therapy  -RW    Patient Effort, Rehab Treatment good  -JW good  -RC good  -RW    Precautions/Limitations fall precautions  -JW fall precautions  -RC fall precautions  -RW    Recorded by [JW] Felicia Haynes, MARTIN [RC] MARCO Cannon/DAWN  [RW] Pipe Gruber PTA    Vital Signs    Pre Patient Position Sitting  -JW      Post Patient Position Sitting  -JW      Recorded by [JW] Felicia Haynes PTA      Pain Assessment    Pain Assessment 0-10  -JW No/denies pain  -RC No/denies pain  -RW    Pain Score 0  -JW      Post Pain Score 0  -JW      Recorded by [JW] Felicia Haynes, PTA [RC] Cassie Carpio, LARA/L [RW] Pipe Gruber PTA    Cognitive Assessment/Intervention    Current Cognitive/Communication Assessment functional  -JW  functional  -RW    Orientation Status oriented x 4  -JW  oriented x 4  -RW    Follows Commands/Answers Questions 100% of the time  -JW  100% of the time  -RW    Personal Safety Interventions gait belt;nonskid shoes/slippers when out of bed  -JW  gait belt;nonskid shoes/slippers when out of bed  -RW    Recorded by [JW] Felicia Haynes, MARTIN  [RW] Pipe Gruber PTA    Cognitive Assessment Intervention    Behavior/Mood Observations   behavior appropriate to situation, WNL/WFL  -RW    Recorded by   [RW] Pipe Gruber PTA    Bed Mobility, Assessment/Treatment    Bed Mob, Supine to Sit, Boyd   independent  -RW    Recorded by   [RW] Pipe Gruber PTA    Transfer Assessment/Treatment    Transfers, Bed-Chair [place]  stand by assist  -RC stand by assist  -RW    Transfers, Chair-Bed Boyd  stand by assist  -RC stand by assist  -RW    Transfers, Sit-Stand Boyd supervision required;conditional independence  -JW stand by assist  -RC stand by assist  -RW    Transfers, Stand-Sit Boyd supervision required;conditional independence  -JW stand by assist  -RC stand by assist  -RW    Transfers, Sit-Stand-Sit, Assist Device rolling walker  -JW  rolling walker  -RW    Toilet Transfer, Boyd  supervision required  -RC     Toilet Transfer, Assistive Device  rolling walker  -RC     Recorded by [JW] Felicia Haynes, PTA [RC] Cassie Carpio, LARA/L [RW] Pipe Gruber PTA    Gait  Assessment/Treatment    Gait, Gordon Level stand by assist  -JW  stand by assist  -RW    Gait, Assistive Device rolling walker  -JW  rolling walker  -RW    Gait, Distance (Feet) 150  -JW  150  -RW    Gait, Impairments   strength decreased;impaired balance  -RW    Recorded by [JW] Felicia Haynes, PTA  [RW] Pipe Gruber PTA    Stairs Assessment/Treatment    Number of Stairs 10  -JW      Stairs, Handrail Location left side (ascending)  -JW  --  -RW    Stairs, Gordon Level supervision required  -JW  --  -RW    Recorded by [JW] Felicia Haynes, PTA  [RW] Pipe Gruber PTA    Functional Mobility    Functional Mobility- Ind. Level  supervision required  -RC     Functional Mobility- Device  rolling walker  -RC     Functional Mobility-Distance (Feet)  150  -RC     Recorded by  [RC] LEIGH CannonA/L     Wheelchair Training/Management    Wheelchair, Distance Propelled   150 mod ind  -RW    Recorded by   [RW] Pipe Gruber PTA    ADL Assessment/Intervention    Additional Documentation  --   kitchen task w/ supervision  -RC     Recorded by  [RC] LEIGH CannonA/L     Upper Body Bathing Assessment/Training    UB Bathing Assess/Train, Position  sitting;long sitting  -RC     UB Bathing Assess/Train, Gordon Level  conditional independence  -RC     Recorded by  [RC] Cassie Carpio LARA/L     Lower Body Bathing Assessment/Training    LB Bathing Assess/Train, Position  sink side;sitting  -RC     LB Bathing Assess/Train, Gordon Level  conditional independence  -RC     Recorded by  [RC] Cassie Carpio LARA/L     Upper Body Dressing Assessment/Training    UB Dressing Assess/Train, Clothing Type  doffing:;donning:;pull over;button up  -RC     UB Dressing Assess/Train, Position  sitting  -RC     UB Dressing Assess/Train, Gordon  conditional independence  -RC     Recorded by  [RC] Cassie Carpio LARA/L     Lower Body Dressing Assessment/Training    LB Dressing Assess/Train,  Clothing Type  doffing:;donning:;pants;shoes;socks;shorts  -RC donning:;shoes  -RW    LB Dressing Assess/Train, Position  sitting;standing  -RC     LB Dressing Assess/Train, [place]  conditional independence;supervision required  -RC     Recorded by  [RC] MARCO Cannon/L [RW] Pipe Gruber PTA    Toileting Assessment/Training    Toileting Assess/Train, Position  sitting;standing  -RC     Toileting Assess/Train, Indepen Level  supervision required  -RC     Recorded by  [RC] MARCO Cannon/L     Grooming Assessment/Training    Grooming Assess/Train, Indepen Level  independent  -RC     Recorded by  [RC] MARCO Cannon/L     Therapy Exercises    Bilateral Lower Extremities AROM:;20 reps;sitting;ankle pumps/circles;hip flexion;LAQ;standing;heel raises;hip abduction/adduction   2 sets all ther ex  -JW  AROM:;20 reps;sitting;hip flexion;LAQ;standing;heel raises;hip abduction/adduction   pro 2 level 3 x 5 min  -RW    Bilateral Upper Extremity  --   ue bike 10 min pulley ex 5 min  -RC     Recorded by [JW] Felicia Haynes PTA [RC] MARCO Cannon/L [RW] Pipe Gruber PTA    Positioning and Restraints    Pre-Treatment Position other (comment)   w/c  -JW  in bed  -RW    Post Treatment Position wheelchair  -JW  chair  -RW    In Chair   call light within reach  -RW    In Wheelchair sitting;call light within reach;encouraged to call for assist  -JW      Recorded by [JW] Felicia Haynes PTA  [RW] Pipe Gruber PTA      11/13/17 1030 11/13/17 0738       Rehab Assessment/Intervention    Discipline physical therapist  -LM occupational therapist  -     Document Type progress note  -LM      Subjective Information agree to therapy;no complaints  -LM      Recorded by [LM] Landy Mckeon, PT [] Karoline Huang, OTR/L     Vital Signs    Pre Systolic BP Rehab 121  -LM      Pre Treatment Diastolic BP 57  -LM      Pretreatment Heart Rate (beats/min) 59  -LM      Pre SpO2 (%) 95  -LM      O2  Delivery Pre Treatment room air  -LM      Pre Patient Position Sitting  -LM      Recorded by [LM] Landy Mckeon, LEOLA      Pain Assessment    Pain Assessment No/denies pain  -LM      Recorded by [LM] Landy Mckeon, LEOLA        User Key  (r) = Recorded By, (t) = Taken By, (c) = Cosigned By    Initials Name Effective Dates    JW Felicia HOWARD [place], PTA 08/11/15 -     LM Landy Mckeon, PT 06/15/16 -     BH Karoline Huang, OTR/L 10/17/16 -     RW Pipe Gruber, PTA 10/17/16 -     RC Cassie RONI Carpio, LARA/L 10/17/16 -                 OT Goals       11/14/17 1038 11/13/17 1527 11/13/17 0738    Transfer Training OT LTG    Transfer Training OT LTG, Date Goal Reviewed 11/14/17  -RC 11/13/17  -     Transfer Training OT LTG, Outcome goal met  -RC goal partially met  -BH     Patient Education OT LTG    Patient Education OT LTG, Date Goal Reviewed 11/14/17  -RC  11/13/17  -    Patient Education OT LTG Outcome goal met  -RC  goal partially met  -BH    Safety Awareness OT LTG    Safety Awareness OT LTG, Date Goal Reviewed 11/14/17  -RC 11/13/17  -     Safety Awareness OT LTG, Outcome goal met  -RC goal ongoing  -BH     ADL OT LTG    ADL OT LTG, Date Goal Reviewed 11/14/17  -RC  11/13/17  -BH    ADL OT LTG, Outcome goal met  -RC  goal partially met  -BH      11/10/17 0914 11/09/17 1155 11/08/17 0929    Transfer Training OT LTG    Transfer Training OT LTG, Date Goal Reviewed  11/09/17  -RC 11/08/17  -RC    Patient Education OT LTG    Patient Education OT LTG, Date Goal Reviewed 11/10/17  -RC 11/09/17  -RC 11/08/17  -RC    Safety Awareness OT LTG    Safety Awareness OT LTG, Date Goal Reviewed 11/10/17  -RC 11/09/17  -RC 11/08/17  -RC    Safety Awareness OT LTG, Outcome  goal ongoing  -RC     ADL OT LTG    ADL OT LTG, Date Goal Reviewed 11/10/17  -RC 11/09/17  -RC 11/08/17  -RC    ADL OT LTG, Outcome  goal partially met  - goal ongoing  -RC    Endurance OT LTG    Endurance Goal OT LTG, Date Goal Reviewed   11/08/17  -RC     Endurance Goal OT LTG, Outcome   goal met  -RC      11/07/17 1351 11/06/17 1425 11/06/17 0724    Transfer Training OT LTG    Transfer Training OT LTG, Assist Device   walker, rolling platform  -KD    Transfer Training OT LTG, Date Goal Reviewed 11/07/17  -RC  11/06/17  -KD    Transfer Training OT LTG, Outcome goal ongoing  -RC  goal not met  -KD    Patient Education OT LTG    Patient Education OT LTG, Date Goal Reviewed 11/07/17  -RC  11/06/17  -KD    Patient Education OT LTG Outcome goal ongoing  -RC  goal not met  -KD    Safety Awareness OT LTG    Safety Awareness OT LTG, Date Goal Reviewed 11/07/17  -RC 11/06/17  -KD     Safety Awareness OT LTG, Outcome goal ongoing  -RC goal not met  -KD     ADL OT LTG    ADL OT LTG, Date Goal Reviewed 11/07/17  -RC  11/06/17  -KD    ADL OT LTG, Outcome goal ongoing  -RC  goal not met  -KD    Endurance OT LTG    Endurance Goal OT LTG, Date Goal Reviewed 11/07/17  -RC  11/06/17  -KD    Endurance Goal OT LTG, Outcome goal partially met  -RC  goal not met  -KD      11/04/17 0738 11/03/17 1359 11/02/17 0715    Transfer Training OT LTG    Transfer Training OT LTG, Date Goal Reviewed 11/04/17  -RC 11/03/17  -LM 11/02/17  -KD    Transfer Training OT LTG, Outcome  goal ongoing  -LM goal not met  -KD    Patient Education OT LTG    Patient Education OT LTG, Date Goal Reviewed 11/04/17  -RC 11/03/17  -LM 11/02/17  -KD    Patient Education OT LTG Outcome  goal not met  -LM goal not met  -KD    Safety Awareness OT LTG    Safety Awareness OT LTG, Date Goal Reviewed 11/04/17  -RC 11/03/17  -LM 11/02/17  -KD    Safety Awareness OT LTG, Outcome  goal not met  -LM goal not met  -KD    ADL OT LTG    ADL OT LTG, Date Goal Reviewed 11/04/17  -RC 11/03/17  -LM     ADL OT LTG, Outcome  goal not met  -LM goal not met  -KD    Endurance OT LTG    Endurance Goal OT LTG, Date Goal Reviewed 11/04/17  -RC 11/03/17  -LM 11/02/17  -KD    Endurance Goal OT LTG, Outcome goal ongoing  -RC goal not met  -LM goal  not met  -KD      11/01/17 1635          Transfer Training OT LTG    Transfer Training OT LTG, Date Goal Reviewed 11/01/17  -RW      Transfer Training OT LTG, Outcome goal ongoing  -RW      Patient Education OT LTG    Patient Education OT LTG, Date Goal Reviewed 11/01/17  -RW      Patient Education OT LTG Outcome goal ongoing  -RW      Safety Awareness OT LTG    Safety Awareness OT LTG, Date Goal Reviewed 11/01/17  -RW      Safety Awareness OT LTG, Outcome goal ongoing  -RW      ADL OT LTG    ADL OT LTG, Date Goal Reviewed 11/01/17  -RW      ADL OT LTG, Outcome goal ongoing  -RW      Endurance OT LTG    Endurance Goal OT LTG, Date Goal Reviewed 11/01/17  -RW      Endurance Goal OT LTG, Outcome goal ongoing  -RW        User Key  (r) = Recorded By, (t) = Taken By, (c) = Cosigned By    Initials Name Provider Type     Karoline Huang, OTR/L Occupational Therapist     Jacinta Pitts, OTR/L Occupational Therapist     Cassie Carpio, LARA/L Occupational Therapy Assistant    KD Therese King, LARA/L Occupational Therapy Assistant    MOSHE Boucher LARA/L Occupational Therapy Assistant          Occupational Therapy Education     Title: PT OT SLP Therapies (Active)     Topic: Occupational Therapy (Active)     Point: ADL training (Active)    Description: Instruct learner(s) on proper safety adaptation and remediation techniques during self care or transfers.   Instruct in proper use of assistive devices.    Learning Progress Summary    Learner Readiness Method Response Comment Documented by Status   Patient Acceptance E,H CARLOS reviewed and issued hamdout for Home safety fall prevention. issued and discussed informantion on driving evaluation.  11/14/17 1037 Done    Acceptance E,TB VU,DU Pt highly educated about HEP for home and given handouts. Started discussing safety at home with IADL. Educated to call if he needed anything.  11/13/17 1526 Done    Acceptance E NR,CARLOS   11/10/17 0914 Done    Acceptance E  NR   11/09/17 1155 Active    Acceptance TB NR  KD 11/02/17 1108 Active    Acceptance E,D NR  RW 11/01/17 1518 Active    Acceptance E VU Educated about OT and POC. Educated about anni dressing technique. Educated about safety with ADL and t/f. Educated to call for assist and not to stand independently. SP 10/31/17 1354 Done   Family Acceptance E,D NR  RW 11/01/17 1518 Active    Acceptance E VU Educated about OT and POC. Educated about anni dressing technique. Educated about safety with ADL and t/f. Educated to call for assist and not to stand independently. SP 10/31/17 1354 Done               Point: Home exercise program (Active)    Description: Instruct learner(s) on appropriate technique for monitoring, assisting and/or progressing therapeutic exercises/activities.    Learning Progress Summary    Learner Readiness Method Response Comment Documented by Status   Patient Acceptance E,TB VU,DU Pt highly educated about HEP for home and given handouts. Started discussing safety at home with IADL. Educated to call if he needed anything.  11/13/17 1526 Done    Acceptance E,D NR theraputty  11/01/17 1634 Active   Family Acceptance E,D NR theraputty  11/01/17 1634 Active               Point: Precautions (Active)    Description: Instruct learner(s) on prescribed precautions during self-care and functional transfers.    Learning Progress Summary    Learner Readiness Method Response Comment Documented by Status   Patient Acceptance E,H CARLOS reviewed and issued hamdout for Home safety fall prevention. issued and discussed informantion on driving evaluation.  11/14/17 1037 Done    Acceptance E VU recommend pt not drive w/o MD OK and discussed  evaluation program  11/10/17 0918 Done    Acceptance E NR,VU   11/10/17 0914 Done    Acceptance E NR   11/09/17 1155 Active    Acceptance E NR,VU recommned no climbing on ladders or step stools.  11/08/17 0928 Done    Acceptance E NR   11/07/17 1350 Active     Acceptance E NR   11/04/17 0930 Active    Acceptance E,D NR   11/01/17 1518 Active    Acceptance E VU Educated about OT and POC. Educated about anni dressing technique. Educated about safety with ADL and t/f. Educated to call for assist and not to stand independently. SP 10/31/17 1354 Done   Family Acceptance E,D NR   11/01/17 1518 Active    Acceptance E VU Educated about OT and POC. Educated about anni dressing technique. Educated about safety with ADL and t/f. Educated to call for assist and not to stand independently. SP 10/31/17 1354 Done               Point: Body mechanics (Done)    Description: Instruct learner(s) on proper positioning and spine alignment during self-care, functional mobility activities and/or exercises.    Learning Progress Summary    Learner Readiness Method Response Comment Documented by Status   Patient Acceptance E,H CARLOS reviewed and issued hamdout for Home safety fall prevention. issued and discussed informantion on driving evaluation.  11/14/17 1037 Done    Acceptance E NR,VU   11/10/17 0914 Done    Acceptance E NR   11/09/17 1155 Active    Acceptance E NR,VU recommned no climbing on ladders or step stools.  11/08/17 0928 Done    Acceptance E NR   11/07/17 1350 Active    Acceptance E NR   11/04/17 0930 Active    Acceptance E VU Educated about OT and POC. Educated about anni dressing technique. Educated about safety with ADL and t/f. Educated to call for assist and not to stand independently. SP 10/31/17 1354 Done   Family Acceptance E VU Educated about OT and POC. Educated about anni dressing technique. Educated about safety with ADL and t/f. Educated to call for assist and not to stand independently. SP 10/31/17 1354 Done                      User Key     Initials Effective Dates Name Provider Type Discipline     10/17/16 -  Karoline Huang OTR/L Occupational Therapist OT     10/17/16 -  Jacinta Pitts OTR/L Occupational Therapist OT     10/17/16 -  Cassie TAMAYO  MARCO Carpio/L Occupational Therapy Assistant OT    JOSS 10/17/16 -  DAYDAY Vidales Occupational Therapy Assistant OT    SP 01/31/17 -  Alem Bruce OT Student OT Student OT                OT Recommendation and Plan  Anticipated Equipment Needs At Discharge: bathing equipment, dressing equipment, front wheeled walker  Anticipated Discharge Disposition: home with assist  Planned Therapy Interventions: activity intolerance, adaptive equipment training, ADL retraining, IADL retraining, balance training, bed mobility training, fine motor coordination training, energy conservation, home exercise program, motor coordination training, neuromuscular re-education, ROM (Range of Motion), strengthening, stretching, transfer training  Therapy Frequency: other (see comments) (3-14x a week)  Plan of Care Review  Plan Of Care Reviewed With: patient  Progress: progress toward functional goals as expected  Outcome Summary/Follow up Plan: pt met all goals, he is sba for any standing, transfer or ambulation, He was advised not to drive unless cleared by MD or driving evaluation.  he is d/c'd home w/ wife and out pt  PT services          Outcome Measures       11/14/17 0857 11/14/17 0730 11/13/17 1030    How much help from another person do you currently need...    Turning from your back to your side while in flat bed without using bedrails? 4  -JW  4  -LM    Moving from lying on back to sitting on the side of a flat bed without bedrails? 4  -JW  3  -LM    Moving to and from a bed to a chair (including a wheelchair)? 3  -JW  3  -LM    Standing up from a chair using your arms (e.g., wheelchair, bedside chair)? 3  -JW  3  -LM    Climbing 3-5 steps with a railing? 3  -JW  3  -LM    To walk in hospital room? 3  -JW  3  -LM    AM-PAC 6 Clicks Score 20  -JW  19  -LM    How much help from another is currently needed...    Putting on and taking off regular lower body clothing?  3  -RC     Bathing (including washing, rinsing, and  drying)  4  -RC     Toileting (which includes using toilet bed pan or urinal)  4  -RC     Putting on and taking off regular upper body clothing  4  -RC     Taking care of personal grooming (such as brushing teeth)  4  -RC     Eating meals  4  -     Score  23  -     Functional Assessment    Outcome Measure Options AM-PAC 6 Clicks Basic Mobility (PT)  -  AM-PAC 6 Clicks Basic Mobility (PT)  -      11/13/17 0738          How much help from another is currently needed...    Putting on and taking off regular lower body clothing? 3  -BH      Bathing (including washing, rinsing, and drying) 4  -BH      Toileting (which includes using toilet bed pan or urinal) 3  -BH      Putting on and taking off regular upper body clothing 4  -BH      Taking care of personal grooming (such as brushing teeth) 4  -BH      Eating meals 4  -      Score 22  -        User Key  (r) = Recorded By, (t) = Taken By, (c) = Cosigned By    Initials Name Provider Type     Felicia Haynes, PTA Physical Therapy Assistant     Landy Mckeon, PT Physical Therapist     Karoline Huang, OTR/L Occupational Therapist    JIA Carpio LARA/L Occupational Therapy Assistant           Time Calculation:          Time Calculation- OT       11/14/17 1045 11/14/17 0719       Time Calculation- OT    OT Start Time 0730  -      OT Stop Time 0855  -      OT Time Calculation (min) 85 min  -      Total Timed Code Minutes- OT 85 minute(s)  -      OT Received On 11/14/17  -      OT Goal Re-Cert Due Date  11/26/17  -       User Key  (r) = Recorded By, (t) = Taken By, (c) = Cosigned By    Initials Name Provider Type     Karoline Huang OTR/L Occupational Therapist    LEIGH YoungA/L Occupational Therapy Assistant          Therapy Charges for Today     Code Description Service Date Service Provider Modifiers Qty    [id number]  OT SELF CARE/MGMT/TRAIN EA 15 MIN 11/14/2017 MARCO Cannon/L GO 4    [id number]  OT THER  PROC EA 15 MIN 11/14/2017 DAYDAY Cannon GO 1          OT G-codes  OT Professional Judgement Used?: Yes  OT Functional Scales Options: AM-PAC 6 Clicks Daily Activity (OT)  Score: 22  Functional Limitation: Self care  Self Care Current Status (): At least 20 percent but less than 40 percent impaired, limited or restricted  Self Care Goal Status (): At least 1 percent but less than 20 percent impaired, limited or restricted    OT Discharge Summary  Anticipated Discharge Disposition: home with assist  Reason for Discharge: Per MD order, All goals achieved  Outcomes Achieved: Able to achieve all goals within established timeline  Discharge Destination: Home with assist    DAYDAY Cannon  11/14/2017

## 2017-11-14 NOTE — THERAPY DISCHARGE NOTE
Inpatient Rehabilitation - Physical Therapy Discharge Summary  [facility]       Patient Name: Kenneth Harper Jr.  : 1934  MRN: [id number]    Today's Date: 2017  Onset of Illness/Injury or Date of Surgery Date: 10/30/17    Date of Referral to PT: 10/30/17  Referring Physician: Dr. Mckinnon      Admit Date: 10/30/2017      PT Recommendation and Plan    Visit Dx:    ICD-10-CM ICD-9-CM   1. Right-sided lacunar stroke I63.9 434.91   2. Symbolic dysfunction R48.9 784.60   3. Impaired mobility and ADLs Z74.09 799.89   4. Abnormality of gait and mobility R26.9 781.2   5. Muscle weakness (generalized) M62.81 728.87   6. Left-sided weakness R53.1 728.87   7. Chronic back pain greater than 3 months duration M54.9 724.5    G89.29 338.29   8. Essential hypertension I10 401.9             Outcome Measures       17 0857 17 0730 17 1030    How much help from another person do you currently need...    Turning from your back to your side while in flat bed without using bedrails? 4  -JW  4  -LM    Moving from lying on back to sitting on the side of a flat bed without bedrails? 4  -JW  3  -LM    Moving to and from a bed to a chair (including a wheelchair)? 3  -JW  3  -LM    Standing up from a chair using your arms (e.g., wheelchair, bedside chair)? 3  -JW  3  -LM    Climbing 3-5 steps with a railing? 3  -JW  3  -LM    To walk in hospital room? 3  -JW  3  -LM    AM-PAC 6 Clicks Score 20  -JW  19  -LM    How much help from another is currently needed...    Putting on and taking off regular lower body clothing?  3  -RC     Bathing (including washing, rinsing, and drying)  4  -RC     Toileting (which includes using toilet bed pan or urinal)  4  -RC     Putting on and taking off regular upper body clothing  4  -RC     Taking care of personal grooming (such as brushing teeth)  4  -RC     Eating meals  4  -RC     Score  23  -RC     Functional Assessment    Outcome Measure Options AM-PAC 6 Clicks  Basic Mobility (PT)  -  AM-PAC 6 Clicks Basic Mobility (PT)  -      11/13/17 0738          How much help from another is currently needed...    Putting on and taking off regular lower body clothing? 3  -BH      Bathing (including washing, rinsing, and drying) 4  -BH      Toileting (which includes using toilet bed pan or urinal) 3  -BH      Putting on and taking off regular upper body clothing 4  -BH      Taking care of personal grooming (such as brushing teeth) 4  -BH      Eating meals 4  -      Score 22  -        User Key  (r) = Recorded By, (t) = Taken By, (c) = Cosigned By    Initials Name Provider Type    JENA Haynes PTA Physical Therapy Assistant    MOSHE Mckeon, PT Physical Therapist     Karoline Huang, OTR/L Occupational Therapist    RC Cassie Carpio, LARA/L Occupational Therapy Assistant                PT Charges       11/14/17 0857          Time Calculation    Start Time 0857  -      Stop Time 0936  -      Time Calculation (min) 39 min  -      PT Received On 11/14/17  -      Time Calculation- PT    Total Timed Code Minutes- PT 39 minute(s)  -        User Key  (r) = Recorded By, (t) = Taken By, (c) = Cosigned By    Initials Name Provider Type     Felicia Haynes PTA Physical Therapy Assistant                  IP PT Goals       11/14/17 1622 11/14/17 0857 11/13/17 1525    Bed Mobility PT LTG    Bed Mobility PT LTG, Date Goal Reviewed 11/14/17  -      Bed Mobility PT LTG, Outcome goal met  -LM      Transfer Training PT LTG    Transfer Training PT  LTG, Date Goal Reviewed 11/14/17  -LM      Transfer Training PT LTG, Outcome goal met  -LM      Gait Training PT LTG    Gait Training Goal PT LTG, Date Goal Reviewed 11/14/17  -LM      Gait Training Goal PT LTG, Outcome goal met  -LM      Stair Training PT STG    Stair Training Goal PT STG, Date Goal Reviewed 11/14/17  -LM      Stair Training Goal PT STG, Outcome goal met  -LM      Stair Training PT LTG    Stair  Training Goal PT LTG, Date Goal Reviewed 11/14/17  -LM 11/14/17  -JW 11/13/17  -RW    Stair Training Goal PT LTG, Outcome goal met  -LM goal met   w/ 1 HR  -JW goal ongoing  -RW      11/13/17 1030 11/10/17 1355 11/09/17 1535    Gait Training PT LTG    Gait Training Goal PT LTG, Date Goal Reviewed 11/13/17  -LM 11/10/17  -JA 11/09/17  -RW    Gait Training Goal PT LTG, Outcome goal met  -LM goal ongoing  -JA goal ongoing  -RW    Stair Training PT STG    Stair Training Goal PT STG, Date Goal Reviewed   11/09/17  -RW    Stair Training Goal PT STG, Outcome   goal met  -RW    Stair Training PT LTG    Stair Training Goal PT LTG, Date Goal Reviewed 11/13/17  -LM 11/10/17  -JA 11/09/17  -RW    Stair Training Goal PT LTG, Outcome goal ongoing  -LM goal ongoing  -JA goal ongoing  -RW      11/08/17 1119 11/07/17 1357 11/06/17 1415    Transfer Training PT LTG    Transfer Training PT  LTG, Date Goal Reviewed  11/07/17  -RW 11/06/17  -RW    Transfer Training PT LTG, Outcome  goal met  -RW goal ongoing  -RW    Gait Training PT LTG    Gait Training Goal PT LTG, Date Goal Reviewed 11/08/17  -RW 11/07/17  -RW 11/06/17  -RW    Gait Training Goal PT LTG, Outcome goal ongoing  -RW  goal ongoing  -RW    Stair Training PT STG    Stair Training Goal PT STG, Date Goal Reviewed 11/08/17  -RW 11/07/17  -RW 11/06/17  -RW    Stair Training Goal PT STG, Outcome goal ongoing  -RW goal partially met   needed 2 hr  -RW goal ongoing  -RW    Stair Training PT LTG    Stair Training Goal PT LTG, Date Goal Reviewed 11/08/17  -RW 11/07/17  -RW 11/06/17  -RW    Stair Training Goal PT LTG, Outcome goal ongoing  -RW goal partially met   needed cga  -RW goal ongoing  -RW      11/04/17 1440 11/03/17 1540 11/02/17 1631    Bed Mobility PT LTG    Bed Mobility PT LTG, Date Goal Reviewed   11/02/17  -RW    Bed Mobility PT LTG, Outcome   goal met  -RW    Transfer Training PT LTG    Transfer Training PT  LTG, Date Goal Reviewed 11/04/17  -JW 11/03/17  -RW 11/02/17   -RW    Transfer Training PT LTG, Outcome goal ongoing  -JW goal ongoing  -RW goal ongoing  -RW    Gait Training PT LTG    Gait Training Goal PT LTG, Date Goal Reviewed 11/04/17  -JW 11/03/17  -RW 11/02/17  -RW    Gait Training Goal PT LTG, Outcome goal ongoing  -JW goal ongoing  -RW goal ongoing  -RW    Stair Training PT STG    Stair Training Goal PT STG, Date Goal Reviewed 11/04/17  -JW 11/03/17  -RW 11/02/17  -RW    Stair Training Goal PT STG, Outcome goal ongoing  -JW goal ongoing  -RW goal ongoing  -RW    Stair Training PT LTG    Stair Training Goal PT LTG, Date Established 11/04/17  -JW      Stair Training Goal PT LTG, Date Goal Reviewed 11/04/17  -JW 11/03/17  -RW 11/02/17  -RW    Stair Training Goal PT LTG, Outcome goal ongoing -JW goal ongoing  -RW goal ongoing  -RW      11/01/17 1543          Bed Mobility PT LTG    Bed Mobility PT LTG, Date Goal Reviewed 11/01/17  -RW      Bed Mobility PT LTG, Outcome goal ongoing  -RW      Transfer Training PT LTG    Transfer Training PT  LTG, Date Goal Reviewed 11/01/17  -RW      Transfer Training PT LTG, Outcome goal ongoing  -RW      Gait Training PT LTG    Gait Training Goal PT LTG, Date Goal Reviewed 11/01/17  -RW      Gait Training Goal PT LTG, Outcome goal ongoing  -RW      Stair Training PT STG    Stair Training Goal PT STG, Date Goal Reviewed 11/01/17  -RW      Stair Training Goal PT STG, Outcome goal ongoing  -RW      Stair Training PT LTG    Stair Training Goal PT LTG, Date Goal Reviewed 11/01/17  -RW      Stair Training Goal PT LTG, Outcome goal ongoing  -RW        User Key  (r) = Recorded By, (t) = Taken By, (c) = Cosigned By    Initials Name Provider Type    JENA Haynes, PTA Physical Therapy Assistant    MOSHE Mckeon, PT Physical Therapist    RADHA Flores, PTA Physical Therapy Assistant    KATHIE Gruber, PTA Physical Therapy Assistant          Therapy Charges for Today     Code Description Service Date Service Provider Modifiers  Qty    [id number]  GAIT TRAINING EA 15 MIN 11/13/2017 Landy Mckeon, PT GP 3          PT Discharge Summary  Anticipated Discharge Disposition: home with 24/7 care, home with home health  Reason for Discharge: All goals achieved  Outcomes Achieved: Able to achieve all goals within established timeline  Discharge Destination: Home with home health      Landy Mckeon, PT   11/14/2017

## 2017-11-14 NOTE — PLAN OF CARE
Problem: Inpatient Physical Therapy  Goal: Bed Mobility Goal LTG- PT  Outcome: Outcome(s) achieved Date Met:  11/14/17    10/31/17 0825 11/14/17 1622   Bed Mobility PT LTG   Bed Mobility PT LTG, Date Established 10/31/17 --    Bed Mobility PT LTG, Time to Achieve by discharge --    Bed Mobility PT LTG, Activity Type supine to sit/sit to supine --    Bed Mobility PT LTG, [place] Level supervision required --    Bed Mobility PT LTG, Additional Goal HOB flat; No BR's --    Bed Mobility PT LTG, Date Goal Reviewed --  11/14/17   Bed Mobility PT LTG, Outcome --  goal met       Goal: Transfer Training Goal 1 LTG- PT  Outcome: Outcome(s) achieved Date Met:  11/14/17    10/31/17 0825 11/14/17 1622   Transfer Training PT LTG   Transfer Training PT LTG, Date Established 10/31/17 --    Transfer Training PT LTG, Time to Achieve by discharge --    Transfer Training PT LTG, Activity Type bed to chair /chair to bed --    Transfer Training PT LTG, [place] Level supervision required --    Transfer Training PT LTG, Assist Device (AAD) --    Transfer Training PT LTG, Date Goal Reviewed --  11/14/17   Transfer Training PT LTG, Outcome --  goal met       Goal: Gait Training Goal LTG- PT  Outcome: Outcome(s) achieved Date Met:  11/14/17    10/31/17 0825 11/14/17 1622   Gait Training PT LTG   Gait Training Goal PT LTG, Date Established 10/31/17 --    Gait Training Goal PT LTG, Time to Achieve by discharge --    Gait Training Goal PT LTG, [place] Level supervision required --    Gait Training Goal PT LTG, Assist Device (AAD) --    Gait Training Goal PT LTG, Distance to Achieve 150 feet --    Gait Training Goal PT LTG, Date Goal Reviewed --  11/14/17   Gait Training Goal PT LTG, Outcome --  goal met       Goal: Stair Training Goal STG- PT  Outcome: Outcome(s) achieved Date Met:  11/14/17    10/31/17 0825 11/14/17 1622   Stair Training PT STG   Stair Training Goal PT STG, Date Established 10/31/17 --    Stair Training Goal PT  STG, Time to Achieve 4 days --    Stair Training Goal PT STG, Number of Steps 4 steps --    Stair Training Goal PT STG, [place] Level contact guard assist --    Stair Training Goal PT STG, Assist Device 1 handrail --    Stair Training Goal PT STG, Date Goal Reviewed --  11/14/17   Stair Training Goal PT STG, Outcome --  goal met       Goal: Stair Training Goal LTG- PT  Outcome: Outcome(s) achieved Date Met:  11/14/17    10/31/17 0825 11/04/17 1440 11/14/17 1622   Stair Training PT LTG   Stair Training Goal PT LTG, Date Established --  11/04/17 --    Stair Training Goal PT LTG, Time to Achieve by discharge --  --    Stair Training Goal PT LTG, Number of Steps 1 flight --  --    Stair Training Goal PT LTG, [place] Level supervision required --  --    Stair Training Goal PT LTG, Assist Device 2 handrails --  --    Stair Training Goal PT LTG, Date Goal Reviewed --  --  11/14/17   Stair Training Goal PT LTG, Outcome --  --  goal met

## 2017-11-14 NOTE — PLAN OF CARE
Problem: Patient Care Overview (Adult)  Goal: Plan of Care Review  Outcome: Ongoing (interventions implemented as appropriate)    11/14/17 0204   Coping/Psychosocial Response Interventions   Plan Of Care Reviewed With patient   Patient Care Overview   Progress improving   Outcome Evaluation   Outcome Summary/Follow up Plan dc planned for today rested well no complaints         Problem: Fall Risk (Adult)  Goal: Absence of Falls  Outcome: Ongoing (interventions implemented as appropriate)    Problem: Stroke (Ischemic) (Adult)  Goal: Signs and Symptoms of Listed Potential Problems Will be Absent or Manageable (Stroke)  Outcome: Ongoing (interventions implemented as appropriate)

## 2017-11-15 NOTE — PAYOR COMM NOTE
"Faith Harper Jr. (82 y.o. Male)     Date of Birth Social Security Number Address Home Phone MRN    1934  [address] [facility] [address] [phone] [id number]    Orthodoxy Marital Status          None        Admission Date Admission Type Admitting Provider Attending Provider Department, Room/Bed    10/30/17 Elective Vishnu Mckinnon MD  [facility] ACUTE REHAB, 529/1    Discharge Date Discharge Disposition Discharge Destination        2017 Home or Self Care             Attending Provider: (none)    Allergies:  Levaquin [Levofloxacin]    Isolation:  None   Infection:  None   Code Status:  Prior    Ht:  70\" (177.8 cm)   Wt:  202 lb 4.8 oz (91.8 kg)    Admission Cmt:  None   Principal Problem:  Right-sided lacunar stroke [I63.9]                 Active Insurance as of 10/30/2017     Primary Coverage     Payor Plan Insurance Group Employer/Plan Group    HUMANA MEDICARE REPLACEMENT HUMANA MEDICARE REPL [id number]     Payor Plan Address Payor Plan Phone Number Effective From Effective To    [address] [address] [phone] 2015     [place] [address]       Subscriber Name Subscriber Birth Date Member ID       FAITH HARPER JR. 1934 [id number]                 Emergency Contacts      (Rel.) Home Phone Work Phone Mobile Phone    Maxine Harper (Spouse) -- -- [phone]    Nika Chow (Daughter) [phone] -- [phone]               Discharge Summary      Vishnu Mckinnon MD at 2017 10:42 AM             [address] [address]  T - [phone]     Rehabilitation Discharge Summary       BRIEF OVERVIEW   PATIENT NAME: Faith Kit Harper Jr.                      PHYSICIAN: Vishnu Mckinnon MD  : 1934  MRN: [id number]    ADMISSION/DISCHARGE INFO   Admitting Provider: Vishnu Mckinnon MD  Discharge Provider: No att. providers found  ADMISSION DATE: " 10/30/2017   DISCHARGE DATE: 11/14/2017    ADMISSION DIAGNOSES: Right-sided lacunar stroke [I63.9]  DISCHARGE DIAGNOSES: Principal Problem:    Right-sided lacunar stroke  Active Problems:    Left-sided weakness    Atrial fibrillation, chronic    Essential hypertension    Diabetes mellitus    Chronic anxiety    Chronic back pain greater than 3 months duration    Degenerative joint disease involving multiple joints    Encounter for rehabilitation    Obstructive sleep apnea syndrome    Former smoker      Primary Care Physician at Discharge: Evan Marrero [phone]      Secondary Discharge Diagnosis  Patient Active Problem List   Diagnosis Code   • Spinal stenosis, lumbar region, with neurogenic claudication M48.062   • Former smoker Z87.891   • Overweight E66.3   • Left-sided weakness R53.1   • Right-sided lacunar stroke I63.9   • Essential hypertension I10   • Diabetes mellitus E11.9   • Chronic anxiety F41.9   • Chronic back pain greater than 3 months duration M54.9, G89.29   • Prostatic hypertrophy N40.0   • Degenerative joint disease involving multiple joints M15.9   • Atrial fibrillation, chronic I48.2   • Encounter for rehabilitation Z51.89   • Obstructive sleep apnea syndrome G47.33       Discharge Disposition  Home or Self Care       Code Status at Discharge: Full code       CONSULTS   Consult Orders (all)     Start     Ordered    11/14/17 0835  Inpatient Consult to Case Management   Once     Provider:  (Not yet assigned)    11/14/17 0835    11/13/17 0847  Inpatient Consult to Case Management   Once     Provider:  (Not yet assigned)    11/13/17 0847    10/30/17 1506  Inpatient consult to Nutrition  Once     Provider:  (Not yet assigned)    10/30/17 1508          DETAILS OF HOSPITAL STAY    Functional Status:                ADMIT             Discharge   Eating                                          4                        7   Grooming                                     4                        7  Bathing                                         3                        6  Dressing upper body                    3                        5  Dressing lower body                     3                        6  Toileting                                        4                        6  Bladder Management                   4                        6  Bowel Management                     4                        6  Transfers:bed chair wheelchair    3                        5  Transfers: toilet                            0                        5  Transfers: tub/shower                  0                        5  Locomotion: walking                    1                        5  Locomotion: Wheelchair              2                        6  Locomotion: stairs                       0                         5  Comprehension                           5                         6  Expression                                   5                         6  Social interaction                         4                        7  Problem solving                           2                        6  Memory                                        4                        5                                          Rehabilitation Course  He was admitted to the acute rehabilitation unit after having a lacunar stroke with left-sided weakness and ataxia.  Initially he had some episodes of confusion when he woke but that improved significantly over the first few days.  Since that time he has been alert oriented and not confused at all.  We did taper his analgesics and anxiolytics and that seemed to help for confusion.  His diabetes is been well controlled during his stay on acute rehabilitation.  Blood pressures been well controlled.  He has participated well with therapy and made excellent progress      Heart Rate: 79  Resp: 18  BP: 129/66  Temp: 96.1 °F (35.6 °C)   Weight: 202 lb 4.8 oz (91.8  kg)    Pertinent Test Results:    Lab Results (last 72 hours)     Procedure Component Value Units Date/Time    POC Glucose Fingerstick [[id number]]  (Normal) Collected:  11/11/17 1034    Specimen:  Blood Updated:  11/11/17 1046     Glucose 91 mg/dL       Meter: [id number]: [id number] [facility]       POC Glucose Fingerstick [[id number]]  (Normal) Collected:  11/11/17 1651    Specimen:  Blood Updated:  11/11/17 1705     Glucose 75 mg/dL       Meter: [id number]: [id number] [facility]       POC Glucose Fingerstick [[id number]]  (Normal) Collected:  11/11/17 2042    Specimen:  Blood Updated:  11/12/17 0032     Glucose 123 mg/dL       Meter: [id number]: [id number] [facility]       POC Glucose Fingerstick [[id number]]  (Normal) Collected:  11/12/17 0539    Specimen:  Blood Updated:  11/12/17 0657     Glucose 107 mg/dL       Meter: [id number]: [id number] [facility]       POC Glucose Fingerstick [[id number]]  (Abnormal) Collected:  11/12/17 1052    Specimen:  Blood Updated:  11/12/17 1105     Glucose 68 (L) mg/dL       Meter: [id number]: [id number] [facility]       POC Glucose Fingerstick [[id number]]  (Normal) Collected:  11/12/17 1559    Specimen:  Blood Updated:  11/12/17 1621     Glucose 70 mg/dL       Meter: [id number]: [id number] [facility]       POC Glucose Fingerstick [[id number]]  (Normal) Collected:  11/12/17 2038    Specimen:  Blood Updated:  11/13/17 0437     Glucose 96 mg/dL       Meter: [id number]: [id number] [facility]       POC Glucose Fingerstick [[id number]]  (Normal) Collected:  11/13/17 0443    Specimen:  Blood Updated:  11/13/17 0501     Glucose 120 mg/dL       Meter: [id number]: [id number] [facility]IR[facility] SUNITA K       POC Glucose Fingerstick [[id number]]  (Abnormal) Collected:  11/13/17 1006    Specimen:  Blood Updated:  11/13/17 1019     Glucose 140 (H) mg/dL       RN NotifiedMeter: [id number]: [id number]  [facility] KLARISSA       POC Glucose Fingerstick [[id number]]  (Normal) Collected:  11/13/17 1616    Specimen:  Blood Updated:  11/13/17 1639     Glucose 91 mg/dL       RN NotifiedMeter: [id number]: [id number] RIGOBERTO KLARISSA       POC Glucose Fingerstick [[id number]]  (Normal) Collected:  11/14/17 0513    Specimen:  Blood Updated:  11/14/17 0530     Glucose 96 mg/dL       Meter: [id number]: [id number] [facility]TLE           Imaging Results (all)     None          Presenting Problem/History of Present Illness   Kenneth Harper Jr. is a 82 y.o. male who suffered lacunar infarction with some left-sided weakness.  He was admitted to [facility].  The showman has known atrial fibrillation was followed by his cardiologist Dr. Carrasco and also neurology Dr. Cordova.  Both recommended that he not be fully anticoagulated because of bleeding problems and recommended that he continue aspirin and Plavix.  As he was evaluated and stabilized we will consult in for admission to the acute rehabilitation unit.  He was admitted and has done very well during his stay      Physical Exam at Discharge      He is alert and oriented ×3    Supple no carotid bruits no JVD  Lungs were clear without rales rhonchi or wheezes  Heart rate regular without murmurs gallops or rubs  Abdomen soft nontender good bowel sounds no rebound guarding or rigidity  Extremities without clubbing cyanosis or edema  History on the left side continued to improve throughout his stay and the ataxia improved considerably.    Noted at admission that his heart rate was very regular but his EKG did show atrial fibrillation              DISPOSITION   Discharge to home with his family     DISCHARGE MEDICATIONS        Your medication list       As of 11/14/2017  9:58 AM      START taking these medications       Instructions Last Dose Given Next Dose Due    amLODIPine 10 MG tablet   Commonly known as:  NORVASC        Take 1 tablet by mouth Every  Night.         atorvastatin 10 MG tablet   Commonly known as:  LIPITOR        Take 1 tablet by mouth Every Night.         clopidogrel 75 MG tablet   Commonly known as:  PLAVIX        Take 1 tablet by mouth Daily.         doxazosin 8 MG tablet   Commonly known as:  CARDURA        Take 1 tablet by mouth Every Night.         finasteride 5 MG tablet   Commonly known as:  PROSCAR        Take 1 tablet by mouth Daily.         fluticasone 50 MCG/ACT nasal spray   Commonly known as:  FLONASE        2 sprays by Each Nare route Daily.         glimepiride 2 MG tablet   Commonly known as:  AMARYL        Take 1 tablet by mouth 2 (Two) Times a Day Before Meals.         lisinopril 10 MG tablet   Commonly known as:  PRINIVIL,ZESTRIL        Take 1 tablet by mouth Daily.         Magnesium Oxide 400 (240 Mg) MG tablet        Take 1 tablet by mouth Daily.         metFORMIN 1000 MG tablet   Commonly known as:  GLUCOPHAGE        Take 1 tablet by mouth 2 (Two) Times a Day With Meals.         metoprolol succinate XL 50 MG 24 hr tablet   Commonly known as:  TOPROL XL        Take 1 tablet by mouth Daily.           CHANGE how you take these medications       Instructions Last Dose Given Next Dose Due    aspirin 81 MG chewable tablet   What changed:    - medication strength  - how much to take  - how to take this  - when to take this        Chew 1 tablet Daily.           CONTINUE taking these medications       Instructions Last Dose Given Next Dose Due    Acetaminophen 500 MG coapsule              ALLERGY MEDICINE PO              HYDROcodone-acetaminophen 7.5-325 MG per tablet   Commonly known as:  NORCO              MULTI VITAMIN DAILY PO              omeprazole 20 MG capsule   Commonly known as:  priLOSEC              VITAMIN D PO                STOP taking these medications          ALPRAZolam 0.5 MG tablet   Commonly known as:  XANAX           naproxen 500 MG tablet   Commonly known as:  NAPROSYN                Where to Get Your Medications       These medications were sent to [facility] [address] [place] [address] - [address] - [phone]  - [phone] FX  [address], [facility] [address]     Phone:  [phone]    • amLODIPine 10 MG tablet   • atorvastatin 10 MG tablet   • clopidogrel 75 MG tablet   • finasteride 5 MG tablet   • fluticasone 50 MCG/ACT nasal spray   • Magnesium Oxide 400 (240 Mg) MG tablet   • metoprolol succinate XL 50 MG 24 hr tablet         Information about where to get these medications is not yet available     ! Ask your nurse or doctor about these medications    • aspirin 81 MG chewable tablet   • doxazosin 8 MG tablet   • glimepiride 2 MG tablet   • lisinopril 10 MG tablet   • metFORMIN 1000 MG tablet             INSTRUCTIONS   Activity: Activity will be as tolerated but I did suggest he not be on this season and that he not drive for now.  Diabetic diet    Diet: Diabetic diet    Special Instructions:   Rolling walker    FOLLOW UP   Additional Instructions for the Follow-ups that You Need to Schedule     Ambulatory Referral to Physical Therapy Evaluate and treat, Neuro    As directed    She requested outpatient physical therapy at Dr. Dumont's office   Specialty modality needed?:   Evaluate and treat  Neuro                Follow-up Information     Follow up with Evan Marrero MD Follow up in 1 week(s).    Specialty:  Family Medicine    Contact information:    [address] [facility] [address] [phone]          Follow up with Fabrice Cordova MD Follow up in 1 week(s).    Specialty:  Neurology    Contact information:    [address] [facility] [id number] [phone]          No future appointments.               Vishnu Mckinnon MD  11/14/17  4:06 PM          Electronically signed by Vishnu Mckinnon MD at 11/14/2017  4:11 PM

## 2021-04-06 RX ORDER — ASPIRIN 325 MG
TABLET ORAL DAILY
COMMUNITY

## 2021-04-06 RX ORDER — LISINOPRIL 20 MG/1
20 TABLET ORAL DAILY
COMMUNITY

## 2021-04-06 RX ORDER — ACETAMINOPHEN 500 MG
500 TABLET ORAL EVERY 6 HOURS PRN
COMMUNITY

## 2021-04-06 RX ORDER — CALCIUM CARBONATE 300MG(750)
TABLET,CHEWABLE ORAL
COMMUNITY

## 2021-04-06 RX ORDER — ATORVASTATIN CALCIUM 40 MG/1
40 TABLET, FILM COATED ORAL DAILY
COMMUNITY

## 2021-04-06 RX ORDER — HYDRALAZINE HYDROCHLORIDE 25 MG/1
25 TABLET, FILM COATED ORAL 4 TIMES DAILY
COMMUNITY

## 2021-04-06 RX ORDER — BUSPIRONE HYDROCHLORIDE 5 MG/1
5 TABLET ORAL 2 TIMES DAILY
COMMUNITY

## 2021-04-06 RX ORDER — MECLIZINE HYDROCHLORIDE 25 MG/1
25 TABLET ORAL 3 TIMES DAILY PRN
COMMUNITY

## 2021-04-06 RX ORDER — OMEPRAZOLE 20 MG/1
20 CAPSULE, DELAYED RELEASE ORAL DAILY
COMMUNITY

## 2021-04-06 RX ORDER — SPIRONOLACTONE 25 MG/1
25 TABLET ORAL DAILY
COMMUNITY

## 2021-04-06 RX ORDER — DOXAZOSIN MESYLATE 4 MG/1
4 TABLET ORAL NIGHTLY
COMMUNITY

## 2021-04-06 RX ORDER — RAMELTEON 8 MG/1
8 TABLET ORAL NIGHTLY
COMMUNITY

## 2021-04-06 RX ORDER — QUETIAPINE FUMARATE 25 MG/1
25 TABLET, FILM COATED ORAL 2 TIMES DAILY
COMMUNITY

## 2021-04-06 RX ORDER — DOCUSATE SODIUM 100 MG/1
100 CAPSULE, LIQUID FILLED ORAL DAILY
COMMUNITY

## 2021-04-13 ENCOUNTER — OFFICE VISIT (OUTPATIENT)
Dept: VASCULAR SURGERY | Age: 86
End: 2021-04-13
Payer: MEDICARE

## 2021-04-13 ENCOUNTER — TELEPHONE (OUTPATIENT)
Dept: VASCULAR SURGERY | Age: 86
End: 2021-04-13

## 2021-04-13 VITALS
SYSTOLIC BLOOD PRESSURE: 150 MMHG | TEMPERATURE: 97.7 F | WEIGHT: 180 LBS | BODY MASS INDEX: 25.77 KG/M2 | OXYGEN SATURATION: 92 % | RESPIRATION RATE: 16 BRPM | HEART RATE: 88 BPM | DIASTOLIC BLOOD PRESSURE: 86 MMHG | HEIGHT: 70 IN

## 2021-04-13 DIAGNOSIS — I65.23 BILATERAL CAROTID ARTERY STENOSIS: Primary | ICD-10-CM

## 2021-04-13 PROCEDURE — G8417 CALC BMI ABV UP PARAM F/U: HCPCS | Performed by: PHYSICIAN ASSISTANT

## 2021-04-13 PROCEDURE — G8427 DOCREV CUR MEDS BY ELIG CLIN: HCPCS | Performed by: PHYSICIAN ASSISTANT

## 2021-04-13 PROCEDURE — 4040F PNEUMOC VAC/ADMIN/RCVD: CPT | Performed by: PHYSICIAN ASSISTANT

## 2021-04-13 PROCEDURE — 1036F TOBACCO NON-USER: CPT | Performed by: PHYSICIAN ASSISTANT

## 2021-04-13 PROCEDURE — 1123F ACP DISCUSS/DSCN MKR DOCD: CPT | Performed by: PHYSICIAN ASSISTANT

## 2021-04-13 PROCEDURE — 99204 OFFICE O/P NEW MOD 45 MIN: CPT | Performed by: PHYSICIAN ASSISTANT

## 2021-04-13 NOTE — PROGRESS NOTES
Patient Care Team:  Markie Patton MD as PCP - General (Internal Medicine)  Brooke Rodgers MD as Consulting Physician (Vascular Surgery)      Aleida Nicolas (:  1934) is a 80 y.o. male,New patient, here for evaluation of the following chief complaint(s):  New Patient (Syncope and collapse)      SUBJECTIVE/OBJECTIVE:  Mr. Darcy Joseph is a 81 yo male who has a history that includes A-fib on Eliquis, PE, CVA, TIA's and HTN. He presents today as a referral from Dr. Marco Campo. He was admitted to [facility] on 21 for syncope and collapse. As part of his work up he had a CDU done that showed he had bilateral ICA stenosis in the 50-69% range. He had a CT chest that showed a small nonocclusive RLL PE. He had a CTA neck that showed bilateral ICA's with approximately 70% stenosis. He reports a history of a CVA in the past with left sided weakness (now resolved) and \"TIA's\" that caused frequent falls. He reports that the best he can recall regarding his passing out and falls occur after he has changed from a lying or sitting position to a standing position. He is currently on ASA, Eliquis and a statin daily. He  denies any new onset of partial or complete loss of vision affecting only one eye, speech difficulty or lateralizing weakness, numbness/tingling. He reports that he hasn't hadany further episodes of syncope or falls since d/c from hospital. He resides in [facility]. Aleida Nicolas is a 80 y.o. male with the following history as recorded in [facility]: There are no active problems to display for this patient.     Current Outpatient Medications   Medication Sig Dispense Refill    apixaban (ELIQUIS) 5 MG TABS tablet Take 10 mg by mouth 2 times daily      aspirin 325 MG tablet Take by mouth daily      atorvastatin (LIPITOR) 40 MG tablet Take 40 mg by mouth daily      busPIRone (BUSPAR) 5 MG tablet Take 5 mg by mouth 2 times daily      docusate sodium (COLACE) 100 MG capsule Take 180 lb (81.6 kg)   SpO2 92%   BMI 25.83 kg/m²       Neck- carotid pulses 2+ to palpation with no bruit  Cardiovascular - Regular rate and rhythm. No murmur noted. Pulmonary - effort appears normal.  No respiratory distress. Lungs - Breath sounds normal. No wheezes or rales. Extremities - with mild edema. Femoral pulses 2+. Neurologic - alert and oriented X 3. Physiologic. Face symmetric. Skin - warm, dry, and intact. Psychiatric - mood, affect, and behavior appear normal.  Judgment and thought processes appear normal.    Risk factors for atherosclerosis of all vascular beds have been reviewed with the patient including:  Family history, tobacco abuse in all forms, elevated cholesterol, hyperlipidemia, and diabetes. Reviewed on this visit: pcp notes and prior studies including CDU, CT chest and CTA Neck      ASSESSMENT/PLAN:      1. Carotid Artery Stenosis      Recommend he continue ASA and a statin   Recommend no smoking  Recommend good BP control  Recommend low fat, low cholesterol diet  Recommend daily exercise in moderation  We will request the CTA neck to be placed on a disc and sent to our office for our viewing. After we have reviewed the imaging, we will call with further recommendations. An electronic signature was used to authenticate this note.     --Tre Lopez PA-C

## 2021-04-13 NOTE — TELEPHONE ENCOUNTER
Called [facility] and requested the disk of CTA of neck mailed to us. Radiology stated I needed to fax the request to them. Request faxed.

## 2021-04-13 NOTE — TELEPHONE ENCOUNTER
----- Message from Bernardo Santos PA-C sent at 4/13/2021 10:34 AM CDT -----  Please request CTA neck to be placed on disc and sent to us ASAP.  ERI

## 2021-04-23 ENCOUNTER — TELEPHONE (OUTPATIENT)
Dept: VASCULAR SURGERY | Age: 86
End: 2021-04-23

## 2021-04-23 NOTE — TELEPHONE ENCOUNTER
----- Message from ESPERANZA Jacobson sent at 4/23/2021  6:02 AM CDT -----  Jordana Cisneros - please call patient. Let him know DR. Sargent and Dr. Dayami Krueger have reivewed his films. They believe he would do best with a carotid stent, left side first.  Please inquire if he has had any sysmptoms - one sided weakness, speech issues, vision loss. Let him know as requried by medicare he will need to see a neurologist one time. If he is agreeable, we will start to work on everything that is needed. We can see him in the office on Thursday to discuss details.   If he agrees and you give appt, forward to [facility] so she can start to work on date and neurology appt

## 2021-04-23 NOTE — TELEPHONE ENCOUNTER
I called pt's number and was transferred to [facility] at [facility]. I informed her of the surgeons recommendation. She stated he is asymptomatic at this time. She agreed to start proceedings for surgery. She voiced her understanding and agreed.

## 2021-04-26 DIAGNOSIS — Z01.818 PRE-OPERATIVE CLEARANCE: Primary | ICD-10-CM

## 2021-04-29 ENCOUNTER — OFFICE VISIT (OUTPATIENT)
Dept: VASCULAR SURGERY | Age: 86
End: 2021-04-29
Payer: MEDICARE

## 2021-04-29 DIAGNOSIS — I65.23 BILATERAL CAROTID ARTERY STENOSIS: Primary | ICD-10-CM

## 2021-04-29 PROCEDURE — G8427 DOCREV CUR MEDS BY ELIG CLIN: HCPCS | Performed by: NURSE PRACTITIONER

## 2021-04-29 PROCEDURE — 1123F ACP DISCUSS/DSCN MKR DOCD: CPT | Performed by: NURSE PRACTITIONER

## 2021-04-29 PROCEDURE — 4040F PNEUMOC VAC/ADMIN/RCVD: CPT | Performed by: NURSE PRACTITIONER

## 2021-04-29 PROCEDURE — G8417 CALC BMI ABV UP PARAM F/U: HCPCS | Performed by: NURSE PRACTITIONER

## 2021-04-29 PROCEDURE — 99214 OFFICE O/P EST MOD 30 MIN: CPT | Performed by: NURSE PRACTITIONER

## 2021-04-29 PROCEDURE — 1036F TOBACCO NON-USER: CPT | Performed by: NURSE PRACTITIONER

## 2021-04-29 NOTE — Clinical Note
get notes from MRI fbrain rom March done at [facility] and discharge summary.   Iget notes from Dr. Bon Jean Baptiste as well as any scans done at [facility] when he was admitted in [facility]  May need to call Dr. Julianna Urbano office

## 2021-04-29 NOTE — Clinical Note
Please call again and try to get notes from dr. Sandie Saldivar.   Please let daughter know we are still trying to get this info

## 2021-04-30 NOTE — PROGRESS NOTES
Viral Bourgeois (:  1934) is a 80 y.o. male,Established patient, here for evaluation of the following chief complaint(s):  No chief complaint on file. SUBJECTIVE/OBJECTIVE:  He presents for follow up of carotid artery stenosis. He has a known history of carotid artery stenosis for < 1 year. His current treatment includes eliquis, ASA EC daily. 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 He has had multiple episodes of syncope. He has been admitted for these. Often they are with change of position. He reports has not had TIA's, episodes of lateralizing weakness and episodes of amaurosis fugax. He has seen Dr. Clemente and they thought confusion was vascular dementia and not Alzheimer. He basically just sits in wheelchair and lays in bed.       Viral Bourgeois is a 80 y.o. male with the following history as recorded in [employer]:  Patient Active Problem List    Diagnosis Date Noted    A-fib ([facility] [address]     HTN (hypertension)     Hyperlipidemia     Pulmonary embolism (HCC)      Current Outpatient Medications   Medication Sig Dispense Refill    apixaban (ELIQUIS) 5 MG TABS tablet Take 10 mg by mouth 2 times daily      aspirin 325 MG tablet Take by mouth daily      atorvastatin (LIPITOR) 40 MG tablet Take 40 mg by mouth daily      busPIRone (BUSPAR) 5 MG tablet Take 5 mg by mouth 2 times daily      Menthol-Zinc Oxide (CALMOSEPTINE EX) Apply topically      docusate sodium (COLACE) 100 MG capsule Take 100 mg by mouth daily      doxazosin (CARDURA) 4 MG tablet Take 4 mg by mouth nightly      hydrALAZINE (APRESOLINE) 25 MG tablet Take 25 mg by mouth 4 times daily      lisinopril (PRINIVIL;ZESTRIL) 20 MG tablet Take 20 mg by mouth daily      Magnesium 400 MG TABS Take by mouth      meclizine (ANTIVERT) 25 MG tablet Take 25 mg by mouth 3 times daily as needed      omeprazole (PRILOSEC) 20 MG delayed release capsule Take 20 mg by mouth daily      QUEtiapine (SEROQUEL) 25 MG tablet Take 25 mg by mouth 2 times daily      ramelteon (ROZEREM) 8 MG tablet Take 8 mg by mouth nightly      spironolactone (ALDACTONE) 25 MG tablet Take 25 mg by mouth daily      acetaminophen (TYLENOL) 500 MG tablet Take 500 mg by mouth every 6 hours as needed for Pain       No current facility-administered medications for this visit. Allergies: Levaquin [levofloxacin]  Past Medical History:   Diagnosis Date    [facility]-fib ([facility] [address]     Carotid stenosis     History of CVA (cerebrovascular accident)     HTN (hypertension)     Hx of TIA (transient ischemic attack) and stroke     Hyperlipidemia     Pulmonary edema     Syncope and collapse      Past Surgical History:   Procedure Laterality Date    TONSILLECTOMY       History reviewed. No pertinent family history. Social History     Tobacco Use    Smoking status: Never Smoker    Smokeless tobacco: Never Used   Substance Use Topics    Alcohol use: Not Currently         Eyes - no sudden vision change or amaurosis. Respiratory - no significant shortness of breath,  Cardiovascular - no chest pain or syncope. No  significant leg swelling. No claudication. Musculoskeletal - no gait disturbance  Skin - no new wound. Neurologic -  No speech difficulty or lateralizing weakness. Psychiatric - has hx significant hostility per daughter and significant confusion  All other review of systems are negative. Physical Exam        Cardiovascular - Regular rate and rhythm. Pulmonary - effort appears normal.  No respiratory distress. Lungs - Breath sounds normal. No wheezes or rales. Extremities - without edema   Neurologic - alert and oriented X 3. Physiologic. Face symmetric. Skin - warm, dry, and intact. no wound  Psychiatric - mood, affect, and behavior appear normal.  Confusion basically daily but varies per POA.     Risk factors for atherosclerosis of all vascular beds have been reviewed with the patient including:  Family history, tobacco abuse in all forms, elevated cholesterol, hyperlipidemia, and diabetes. Doppler results:    Right CCA/ICA 50-69% stenotic  Left CCA/ICA 50-69% stenotic  Right verterbral artery flow is antegrade  Left verterbral artery flow is antegrade  Individual velocities reviewed: Yes. Results were reviewed with the patient. Disease process is chronic illness with exacerbation or progression      CT - 70 percent ICA stenosis bilaterally  Individual images were reviewed. Results were reviewed with the patient. Reviewed on this visit: pcp notes and recent hospitalization notes from 3/2/21    Options have been discussed with the patient including continued medical management vs. proceeding with left CE. We have discussed with POA and are going to get more information. Risks have been discussed with the patient including but not limited to MI, death, CVA, bleed, nerve injury, and infection. ASSESSMENT/PLAN:  1. Bilateral carotid artery stenosis        Discussed management of carotid u/s and CT scan which includes:  continue asa and eliquis for now due to PE    Strongly encourage start/continue statin therapy -   Recommend no smoking - discussed the effect tobacco has on illness;       POA - daughter Krzysztof Demarco [phone]. We will get notes from MRI from March done at [facility]. I will get notes from Dr. Hedy Casillas as well as any scans done at [facility] when he was admitted in [place] - MRI shows no acute event, microvascular change and atrophy. Ct head is negative. Addendum - Dr. Phyllis Ceballos and I have reviewed all films and notes from neurology. He has progressive dementia and is even combative at times. The notes say he is near end of life. Given this information, we do not feel proceeding with any type of procedure is indicated. We feel risk of proceeding far outweighs any benefit. POA agrees. She will call if he develops any symptoms of TIA and we could re-evaluate at that point.        Patient instructed to call or proceed to the emergency room with any symptoms of lateralizing weakness, loss of vision in one eye, or episodes slurred speech. An electronic signature was used to authenticate this note.     --ESPERANZA Carpio

## 2021-05-04 NOTE — PROGRESS NOTES
Spoke with Dr. Alaniz Hearing office. They state they sent the progress notes earlier and spoke with our  about this. Our  has not spoken with them or received anything from Dr. Alaniz Hearing office. I attempted to call them back to see if they could refax this information and no one answered. Left our fax number and patient information with the request to please send Progress notes as soon as possible.

## 2021-05-19 ENCOUNTER — TELEPHONE (OUTPATIENT)
Dept: VASCULAR SURGERY | Age: 86
End: 2021-05-19

## 2021-05-19 NOTE — TELEPHONE ENCOUNTER
I called and savi Riojas a nurse on the [facility] wing of [facility] to ask her to verify the pt does not want to proceed with the carotid stent with Dr. Eligio Aguielra. I was reviewing my clearances and seen the pt's appt with [phone] [facility] was cx. In the reason it states the pt does not want to proceed with surgery. After looking through their notes Beulah states the pt's daughter Patricia Ferro called to have the appt cx deciding they will not proceed with surgery as recommended by Dr. Eligio Aguilera. Unfortunately we do not have Joi Chavez listed on a HIPAA for the pt so I will not be able to discuss this matter with her personally. I will make [phone] aware of this situation.